# Patient Record
Sex: FEMALE | Race: WHITE | Employment: OTHER | URBAN - METROPOLITAN AREA
[De-identification: names, ages, dates, MRNs, and addresses within clinical notes are randomized per-mention and may not be internally consistent; named-entity substitution may affect disease eponyms.]

---

## 2017-02-13 ENCOUNTER — HOSPITAL ENCOUNTER (OUTPATIENT)
Dept: CT IMAGING | Age: 72
Discharge: HOME | End: 2017-02-13
Attending: INTERNAL MEDICINE | Admitting: INTERNAL MEDICINE

## 2017-02-13 DIAGNOSIS — R91.1 SOLITARY PULMONARY NODULE: ICD-10-CM

## 2017-02-13 DIAGNOSIS — N28.9 DISORDER OF KIDNEY: ICD-10-CM

## 2017-02-13 DIAGNOSIS — N28.9 DISORDER OF KIDNEY AND URETER: ICD-10-CM

## 2017-02-13 DIAGNOSIS — R91.1 PULMONARY NODULE: ICD-10-CM

## 2017-02-15 ENCOUNTER — OFFICE VISIT (OUTPATIENT)
Dept: PULMONOLOGY | Age: 72
End: 2017-02-15

## 2017-02-15 ENCOUNTER — TELEPHONE (OUTPATIENT)
Dept: PULMONOLOGY | Age: 72
End: 2017-02-15

## 2017-02-15 VITALS
WEIGHT: 148 LBS | BODY MASS INDEX: 23.78 KG/M2 | OXYGEN SATURATION: 95 % | DIASTOLIC BLOOD PRESSURE: 82 MMHG | HEART RATE: 69 BPM | SYSTOLIC BLOOD PRESSURE: 118 MMHG | RESPIRATION RATE: 20 BRPM | HEIGHT: 66 IN | TEMPERATURE: 98.3 F

## 2017-02-15 DIAGNOSIS — J30.9 ALLERGIC RHINITIS, UNSPECIFIED ALLERGIC RHINITIS TRIGGER, UNSPECIFIED RHINITIS SEASONALITY: ICD-10-CM

## 2017-02-15 DIAGNOSIS — Z01.812 PRE-PROCEDURE LAB EXAM: ICD-10-CM

## 2017-02-15 DIAGNOSIS — R91.8 PULMONARY NODULES: Primary | ICD-10-CM

## 2017-02-15 DIAGNOSIS — J45.901 ASTHMA WITH ACUTE EXACERBATION, UNSPECIFIED ASTHMA SEVERITY: ICD-10-CM

## 2017-02-15 DIAGNOSIS — R55 SYNCOPE, UNSPECIFIED SYNCOPE TYPE: ICD-10-CM

## 2017-02-15 PROCEDURE — 99214 OFFICE O/P EST MOD 30 MIN: CPT | Performed by: INTERNAL MEDICINE

## 2017-02-15 RX ORDER — PREDNISONE 10 MG/1
TABLET ORAL
Qty: 30 TABLET | Refills: 0 | Status: SHIPPED | OUTPATIENT
Start: 2017-02-15 | End: 2017-02-25

## 2017-02-15 RX ORDER — DOXYCYCLINE HYCLATE 100 MG
100 TABLET ORAL 2 TIMES DAILY
Qty: 10 TABLET | Refills: 0 | Status: SHIPPED | OUTPATIENT
Start: 2017-02-15 | End: 2017-02-20

## 2017-02-24 ENCOUNTER — TELEPHONE (OUTPATIENT)
Dept: PULMONOLOGY | Age: 72
End: 2017-02-24

## 2017-02-27 ENCOUNTER — HOSPITAL ENCOUNTER (OUTPATIENT)
Dept: SURGERY | Age: 72
Discharge: HOME | End: 2017-02-27
Attending: INTERNAL MEDICINE | Admitting: INTERNAL MEDICINE

## 2017-02-27 VITALS
BODY MASS INDEX: 24.49 KG/M2 | HEART RATE: 81 BPM | OXYGEN SATURATION: 94 % | TEMPERATURE: 97 F | HEIGHT: 65 IN | DIASTOLIC BLOOD PRESSURE: 66 MMHG | WEIGHT: 147 LBS | RESPIRATION RATE: 16 BRPM | SYSTOLIC BLOOD PRESSURE: 114 MMHG

## 2017-02-27 DIAGNOSIS — R91.1 LUNG NODULE: ICD-10-CM

## 2017-02-27 DIAGNOSIS — R59.0 HILAR ADENOPATHY: ICD-10-CM

## 2017-02-27 LAB
INR BLD: 0.97 (ref 0.85–1.15)
PLATELET # BLD: 252 K/UL (ref 135–450)
PROTHROMBIN TIME: 11 SEC (ref 9.6–13)

## 2017-02-27 PROCEDURE — 31624 DX BRONCHOSCOPE/LAVAGE: CPT | Performed by: INTERNAL MEDICINE

## 2017-02-27 PROCEDURE — 31628 BRONCHOSCOPY/LUNG BX EACH: CPT | Performed by: INTERNAL MEDICINE

## 2017-02-27 PROCEDURE — 31623 DX BRONCHOSCOPE/BRUSH: CPT | Performed by: INTERNAL MEDICINE

## 2017-02-27 PROCEDURE — 31627 NAVIGATIONAL BRONCHOSCOPY: CPT | Performed by: INTERNAL MEDICINE

## 2017-02-27 PROCEDURE — 31652 BRONCH EBUS SAMPLNG 1/2 NODE: CPT | Performed by: INTERNAL MEDICINE

## 2017-02-27 RX ORDER — HYDROMORPHONE HCL 110MG/55ML
0.25 PATIENT CONTROLLED ANALGESIA SYRINGE INTRAVENOUS EVERY 5 MIN PRN
Status: DISCONTINUED | OUTPATIENT
Start: 2017-02-27 | End: 2017-02-28 | Stop reason: HOSPADM

## 2017-02-27 RX ORDER — MEPERIDINE HYDROCHLORIDE 25 MG/ML
12.5 INJECTION INTRAMUSCULAR; INTRAVENOUS; SUBCUTANEOUS EVERY 5 MIN PRN
Status: DISCONTINUED | OUTPATIENT
Start: 2017-02-27 | End: 2017-02-28 | Stop reason: HOSPADM

## 2017-02-27 RX ORDER — LABETALOL HYDROCHLORIDE 5 MG/ML
5 INJECTION, SOLUTION INTRAVENOUS EVERY 10 MIN PRN
Status: DISCONTINUED | OUTPATIENT
Start: 2017-02-27 | End: 2017-02-28 | Stop reason: HOSPADM

## 2017-02-27 RX ORDER — SODIUM CHLORIDE, SODIUM LACTATE, POTASSIUM CHLORIDE, CALCIUM CHLORIDE 600; 310; 30; 20 MG/100ML; MG/100ML; MG/100ML; MG/100ML
INJECTION, SOLUTION INTRAVENOUS CONTINUOUS
Status: DISCONTINUED | OUTPATIENT
Start: 2017-02-27 | End: 2017-02-28 | Stop reason: HOSPADM

## 2017-02-27 RX ORDER — OXYCODONE HYDROCHLORIDE AND ACETAMINOPHEN 5; 325 MG/1; MG/1
2 TABLET ORAL PRN
Status: ACTIVE | OUTPATIENT
Start: 2017-02-27 | End: 2017-02-27

## 2017-02-27 RX ORDER — ONDANSETRON 2 MG/ML
4 INJECTION INTRAMUSCULAR; INTRAVENOUS EVERY 10 MIN PRN
Status: DISCONTINUED | OUTPATIENT
Start: 2017-02-27 | End: 2017-02-28 | Stop reason: HOSPADM

## 2017-02-27 RX ORDER — HYDROMORPHONE HCL 110MG/55ML
0.5 PATIENT CONTROLLED ANALGESIA SYRINGE INTRAVENOUS EVERY 5 MIN PRN
Status: DISCONTINUED | OUTPATIENT
Start: 2017-02-27 | End: 2017-02-28 | Stop reason: HOSPADM

## 2017-02-27 RX ORDER — HYDRALAZINE HYDROCHLORIDE 20 MG/ML
5 INJECTION INTRAMUSCULAR; INTRAVENOUS EVERY 10 MIN PRN
Status: DISCONTINUED | OUTPATIENT
Start: 2017-02-27 | End: 2017-02-28 | Stop reason: HOSPADM

## 2017-02-27 RX ORDER — OXYCODONE HYDROCHLORIDE AND ACETAMINOPHEN 5; 325 MG/1; MG/1
1 TABLET ORAL PRN
Status: ACTIVE | OUTPATIENT
Start: 2017-02-27 | End: 2017-02-27

## 2017-02-27 RX ADMIN — SODIUM CHLORIDE, SODIUM LACTATE, POTASSIUM CHLORIDE, CALCIUM CHLORIDE: 600; 310; 30; 20 INJECTION, SOLUTION INTRAVENOUS at 12:00

## 2017-02-27 ASSESSMENT — PAIN DESCRIPTION - DESCRIPTORS: DESCRIPTORS: ACHING

## 2017-02-27 ASSESSMENT — PAIN SCALES - GENERAL: PAINLEVEL_OUTOF10: 0

## 2017-02-27 ASSESSMENT — PAIN - FUNCTIONAL ASSESSMENT: PAIN_FUNCTIONAL_ASSESSMENT: 0-10

## 2017-03-01 LAB
CULTURE, RESPIRATORY: NORMAL
CULTURE, RESPIRATORY: NORMAL
GRAM STAIN RESULT: NORMAL
GRAM STAIN RESULT: NORMAL

## 2017-03-02 ENCOUNTER — OFFICE VISIT (OUTPATIENT)
Dept: PULMONOLOGY | Age: 72
End: 2017-03-02

## 2017-03-02 VITALS
HEIGHT: 66 IN | BODY MASS INDEX: 23.78 KG/M2 | HEART RATE: 68 BPM | OXYGEN SATURATION: 95 % | SYSTOLIC BLOOD PRESSURE: 109 MMHG | DIASTOLIC BLOOD PRESSURE: 73 MMHG | WEIGHT: 148 LBS | TEMPERATURE: 97.5 F | RESPIRATION RATE: 18 BRPM

## 2017-03-02 DIAGNOSIS — R91.1 PULMONARY NODULE: Primary | ICD-10-CM

## 2017-03-02 DIAGNOSIS — J30.9 ALLERGIC RHINITIS, UNSPECIFIED ALLERGIC RHINITIS TRIGGER, UNSPECIFIED RHINITIS SEASONALITY: ICD-10-CM

## 2017-03-02 PROCEDURE — 99214 OFFICE O/P EST MOD 30 MIN: CPT | Performed by: INTERNAL MEDICINE

## 2017-03-02 RX ORDER — ALBUTEROL SULFATE 90 UG/1
2 AEROSOL, METERED RESPIRATORY (INHALATION) EVERY 6 HOURS PRN
Qty: 3 INHALER | Refills: 1 | Status: SHIPPED | OUTPATIENT
Start: 2017-03-02 | End: 2017-11-16 | Stop reason: SDUPTHER

## 2017-03-02 RX ORDER — AZELASTINE 1 MG/ML
2 SPRAY, METERED NASAL 2 TIMES DAILY
Qty: 3 BOTTLE | Refills: 1 | Status: SHIPPED | OUTPATIENT
Start: 2017-03-02 | End: 2018-05-03 | Stop reason: ALTCHOICE

## 2017-03-02 RX ORDER — FLUTICASONE PROPIONATE 50 MCG
SPRAY, SUSPENSION (ML) NASAL
Qty: 3 BOTTLE | Refills: 1 | Status: SHIPPED | OUTPATIENT
Start: 2017-03-02 | End: 2017-11-16 | Stop reason: SDUPTHER

## 2017-03-02 RX ORDER — MONTELUKAST SODIUM 10 MG/1
10 TABLET ORAL NIGHTLY
Qty: 90 TABLET | Refills: 1 | Status: SHIPPED | OUTPATIENT
Start: 2017-03-02 | End: 2018-05-03 | Stop reason: SDUPTHER

## 2017-04-03 LAB
FUNGUS (MYCOLOGY) CULTURE: NORMAL
FUNGUS (MYCOLOGY) CULTURE: NORMAL
FUNGUS STAIN: NORMAL
FUNGUS STAIN: NORMAL

## 2017-04-18 LAB
AFB CULTURE (MYCOBACTERIA): NORMAL
AFB CULTURE (MYCOBACTERIA): NORMAL
AFB SMEAR: NORMAL
AFB SMEAR: NORMAL

## 2017-05-15 ENCOUNTER — HOSPITAL ENCOUNTER (OUTPATIENT)
Dept: CT IMAGING | Age: 72
Discharge: HOME | End: 2017-05-15
Attending: INTERNAL MEDICINE | Admitting: INTERNAL MEDICINE

## 2017-05-15 DIAGNOSIS — R91.1 PULMONARY NODULE: ICD-10-CM

## 2017-05-15 DIAGNOSIS — R91.1 SOLITARY PULMONARY NODULE: ICD-10-CM

## 2017-06-05 ENCOUNTER — OFFICE VISIT (OUTPATIENT)
Dept: PULMONOLOGY | Age: 72
End: 2017-06-05

## 2017-06-05 VITALS
BODY MASS INDEX: 24.16 KG/M2 | RESPIRATION RATE: 16 BRPM | SYSTOLIC BLOOD PRESSURE: 100 MMHG | TEMPERATURE: 97.9 F | HEART RATE: 72 BPM | WEIGHT: 145 LBS | DIASTOLIC BLOOD PRESSURE: 66 MMHG | HEIGHT: 65 IN | OXYGEN SATURATION: 98 %

## 2017-06-05 DIAGNOSIS — R91.1 PULMONARY NODULE: Primary | ICD-10-CM

## 2017-06-05 DIAGNOSIS — Z23 NEED FOR VACCINATION WITH 13-POLYVALENT PNEUMOCOCCAL CONJUGATE VACCINE: ICD-10-CM

## 2017-06-05 DIAGNOSIS — J45.31 MILD PERSISTENT ASTHMA WITH ACUTE EXACERBATION: ICD-10-CM

## 2017-06-05 DIAGNOSIS — Z87.891 FORMER SMOKER: ICD-10-CM

## 2017-06-05 DIAGNOSIS — J30.9 ALLERGIC RHINITIS, UNSPECIFIED ALLERGIC RHINITIS TRIGGER, UNSPECIFIED RHINITIS SEASONALITY: ICD-10-CM

## 2017-06-05 PROCEDURE — G0009 ADMIN PNEUMOCOCCAL VACCINE: HCPCS | Performed by: INTERNAL MEDICINE

## 2017-06-05 PROCEDURE — 90670 PCV13 VACCINE IM: CPT | Performed by: INTERNAL MEDICINE

## 2017-06-05 PROCEDURE — 99214 OFFICE O/P EST MOD 30 MIN: CPT | Performed by: INTERNAL MEDICINE

## 2017-06-05 RX ORDER — PREDNISONE 10 MG/1
TABLET ORAL
Qty: 30 TABLET | Refills: 0 | Status: SHIPPED | OUTPATIENT
Start: 2017-06-05 | End: 2017-06-15

## 2017-07-14 ENCOUNTER — TELEPHONE (OUTPATIENT)
Dept: PULMONOLOGY | Age: 72
End: 2017-07-14

## 2017-08-21 ENCOUNTER — HOSPITAL ENCOUNTER (OUTPATIENT)
Dept: GENERAL RADIOLOGY | Age: 72
Discharge: HOME | End: 2017-08-21
Attending: INTERNAL MEDICINE | Admitting: INTERNAL MEDICINE

## 2017-08-21 DIAGNOSIS — M81.0 AGE-RELATED OSTEOPOROSIS WITHOUT CURRENT PATHOLOGICAL FRACTURE: ICD-10-CM

## 2017-08-21 DIAGNOSIS — M81.0 OSTEOPOROSIS, UNSPECIFIED OSTEOPOROSIS TYPE, UNSPECIFIED PATHOLOGICAL FRACTURE PRESENCE: ICD-10-CM

## 2017-11-15 ENCOUNTER — TELEPHONE (OUTPATIENT)
Dept: PULMONOLOGY | Age: 72
End: 2017-11-15

## 2017-11-15 ENCOUNTER — OFFICE VISIT (OUTPATIENT)
Dept: PULMONOLOGY | Age: 72
End: 2017-11-15

## 2017-11-15 ENCOUNTER — HOSPITAL ENCOUNTER (OUTPATIENT)
Dept: CT IMAGING | Age: 72
Discharge: HOME | End: 2017-11-15
Attending: INTERNAL MEDICINE | Admitting: INTERNAL MEDICINE

## 2017-11-15 VITALS
DIASTOLIC BLOOD PRESSURE: 76 MMHG | TEMPERATURE: 97.2 F | RESPIRATION RATE: 22 BRPM | WEIGHT: 153.4 LBS | BODY MASS INDEX: 25.56 KG/M2 | HEIGHT: 65 IN | OXYGEN SATURATION: 96 % | HEART RATE: 58 BPM | SYSTOLIC BLOOD PRESSURE: 116 MMHG

## 2017-11-15 DIAGNOSIS — R91.1 LUNG NODULE: Primary | ICD-10-CM

## 2017-11-15 DIAGNOSIS — R91.1 SOLITARY PULMONARY NODULE: ICD-10-CM

## 2017-11-15 DIAGNOSIS — J45.909 PERSISTENT ASTHMA WITHOUT COMPLICATION, UNSPECIFIED ASTHMA SEVERITY: ICD-10-CM

## 2017-11-15 DIAGNOSIS — R91.1 PULMONARY NODULE: ICD-10-CM

## 2017-11-15 PROCEDURE — 1123F ACP DISCUSS/DSCN MKR DOCD: CPT | Performed by: INTERNAL MEDICINE

## 2017-11-15 PROCEDURE — G8484 FLU IMMUNIZE NO ADMIN: HCPCS | Performed by: INTERNAL MEDICINE

## 2017-11-15 PROCEDURE — 3017F COLORECTAL CA SCREEN DOC REV: CPT | Performed by: INTERNAL MEDICINE

## 2017-11-15 PROCEDURE — G8400 PT W/DXA NO RESULTS DOC: HCPCS | Performed by: INTERNAL MEDICINE

## 2017-11-15 PROCEDURE — 4040F PNEUMOC VAC/ADMIN/RCVD: CPT | Performed by: INTERNAL MEDICINE

## 2017-11-15 PROCEDURE — 99214 OFFICE O/P EST MOD 30 MIN: CPT | Performed by: INTERNAL MEDICINE

## 2017-11-15 PROCEDURE — G8427 DOCREV CUR MEDS BY ELIG CLIN: HCPCS | Performed by: INTERNAL MEDICINE

## 2017-11-15 PROCEDURE — 1090F PRES/ABSN URINE INCON ASSESS: CPT | Performed by: INTERNAL MEDICINE

## 2017-11-15 PROCEDURE — G8419 CALC BMI OUT NRM PARAM NOF/U: HCPCS | Performed by: INTERNAL MEDICINE

## 2017-11-15 PROCEDURE — 4004F PT TOBACCO SCREEN RCVD TLK: CPT | Performed by: INTERNAL MEDICINE

## 2017-11-15 PROCEDURE — 3014F SCREEN MAMMO DOC REV: CPT | Performed by: INTERNAL MEDICINE

## 2017-11-15 RX ORDER — CITALOPRAM 20 MG/1
20 TABLET ORAL
COMMUNITY
Start: 2017-01-17

## 2017-11-15 NOTE — COMMUNICATION BODY
Assessment:      · Growing RUL nodule now 8 mm on CT chest 11/15/2017. 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 CT chest 8/12/16 showed stability with calcification. ? Growth on CT chest 2/13/2017 with hilar adenopathy. Negative EBUS TBNA of the LN 2/27/2017. Stable on repeat CT chest 11/15/2017. Overall fever granulomatous disease  · Persistent Asthma. Unable to afford Xolair  · Allergic rhinitis  · 54 pack years smoking-quit 5/2017   · Pulmonary nodules stable between 5/2007 and 4/2009. Likely granulomas. No further evaluation needed  · History of [address] [place]:      · Patient is not interested in chemotherapy or surgery if she is to have malignancy. She might consider radiation therapy. We'll refer to Dr. Yovana Dimas SBRT evaluation   · Decrease Advair 250/50 BID   · Albuterol 2 puffs Q4-6 hrs PRN  · Singulair, Flonase and Astelin   · Patient is up to date with Pneumococcal vaccine.    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

## 2017-11-15 NOTE — LETTER
[facility] Pulmonary, Critical Care, and Sleep  [address], [address] [place] [address]  Phone: [phone]  Fax: [phone]     November 15, 2017     Patient: Odessa Fitzpatrick   MR Number: [id number]   YOB: 1945   Date of Visit: 11/15/2017     Dear Dr. Justino Braun:    Thank you for the request for consultation for [place] to me for the evaluation of pulmonary nodule. Below are the relevant portions of my assessment and plan of care. Assessment:      · Growing RUL nodule now 8 mm on CT chest 11/15/2017. 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 CT chest 8/12/16 showed stability with calcification. ? Growth on CT chest 2/13/2017 with hilar adenopathy. Negative EBUS TBNA of the LN 2/27/2017. Stable on repeat CT chest 11/15/2017. Overall fever granulomatous disease  · Persistent Asthma. Unable to afford Xolair  · Allergic rhinitis  · 54 pack years smoking-quit 5/2017   · Pulmonary nodules stable between 5/2007 and 4/2009. Likely granulomas. No further evaluation needed  · History of [address] [place]:      · Patient is not interested in chemotherapy or surgery if she is to have malignancy. She might consider radiation therapy. We'll refer to Dr. Cipriano Beltran SBRT evaluation   · Decrease Advair 250/50 BID   · Albuterol 2 puffs Q4-6 hrs PRN  · Singulair, Flonase and Astelin   · Patient is up to date with Pneumococcal vaccine. 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 I look forward to following Kuldeep Vang along with you.     Sincerely,      Braulio Reeder MD

## 2017-11-15 NOTE — PROGRESS NOTES
[facility]    Outpatient Follow Up Note      CHIEF COMPLAINT: Follow up CT       HPI:   CT chest reviewed by me and noted below. Results were dicussed with patient and multiple good questions were answered. Breathing is fine. Clear sputum. Patient is compliant with inhaled bronchodilators. Does not like the Advair 500/50 and wants to go back to 250/50. Not needing to use her rescue inhaler, albuterol. 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)     Hyperlipidemia     Lock jaw     Pneumonia     Post-nasal drip     Pulmonary nodule     bi lateral    Rash     itchy, bumps    Reflex sympathetic dystrophy     RSD (reflex sympathetic dystrophy)     TMJ (dislocation of temporomandibular joint)        Past Surgical History:        Procedure Laterality Date    APPENDECTOMY      BRONCHOSCOPY  2008    COLONOSCOPY  11/15/2012    1 snared polyp    HYSTERECTOMY      TONSILLECTOMY         Allergies:  is allergic to latex; iodine; penicillins; sulfa antibiotics; tetanus toxoids; clindamycin/lincomycin; influenza vaccines; neurontin [gabapentin]; singulair [montelukast]; albuterol; codeine; and spiriva handihaler [tiotropium bromide monohydrate]. Social History:    TOBACCO:   reports that she has been smoking Cigarettes. She has a 12.75 pack-year smoking history. She has never used smokeless tobacco.  ETOH:   reports that she does not drink alcohol.       Family History:       Problem Relation Age of Onset    Diabetes Brother     Cancer Mother     Colon Cancer Mother     Asthma Neg Hx     Emphysema Neg Hx     Heart Failure Neg Hx     Hypertension Neg Hx        Current Medications:    Current Outpatient Prescriptions:     citalopram (CELEXA) 20 MG tablet, Take 20 mg by mouth, Disp: , Rfl:     fluticasone-salmeterol (ADVAIR) 500-50 MCG/DOSE diskus inhaler, Inhale 1 puff into the lungs every 12 hours, Disp: 2 Inhaler, Rfl: 0    albuterol sulfate HFA (VENTOLIN HFA) 108 (90 BASE) MCG/ACT inhaler, Inhale 2 puffs into the lungs every 6 hours as needed for Wheezing or Shortness of Breath, Disp: 3 Inhaler, Rfl: 1    montelukast (SINGULAIR) 10 MG tablet, Take 1 tablet by mouth nightly, Disp: 90 tablet, Rfl: 1    fluticasone (FLONASE) 50 MCG/ACT nasal spray, 1 SPRAY IN EACH NOSTRIL DAILY, Disp: 3 Bottle, Rfl: 1    azelastine (ASTELIN) 0.1 % nasal spray, 2 sprays by Nasal route 2 times daily Use in each nostril as directed, Disp: 3 Bottle, Rfl: 1    escitalopram (LEXAPRO) 10 MG tablet, Take 10 mg by mouth daily, Disp: , Rfl:     betamethasone dipropionate (DIPROLENE) 0.05 % cream, Apply topically as needed Apply topically 2 times daily. , Disp: , Rfl:     clobetasol (TEMOVATE) 0.05 % ointment, Apply topically 2 times daily Apply topically 2 times daily. , Disp: , Rfl:     pravastatin (PRAVACHOL) 40 MG tablet, Take 1 tablet by mouth daily. , Disp: , Rfl:     guaiFENesin (MUCINEX) 600 MG SR tablet, Take 1,200 mg by mouth 2 times daily as needed , Disp: , Rfl:     DiphenhydrAMINE HCl (BENADRYL PO), Take 1 tablet by mouth daily , Disp: , Rfl:       Objective:     /76 (Site: Left Arm, Position: Sitting, Cuff Size: Medium Adult)   Pulse 58   Temp 97.2 °F (36.2 °C) (Oral)   Resp 22   Ht 5' 5\" (1.651 m)   Wt 153 lb 6.4 oz (69.6 kg)   SpO2 96% Comment: on room air  BMI 25.53 kg/m²  on RA  Gen: No distress. Eyes: PERRL. No sclera icterus. No conjunctival injection. ENT: No discharge. Pharynx clear. Neck: Trachea midline. No obvious mass. Resp: No accessory muscle use. No crackles. No wheezes. No rhonchi. No dullness on percussion. CV: Regular rate. Regular rhythm. No murmur or rub. No edema. GI: Non-tender. Non-distended. No hernia. Skin: Warm and dry.  No nodule on exposed extremities. Lymph: No cervical LAD. No supraclavicular LAD. M/S: No cyanosis. No joint deformity. No clubbing. Neuro: Awake. Alert. Moves all four extremities. Psych: Oriented x 3. No anxiety. DATA reviewed by me:   PFTs 02/17/2016 FEV1 1.78L(78%) TLC 5.09L(98%)   DLCO 15.97 (70%) 6MW 1120 F LO2 91%   PFTs 07/10/2012 FEV1 1.91L(90%) TLC 5.04L(101%) DLCO 16.30 (90%)  PFTs 03/17/2011 FEV1 1.77L           TLC 4.77L             DLCO 16.72  PFTs 04/15/2008 FEV1 1.82L           TLC 4.87L             DLCO 19.24      CT chest 11/15/2017  images reviewed by me and showed:   Mediastinum: No mediastinal adenopathy. Thoracic aorta normal in caliber. No pericardial effusion. Lungs/pleura: No significant change in size in appearance of 2 adjacent nodules in the left upper lobe. Interval increase in size of a nodule in the lateral right upper lobe, now measuring 8 mm in greatest axial dimension, was 5.8 mm. This nodule has irregular margins. Additional small nodules scattered throughout both lungs are not significantly changed. There is scarring in the lung bases. Pleural spaces are clear   pper Abdomen: Stable hyperdense left renal lesion Soft Tissues/Bones: No suspicious bone lesion         Bronch 2/27/2017:  Hilar LAD: No phenotypic evidence of non-Hodgkin lymphoma. A. Lymph Node, 12L, Transbronchial Fine Needle Aspirate: No malignant cells identified. B. Lung, Left Lower Lobe Nodule Brushings and Brush Tip: No malignant cells identified. A. Lung, Bronchial Washing: No malignant cells identified. B. Lung, Bronchoalveolar Lavage to Left Lower Lobe: No malignant cells identified. Assessment:      · Growing RUL nodule now 8 mm on CT chest 11/15/2017. 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 CT chest 8/12/16 showed stability with calcification. ? Growth on CT chest 2/13/2017 with hilar adenopathy.  Negative EBUS TBNA of the LN 2/27/2017. Stable on repeat CT chest 11/15/2017. Overall fever granulomatous disease  · Persistent Asthma. Unable to afford Xolair  · Allergic rhinitis  · 54 pack years smoking-quit 5/2017   · Pulmonary nodules stable between 5/2007 and 4/2009. Likely granulomas. No further evaluation needed  · History of [address] [place]:      · Patient is not interested in chemotherapy or surgery if she is to have malignancy. She might consider radiation therapy. We'll refer to Dr. Gisel Barragan SBRT evaluation   · Decrease Advair 250/50 BID   · Albuterol 2 puffs Q4-6 hrs PRN  · Singulair, Flonase and Astelin   · Patient is up to date with Pneumococcal vaccine.    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

## 2017-11-16 RX ORDER — FLUTICASONE PROPIONATE 50 MCG
SPRAY, SUSPENSION (ML) NASAL
Qty: 3 BOTTLE | Refills: 1 | Status: SHIPPED | OUTPATIENT
Start: 2017-11-16 | End: 2018-05-03 | Stop reason: SDUPTHER

## 2017-11-16 RX ORDER — ALBUTEROL SULFATE 90 UG/1
2 AEROSOL, METERED RESPIRATORY (INHALATION) EVERY 6 HOURS PRN
Qty: 3 INHALER | Refills: 1 | Status: SHIPPED | OUTPATIENT
Start: 2017-11-16 | End: 2018-05-03 | Stop reason: SDUPTHER

## 2017-11-16 NOTE — TELEPHONE ENCOUNTER
Pt called asking for 90 day supply to be sent to pharmacy. Pt wants to be notified when scripts have been sent.
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
## 2017-11-30 PROBLEM — S22.20XA CLOSED FRACTURE OF STERNUM: Status: ACTIVE | Noted: 2017-11-30

## 2017-11-30 PROBLEM — S32.019A CLOSED FRACTURE OF FIRST LUMBAR VERTEBRA (HCC): Status: ACTIVE | Noted: 2017-11-30

## 2017-11-30 PROBLEM — S22.089A CLOSED FRACTURE OF TWELFTH THORACIC VERTEBRA (HCC): Status: ACTIVE | Noted: 2017-11-30

## 2018-04-23 ENCOUNTER — TELEPHONE (OUTPATIENT)
Dept: PULMONOLOGY | Age: 73
End: 2018-04-23

## 2018-05-03 ENCOUNTER — OFFICE VISIT (OUTPATIENT)
Dept: PULMONOLOGY | Age: 73
End: 2018-05-03

## 2018-05-03 VITALS
DIASTOLIC BLOOD PRESSURE: 74 MMHG | OXYGEN SATURATION: 94 % | WEIGHT: 148.8 LBS | HEIGHT: 65 IN | HEART RATE: 70 BPM | BODY MASS INDEX: 24.79 KG/M2 | TEMPERATURE: 97.4 F | RESPIRATION RATE: 22 BRPM | SYSTOLIC BLOOD PRESSURE: 126 MMHG

## 2018-05-03 DIAGNOSIS — R91.8 PULMONARY NODULES: Primary | ICD-10-CM

## 2018-05-03 DIAGNOSIS — J45.909 PERSISTENT ASTHMA, UNSPECIFIED ASTHMA SEVERITY, UNSPECIFIED WHETHER COMPLICATED: ICD-10-CM

## 2018-05-03 DIAGNOSIS — J30.9 ALLERGIC RHINITIS, UNSPECIFIED CHRONICITY, UNSPECIFIED SEASONALITY, UNSPECIFIED TRIGGER: ICD-10-CM

## 2018-05-03 DIAGNOSIS — Z87.891 FORMER SMOKER: ICD-10-CM

## 2018-05-03 PROCEDURE — 99214 OFFICE O/P EST MOD 30 MIN: CPT | Performed by: INTERNAL MEDICINE

## 2018-05-03 RX ORDER — ALBUTEROL SULFATE 90 UG/1
2 AEROSOL, METERED RESPIRATORY (INHALATION) EVERY 6 HOURS PRN
Qty: 3 INHALER | Refills: 1 | Status: SHIPPED | OUTPATIENT
Start: 2018-05-03 | End: 2018-11-12 | Stop reason: SDUPTHER

## 2018-05-03 RX ORDER — MONTELUKAST SODIUM 10 MG/1
10 TABLET ORAL NIGHTLY
Qty: 90 TABLET | Refills: 1 | Status: SHIPPED | OUTPATIENT
Start: 2018-05-03 | End: 2018-11-12 | Stop reason: SDUPTHER

## 2018-05-03 RX ORDER — FLUTICASONE PROPIONATE 50 MCG
SPRAY, SUSPENSION (ML) NASAL
Qty: 3 BOTTLE | Refills: 1 | Status: SHIPPED | OUTPATIENT
Start: 2018-05-03 | End: 2018-11-12 | Stop reason: SDUPTHER

## 2018-06-11 ENCOUNTER — HOSPITAL ENCOUNTER (OUTPATIENT)
Dept: CT IMAGING | Age: 73
Discharge: HOME | End: 2018-06-11
Attending: RADIOLOGY | Admitting: RADIOLOGY

## 2018-06-11 DIAGNOSIS — R91.1 SOLITARY PULMONARY NODULE: ICD-10-CM

## 2018-06-11 DIAGNOSIS — R91.1 LUNG NODULE: ICD-10-CM

## 2018-11-12 ENCOUNTER — OFFICE VISIT (OUTPATIENT)
Dept: PULMONOLOGY | Age: 73
End: 2018-11-12
Payer: COMMERCIAL

## 2018-11-12 VITALS
BODY MASS INDEX: 27.16 KG/M2 | HEART RATE: 62 BPM | SYSTOLIC BLOOD PRESSURE: 148 MMHG | HEIGHT: 65 IN | WEIGHT: 163 LBS | TEMPERATURE: 97.9 F | DIASTOLIC BLOOD PRESSURE: 80 MMHG | OXYGEN SATURATION: 97 % | RESPIRATION RATE: 18 BRPM

## 2018-11-12 DIAGNOSIS — R91.8 PULMONARY NODULES: Primary | ICD-10-CM

## 2018-11-12 DIAGNOSIS — J45.998 PERSISTENT ASTHMA WITH UNDETERMINED SEVERITY: ICD-10-CM

## 2018-11-12 DIAGNOSIS — Z87.891 PERSONAL HISTORY OF TOBACCO USE: ICD-10-CM

## 2018-11-12 DIAGNOSIS — J30.9 ALLERGIC RHINITIS, UNSPECIFIED SEASONALITY, UNSPECIFIED TRIGGER: ICD-10-CM

## 2018-11-12 PROCEDURE — 99214 OFFICE O/P EST MOD 30 MIN: CPT | Performed by: INTERNAL MEDICINE

## 2018-11-12 RX ORDER — TIZANIDINE 4 MG/1
4 TABLET ORAL EVERY 6 HOURS PRN
COMMUNITY
End: 2020-08-12

## 2018-11-12 RX ORDER — NAPROXEN 500 MG/1
500 TABLET ORAL 2 TIMES DAILY WITH MEALS
COMMUNITY
End: 2020-02-25 | Stop reason: ALTCHOICE

## 2018-11-12 RX ORDER — ALBUTEROL SULFATE 90 UG/1
2 AEROSOL, METERED RESPIRATORY (INHALATION) EVERY 6 HOURS PRN
Qty: 3 INHALER | Refills: 1 | Status: SHIPPED | OUTPATIENT
Start: 2018-11-12 | End: 2019-03-25 | Stop reason: SDUPTHER

## 2018-11-12 RX ORDER — FEXOFENADINE HCL 180 MG/1
180 TABLET ORAL DAILY
COMMUNITY

## 2018-11-12 RX ORDER — MONTELUKAST SODIUM 10 MG/1
10 TABLET ORAL NIGHTLY
Qty: 90 TABLET | Refills: 1 | Status: SHIPPED | OUTPATIENT
Start: 2018-11-12 | End: 2019-03-25 | Stop reason: SDUPTHER

## 2018-11-12 RX ORDER — GABAPENTIN 300 MG/1
CAPSULE ORAL
COMMUNITY
Start: 2018-10-31

## 2018-11-12 RX ORDER — FLUTICASONE PROPIONATE 50 MCG
SPRAY, SUSPENSION (ML) NASAL
Qty: 3 BOTTLE | Refills: 1 | Status: SHIPPED | OUTPATIENT
Start: 2018-11-12 | End: 2019-03-25 | Stop reason: SDUPTHER

## 2018-11-12 NOTE — PROGRESS NOTES
[facility]    Outpatient Follow Up Note      CHIEF COMPLAINT: Follow up asthma       HPI:   Evaluated by Dr. Casiano Sic and elected to follow up radiaographically   Uses Advair BID   Not needing to use the rescue inhaler- Albuterol   Some cough with occasional sputum - clear   No smoking         Past Medical History:   Diagnosis Date    Arthritis     Asthma     Bronchitis chronic     Colon polyp     [facility]     [facility] lung [facility]     Guillain-[place] ([facility]     Hyperlipidemia     Lock jaw     Pneumonia     Post-nasal drip     Pulmonary nodule     bi lateral    Rash     itchy, bumps    Reflex sympathetic dystrophy     RSD (reflex sympathetic dystrophy)     TMJ (dislocation of temporomandibular joint)        Past Surgical History:        Procedure Laterality Date    APPENDECTOMY      BRONCHOSCOPY  2008    COLONOSCOPY  11/15/2012    1 snared polyp    HYSTERECTOMY      TONSILLECTOMY         Allergies:  is allergic to latex; iodine; penicillins; sulfa antibiotics; tetanus toxoids; clindamycin/lincomycin; influenza vaccines; neurontin [gabapentin]; singulair [montelukast]; albuterol; codeine; and spiriva handihaler [tiotropium bromide monohydrate]. Social History:    TOBACCO:   reports that she quit smoking about a year ago. Her smoking use included Cigarettes. She has a 12.75 pack-year smoking history. She has never used smokeless tobacco.  ETOH:   reports that she drinks alcohol.       Family History:   Family History   Problem Relation Age of Onset    Diabetes Brother     Cancer Mother     Colon Cancer Mother     Asthma Neg Hx     Emphysema Neg Hx     Heart Failure Neg Hx     Hypertension Neg Hx        Current Medications:    Current Outpatient Prescriptions:     Cholecalciferol (VITAMIN D3 PO), Take by mouth, Disp: , Rfl:     tiZANidine (ZANAFLEX) 4 MG tablet, Take 4 mg by mouth every 6 hours as needed, Disp: , reviewed by me:   PFTs 02/17/2016 FEV1 1.78L(78%) TLC 5.09L(98%)   DLCO 15.97 (70%) 6MW 1120 F LO2 91%   PFTs 07/10/2012 FEV1 1.91L(90%) TLC 5.04L(101%) DLCO 16.30 (90%)  PFTs 03/17/2011 FEV1 1.77L           TLC 4.77L             DLCO 16.72  PFTs 04/15/2008 FEV1 1.82L           TLC 4.87L             DLCO 19.24          CT chest 6/11/18 imaging reviewed by me and showed  Unchanged left lower lobe right lower lobe pulmonary nodules since every 2018  No adenopathy        Bronch 2/27/2018: Hilar LAD: No phenotypic evidence of non-Hodgkin lymphoma. A. Lymph Node, 12L, Transbronchial Fine Needle Aspirate: No malignant cells identified. B. Lung, Left Lower Lobe Nodule Brushings and Brush Tip: No malignant cells identified. A. Lung, Bronchial Washing: No malignant cells identified. B. Lung, Bronchoalveolar Lavage to Left Lower Lobe: No malignant cells identified. Assessment:      · RUL nodule now 8 mm on CT chest 11/15/2017. Smaller on repeat CT chest 6/11/2018   · Lobulated LLL pulmonary nodule on CT chest 2/3/2016 (SUV of 1.48). CT chest 8/12/16 showed stability with calcification. ? Growth on CT chest 2/13/2017 with hilar adenopathy. Negative EBUS TBNA of the LN 2/27/2017. Stable on repeat CT chest 11/15/2017. Overall fever granulomatous disease  · Moderate persistent Asthma. Unable to afford Xolair  · Allergic rhinitis  · 54 pack years smoking-quit 11/2017   · Pulmonary nodules stable between 5/2007 and 4/2009. Likely granulomas. No further evaluation needed  · History of Guillain Phi       Plan:      Advair and Albuterol 2 puffs Q4-6 hrs PRN  Singulair, Flonase and Astelin   Patient is up to date with Pneumococcal vaccine.    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

## 2019-02-15 ENCOUNTER — HOSPITAL ENCOUNTER (OUTPATIENT)
Dept: CT IMAGING | Age: 74
Discharge: HOME | End: 2019-02-15
Payer: MEDICARE

## 2019-02-15 DIAGNOSIS — R91.8 LUNG MASS: ICD-10-CM

## 2019-02-15 DIAGNOSIS — R91.1 PULMONARY NODULE: ICD-10-CM

## 2019-02-15 PROCEDURE — 71250 CT THORAX DX C-: CPT

## 2019-03-25 ENCOUNTER — OFFICE VISIT (OUTPATIENT)
Dept: PULMONOLOGY | Age: 74
End: 2019-03-25
Payer: MEDICARE

## 2019-03-25 VITALS
OXYGEN SATURATION: 96 % | HEART RATE: 70 BPM | DIASTOLIC BLOOD PRESSURE: 78 MMHG | TEMPERATURE: 98 F | SYSTOLIC BLOOD PRESSURE: 120 MMHG | WEIGHT: 170.4 LBS | BODY MASS INDEX: 28.39 KG/M2 | HEIGHT: 65 IN | RESPIRATION RATE: 24 BRPM

## 2019-03-25 DIAGNOSIS — Z87.891 PERSONAL HISTORY OF TOBACCO USE: ICD-10-CM

## 2019-03-25 DIAGNOSIS — J45.41 MODERATE PERSISTENT ASTHMA WITH ACUTE EXACERBATION: Primary | ICD-10-CM

## 2019-03-25 DIAGNOSIS — R91.8 PULMONARY NODULES: ICD-10-CM

## 2019-03-25 DIAGNOSIS — J30.9 ALLERGIC RHINITIS, UNSPECIFIED SEASONALITY, UNSPECIFIED TRIGGER: ICD-10-CM

## 2019-03-25 PROCEDURE — 99214 OFFICE O/P EST MOD 30 MIN: CPT | Performed by: INTERNAL MEDICINE

## 2019-03-25 RX ORDER — ALBUTEROL SULFATE 90 UG/1
2 AEROSOL, METERED RESPIRATORY (INHALATION) EVERY 6 HOURS PRN
Qty: 3 INHALER | Refills: 1 | Status: SHIPPED | OUTPATIENT
Start: 2019-03-25 | End: 2020-02-03 | Stop reason: SDUPTHER

## 2019-03-25 RX ORDER — AZELASTINE 1 MG/ML
2 SPRAY, METERED NASAL 2 TIMES DAILY
Qty: 3 BOTTLE | Refills: 1 | Status: SHIPPED | OUTPATIENT
Start: 2019-03-25 | End: 2020-02-03 | Stop reason: SDUPTHER

## 2019-03-25 RX ORDER — FLUTICASONE PROPIONATE 50 MCG
SPRAY, SUSPENSION (ML) NASAL
Qty: 3 BOTTLE | Refills: 1 | Status: SHIPPED | OUTPATIENT
Start: 2019-03-25 | End: 2020-02-03 | Stop reason: SDUPTHER

## 2019-03-25 RX ORDER — IPRATROPIUM BROMIDE AND ALBUTEROL SULFATE 2.5; .5 MG/3ML; MG/3ML
1 SOLUTION RESPIRATORY (INHALATION) EVERY 6 HOURS PRN
Qty: 120 VIAL | Refills: 6 | Status: SHIPPED | OUTPATIENT
Start: 2019-03-25 | End: 2020-08-12 | Stop reason: SDUPTHER

## 2019-03-25 RX ORDER — MONTELUKAST SODIUM 10 MG/1
10 TABLET ORAL NIGHTLY
Qty: 90 TABLET | Refills: 1 | Status: SHIPPED | OUTPATIENT
Start: 2019-03-25 | End: 2019-11-18 | Stop reason: SDUPTHER

## 2019-03-25 RX ORDER — PREDNISONE 10 MG/1
TABLET ORAL
Qty: 30 TABLET | Refills: 0 | Status: SHIPPED | OUTPATIENT
Start: 2019-03-25 | End: 2019-04-04

## 2019-03-25 RX ORDER — LISINOPRIL 10 MG/1
10 TABLET ORAL DAILY
COMMUNITY

## 2019-05-01 ENCOUNTER — TELEPHONE (OUTPATIENT)
Dept: PULMONOLOGY | Age: 74
End: 2019-05-01

## 2019-05-01 NOTE — TELEPHONE ENCOUNTER
Patient called cancelled appt for 5/21/19 6 mo f/u Dr Leslie Friedman erasmo 7/17/19 @ 10:30am.      3//25/19      Assessment:   · Moderate persistent Asthma with AE. Unable to afford Xolair  · Allergic rhinitis  · 54 pack years smoking-quit 11/2017   · RUL nodule now 8 mm on CT chest 11/15/2017. Smaller on repeat CT chest 6/11/2018. No suspicious nodules on repeat CT 2/15/19. · Lobulated LLL pulmonary nodule on CT chest 2/3/2016 (SUV of 1.48). CT chest 8/12/16 showed stability with calcification. ? Growth on CT chest 2/13/2017 with hilar adenopathy. Negative EBUS TBNA of the LN 2/27/2017. Stable on repeat CT chest 11/15/2017. Overall fever granulomatous disease  · Pulmonary nodules stable between 5/2007 and 4/2009. Likely granulomas. No further evaluation needed  · History of Guillain Phi                 Plan:   · Prednisone taper   · Advair and Albuterol 2 puffs Q4-6 hrs PRN  · Trial of DuoNeb QID PRN   · Singulair, Flonase and Astelin   · Patient is up to date with Pneumococcal vaccine.    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

## 2019-07-15 ENCOUNTER — TELEPHONE (OUTPATIENT)
Dept: PULMONOLOGY | Age: 74
End: 2019-07-15

## 2019-07-15 NOTE — TELEPHONE ENCOUNTER
Patient cancelled appointment on 7/17/19 with  for 6 month follow-up. Reason: did not give one    Patient did not reschedule appointment. Patient declined to reschedule      Last OV   Assessment:3/25/19   · Moderate persistent Asthma with AE. Unable to afford Xolair  · Allergic rhinitis  · 54 pack years smoking-quit 11/2017   · RUL nodule now 8 mm on CT chest 11/15/2017. Smaller on repeat CT chest 6/11/2018. No suspicious nodules on repeat CT 2/15/19. · Lobulated LLL pulmonary nodule on CT chest 2/3/2016 (SUV of 1.48). CT chest 8/12/16 showed stability with calcification. ? Growth on CT chest 2/13/2017 with hilar adenopathy. Negative EBUS TBNA of the LN 2/27/2017. Stable on repeat CT chest 11/15/2017. Overall fever granulomatous disease  · Pulmonary nodules stable between 5/2007 and 4/2009. Likely granulomas. No further evaluation needed  · History of Guillain Phi                 Plan:   · Prednisone taper   · Advair and Albuterol 2 puffs Q4-6 hrs PRN  · Trial of DuoNeb QID PRN   · Singulair, Flonase and Astelin   · Patient is up to date with Pneumococcal vaccine.    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

## 2019-10-12 ENCOUNTER — HOSPITAL ENCOUNTER (EMERGENCY)
Age: 74
Discharge: HOME | End: 2019-10-12
Attending: EMERGENCY MEDICINE
Payer: MEDICARE

## 2019-10-12 VITALS
RESPIRATION RATE: 21 BRPM | TEMPERATURE: 98 F | WEIGHT: 170 LBS | HEART RATE: 60 BPM | OXYGEN SATURATION: 100 % | DIASTOLIC BLOOD PRESSURE: 82 MMHG | BODY MASS INDEX: 28.32 KG/M2 | SYSTOLIC BLOOD PRESSURE: 138 MMHG | HEIGHT: 65 IN

## 2019-10-12 DIAGNOSIS — R53.1 GENERALIZED WEAKNESS: ICD-10-CM

## 2019-10-12 DIAGNOSIS — R53.83 FATIGUE, UNSPECIFIED TYPE: ICD-10-CM

## 2019-10-12 DIAGNOSIS — I95.1 ORTHOSTATIC HYPOTENSION: Primary | ICD-10-CM

## 2019-10-12 LAB
A/G RATIO: 1.5 (ref 1.1–2.2)
ALBUMIN SERPL-MCNC: 4.3 G/DL (ref 3.4–5)
ALP BLD-CCNC: 100 U/L (ref 40–129)
ALT SERPL-CCNC: 14 U/L (ref 10–40)
ANION GAP SERPL CALCULATED.3IONS-SCNC: 13 MMOL/L (ref 3–16)
AST SERPL-CCNC: 20 U/L (ref 15–37)
BASOPHILS ABSOLUTE: 0.1 K/UL (ref 0–0.2)
BASOPHILS RELATIVE PERCENT: 0.8 %
BILIRUB SERPL-MCNC: 0.4 MG/DL (ref 0–1)
BILIRUBIN URINE: NEGATIVE
BLOOD, URINE: NEGATIVE
BUN BLDV-MCNC: 6 MG/DL (ref 7–20)
CALCIUM SERPL-MCNC: 9 MG/DL (ref 8.3–10.6)
CASTS: ABNORMAL /LPF
CHLORIDE BLD-SCNC: 98 MMOL/L (ref 99–110)
CLARITY: CLEAR
CO2: 23 MMOL/L (ref 21–32)
COLOR: YELLOW
CREAT SERPL-MCNC: 0.6 MG/DL (ref 0.6–1.2)
EKG ATRIAL RATE: 60 BPM
EKG DIAGNOSIS: NORMAL
EKG P AXIS: 75 DEGREES
EKG P-R INTERVAL: 170 MS
EKG Q-T INTERVAL: 444 MS
EKG QRS DURATION: 78 MS
EKG QTC CALCULATION (BAZETT): 444 MS
EKG R AXIS: -17 DEGREES
EKG T AXIS: 27 DEGREES
EKG VENTRICULAR RATE: 60 BPM
EOSINOPHILS ABSOLUTE: 0.2 K/UL (ref 0–0.6)
EOSINOPHILS RELATIVE PERCENT: 3.7 %
EPITHELIAL CELLS, UA: ABNORMAL /HPF
GFR AFRICAN AMERICAN: >60
GFR NON-AFRICAN AMERICAN: >60
GLOBULIN: 2.8 G/DL
GLUCOSE BLD-MCNC: 124 MG/DL (ref 70–99)
GLUCOSE URINE: NEGATIVE MG/DL
HCT VFR BLD CALC: 39.8 % (ref 36–48)
HEMOGLOBIN: 13.4 G/DL (ref 12–16)
KETONES, URINE: ABNORMAL MG/DL
LEUKOCYTE ESTERASE, URINE: NEGATIVE
LYMPHOCYTES ABSOLUTE: 1.4 K/UL (ref 1–5.1)
LYMPHOCYTES RELATIVE PERCENT: 23.7 %
MCH RBC QN AUTO: 30.8 PG (ref 26–34)
MCHC RBC AUTO-ENTMCNC: 33.8 G/DL (ref 31–36)
MCV RBC AUTO: 91.2 FL (ref 80–100)
MICROSCOPIC EXAMINATION: YES
MONOCYTES ABSOLUTE: 0.6 K/UL (ref 0–1.3)
MONOCYTES RELATIVE PERCENT: 10.4 %
MUCUS: ABNORMAL /LPF
NEUTROPHILS ABSOLUTE: 3.7 K/UL (ref 1.7–7.7)
NEUTROPHILS RELATIVE PERCENT: 61.4 %
NITRITE, URINE: NEGATIVE
PDW BLD-RTO: 14.9 % (ref 12.4–15.4)
PH UA: 7 (ref 5–8)
PLATELET # BLD: 189 K/UL (ref 135–450)
PMV BLD AUTO: 10.4 FL (ref 5–10.5)
POTASSIUM SERPL-SCNC: 3.9 MMOL/L (ref 3.5–5.1)
PRO-BNP: 148 PG/ML (ref 0–449)
PROTEIN UA: 30 MG/DL
RBC # BLD: 4.37 M/UL (ref 4–5.2)
RBC UA: ABNORMAL /HPF (ref 0–2)
SODIUM BLD-SCNC: 134 MMOL/L (ref 136–145)
SPECIFIC GRAVITY UA: 1.02 (ref 1–1.03)
TOTAL PROTEIN: 7.1 G/DL (ref 6.4–8.2)
TROPONIN: <0.01 NG/ML
URINE REFLEX TO CULTURE: ABNORMAL
URINE TYPE: ABNORMAL
UROBILINOGEN, URINE: 2 E.U./DL
WBC # BLD: 6 K/UL (ref 4–11)
WBC UA: ABNORMAL /HPF (ref 0–5)

## 2019-10-12 PROCEDURE — 51701 INSERT BLADDER CATHETER: CPT

## 2019-10-12 PROCEDURE — 80053 COMPREHEN METABOLIC PANEL: CPT

## 2019-10-12 PROCEDURE — 96360 HYDRATION IV INFUSION INIT: CPT

## 2019-10-12 PROCEDURE — 93010 ELECTROCARDIOGRAM REPORT: CPT | Performed by: INTERNAL MEDICINE

## 2019-10-12 PROCEDURE — 96361 HYDRATE IV INFUSION ADD-ON: CPT

## 2019-10-12 PROCEDURE — 83880 ASSAY OF NATRIURETIC PEPTIDE: CPT

## 2019-10-12 PROCEDURE — 93005 ELECTROCARDIOGRAM TRACING: CPT | Performed by: EMERGENCY MEDICINE

## 2019-10-12 PROCEDURE — 81001 URINALYSIS AUTO W/SCOPE: CPT

## 2019-10-12 PROCEDURE — 2580000003 HC RX 258: Performed by: NURSE PRACTITIONER

## 2019-10-12 PROCEDURE — 99285 EMERGENCY DEPT VISIT HI MDM: CPT

## 2019-10-12 PROCEDURE — 85025 COMPLETE CBC W/AUTO DIFF WBC: CPT

## 2019-10-12 PROCEDURE — 84484 ASSAY OF TROPONIN QUANT: CPT

## 2019-10-12 RX ORDER — 0.9 % SODIUM CHLORIDE 0.9 %
1000 INTRAVENOUS SOLUTION INTRAVENOUS ONCE
Status: COMPLETED | OUTPATIENT
Start: 2019-10-12 | End: 2019-10-12

## 2019-10-12 RX ADMIN — SODIUM CHLORIDE 1000 ML: 9 INJECTION, SOLUTION INTRAVENOUS at 10:42

## 2019-10-12 ASSESSMENT — ENCOUNTER SYMPTOMS
SHORTNESS OF BREATH: 1
ABDOMINAL PAIN: 0

## 2019-10-12 ASSESSMENT — PAIN DESCRIPTION - PAIN TYPE: TYPE: CHRONIC PAIN

## 2019-10-12 ASSESSMENT — PAIN SCALES - GENERAL: PAINLEVEL_OUTOF10: 6

## 2019-10-12 ASSESSMENT — PAIN DESCRIPTION - LOCATION: LOCATION: BACK

## 2019-11-18 DIAGNOSIS — J45.41 MODERATE PERSISTENT ASTHMA WITH ACUTE EXACERBATION: ICD-10-CM

## 2019-11-18 RX ORDER — MONTELUKAST SODIUM 10 MG/1
TABLET ORAL
Qty: 90 TABLET | Refills: 1 | Status: SHIPPED | OUTPATIENT
Start: 2019-11-18 | End: 2020-02-03 | Stop reason: SDUPTHER

## 2020-01-18 ENCOUNTER — APPOINTMENT (OUTPATIENT)
Dept: CT IMAGING | Age: 75
End: 2020-01-18
Payer: MEDICARE

## 2020-01-18 ENCOUNTER — HOSPITAL ENCOUNTER (EMERGENCY)
Age: 75
Discharge: HOME | End: 2020-01-19
Attending: EMERGENCY MEDICINE
Payer: MEDICARE

## 2020-01-18 VITALS
RESPIRATION RATE: 18 BRPM | TEMPERATURE: 97 F | HEART RATE: 72 BPM | WEIGHT: 170 LBS | DIASTOLIC BLOOD PRESSURE: 73 MMHG | HEIGHT: 65 IN | SYSTOLIC BLOOD PRESSURE: 120 MMHG | OXYGEN SATURATION: 97 % | BODY MASS INDEX: 28.32 KG/M2

## 2020-01-18 LAB
A/G RATIO: 1.4 (ref 1.1–2.2)
ALBUMIN SERPL-MCNC: 4.5 G/DL (ref 3.4–5)
ALP BLD-CCNC: 106 U/L (ref 40–129)
ALT SERPL-CCNC: 13 U/L (ref 10–40)
ANION GAP SERPL CALCULATED.3IONS-SCNC: 17 MMOL/L (ref 3–16)
AST SERPL-CCNC: 24 U/L (ref 15–37)
BASOPHILS ABSOLUTE: 0.1 K/UL (ref 0–0.2)
BASOPHILS RELATIVE PERCENT: 0.9 %
BILIRUB SERPL-MCNC: 0.3 MG/DL (ref 0–1)
BUN BLDV-MCNC: 15 MG/DL (ref 7–20)
CALCIUM SERPL-MCNC: 10.3 MG/DL (ref 8.3–10.6)
CHLORIDE BLD-SCNC: 97 MMOL/L (ref 99–110)
CO2: 21 MMOL/L (ref 21–32)
CREAT SERPL-MCNC: 0.9 MG/DL (ref 0.6–1.2)
EOSINOPHILS ABSOLUTE: 0.3 K/UL (ref 0–0.6)
EOSINOPHILS RELATIVE PERCENT: 3.1 %
GFR AFRICAN AMERICAN: >60
GFR NON-AFRICAN AMERICAN: >60
GLOBULIN: 3.2 G/DL
GLUCOSE BLD-MCNC: 131 MG/DL (ref 70–99)
HCT VFR BLD CALC: 47.4 % (ref 36–48)
HEMOGLOBIN: 15.5 G/DL (ref 12–16)
LIPASE: 37 U/L (ref 13–60)
LYMPHOCYTES ABSOLUTE: 2 K/UL (ref 1–5.1)
LYMPHOCYTES RELATIVE PERCENT: 21.5 %
MCH RBC QN AUTO: 31 PG (ref 26–34)
MCHC RBC AUTO-ENTMCNC: 32.7 G/DL (ref 31–36)
MCV RBC AUTO: 94.7 FL (ref 80–100)
MONOCYTES ABSOLUTE: 0.8 K/UL (ref 0–1.3)
MONOCYTES RELATIVE PERCENT: 8.5 %
NEUTROPHILS ABSOLUTE: 6.1 K/UL (ref 1.7–7.7)
NEUTROPHILS RELATIVE PERCENT: 66 %
PDW BLD-RTO: 13.7 % (ref 12.4–15.4)
PLATELET # BLD: 236 K/UL (ref 135–450)
PMV BLD AUTO: 11.3 FL (ref 5–10.5)
POTASSIUM REFLEX MAGNESIUM: 4.2 MMOL/L (ref 3.5–5.1)
RAPID INFLUENZA  B AGN: NEGATIVE
RAPID INFLUENZA A AGN: NEGATIVE
RBC # BLD: 5.01 M/UL (ref 4–5.2)
SODIUM BLD-SCNC: 135 MMOL/L (ref 136–145)
TOTAL PROTEIN: 7.7 G/DL (ref 6.4–8.2)
TROPONIN: <0.01 NG/ML
WBC # BLD: 9.3 K/UL (ref 4–11)

## 2020-01-18 PROCEDURE — 99284 EMERGENCY DEPT VISIT MOD MDM: CPT

## 2020-01-18 PROCEDURE — 70450 CT HEAD/BRAIN W/O DYE: CPT

## 2020-01-18 PROCEDURE — 80053 COMPREHEN METABOLIC PANEL: CPT

## 2020-01-18 PROCEDURE — 2580000003 HC RX 258: Performed by: EMERGENCY MEDICINE

## 2020-01-18 PROCEDURE — 87804 INFLUENZA ASSAY W/OPTIC: CPT

## 2020-01-18 PROCEDURE — 83690 ASSAY OF LIPASE: CPT

## 2020-01-18 PROCEDURE — 84484 ASSAY OF TROPONIN QUANT: CPT

## 2020-01-18 PROCEDURE — 85025 COMPLETE CBC W/AUTO DIFF WBC: CPT

## 2020-01-18 PROCEDURE — 93005 ELECTROCARDIOGRAM TRACING: CPT | Performed by: EMERGENCY MEDICINE

## 2020-01-18 RX ORDER — 0.9 % SODIUM CHLORIDE 0.9 %
1000 INTRAVENOUS SOLUTION INTRAVENOUS ONCE
Status: COMPLETED | OUTPATIENT
Start: 2020-01-18 | End: 2020-01-19

## 2020-01-18 RX ADMIN — SODIUM CHLORIDE 1000 ML: 9 INJECTION, SOLUTION INTRAVENOUS at 22:42

## 2020-01-18 ASSESSMENT — PAIN DESCRIPTION - PAIN TYPE: TYPE: CHRONIC PAIN

## 2020-01-18 ASSESSMENT — PAIN SCALES - GENERAL: PAINLEVEL_OUTOF10: 10

## 2020-01-18 ASSESSMENT — PAIN DESCRIPTION - LOCATION: LOCATION: BACK

## 2020-01-18 ASSESSMENT — PAIN DESCRIPTION - DESCRIPTORS: DESCRIPTORS: CONSTANT

## 2020-01-19 LAB
AMORPHOUS: ABNORMAL /HPF
BACTERIA: ABNORMAL /HPF
BILIRUBIN URINE: NEGATIVE
BLOOD, URINE: NEGATIVE
CASTS 2: ABNORMAL /LPF
CASTS: ABNORMAL /LPF
CLARITY: CLEAR
COLOR: YELLOW
EKG ATRIAL RATE: 73 BPM
EKG DIAGNOSIS: NORMAL
EKG P AXIS: 62 DEGREES
EKG P-R INTERVAL: 158 MS
EKG Q-T INTERVAL: 398 MS
EKG QRS DURATION: 68 MS
EKG QTC CALCULATION (BAZETT): 438 MS
EKG R AXIS: -12 DEGREES
EKG T AXIS: 23 DEGREES
EKG VENTRICULAR RATE: 73 BPM
EPITHELIAL CELLS, UA: ABNORMAL /HPF
GLUCOSE URINE: NEGATIVE MG/DL
KETONES, URINE: NEGATIVE MG/DL
LEUKOCYTE ESTERASE, URINE: ABNORMAL
MICROSCOPIC EXAMINATION: YES
MUCUS: ABNORMAL /LPF
NITRITE, URINE: NEGATIVE
PH UA: 5.5 (ref 5–8)
PROTEIN UA: ABNORMAL MG/DL
RBC UA: ABNORMAL /HPF (ref 0–2)
RENAL EPITHELIAL, UA: ABNORMAL /HPF
SPECIFIC GRAVITY UA: >=1.03 (ref 1–1.03)
URINE REFLEX TO CULTURE: YES
URINE TYPE: ABNORMAL
UROBILINOGEN, URINE: 0.2 E.U./DL
WBC UA: ABNORMAL /HPF (ref 0–5)

## 2020-01-19 PROCEDURE — 87086 URINE CULTURE/COLONY COUNT: CPT

## 2020-01-19 PROCEDURE — 93010 ELECTROCARDIOGRAM REPORT: CPT | Performed by: INTERNAL MEDICINE

## 2020-01-19 PROCEDURE — 81001 URINALYSIS AUTO W/SCOPE: CPT

## 2020-01-19 NOTE — ED PROVIDER NOTES
atraumatic. Eyes:  conjunctiva/corneas clear, EOM's intact. Sclera anicteric. ENT: Mucous membranes moist.   Neck: Supple, symmetrical, trachea midline, no adenopathy. No jugular venous distention. Lungs:   Clear to auscultation bilaterally, respirationsunlabored. No rales, rhonchi or wheezes. Chest Wall:  No tenderness. Heart:  Regular rate and rhythm, S1 and S2 normal, no murmur, rub or gallop. Abdomen:   Soft, non-tender, bowel sounds active,   no masses, no organomegaly. Extremities: No edema, cords or calf tenderness. Full range of motion. Pulses: 2+ and symmetric   Skin: Turgor is normal, no rashes or lesions. Neurologic: Alert and oriented X 3. No focal findings.   Motor grossly normal.  Speech clear, no drift, CN III-XII grossly intact,        DIAGNOSTIC RESULTS   LABS:    Labs Reviewed   CBC WITH AUTO DIFFERENTIAL - Abnormal; Notable for the following components:       Result Value    MPV 11.3 (*)     All other components within normal limits    Narrative:     Performed at:  [place] [address],  [place], [facility]   Phone [phone]   COMPREHENSIVE METABOLIC PANEL W/ REFLEX TO MG FOR LOW K - Abnormal; Notable for the following components:    Sodium 135 (*)     Chloride 97 (*)     Anion Gap 17 (*)     Glucose 131 (*)     All other components within normal limits    Narrative:     Performed at:  [place] [address],  [place], [facility]   Phone [phone]   RAPID INFLUENZA A/B ANTIGENS    Narrative:     Performed at:  [place] [address],  [place], [facility]   Phone [phone]   LIPASE    Narrative:     Performed at:  [place] [address],  [place], [facility]   Phone [phone]   TROPONIN    Narrative:     Performed at:  [facility] [place] [address],  [place], [place] [address]   Phone [phone]   URINE RT REFLEX TO CULTURE       All other labs were within normal range or not returned as of this dictation. EKG: All EKG's are interpreted by the Emergency Department Physician who eithersigns or Co-signs this chart in the absence of a cardiologist.    The Ekg interpreted by me in the absence of a cardiologist shows. Normal Sinus rhythm   Rate of  73   Axis is   Normal  QTc is  within an acceptable range  Intervals and Durations are unremarkable. Nonspecific ST-T wave changes appreciated. No evidence of acute ischemia. RADIOLOGY:   Non-plain film images such as CT, Ultrasound and MRI are read by the radiologist. Plain radiographic images are visualized by myself. *    Interpretation per the Radiologist below, if available at the time of this note:    CT Head WO Contrast   Preliminary Result   1. No acute intracranial abnormality. 2. Cerebral parenchymal volume loss with severe chronic microvascular white   matter ischemic disease. 3. Stable right mastoid effusion. PROCEDURES   Unless otherwise noted below, none     Procedures    *    CRITICAL CARE TIME   N/A      EMERGENCY DEPARTMENT COURSE and DIFFERENTIALDIAGNOSIS/MDM:   Vitals:    Vitals:    01/18/20 2111   BP: 120/73   Pulse: 72   Resp: 18   Temp: 97 °F (36.1 °C)   TempSrc: Temporal   SpO2: 97%   Weight: 170 lb (77.1 kg)   Height: 5' 5\" (1.651 m)       Patient was given thefollowing medications:  Medications   0.9 % sodium chloride bolus (1,000 mLs Intravenous New Bag 1/18/20 3312)           The patient tolerated their visit well. The patient and / or the familywere informed of the results of any tests, a time was given to answer questions. FINAL IMPRESSION      1.  Gastroenteritis          DISPOSITION/PLAN   DISPOSITION Decision To Discharge 01/19/2020 12:55:03 AM  Patient ambulated without difficulty refused an abdominal CT vital signs are stable plan is for discharge    PATIENT REFERRED TO:  Bernadette Girard MD  [address]  [phone]    In 2 days        DISCHARGE MEDICATIONS:  New Prescriptions    No medications on file       DISCONTINUED MEDICATIONS:  Discontinued Medications    No medications on file              (Please note that portions of this note were completed with a voice recognition program.  Efforts were made to edit the dictations but occasionally words are mis-transcribed.)    Keri Kaiser MD (electronically signed)      Keri Kaiser MD  01/19/20 7538

## 2020-01-19 NOTE — ED NOTES
Report from Emilee Leroy, [address] [place] [address].       Eyal Hedrick RN  01/18/20 9805

## 2020-01-20 LAB — URINE CULTURE, ROUTINE: NORMAL

## 2020-02-03 ENCOUNTER — OFFICE VISIT (OUTPATIENT)
Dept: PULMONOLOGY | Age: 75
End: 2020-02-03
Payer: MEDICARE

## 2020-02-03 VITALS
HEIGHT: 65 IN | BODY MASS INDEX: 26.82 KG/M2 | WEIGHT: 161 LBS | TEMPERATURE: 98.1 F | HEART RATE: 72 BPM | OXYGEN SATURATION: 96 % | RESPIRATION RATE: 16 BRPM | DIASTOLIC BLOOD PRESSURE: 70 MMHG | SYSTOLIC BLOOD PRESSURE: 116 MMHG

## 2020-02-03 PROCEDURE — 99214 OFFICE O/P EST MOD 30 MIN: CPT | Performed by: INTERNAL MEDICINE

## 2020-02-03 RX ORDER — MONTELUKAST SODIUM 10 MG/1
TABLET ORAL
Qty: 90 TABLET | Refills: 1 | Status: SHIPPED | OUTPATIENT
Start: 2020-02-03 | End: 2020-08-12

## 2020-02-03 RX ORDER — PREDNISONE 10 MG/1
TABLET ORAL
Qty: 30 TABLET | Refills: 0 | Status: SHIPPED | OUTPATIENT
Start: 2020-02-03 | End: 2020-02-13

## 2020-02-03 RX ORDER — AZELASTINE 1 MG/ML
2 SPRAY, METERED NASAL 2 TIMES DAILY
Qty: 3 BOTTLE | Refills: 1 | Status: SHIPPED | OUTPATIENT
Start: 2020-02-03

## 2020-02-03 RX ORDER — HYDROXYZINE HYDROCHLORIDE 25 MG/1
TABLET, FILM COATED ORAL
COMMUNITY
Start: 2020-01-21

## 2020-02-03 RX ORDER — ALBUTEROL SULFATE 90 UG/1
2 AEROSOL, METERED RESPIRATORY (INHALATION) EVERY 6 HOURS PRN
Qty: 3 INHALER | Refills: 1 | Status: SHIPPED | OUTPATIENT
Start: 2020-02-03 | End: 2020-08-12 | Stop reason: SDUPTHER

## 2020-02-03 RX ORDER — DOXYCYCLINE HYCLATE 100 MG
100 TABLET ORAL 2 TIMES DAILY
Qty: 10 TABLET | Refills: 0 | Status: SHIPPED | OUTPATIENT
Start: 2020-02-03 | End: 2020-02-08

## 2020-02-03 RX ORDER — FUROSEMIDE 40 MG/1
TABLET ORAL
COMMUNITY
Start: 2019-12-27 | End: 2020-08-12

## 2020-02-03 RX ORDER — FLUTICASONE PROPIONATE 50 MCG
SPRAY, SUSPENSION (ML) NASAL
Qty: 3 BOTTLE | Refills: 1 | Status: SHIPPED | OUTPATIENT
Start: 2020-02-03

## 2020-02-03 RX ORDER — POTASSIUM CHLORIDE 20 MEQ/1
40 TABLET, EXTENDED RELEASE ORAL
COMMUNITY
Start: 2019-06-25 | End: 2020-08-12

## 2020-02-03 NOTE — PROGRESS NOTES
[facility]    Outpatient Follow Up Note      CHIEF COMPLAINT:  Follow up asthma       HPI:   Doing fine   Some cough with no sputum  No hemoptysis   Uses Albuterol 2 times/day   Uses neb once a week     Past Medical History:   Diagnosis Date    Arthritis     Asthma     Bronchitis chronic     Colon polyp     COPD (chronic obstructive pulmonary disease) (HCC)     Emphysema of lung (HCC)     Guillain-[place] (HCC)     Hyperlipidemia     Lock jaw     Pneumonia     Post-nasal drip     Pulmonary nodule     bi lateral    Rash     itchy, bumps    Reflex sympathetic dystrophy     RSD (reflex sympathetic dystrophy)     TMJ (dislocation of temporomandibular joint)        Past Surgical History:        Procedure Laterality Date    APPENDECTOMY      BACK SURGERY      BRONCHOSCOPY  2008    COLONOSCOPY  11/15/2012    1 snared polyp    HYSTERECTOMY      TONSILLECTOMY         Allergies:  is allergic to latex; iodine; penicillins; sulfa antibiotics; tetanus toxoids; clindamycin/lincomycin; influenza vaccines; neurontin [gabapentin]; singulair [montelukast]; albuterol; codeine; and spiriva handihaler [tiotropium bromide monohydrate]. Social History:    TOBACCO:   reports that she quit smoking about 2 years ago. Her smoking use included cigarettes. She has a 12.75 pack-year smoking history. She has never used smokeless tobacco.  ETOH:   reports current alcohol use.       Family History:       Problem Relation Age of Onset    Diabetes Brother     Cancer Mother     Colon Cancer Mother     Asthma Neg Hx     Emphysema Neg Hx     Heart Failure Neg Hx     Hypertension Neg Hx        Current Medications:    Current Outpatient Medications:     hydrOXYzine (ATARAX) 25 MG tablet, , Disp: , Rfl:     furosemide (LASIX) 40 MG tablet, , Disp: , Rfl:     potassium chloride (KLOR-CON M) 20 MEQ extended release tablet, Take 40 mEq by mouth, Disp: , Rfl:     montelukast (SINGULAIR) 10 MG times daily as needed , Disp: , Rfl:       Objective:     /70 (Site: Right Upper Arm, Position: Sitting)   Pulse 72   Temp 98.1 °F (36.7 °C) (Oral)   Resp 16   Ht 5' 5\" (1.651 m)   Wt 161 lb (73 kg)   SpO2 96%   BMI 26.79 kg/m²  on RA  Gen: No distress. Eyes: PERRL. No sclera icterus. No conjunctival injection. ENT: No discharge. Pharynx clear. Neck: Trachea midline. No obvious mass. Resp: No accessory muscle use. No crackles. No wheezes. No rhonchi. No dullness on percussion. CV: Regular rate. Regular rhythm. No murmur or rub. No edema. GI: Non-tender. Non-distended. No hernia. Skin: Warm and dry. No nodule on exposed extremities. Lymph: No cervical LAD. No supraclavicular LAD. M/S: No cyanosis. No joint deformity. No clubbing. Neuro: Awake. Alert. Moves all four extremities. Psych: Oriented x 3. No anxiety. DATA reviewed by me:   PFTs 02/17/2016 FEV1 1.78L(78%) TLC 5.09L(98%)   DLCO 15.97 (70%) 6MW 1120 F LO2 91%   PFTs 07/10/2012 FEV1 1.91L(90%) TLC 5.04L(101%) DLCO 16.30 (90%)  PFTs 03/17/2011 FEV1 1.77L           TLC 4.77L             DLCO 16.72  PFTs 04/15/2008 FEV1 1.82L           TLC 4.87L             DLCO 19.24        CT chest 2/15/19 imaging reviewed by me and showed  No suspicious nodules  Regression of left lower lobe, right upper lobe nodules  Slight growth in the small right lower lobe nodule          Bronch 2/27/2018: Hilar LAD: No phenotypic evidence of non-Hodgkin lymphoma. A. Lymph Node, 12L, Transbronchial Fine Needle Aspirate: No malignant cells identified. B. Lung, Left Lower Lobe Nodule Brushings and Brush Tip: No malignant cells identified. A. Lung, Bronchial Washing: No malignant cells identified. B. Lung, Bronchoalveolar Lavage to Left Lower Lobe: No malignant cells identified. Assessment:      · Moderate persistent Asthma.  Unable to afford Xolair  · Allergic rhinitis  · 54 pack years smoking-quit 11/2017   · RUL nodule now 8 mm on CT chest 11/15/2017. Smaller on repeat CT chest 6/11/2018. No suspicious nodules on repeat CT 2/15/19. · Lobulated LLL pulmonary nodule on CT chest 2/3/2016 (SUV of 1.48). CT chest 8/12/16 showed stability with calcification. ? Growth on CT chest 2/13/2017 with hilar adenopathy. Negative EBUS TBNA of the LN 2/27/2017. Stable on repeat CT chest 11/15/2017. Overall fever granulomatous disease  · Pulmonary nodules stable between 5/2007 and 4/2009. Likely granulomas. No further evaluation needed  · History of Guillain Phi       Plan:      Continue Advair and DuoNeb QID PRN  Albuterol 2 puffs Q4-6 hrs PRN  Singulair, Flonase and Astelin   Prednisone taper and Doxycycline 100 mg BID x 5 days for asthma AE self management if needed. Patient is up to date with Pneumococcal vaccine.    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

## 2020-02-25 ENCOUNTER — APPOINTMENT (OUTPATIENT)
Dept: GENERAL RADIOLOGY | Age: 75
End: 2020-02-25
Payer: MEDICARE

## 2020-02-25 ENCOUNTER — HOSPITAL ENCOUNTER (EMERGENCY)
Age: 75
Discharge: HOME | End: 2020-02-25
Payer: MEDICARE

## 2020-02-25 VITALS
HEART RATE: 58 BPM | OXYGEN SATURATION: 96 % | SYSTOLIC BLOOD PRESSURE: 120 MMHG | BODY MASS INDEX: 28.32 KG/M2 | DIASTOLIC BLOOD PRESSURE: 74 MMHG | WEIGHT: 170 LBS | RESPIRATION RATE: 16 BRPM | HEIGHT: 65 IN | TEMPERATURE: 97.4 F

## 2020-02-25 PROCEDURE — 99283 EMERGENCY DEPT VISIT LOW MDM: CPT

## 2020-02-25 PROCEDURE — 71046 X-RAY EXAM CHEST 2 VIEWS: CPT

## 2020-02-25 RX ORDER — HYDROCODONE BITARTRATE AND ACETAMINOPHEN 5; 325 MG/1; MG/1
1 TABLET ORAL EVERY 6 HOURS PRN
Qty: 12 TABLET | Refills: 0 | Status: SHIPPED | OUTPATIENT
Start: 2020-02-25 | End: 2020-02-28

## 2020-02-25 ASSESSMENT — PAIN DESCRIPTION - LOCATION: LOCATION: RIB CAGE;BACK

## 2020-02-25 ASSESSMENT — PAIN DESCRIPTION - FREQUENCY: FREQUENCY: CONTINUOUS

## 2020-02-25 ASSESSMENT — PAIN DESCRIPTION - PAIN TYPE: TYPE: ACUTE PAIN

## 2020-02-25 ASSESSMENT — PAIN DESCRIPTION - ONSET: ONSET: ON-GOING

## 2020-02-25 ASSESSMENT — PAIN SCALES - GENERAL: PAINLEVEL_OUTOF10: 6

## 2020-02-25 ASSESSMENT — PAIN DESCRIPTION - DESCRIPTORS: DESCRIPTORS: ACHING

## 2020-02-25 ASSESSMENT — PAIN DESCRIPTION - PROGRESSION: CLINICAL_PROGRESSION: NOT CHANGED

## 2020-02-25 ASSESSMENT — PAIN DESCRIPTION - ORIENTATION: ORIENTATION: LEFT

## 2020-02-25 NOTE — ED NOTES
Pt has Incentive Spirometer at home to use. Supa Garcia NP made aware.      Sherley Erickson LPN  72/29/49 [id number]

## 2020-02-27 NOTE — ED PROVIDER NOTES
 TMJ (dislocation of temporomandibular joint)          SURGICAL HISTORY:      Past Surgical History:   Procedure Laterality Date    APPENDECTOMY      BACK SURGERY      BRONCHOSCOPY  2008    COLONOSCOPY  11/15/2012    1 snared polyp    HYSTERECTOMY      TONSILLECTOMY           CURRENT MEDICATIONS:       Discharge Medication List as of 2/25/2020 12:32 PM      CONTINUE these medications which have NOT CHANGED    Details   hydrOXYzine (ATARAX) 25 MG tablet Historical Med      furosemide (LASIX) 40 MG tablet Historical Med      potassium chloride (KLOR-CON M) 20 MEQ extended release tablet Take 40 mEq by mouthHistorical Med      fluticasone-salmeterol (ADVAIR) 250-50 MCG/DOSE AEPB INHALE 1 PUFF INTO THE LUNGS EVERY 12 HOURS, Disp-3 Inhaler, R-1Normal      albuterol sulfate HFA (VENTOLIN HFA) 108 (90 Base) MCG/ACT inhaler Inhale 2 puffs into the lungs every 6 hours as needed for Wheezing or Shortness of Breath, Disp-3 Inhaler, R-1ID# [id number]      montelukast (SINGULAIR) 10 MG tablet TAKE ONE TABLET BY MOUTH EVERY NIGHT AT BEDTIME, Disp-90 tablet, R-1Normal      azelastine (ASTELIN) 0.1 % nasal spray 2 sprays by Nasal route 2 times daily 2 Spray in each nostril, Disp-3 Bottle, R-1Normal      fluticasone (FLONASE) 50 MCG/ACT nasal spray 1 SPRAY IN EACH NOSTRIL DAILY, Disp-3 Bottle, R-1Normal      Probiotic Product (PROBIOTIC-10 PO) Take 1 capsule by mouth dailyHistorical Med      Multiple Vitamins-Minerals (CENTRUM ADULTS PO) Take by mouthHistorical Med      lisinopril (PRINIVIL;ZESTRIL) 10 MG tablet Take 10 mg by mouth dailyHistorical Med      ipratropium-albuterol (DUONEB) 0.5-2.5 (3) MG/3ML SOLN nebulizer solution Inhale 3 mLs into the lungs every 6 hours as needed for Shortness of Breath DX Asthma J45.50, Disp-120 vial, R-6Normal      Cholecalciferol (VITAMIN D3 PO) Take by mouthHistorical Med      tiZANidine (ZANAFLEX) 4 MG tablet Take 4 mg by mouth every 6 hours as neededHistorical Med      gabapentin Relationship status: None    Intimate partner violence:     Fear of current or ex partner: None     Emotionally abused: None     Physically abused: None     Forced sexual activity: None   Other Topics Concern    None   Social History Narrative    None       SCREENINGS:             PHYSICAL EXAM:       ED Triage Vitals [02/25/20 1124]   BP Temp Temp Source Pulse Resp SpO2 Height Weight   106/69 97.4 °F (36.3 °C) Oral 70 20 94 % 5' 5\" (1.651 m) 170 lb (77.1 kg)       Physical Exam    CONSTITUTIONAL: Awake and alert. Cooperative. Well-developed. Well-nourished. Non-toxic. Vitals:    02/25/20 1124 02/25/20 1308   BP: 106/69 120/74   Pulse: 70 58   Resp: 20 16   Temp: 97.4 °F (36.3 °C)    TempSrc: Oral    SpO2: 94% 96%   Weight: 170 lb (77.1 kg)    Height: 5' 5\" (1.651 m)      HENT: Normocephalic. Atraumatic. External ears normal, without discharge. TMs clear bilaterally. Nonasal discharge. Oropharynx clear, no erythema. Mucous membranes moist.  EYES: Conjunctiva non-injected, nolid abnormalities noted. No scleral icterus. PERRL. EOM's grossly intact. Anterior chambers clear. NECK: Supple. Normal ROM. No meningismus. No thyroid tenderness or swelling noted. CARDIOVASCULAR: RRR. No Murmer. No carotid bruits. PULMONARY/CHEST WALL: Effort normal. No tachypnea. Lungs clear to ausculation. Tenderness on palpation to left anterior, lateral, and posterior rib cage with no crepitus. No ecchymosis. ABDOMEN: Normal BS. Soft. Nondistended. No tenderness to palpation. No guarding. No hernias noted. No splenomegaly. Back: Spine is midline. No ecchymosis. No crepituson palpation. No obvious subluxation of vertebral column. No saddle anesthesia or evidence of cauda equina. No focal spine tenderness. /ANORECTAL: Not assessed  MUSKULOSKELETAL: Normal ROM. No acute deformities. No edema. No tenderness to palpate. SKIN: Warm and dry. NEUROLOGICAL:  GCS 15. CN II-XII grossly intact.  Strength is 5/5 in all extremities and questions were answered tothe best of my ability and to the satisfaction of the patient and/or patient family. DDX  Considered acute rib fx, ACS, pneumothorax  FINAL IMPRESSION:      1. Contusion of rib on left side, initial encounter          DISPOSITION/PLAN:   DISPOSITION Decision To Discharge      PATIENT REFERRED TO:  MD Eugenia Everett [address]  [phone]    Call   For follow up      DISCHARGE MEDICATIONS:  Discharge Medication List as of 2/25/2020 12:32 PM      START taking these medications    Details   HYDROcodone-acetaminophen (NORCO) 5-325 MG per tablet Take 1 tablet by mouth every 6 hours as needed for Pain for up to 3 days. , Disp-12 tablet, R-0Print                        (Please note thatportions of this note were completed with a voice recognition program.  Efforts were made to edit the dictations, but occasionally words are mis-transcribed.)    HOWIE Villalobos CNP-C (electronicallysigned)        HOWIE Villalobos CNP  02/26/20 0977

## 2020-05-14 RX ORDER — MONTELUKAST SODIUM 10 MG/1
TABLET ORAL
Qty: 90 TABLET | Refills: 1 | OUTPATIENT
Start: 2020-05-14

## 2020-08-11 ENCOUNTER — TELEPHONE (OUTPATIENT)
Dept: PULMONOLOGY | Age: 75
End: 2020-08-11

## 2020-08-11 NOTE — TELEPHONE ENCOUNTER
limited to the following:    Your Provider(s) may not able to provide medical treatment for your particular condition and you may be required to seek alternative healthcare or emergency care services.  The electronic systems or other security protocols or safeguards used in the Service could fail, causing a breach of privacy of your medical or other information.  Given regulatory requirements in certain jurisdictions, your Provider(s) diagnosis and/or treatment options, especially pertaining to certain prescriptions, may be limited. Acceptance   1. You understand that Services will be provided via Telehealth. This process involves the use of HIPAA compliant and secure, real-time audio-visual interfacing with a qualified and appropriately trained provider located at [facility]. 2. You understand that, under no circumstances, will this session be recorded. 3. You understand that the Provider(s) at [facility] and other clinical participants will be party to the information obtained during the Telehealth session in accordance with best medical practices. 4. You understand that the information obtained during the Telehealth session will be used to help determine the most appropriate treatment options. 5. You understand that You have the right to revoke this consent at any point in time. 6. You understand that Telehealth is voluntary, and that continued treatment is not dependent upon consent. 7. You understand that, in the event of non-consent to Telehealth services and/or technical difficulties, you will obtain services as typically provided in the absence of Telehealth technology. 8. You understand that this consent will be kept in Your medical record. 9. No potential benefits from the use of Telehealth or specific results can be guaranteed. Your condition may not be cured or improved and, in some cases, may get worse.    10. There are limitations in the provision of medical care and treatment via Telehealth and the Service and you may not be able to receive diagnosis and/or treatment through the Service for every condition for which you seek diagnosis and/or treatment. 11. There are potential risks to the use of Telehealth, including but not limited to the risks described in this Telehealth Consent. 12. Your Provider(s) have discussed the use of Telehealth and the Service with you, including the benefits and risks of such and you have provided oral consent to your Provider(s) for the use of Telehealth and the Service. 15. You understand that it is your duty to provide your Provider(s) truthful, accurate and complete information, including all relevant information regarding care that you may have received or may be receiving from other healthcare providers outside of the Service. 14. You understand that each of your Provider(s) may determine in his or sole discretion that your condition is not suitable for diagnosis and/or treatment using the Service, and that you may need to seek medical care and treatment a specialist or other healthcare provider, outside of the Service. 15. You understand that you are fully responsible for payment for all services provided by Provider(s) or through use of the Service and that you may not be able to use third-party insurance. 16. You represent that (a) you have read this Telehealth Consent carefully, (b) you understand the risks and benefits of the Service and the use of Telehealth in the medical care and treatment provided to you by Provider(s) using the Service, and (c) you have the legal capacity and authority to provide this consent for yourself and/or the minor for which you are consenting under applicable federal and state laws, including laws relating to the age of [de-identified] and/or parental/guardian consent.    17. You give your informed consent to the use of Telehealth by Provider(s) using the Service under

## 2020-08-12 ENCOUNTER — VIRTUAL VISIT (OUTPATIENT)
Dept: PULMONOLOGY | Age: 75
End: 2020-08-12
Payer: MEDICARE

## 2020-08-12 PROCEDURE — 99443 PR PHYS/QHP TELEPHONE EVALUATION 21-30 MIN: CPT | Performed by: INTERNAL MEDICINE

## 2020-08-12 RX ORDER — MONTELUKAST SODIUM 10 MG/1
TABLET ORAL
Qty: 30 TABLET | Refills: 6 | Status: SHIPPED | OUTPATIENT
Start: 2020-08-12 | End: 2021-02-16

## 2020-08-12 RX ORDER — PREDNISONE 10 MG/1
TABLET ORAL
Qty: 30 TABLET | Refills: 0 | Status: SHIPPED | OUTPATIENT
Start: 2020-08-12 | End: 2020-08-22

## 2020-08-12 RX ORDER — IPRATROPIUM BROMIDE AND ALBUTEROL SULFATE 2.5; .5 MG/3ML; MG/3ML
1 SOLUTION RESPIRATORY (INHALATION) EVERY 6 HOURS PRN
Qty: 120 VIAL | Refills: 6 | Status: SHIPPED | OUTPATIENT
Start: 2020-08-12 | End: 2021-03-11 | Stop reason: SDUPTHER

## 2020-08-12 RX ORDER — ALBUTEROL SULFATE 90 UG/1
2 AEROSOL, METERED RESPIRATORY (INHALATION) EVERY 6 HOURS PRN
Qty: 1 INHALER | Refills: 6 | Status: SHIPPED | OUTPATIENT
Start: 2020-08-12 | End: 2021-03-11 | Stop reason: SDUPTHER

## 2020-08-12 NOTE — PROGRESS NOTES
furosemide (LASIX) 40 MG tablet, , Disp: , Rfl:     potassium chloride (KLOR-CON M) 20 MEQ extended release tablet, Take 40 mEq by mouth, Disp: , Rfl:     fluticasone-salmeterol (ADVAIR) 250-50 MCG/DOSE AEPB, INHALE 1 PUFF INTO THE LUNGS EVERY 12 HOURS, Disp: 3 Inhaler, Rfl: 1    albuterol sulfate HFA (VENTOLIN HFA) 108 (90 Base) MCG/ACT inhaler, Inhale 2 puffs into the lungs every 6 hours as needed for Wheezing or Shortness of Breath, Disp: 3 Inhaler, Rfl: 1    montelukast (SINGULAIR) 10 MG tablet, TAKE ONE TABLET BY MOUTH EVERY NIGHT AT BEDTIME, Disp: 90 tablet, Rfl: 1    azelastine (ASTELIN) 0.1 % nasal spray, 2 sprays by Nasal route 2 times daily 2 Spray in each nostril, Disp: 3 Bottle, Rfl: 1    fluticasone (FLONASE) 50 MCG/ACT nasal spray, 1 SPRAY IN EACH NOSTRIL DAILY, Disp: 3 Bottle, Rfl: 1    Probiotic Product (PROBIOTIC-10 PO), Take 1 capsule by mouth daily, Disp: , Rfl:     Multiple Vitamins-Minerals (CENTRUM ADULTS PO), Take by mouth, Disp: , Rfl:     lisinopril (PRINIVIL;ZESTRIL) 10 MG tablet, Take 10 mg by mouth daily, Disp: , Rfl:     ipratropium-albuterol (DUONEB) 0.5-2.5 (3) MG/3ML SOLN nebulizer solution, Inhale 3 mLs into the lungs every 6 hours as needed for Shortness of Breath DX Asthma J45.50, Disp: 120 vial, Rfl: 6    Cholecalciferol (VITAMIN D3 PO), Take by mouth, Disp: , Rfl:     tiZANidine (ZANAFLEX) 4 MG tablet, Take 4 mg by mouth every 6 hours as needed, Disp: , Rfl:     gabapentin (NEURONTIN) 300 MG capsule, , Disp: , Rfl:     fexofenadine (ALLEGRA ALLERGY) 180 MG tablet, Take 180 mg by mouth daily, Disp: , Rfl:     citalopram (CELEXA) 20 MG tablet, Take 20 mg by mouth, Disp: , Rfl:     pravastatin (PRAVACHOL) 40 MG tablet, Take 1 tablet by mouth daily. , Disp: , Rfl:     DiphenhydrAMINE HCl (BENADRYL PO), Take 1 tablet by mouth daily , Disp: , Rfl:       Objective:     Telephone visit not able to obtain physical exam       DATA reviewed by me:   PFTs 02/17/2016 FEV1 kelley              Mohini Alexis is a 66 y.o. female evaluated via telephone on 8/12/2020. Consent:  She and/or health care decision maker is aware that that she may receive a bill for this telephone service, depending on her insurance coverage, and has provided verbal consent to proceed: Yes       Documentation:  I communicated with the patient and/or health care decision maker about: See above   Details of this discussion including any medical advice provided: See above       I Affirm this is a Patient Initiated Episode with an Established Patient who has not had a related appointment within my department in the past 7 days or scheduled within the next 24 hours.     Total Time: 21-30 minutes     Note: not billable if this call serves to triage the patient into an appointment for the relevant concern      eDrrick Silver

## 2020-09-02 ENCOUNTER — HOSPITAL ENCOUNTER (OUTPATIENT)
Dept: CT IMAGING | Age: 75
Discharge: HOME | End: 2020-09-02
Payer: MEDICARE

## 2020-09-02 PROCEDURE — 71250 CT THORAX DX C-: CPT

## 2020-09-08 ENCOUNTER — VIRTUAL VISIT (OUTPATIENT)
Dept: PULMONOLOGY | Age: 75
End: 2020-09-08
Payer: MEDICARE

## 2020-09-08 PROCEDURE — 99443 PR PHYS/QHP TELEPHONE EVALUATION 21-30 MIN: CPT | Performed by: INTERNAL MEDICINE

## 2020-09-08 RX ORDER — PREDNISONE 10 MG/1
10 TABLET ORAL DAILY
COMMUNITY
Start: 2020-09-04 | End: 2020-09-15

## 2020-09-08 NOTE — PROGRESS NOTES
[facility]    Outpatient Follow Up Note  TELEHEALTH EVALUATION: Service performed was Audio (During XGMRO-00 public health emergency) and not a face-to-face visit       CHIEF COMPLAINT:  Follow up CT chest      HPI:   CT chest reviewed by me and noted below. Results were dicussed with patient and multiple good questions were answered. Doing better   On prednisone- completing now   No cough or sputum  Little mucus  No hemoptysis    No smoking   Not needing to use the Neb       Past Medical History:   Diagnosis Date    Arthritis     Asthma     Bronchitis chronic     Colon polyp     COPD (chronic obstructive pulmonary disease) (HCC)     Emphysema of lung (HCC)     Guillain-[place] (HCC)     Hyperlipidemia     Lock jaw     Pneumonia     Post-nasal drip     Pulmonary nodule     bi lateral    Rash     itchy, bumps    Reflex sympathetic dystrophy     RSD (reflex sympathetic dystrophy)     TMJ (dislocation of temporomandibular joint)        Past Surgical History:        Procedure Laterality Date    APPENDECTOMY      BACK SURGERY      BRONCHOSCOPY  2008    COLONOSCOPY  11/15/2012    1 snared polyp    HYSTERECTOMY      TONSILLECTOMY         Allergies:  is allergic to latex; iodine; penicillins; sulfa antibiotics; tetanus toxoids; clindamycin/lincomycin; influenza vaccines; singulair [montelukast]; albuterol; codeine; and spiriva handihaler [tiotropium bromide monohydrate]. Social History:    TOBACCO:   reports that she quit smoking about 2 years ago. Her smoking use included cigarettes. She has a 12.75 pack-year smoking history. She has never used smokeless tobacco.  ETOH:   reports current alcohol use.       Family History:       Problem Relation Age of Onset    Diabetes Brother     Cancer Mother     Colon Cancer Mother     Asthma Neg Hx     Emphysema Neg Hx     Heart Failure Neg Hx     Hypertension Neg Hx        Current Medications:    Current Outpatient Medications:     fluticasone-salmeterol (ADVAIR) 250-50 MCG/DOSE AEPB, INHALE 1 PUFF INTO THE LUNGS EVERY 12 HOURS, Disp: 1 Inhaler, Rfl: 6    ipratropium-albuterol (DUONEB) 0.5-2.5 (3) MG/3ML SOLN nebulizer solution, Inhale 3 mLs into the lungs every 6 hours as needed for Shortness of Breath DX Asthma J45.50, Disp: 120 vial, Rfl: 6    albuterol sulfate HFA (VENTOLIN HFA) 108 (90 Base) MCG/ACT inhaler, Inhale 2 puffs into the lungs every 6 hours as needed for Wheezing or Shortness of Breath, Disp: 1 Inhaler, Rfl: 6    montelukast (SINGULAIR) 10 MG tablet, TAKE ONE TABLET BY MOUTH EVERY NIGHT AT BEDTIME, Disp: 30 tablet, Rfl: 6    hydrOXYzine (ATARAX) 25 MG tablet, , Disp: , Rfl:     azelastine (ASTELIN) 0.1 % nasal spray, 2 sprays by Nasal route 2 times daily 2 Spray in each nostril, Disp: 3 Bottle, Rfl: 1    fluticasone (FLONASE) 50 MCG/ACT nasal spray, 1 SPRAY IN EACH NOSTRIL DAILY, Disp: 3 Bottle, Rfl: 1    Probiotic Product (PROBIOTIC-10 PO), Take 1 capsule by mouth daily, Disp: , Rfl:     Multiple Vitamins-Minerals (CENTRUM ADULTS PO), Take by mouth, Disp: , Rfl:     lisinopril (PRINIVIL;ZESTRIL) 10 MG tablet, Take 10 mg by mouth daily, Disp: , Rfl:     Cholecalciferol (VITAMIN D3 PO), Take by mouth, Disp: , Rfl:     gabapentin (NEURONTIN) 300 MG capsule, , Disp: , Rfl:     fexofenadine (ALLEGRA ALLERGY) 180 MG tablet, Take 180 mg by mouth daily, Disp: , Rfl:     citalopram (CELEXA) 20 MG tablet, Take 20 mg by mouth, Disp: , Rfl:     pravastatin (PRAVACHOL) 40 MG tablet, Take 1 tablet by mouth daily. , Disp: , Rfl:     DiphenhydrAMINE HCl (BENADRYL PO), Take 1 tablet by mouth daily , Disp: , Rfl:       Objective:     Telephone visit not able to obtain physical exam       DATA reviewed by me:   PFTs 02/17/2016 FEV1 1.78L(78%) TLC 5.09L(98%)   DLCO 15.97 (70%) 6MW 1120 F LO2 91%   PFTs 07/10/2012 FEV1 1.91L(90%) TLC 5.04L(101%) DLCO 16.30 (90%)  PFTs 03/17/2011 FEV1 1.77L           TLC 4.77L that that she may receive a bill for this telephone service, depending on her insurance coverage, and has provided verbal consent to proceed: Yes       Documentation:  I communicated with the patient and/or health care decision maker about: See above   Details of this discussion including any medical advice provided: See above       I Affirm this is a Patient Initiated Episode with an Established Patient who has not had a related appointment within my department in the past 7 days or scheduled within the next 24 hours.     Total Time: 21-30 minutes     Note: not billable if this call serves to triage the patient into an appointment for the relevant concern      Verónica Sadler

## 2020-12-11 ENCOUNTER — APPOINTMENT (OUTPATIENT)
Dept: CT IMAGING | Age: 75
End: 2020-12-11
Payer: MEDICARE

## 2020-12-11 ENCOUNTER — APPOINTMENT (OUTPATIENT)
Dept: GENERAL RADIOLOGY | Age: 75
End: 2020-12-11
Payer: MEDICARE

## 2020-12-11 ENCOUNTER — HOSPITAL ENCOUNTER (EMERGENCY)
Age: 75
Discharge: HOME | End: 2020-12-11
Attending: EMERGENCY MEDICINE
Payer: MEDICARE

## 2020-12-11 VITALS
TEMPERATURE: 97.9 F | DIASTOLIC BLOOD PRESSURE: 93 MMHG | HEIGHT: 65 IN | BODY MASS INDEX: 26.82 KG/M2 | SYSTOLIC BLOOD PRESSURE: 147 MMHG | RESPIRATION RATE: 20 BRPM | HEART RATE: 57 BPM | OXYGEN SATURATION: 99 % | WEIGHT: 161 LBS

## 2020-12-11 PROCEDURE — 70450 CT HEAD/BRAIN W/O DYE: CPT

## 2020-12-11 PROCEDURE — 71045 X-RAY EXAM CHEST 1 VIEW: CPT

## 2020-12-11 PROCEDURE — 99285 EMERGENCY DEPT VISIT HI MDM: CPT

## 2020-12-11 PROCEDURE — 72125 CT NECK SPINE W/O DYE: CPT

## 2020-12-11 PROCEDURE — 6370000000 HC RX 637 (ALT 250 FOR IP): Performed by: EMERGENCY MEDICINE

## 2020-12-11 RX ORDER — ACETAMINOPHEN 325 MG/1
650 TABLET ORAL ONCE
Status: COMPLETED | OUTPATIENT
Start: 2020-12-11 | End: 2020-12-11

## 2020-12-11 RX ADMIN — ACETAMINOPHEN 650 MG: 325 TABLET ORAL at 14:27

## 2020-12-11 ASSESSMENT — ENCOUNTER SYMPTOMS
NAUSEA: 0
VOMITING: 0
SHORTNESS OF BREATH: 0
BACK PAIN: 1

## 2020-12-11 ASSESSMENT — PAIN DESCRIPTION - PAIN TYPE: TYPE: ACUTE PAIN

## 2020-12-11 ASSESSMENT — PAIN SCALES - GENERAL
PAINLEVEL_OUTOF10: 5
PAINLEVEL_OUTOF10: 5

## 2020-12-11 ASSESSMENT — PAIN DESCRIPTION - LOCATION: LOCATION: BACK

## 2020-12-11 NOTE — ED PROVIDER NOTES
[facility]  ED  EMERGENCYDEPARTMENT ENCOUNTER      Pt Name: Geraldine Guadalupe  MRN: [id number]  [place] 1945  Date of evaluation: 12/11/2020  Melita Morales MD    CHIEF COMPLAINT       Chief Complaint   Patient presents with   [facility] Fall     per squad report patien tfell in her living room (lost her balance) hx of COPD they report pt had some labored breathing upon their arrival. breathing txt adminstered in route and breathing is normal upon arrival to ED.  Arm Pain     pt reports she bent over \"like I shouldn't have\" and fell. she states she did hit her head and lost consciousness for \"a minute or two\" reports she woke up to her dog licking her face.  Loss of Consciousness         HISTORY OF PRESENT ILLNESS   (Location/Symptom, Timing/Onset,Context/Setting, Quality, Duration, Modifying Factors, Severity)  Note limiting factors. Geraldine Guadalupe is a 66 y.o. female who presents to the emergency department sp fall. Patient reports she was bending over and while trying to get up lost her balance and fell backwards and states she hit her right arm and head against the door and lost consciousness, was woken up by her dog licking her face. States she thinks she may have lost consciousness for a few minutes, and once she woke up, started yelling for help and somebody came by. Currently complains of right arm and head pain. Not on blood thinner. HPI    Nursing Notes were reviewed. REVIEW OF SYSTEMS    (2-9 systems for level 4, 10 or more for level 5)     Review of Systems   Constitutional: Negative for fever. Respiratory: Negative for shortness of breath. Cardiovascular: Negative for chest pain. Gastrointestinal: Negative for nausea and vomiting. Musculoskeletal: Positive for back pain (chronic). Negative for neck pain and neck stiffness. Neurological: Positive for headaches. Except as noted above the remainder of the review of systems was reviewedand negative. PAST MEDICAL HISTORY     Past Medical History:   Diagnosis Date    Arthritis     Asthma     Bronchitis chronic     Colon polyp     [facility]) [facility] [address] [facility]     Emphysema of lung ([facility] [address] [facility]     Guillain-[place] (HCC)     Hyperlipidemia     Lock jaw     Pneumonia     Post-nasal drip     Pulmonary nodule     bi lateral    Rash     itchy, bumps    Reflex sympathetic dystrophy     RSD (reflex sympathetic dystrophy)     TMJ (dislocation of temporomandibular joint)          SURGICAL HISTORY       Past Surgical History:   Procedure Laterality Date    APPENDECTOMY      BACK SURGERY      BRONCHOSCOPY  2008    COLONOSCOPY  11/15/2012    1 snared polyp    HYSTERECTOMY      TONSILLECTOMY           CURRENT MEDICATIONS       Previous Medications    ALBUTEROL SULFATE HFA (VENTOLIN HFA) 108 (90 BASE) MCG/ACT INHALER    Inhale 2 puffs into the lungs every 6 hours as needed for Wheezing or Shortness of Breath    AZELASTINE (ASTELIN) 0.1 % NASAL SPRAY    2 sprays by Nasal route 2 times daily 2 Spray in each nostril    CHOLECALCIFEROL (VITAMIN D3 PO)    Take by mouth    CITALOPRAM (CELEXA) 20 MG TABLET    Take 20 mg by mouth    DIPHENHYDRAMINE HCL (BENADRYL PO)    Take 1 tablet by mouth daily     FEXOFENADINE (ALLEGRA ALLERGY) 180 MG TABLET    Take 180 mg by mouth daily    FLUTICASONE (FLONASE) 50 MCG/ACT NASAL SPRAY    1 SPRAY IN EACH NOSTRIL DAILY    FLUTICASONE-SALMETEROL (ADVAIR) 250-50 MCG/DOSE AEPB    INHALE 1 PUFF INTO THE LUNGS EVERY 12 HOURS    GABAPENTIN (NEURONTIN) 300 MG CAPSULE        HYDROXYZINE (ATARAX) 25 MG TABLET        IPRATROPIUM-ALBUTEROL (DUONEB) 0.5-2.5 (3) MG/3ML SOLN NEBULIZER SOLUTION    Inhale 3 mLs into the lungs every 6 hours as needed for Shortness of Breath DX Asthma J45.50    LISINOPRIL (PRINIVIL;ZESTRIL) 10 MG TABLET    Take 10 mg by mouth daily    MONTELUKAST (SINGULAIR) 10 MG TABLET    TAKE ONE TABLET BY MOUTH EVERY NIGHT AT BEDTIME    MULTIPLE VITAMINS-MINERALS (CENTRUM ADULTS PO)    Take by mouth    PRAVASTATIN (PRAVACHOL) 40 MG TABLET    Take 1 tablet by mouth daily. PROBIOTIC PRODUCT (PROBIOTIC-10 PO)    Take 1 capsule by mouth daily       ALLERGIES     Latex; Iodine; Penicillins; Sulfa antibiotics; Tetanus toxoids; Clindamycin/lincomycin; Influenza vaccines; Singulair [montelukast]; Albuterol; Codeine; and Spiriva handihaler [tiotropium bromide monohydrate]    FAMILY HISTORY       Family History   Problem Relation Age of Onset    Diabetes Brother     Cancer Mother     Colon Cancer Mother     Asthma Neg Hx     Emphysema Neg Hx     Heart Failure Neg Hx     Hypertension Neg Hx           SOCIAL HISTORY       Social History     Socioeconomic History    Marital status:      Spouse name: None    Number of children: None    Years of education: None    Highest education level: None   Occupational History    None   Social Needs    Financial resource strain: None    Food insecurity     Worry: None     Inability: None    Transportation needs     Medical: None     Non-medical: None   Tobacco Use    Smoking status: Former Smoker     Packs/day: 0.25     Years: 51.00     Pack years: 12.75     Types: Cigarettes     Last attempt to quit: 11/29/2017     Years since quitting: 3.0    Smokeless tobacco: Never Used   Substance and Sexual Activity    Alcohol use:  Yes    Drug use: No    Sexual activity: None   Lifestyle    Physical activity     Days per week: None     Minutes per session: None    Stress: None   Relationships    Social connections     Talks on phone: None     Gets together: None     Attends Mandaeism service: None     Active member of club or organization: None     Attends meetings of clubs or organizations: None     Relationship status: None    Intimate partner violence     Fear of current or ex partner: None     Emotionally abused: None     Physically abused: None     Forced sexual activity: None   Other Topics Concern    GCS verbal subscore is 5. GCS motor subscore is 6. Psychiatric:         Behavior: Behavior is cooperative. DIAGNOSTIC RESULTS     EKG: All EKG's are interpreted by the Emergency Department Physicianwho either signs or Co-signs this chart in the absence of a cardiologist.      RADIOLOGY:   Non-plain film images such as CT, Ultrasound and MRI are read by the radiologist. Plain radiographic images are visualized and preliminarily interpreted by the emergency physician with the below findings:      Interpretation per the Radiologist below, if available at the time of this note:    XR CHEST PORTABLE   Final Result   Small amount of right-sided atelectasis, less likely pneumonia. No evidence   of acute process otherwise. CT HEAD WO CONTRAST   Preliminary Result   No evidence of acute intracranial hemorrhage. Extensive white matter disease, likely due to chronic small vessel ischemia. Partial right mastoid air cell opacification, similar to the prior. CT CERVICAL SPINE WO CONTRAST   Preliminary Result   Osteopenia. An acute fracture is not identified. Moderate to advanced multilevel degenerative change. ED BEDSIDE ULTRASOUND:   Performed by ED Physician - none    LABS:  Labs Reviewed - No data to display    All other labs were within normal range ornot returned as of this dictation. EMERGENCY DEPARTMENT COURSE and DIFFERENTIAL DIAGNOSIS/MDM:   Vitals:    Vitals:    12/11/20 1306 12/11/20 1427 12/11/20 1430   BP: (!) 144/90 (!) 147/93 (!) 147/93   Pulse: 57     Resp: 20     Temp: 97.9 °F (36.6 °C)     TempSrc: Oral     SpO2: 95%  99%   Weight: 161 lb (73 kg)     Height: 5' 5\" (1.651 m)           MDM    ED COURSE/MDM    -Racheal Loyola is a 66 y.o. female with a history of COPD who presents to ED status post mechanical fall. Upon evaluation patient is alert and oriented, complains of headache and right arm pain.   On examination there is a skin tear, no evidence of discomfort swelling or deformity to right shoulder elbow or wrist.  Denies any neck tenderness, thoracic tenderness. Reports lumbar pain though she states this is her chronic pain and is not any different or increased from her baseline.  -She is speaking in full sentences, not in acute respiratory distress, clear to auscultation bilaterally. -CTA shows no evidence of acute intracranial hemorrhage. Extensive white matter disease likely due to chronic small vessel ischemia. Partial right mastoid air cell opacification similar to the prior  -The cervical shows osteopenia. An acute fracture is not identified. Moderate to advanced multilevel level degenerative change  -C x-ray shows small amount of right-sided atelectasis less likely pneumonia. No evidence of acute process otherwise.  -Wound care was applied to the skin tear to the right arm and covered.    -Patient was ambulated around the room with nurse who states that patient did well did not need assistance. - imaging reviewed and results discussed with patient. Noacute pathology was noted and plan for discharge home with close follow up with PCP was discussed with patient. Strict ED return precautions given for new/worsending symptoms. Patient in agreement withplan, verbally confirm understanding and have no further questions/concerns. REASSESSMENT      Well appearing, non toxic, alert, oriented speaking in full sentences and hemodynamically stable upon discharge      CRITICAL CARE TIME   Total Critical Care time was 0 minutes, excluding separately reportableprocedures. There was a high probability of clinicallysignificant/life threatening deterioration in the patient's condition which required my urgent intervention. CONSULTS:  None    PROCEDURES:  Unless otherwise noted below, none     Procedures    FINAL IMPRESSION      1. Fall, initial encounter    2.  Closed head injury, initial encounter          DISPOSITION/PLAN   DISPOSITION PATIENT REFERREDTO:  No follow-up provider specified.     DISCHARGE MEDICATIONS:  New Prescriptions    No medications on file          (Please note that portions of this note were completed with a voice recognition program.  Efforts were made to edit the dictations but occasionally wordsare mis-transcribed.)    Darcy Sanchez MD (electronically signed)  Attending Emergency Physician            Darcy Sanchez MD  12/11/20 [id number]

## 2020-12-11 NOTE — ED NOTES
DISCHARGE INSTRUCTIONS REVIEWED WITH PT AND FAMILY. PT AND FAMILY VERBALIZED UNDERSTANDING.        Christopher Anaya RN  12/11/20 [id number]

## 2021-01-06 ENCOUNTER — TELEPHONE (OUTPATIENT)
Dept: PULMONOLOGY | Age: 76
End: 2021-01-06

## 2021-01-06 NOTE — TELEPHONE ENCOUNTER
Patient states she has no ride to get the testing done so she's not able to get any tests at this time.

## 2021-01-28 ENCOUNTER — TELEPHONE (OUTPATIENT)
Dept: PULMONOLOGY | Age: 76
End: 2021-01-28

## 2021-01-28 NOTE — TELEPHONE ENCOUNTER
Pt called asking if Dr. Adele Lentz would recommend pt getting covid vaccine, given pts severe reactions to vaccines in the past?  Pt states that she had guillan barre with flu vaccine and is concerned. VV 9/8/20:    Assessment:   · Moderate persistent Asthma. Unable to afford Xolair  · Allergic rhinitis  · 54 pack years smoking-quit 11/2017   · RUL nodule now 8 mm on CT chest 11/15/2017. Smaller on repeat CT chest 6/11/2018. Smaller on repeat CT 9/2/2020  · Lobulated LLL pulmonary nodule on CT chest 2/3/2016 (SUV of 1.48). CT chest 8/12/16 showed stability with calcification. ? Growth on CT chest 2/13/2017 with hilar adenopathy. Negative EBUS TBNA of the LN 2/27/2017. Smaller on repeat CT 9/2/2020  · Pulmonary nodules stable between 5/2007 and 4/2009. Likely granulomas. No further evaluation needed  · History of Guillain Phi                 Plan:   · Complete Prednisone taper   · Continue Advair and DuoNeb QID PRN  · Albuterol 2 puffs Q4-6 hrs PRN  · Refill on Singulair  · Patient is up to date with Pneumococcal vaccine.    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

## 2021-02-03 ENCOUNTER — TELEPHONE (OUTPATIENT)
Dept: PULMONOLOGY | Age: 76
End: 2021-02-03

## 2021-02-03 NOTE — TELEPHONE ENCOUNTER
Patient is requesting that something be sent to the [facility] [place] to help her with the thrush that she received from her inhalers. LOV: 9/8/20  Assessment:   · Moderate persistent Asthma. Unable to afford Xolair  · Allergic rhinitis  · 54 pack years smoking-quit 11/2017   · RUL nodule now 8 mm on CT chest 11/15/2017. Smaller on repeat CT chest 6/11/2018. Smaller on repeat CT 9/2/2020  · Lobulated LLL pulmonary nodule on CT chest 2/3/2016 (SUV of 1.48). CT chest 8/12/16 showed stability with calcification. ? Growth on CT chest 2/13/2017 with hilar adenopathy. Negative EBUS TBNA of the LN 2/27/2017. Smaller on repeat CT 9/2/2020  · Pulmonary nodules stable between 5/2007 and 4/2009. Likely granulomas. No further evaluation needed  · History of Guillain Phi                 Plan:   · Complete Prednisone taper   · Continue Advair and DuoNeb QID PRN  · Albuterol 2 puffs Q4-6 hrs PRN  · Refill on Singulair  · Patient is up to date with Pneumococcal vaccine.    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

## 2021-02-16 RX ORDER — MONTELUKAST SODIUM 10 MG/1
TABLET ORAL
Qty: 30 TABLET | Refills: 6 | Status: SHIPPED | OUTPATIENT
Start: 2021-02-16 | End: 2021-10-11

## 2021-03-11 ENCOUNTER — VIRTUAL VISIT (OUTPATIENT)
Dept: PULMONOLOGY | Age: 76
End: 2021-03-11
Payer: MEDICARE

## 2021-03-11 DIAGNOSIS — J45.41 MODERATE PERSISTENT ASTHMA WITH ACUTE EXACERBATION: ICD-10-CM

## 2021-03-11 DIAGNOSIS — J30.9 ALLERGIC RHINITIS, UNSPECIFIED SEASONALITY, UNSPECIFIED TRIGGER: Primary | ICD-10-CM

## 2021-03-11 PROCEDURE — 99442 PR PHYS/QHP TELEPHONE EVALUATION 11-20 MIN: CPT | Performed by: INTERNAL MEDICINE

## 2021-03-11 RX ORDER — IPRATROPIUM BROMIDE AND ALBUTEROL SULFATE 2.5; .5 MG/3ML; MG/3ML
1 SOLUTION RESPIRATORY (INHALATION) EVERY 6 HOURS PRN
Qty: 120 VIAL | Refills: 5 | Status: SHIPPED | OUTPATIENT
Start: 2021-03-11 | End: 2021-11-16 | Stop reason: SDUPTHER

## 2021-03-11 RX ORDER — ALBUTEROL SULFATE 90 UG/1
2 AEROSOL, METERED RESPIRATORY (INHALATION) EVERY 6 HOURS PRN
Qty: 1 INHALER | Refills: 6 | Status: SHIPPED | OUTPATIENT
Start: 2021-03-11 | End: 2021-11-16 | Stop reason: SDUPTHER

## 2021-03-11 NOTE — PROGRESS NOTES
[facility]    Outpatient Follow Up Note  TELEHEALTH EVALUATION: Service performed was Audio (During LBB-29 public health emergency) and not a face-to-face visit       CHIEF COMPLAINT:  Follow up asthma      HPI:   Doing much better  Some cough with clear sputum  No hemoptysis   No smoking   Not needing to use the Neb        Past Medical History:   Diagnosis Date    Arthritis     Asthma     Bronchitis chronic     Colon polyp     COPD (chronic obstructive pulmonary disease) (HCC)     Emphysema of lung ([address]     Guillain-[place] (HCC)     Hyperlipidemia     Lock jaw     Pneumonia     Post-nasal drip     Pulmonary nodule     bi lateral    Rash     itchy, bumps    Reflex sympathetic dystrophy     RSD (reflex sympathetic dystrophy)     TMJ (dislocation of temporomandibular joint)        Past Surgical History:        Procedure Laterality Date    APPENDECTOMY      BACK SURGERY      BRONCHOSCOPY  2008    COLONOSCOPY  11/15/2012    1 snared polyp    HYSTERECTOMY      TONSILLECTOMY         Allergies:  is allergic to latex; iodine; penicillins; sulfa antibiotics; tetanus toxoids; clindamycin/lincomycin; influenza vaccines; singulair [montelukast]; albuterol; codeine; and spiriva handihaler [tiotropium bromide monohydrate]. Social History:    TOBACCO:   reports that she quit smoking about 3 years ago. Her smoking use included cigarettes. She has a 12.75 pack-year smoking history. She has never used smokeless tobacco.  ETOH:   reports current alcohol use.       Family History:       Problem Relation Age of Onset    Diabetes Brother     Cancer Mother     Colon Cancer Mother     Asthma Neg Hx     Emphysema Neg Hx     Heart Failure Neg Hx     Hypertension Neg Hx        Current Medications:    Current Outpatient Medications:     montelukast (SINGULAIR) 10 MG tablet, TAKE ONE TABLET BY MOUTH EVERY NIGHT AT BEDTIME , Disp: 30 tablet, Rfl: 6    fluticasone-salmeterol (ADVAIR) 250-50 MCG/DOSE AEPB, INHALE 1 PUFF INTO THE LUNGS EVERY 12 HOURS, Disp: 1 Inhaler, Rfl: 6    ipratropium-albuterol (DUONEB) 0.5-2.5 (3) MG/3ML SOLN nebulizer solution, Inhale 3 mLs into the lungs every 6 hours as needed for Shortness of Breath DX Asthma J45.50, Disp: 120 vial, Rfl: 6    albuterol sulfate HFA (VENTOLIN HFA) 108 (90 Base) MCG/ACT inhaler, Inhale 2 puffs into the lungs every 6 hours as needed for Wheezing or Shortness of Breath, Disp: 1 Inhaler, Rfl: 6    hydrOXYzine (ATARAX) 25 MG tablet, , Disp: , Rfl:     fluticasone (FLONASE) 50 MCG/ACT nasal spray, 1 SPRAY IN EACH NOSTRIL DAILY, Disp: 3 Bottle, Rfl: 1    Probiotic Product (PROBIOTIC-10 PO), Take 1 capsule by mouth daily, Disp: , Rfl:     lisinopril (PRINIVIL;ZESTRIL) 10 MG tablet, Take 10 mg by mouth daily, Disp: , Rfl:     Cholecalciferol (VITAMIN D3 PO), Take by mouth, Disp: , Rfl:     gabapentin (NEURONTIN) 300 MG capsule, , Disp: , Rfl:     citalopram (CELEXA) 20 MG tablet, Take 20 mg by mouth, Disp: , Rfl:     pravastatin (PRAVACHOL) 40 MG tablet, Take 1 tablet by mouth daily. , Disp: , Rfl:     DiphenhydrAMINE HCl (BENADRYL PO), Take 1 tablet by mouth daily , Disp: , Rfl:     nystatin (MYCOSTATIN) 044273 UNIT/ML suspension, Take 5 mLs by mouth 4 times daily Retain in mouth as long as possible (Patient not taking: Reported on 3/11/2021), Disp: 240 mL, Rfl: 0    azelastine (ASTELIN) 0.1 % nasal spray, 2 sprays by Nasal route 2 times daily 2 Spray in each nostril (Patient not taking: Reported on 3/11/2021), Disp: 3 Bottle, Rfl: 1    Multiple Vitamins-Minerals (CENTRUM ADULTS PO), Take by mouth, Disp: , Rfl:     fexofenadine (ALLEGRA ALLERGY) 180 MG tablet, Take 180 mg by mouth daily, Disp: , Rfl:       Objective:     Telephone visit not able to obtain physical exam       DATA reviewed by me:   PFTs 02/17/2016 FEV1 1.78L(78%) TLC 5.09L(98%)   DLCO 15.97 (70%) 6MW 1120 F LO2 91%   PFTs 07/10/2012 FEV1 1.91L(90%) TLC 5.04L(101%) DLCO 16.30 (90%)  PFTs 03/17/2011 FEV1 1.77L           TLC 4.77L             DLCO 16.72  PFTs 04/15/2008 FEV1 1.82L           TLC 4.87L             DLCO 19.24        CT chest 2/15/19   No suspicious nodules  Regression of left lower lobe, right upper lobe nodules  Slight growth in the small right lower lobe nodule    CT chest 9/2/2020 imaging reviewed by me and showed  Resolution of pulmonary nodule  Other nodules have resolved or decreased in size  No new nodules or acute abnormalities  No evidence of axillary adenopathy on the right    Bronch 2/27/2018: Hilar LAD: No phenotypic evidence of non-Hodgkin lymphoma. A. Lymph Node, 12L, Transbronchial Fine Needle Aspirate: No malignant cells identified. B. Lung, Left Lower Lobe Nodule Brushings and Brush Tip: No malignant cells identified. A. Lung, Bronchial Washing: No malignant cells identified. B. Lung, Bronchoalveolar Lavage to Left Lower Lobe: No malignant cells identified. Assessment:      · Moderate persistent Asthma. Unable to afford Xolair  · Allergic rhinitis  · 54 pack years smoking-quit 11/2017   · RUL nodule now 8 mm on CT chest 11/15/2017. Smaller on repeat CT chest 6/11/2018. Smaller on repeat CT 9/2/2020  · Lobulated LLL pulmonary nodule on CT chest 2/3/2016 (SUV of 1.48). CT chest 8/12/16 showed stability with calcification. ? Growth on CT chest 2/13/2017 with hilar adenopathy. Negative EBUS TBNA of the LN 2/27/2017. Smaller on repeat CT 9/2/2020  · Pulmonary nodules stable between 5/2007 and 4/2009. Likely granulomas. No further evaluation needed  · Reaction to flu vaccine in the past - vomiting, diarrhea and  achness   · History of Guillain Phi not related vaccines per patient's report       Plan:      Continue Advair BID and DuoNeb QID PRN  Albuterol 2 puffs Q4-6 hrs PRN  Continue Singulair  Patient is up to date with Pneumococcal vaccine. Interested in Covid vaccine, but hesitant.  Risks, benefits, alternatives and side effects were discussed with patient. Happy to schedule if she decides to take. No influenza vaccine due to history of GBS  Advised to continue with smoking cessation  Follow up in 6 months              Vinnie Herrera is a 66 y.o. female evaluated via telephone on 3/11/2021. Consent:  She and/or health care decision maker is aware that that she may receive a bill for this telephone service, depending on her insurance coverage, and has provided verbal consent to proceed: Yes       Documentation:  I communicated with the patient and/or health care decision maker about: See above   Details of this discussion including any medical advice provided: See above       I Affirm this is a Patient Initiated Episode with an Established Patient who has not had a related appointment within my department in the past 7 days or scheduled within the next 24 hours.     Total Time: 11-20 minutes     Note: not billable if this call serves to triage the patient into an appointment for the relevant concern      German Nunez

## 2021-04-06 ENCOUNTER — IMMUNIZATION (OUTPATIENT)
Dept: PRIMARY CARE CLINIC | Age: 76
End: 2021-04-06
Payer: MEDICARE

## 2021-04-06 PROCEDURE — 0001A COVID-19, PFIZER VACCINE 30MCG/0.3ML DOSE: CPT | Performed by: FAMILY MEDICINE

## 2021-04-06 PROCEDURE — 91300 COVID-19, PFIZER VACCINE 30MCG/0.3ML DOSE: CPT | Performed by: FAMILY MEDICINE

## 2021-04-26 ENCOUNTER — OFFICE VISIT (OUTPATIENT)
Dept: PULMONOLOGY | Age: 76
End: 2021-04-26
Payer: MEDICARE

## 2021-04-26 ENCOUNTER — TELEPHONE (OUTPATIENT)
Dept: PULMONOLOGY | Age: 76
End: 2021-04-26

## 2021-04-26 VITALS
HEART RATE: 69 BPM | TEMPERATURE: 97.5 F | OXYGEN SATURATION: 96 % | RESPIRATION RATE: 16 BRPM | SYSTOLIC BLOOD PRESSURE: 130 MMHG | DIASTOLIC BLOOD PRESSURE: 91 MMHG

## 2021-04-26 DIAGNOSIS — T50.Z95A ADVERSE EFFECT OF VACCINE, INITIAL ENCOUNTER: ICD-10-CM

## 2021-04-26 DIAGNOSIS — J30.9 ALLERGIC RHINITIS, UNSPECIFIED SEASONALITY, UNSPECIFIED TRIGGER: ICD-10-CM

## 2021-04-26 DIAGNOSIS — J45.901 EXACERBATION OF ASTHMA, UNSPECIFIED ASTHMA SEVERITY, UNSPECIFIED WHETHER PERSISTENT: Primary | ICD-10-CM

## 2021-04-26 PROCEDURE — 99214 OFFICE O/P EST MOD 30 MIN: CPT | Performed by: INTERNAL MEDICINE

## 2021-04-26 RX ORDER — PREDNISONE 20 MG/1
20 TABLET ORAL DAILY
Qty: 5 TABLET | Refills: 0 | Status: SHIPPED | OUTPATIENT
Start: 2021-04-26 | End: 2021-05-03 | Stop reason: SDUPTHER

## 2021-04-26 NOTE — PROGRESS NOTES
[facility]    Outpatient Follow Up Note      CHIEF COMPLAINT: [id number] seen reaction      HPI:   Patient received her first dose of Covid vaccine 4/6/2021 with pain in her right breast/right armpit since then. Overall improving. Wondering if she is okay to take her second dose. 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)     Hyperlipidemia     Lock jaw     Pneumonia     Post-nasal drip     Pulmonary nodule     bi lateral    Rash     itchy, bumps    Reflex sympathetic dystrophy     RSD (reflex sympathetic dystrophy)     TMJ (dislocation of temporomandibular joint)        Past Surgical History:        Procedure Laterality Date    APPENDECTOMY      BACK SURGERY      BRONCHOSCOPY  2008    COLONOSCOPY  11/15/2012    1 snared polyp    HYSTERECTOMY      TONSILLECTOMY         Allergies:  is allergic to latex; iodine; penicillins; sulfa antibiotics; tetanus toxoids; clindamycin/lincomycin; influenza vaccines; singulair [montelukast]; albuterol; codeine; and spiriva handihaler [tiotropium bromide monohydrate]. Social History:    TOBACCO:   reports that she quit smoking about 3 years ago. Her smoking use included cigarettes. She has a 12.75 pack-year smoking history. She has never used smokeless tobacco.  ETOH:   reports current alcohol use.       Family History:       Problem Relation Age of Onset    Diabetes Brother     Cancer Mother     Colon Cancer Mother     Asthma Neg Hx     Emphysema Neg Hx     Heart Failure Neg Hx     Hypertension Neg Hx        Current Medications:    Current Outpatient Medications:     fluticasone-salmeterol (ADVAIR) 250-50 conjunctival injection. ENT: No discharge. Pharynx clear. Neck: Trachea midline. No obvious mass. Resp: No accessory muscle use. No crackles. No wheezes. No rhonchi. No dullness on percussion. Good air entry. CV: Regular rate. Regular rhythm. No murmur or rub. No edema. GI: Non-tender. Non-distended. No hernia. Skin: Warm and dry. No nodule on exposed extremities. Lymph: No cervical LAD. No supraclavicular LAD. M/S: No cyanosis. No joint deformity. No clubbing. Neuro: Awake. Alert. Moves all four extremities. Psych: Oriented x 3. No anxiety. DATA reviewed by me:   PFTs 02/17/2016 FEV1 1.78L(78%) TLC 5.09L(98%)   DLCO 15.97 (70%) 6MW 1120 F LO2 91%   PFTs 07/10/2012 FEV1 1.91L(90%) TLC 5.04L(101%) DLCO 16.30 (90%)  PFTs 03/17/2011 FEV1 1.77L           TLC 4.77L             DLCO 16.72  PFTs 04/15/2008 FEV1 1.82L           TLC 4.87L             DLCO 19.24        CT chest 2/15/19   No suspicious nodules  Regression of left lower lobe, right upper lobe nodules  Slight growth in the small right lower lobe nodule    CT chest 9/2/2020   Resolution of pulmonary nodule  Other nodules have resolved or decreased in size  No new nodules or acute abnormalities  No evidence of axillary adenopathy on the right    Bronch 2/27/2018: Hilar LAD: No phenotypic evidence of non-Hodgkin lymphoma. A. Lymph Node, 12L, Transbronchial Fine Needle Aspirate: No malignant cells identified. B. Lung, Left Lower Lobe Nodule Brushings and Brush Tip: No malignant cells identified. A. Lung, Bronchial Washing: No malignant cells identified. B. Lung, Bronchoalveolar Lavage to Left Lower Lobe: No malignant cells identified. Assessment:      · Covid vaccine with local reaction, pain at the site of injection- In general, local reactions were mild to moderate in severity and resolved after a mean duration of ~2.5 days (with some cases resolving after ~30 days).  The proportion of local reactions did not increase after the second dose   · Moderate persistent Asthma with AE. Unable to afford Xolair  · Allergic rhinitis   · 54 pack years smoking-quit 11/2017   · RUL nodule now 8 mm on CT chest 11/15/2017. Smaller on repeat CT chest 6/11/2018. Smaller on repeat CT 9/2/2020  · Lobulated LLL pulmonary nodule on CT chest 2/3/2016 (SUV of 1.48). CT chest 8/12/16 showed stability with calcification. ? Growth on CT chest 2/13/2017 with hilar adenopathy. Negative EBUS TBNA of the LN 2/27/2017. Smaller on repeat CT 9/2/2020  · Pulmonary nodules stable between 5/2007 and 4/2009. Likely granulomas. 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 Risks, benefits, alternatives were discussed with patient in detail. She feels comfortable with moving forward with the second dose. Prednisone 20 mg po daily for 5 days   Continue Advair BID and DuoNeb QID PRN  Albuterol 2 puffs Q4-6 hrs PRN  6MW and ONPO   Continue Singulair  Patient is up to date with Covid and Pneumococcal vaccine. 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 female evaluated via telephone on 4/26/2021.       Consent:  She and/or health care decision maker is aware that that she may receive a bill for this telephone service, depending on her insurance coverage, and has provided verbal consent to proceed: Yes       Documentation:  I communicated with the patient and/or health care decision maker about: See above   Details of this discussion including any medical advice provided: See above       I Affirm this is a Patient Initiated Episode with an Established Patient who has not had a related appointment within my department in the past 7 days or scheduled within the next 24 hours.     Total Time: 11-20 minutes     Note: not billable if this call serves to triage the patient into an appointment for the relevant concern      Thee Berry

## 2021-04-26 NOTE — TELEPHONE ENCOUNTER
Patient called stating that she received COVID vaccine 4/6/21. She has been having pain in her breast and armpits for the past 4 wks, but is better today. Patient denies any other symptoms. Patient is wanting to know if she should get second COVID vaccine tomorrow. Please advise. Assessment:3/11/21   · Moderate persistent Asthma. Unable to afford Xolair  · Allergic rhinitis  · 54 pack years smoking-quit 11/2017   · RUL nodule now 8 mm on CT chest 11/15/2017. Smaller on repeat CT chest 6/11/2018. Smaller on repeat CT 9/2/2020  · Lobulated LLL pulmonary nodule on CT chest 2/3/2016 (SUV of 1.48). CT chest 8/12/16 showed stability with calcification. ? Growth on CT chest 2/13/2017 with hilar adenopathy. Negative EBUS TBNA of the LN 2/27/2017. Smaller on repeat CT 9/2/2020  · Pulmonary nodules stable between 5/2007 and 4/2009. Likely granulomas. 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 · Interested in Covid vaccine, but hesitant. Risks, benefits, alternatives and side effects were discussed with patient. Happy to schedule if she decides to take.    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

## 2021-04-27 ENCOUNTER — IMMUNIZATION (OUTPATIENT)
Dept: PRIMARY CARE CLINIC | Age: 76
End: 2021-04-27
Payer: MEDICARE

## 2021-04-27 PROCEDURE — 91300 COVID-19, PFIZER VACCINE 30MCG/0.3ML DOSE: CPT | Performed by: FAMILY MEDICINE

## 2021-04-27 PROCEDURE — 0002A COVID-19, PFIZER VACCINE 30MCG/0.3ML DOSE: CPT | Performed by: FAMILY MEDICINE

## 2021-04-30 ENCOUNTER — TELEPHONE (OUTPATIENT)
Dept: PULMONOLOGY | Age: 76
End: 2021-04-30

## 2021-04-30 NOTE — TELEPHONE ENCOUNTER
Patient calling stating she lost the last 3 tablet of her prednisone that was prescribed on 4/26/21, states she only took for 2 days. She is asking if 3 tablets could be sent back in as they were helping with the rash. She was originally prescribed 20 mg daily for 5 days. Pt uses [facility]. LOV: 4/26/21    Assessment:   · Covid vaccine with local reaction, pain at the site of injection- In general, local reactions were mild to moderate in severity and resolved after a mean duration of ~2.5 days (with some cases resolving after ~30 days). The proportion of local reactions did not increase after the second dose   · Moderate persistent Asthma with AE. Unable to afford Xolair  · Allergic rhinitis   · 54 pack years smoking-quit 11/2017   · RUL nodule now 8 mm on CT chest 11/15/2017. Smaller on repeat CT chest 6/11/2018. Smaller on repeat CT 9/2/2020  · Lobulated LLL pulmonary nodule on CT chest 2/3/2016 (SUV of 1.48). CT chest 8/12/16 showed stability with calcification. ? Growth on CT chest 2/13/2017 with hilar adenopathy. Negative EBUS TBNA of the LN 2/27/2017. Smaller on repeat CT 9/2/2020  · Pulmonary nodules stable between 5/2007 and 4/2009. Likely granulomas. 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 Risks, benefits, alternatives were discussed with patient in detail. She feels comfortable with moving forward with the second dose. · Prednisone 20 mg po daily for 5 days   · Continue Advair BID and DuoNeb QID PRN  · Albuterol 2 puffs Q4-6 hrs PRN  · 6MW and ONPO   · Continue Singulair  · Patient is up to date with Covid and Pneumococcal vaccine.    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

## 2021-05-03 RX ORDER — PREDNISONE 20 MG/1
20 TABLET ORAL DAILY
Qty: 5 TABLET | Refills: 0 | Status: SHIPPED | OUTPATIENT
Start: 2021-05-03 | End: 2021-05-08

## 2021-06-14 ENCOUNTER — TELEPHONE (OUTPATIENT)
Dept: PULMONOLOGY | Age: 76
End: 2021-06-14

## 2021-06-14 NOTE — TELEPHONE ENCOUNTER
Pt called stating that pharmacy can still not fill Advair. Spoke with pharmacist whom states that pt insurance will not cover brand Advair. Please advise if alternative should be used? OV 4/26/21:    Assessment:   · Covid vaccine with local reaction, pain at the site of injection- In general, local reactions were mild to moderate in severity and resolved after a mean duration of ~2.5 days (with some cases resolving after ~30 days). The proportion of local reactions did not increase after the second dose   · Moderate persistent Asthma with AE. Unable to afford Xolair  · Allergic rhinitis   · 54 pack years smoking-quit 11/2017   · RUL nodule now 8 mm on CT chest 11/15/2017. Smaller on repeat CT chest 6/11/2018. Smaller on repeat CT 9/2/2020  · Lobulated LLL pulmonary nodule on CT chest 2/3/2016 (SUV of 1.48). CT chest 8/12/16 showed stability with calcification. ? Growth on CT chest 2/13/2017 with hilar adenopathy. Negative EBUS TBNA of the LN 2/27/2017. Smaller on repeat CT 9/2/2020  · Pulmonary nodules stable between 5/2007 and 4/2009. Likely granulomas. 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 Risks, benefits, alternatives were discussed with patient in detail. She feels comfortable with moving forward with the second dose. · Prednisone 20 mg po daily for 5 days   · Continue Advair BID and DuoNeb QID PRN  · Albuterol 2 puffs Q4-6 hrs PRN  · 6MW and ONPO   · Continue Singulair  · Patient is up to date with Covid and Pneumococcal vaccine.    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

## 2021-06-14 NOTE — TELEPHONE ENCOUNTER
Patient is checking with insurance will call back. States tried generic advair caused hoarseness, irritates throat.

## 2021-09-16 ENCOUNTER — TELEPHONE (OUTPATIENT)
Dept: PULMONOLOGY | Age: 76
End: 2021-09-16

## 2021-09-16 ENCOUNTER — VIRTUAL VISIT (OUTPATIENT)
Dept: PULMONOLOGY | Age: 76
End: 2021-09-16
Payer: MEDICARE

## 2021-09-16 DIAGNOSIS — J30.9 ALLERGIC RHINITIS, UNSPECIFIED SEASONALITY, UNSPECIFIED TRIGGER: ICD-10-CM

## 2021-09-16 DIAGNOSIS — R91.1 PULMONARY NODULE: ICD-10-CM

## 2021-09-16 DIAGNOSIS — J45.40 MODERATE PERSISTENT ASTHMA, UNSPECIFIED WHETHER COMPLICATED: Primary | ICD-10-CM

## 2021-09-16 DIAGNOSIS — Z87.891 HISTORY OF TOBACCO USE: ICD-10-CM

## 2021-09-16 PROCEDURE — 99443 PR PHYS/QHP TELEPHONE EVALUATION 21-30 MIN: CPT | Performed by: INTERNAL MEDICINE

## 2021-09-16 NOTE — PROGRESS NOTES
[facility]    Outpatient Follow Up Note  TELEHEALTH EVALUATION: Service performed was Audio (During [facility]- public health emergency) and not a face-to-face visit       CHIEF COMPLAINT: COPD       HPI:   Completed her second dose of Covid   Overall doing well   Breathing is fine   Some cough with white clear sputum   No hemoptysis   Not needing to use her Neb 3 times/week   No smoking   Not able to get her pulse ox        Past Medical History:   Diagnosis Date    Arthritis     Asthma     Bronchitis chronic     Colon polyp     [facility]     [facility] lung [facility]     Guillain-[place] ([facility]     Hyperlipidemia     Lock jaw     Pneumonia     Post-nasal drip     Pulmonary nodule     bi lateral    Rash     itchy, bumps    Reflex sympathetic dystrophy     RSD (reflex sympathetic dystrophy)     TMJ (dislocation of temporomandibular joint)        Past Surgical History:        Procedure Laterality Date    APPENDECTOMY      BACK SURGERY      BRONCHOSCOPY  2008    COLONOSCOPY  11/15/2012    1 snared polyp    HYSTERECTOMY      TONSILLECTOMY         Allergies:  is allergic to latex, iodine, penicillins, sulfa antibiotics, tetanus toxoids, clindamycin/lincomycin, influenza vaccines, singulair [montelukast], albuterol, codeine, and spiriva handihaler [tiotropium bromide monohydrate]. Social History:    TOBACCO:   reports that she quit smoking about 3 years ago. Her smoking use included cigarettes. She has a 12.75 pack-year smoking history. She has never used smokeless tobacco.  ETOH:   reports current alcohol use.       Family History:       Problem Relation Age of Onset    Diabetes Brother     Cancer Mother     Colon Cancer Mother     Asthma Neg Hx     Emphysema Neg Hx     Heart Failure Neg Hx     Hypertension Neg Hx        Current Medications:    Current Outpatient Medications:     fluticasone-salmeterol (ADVAIR) 250-50 MCG/DOSE AEPB, INHALE 1 PUFF INTO THE LUNGS EVERY 12 HOURS, Disp: 1 Inhaler, Rfl: 5    ipratropium-albuterol (DUONEB) 0.5-2.5 (3) MG/3ML SOLN nebulizer solution, Inhale 3 mLs into the lungs every 6 hours as needed for Shortness of Breath DX Asthma J45.50, Disp: 120 vial, Rfl: 5    albuterol sulfate HFA (VENTOLIN HFA) 108 (90 Base) MCG/ACT inhaler, Inhale 2 puffs into the lungs every 6 hours as needed for Wheezing or Shortness of Breath, Disp: 1 Inhaler, Rfl: 6    montelukast (SINGULAIR) 10 MG tablet, TAKE ONE TABLET BY MOUTH EVERY NIGHT AT BEDTIME , Disp: 30 tablet, Rfl: 6    fluticasone (FLONASE) 50 MCG/ACT nasal spray, 1 SPRAY IN EACH NOSTRIL DAILY, Disp: 3 Bottle, Rfl: 1    fexofenadine (ALLEGRA ALLERGY) 180 MG tablet, Take 180 mg by mouth daily, Disp: , Rfl:     nystatin (MYCOSTATIN) 373706 UNIT/ML suspension, Take 5 mLs by mouth 4 times daily Retain in mouth as long as possible, Disp: 240 mL, Rfl: 0    hydrOXYzine (ATARAX) 25 MG tablet, , Disp: , Rfl:     azelastine (ASTELIN) 0.1 % nasal spray, 2 sprays by Nasal route 2 times daily 2 Spray in each nostril, Disp: 3 Bottle, Rfl: 1    Probiotic Product (PROBIOTIC-10 PO), Take 1 capsule by mouth daily, Disp: , Rfl:     Multiple Vitamins-Minerals (CENTRUM ADULTS PO), Take by mouth, Disp: , Rfl:     lisinopril (PRINIVIL;ZESTRIL) 10 MG tablet, Take 10 mg by mouth daily, Disp: , Rfl:     Cholecalciferol (VITAMIN D3 PO), Take by mouth, Disp: , Rfl:     gabapentin (NEURONTIN) 300 MG capsule, , Disp: , Rfl:     citalopram (CELEXA) 20 MG tablet, Take 20 mg by mouth, Disp: , Rfl:     pravastatin (PRAVACHOL) 40 MG tablet, Take 1 tablet by mouth daily. , Disp: , Rfl:     DiphenhydrAMINE HCl (BENADRYL PO), Take 1 tablet by mouth daily , Disp: , Rfl:       Objective:   Telephone visit not able to obtain physical exam       DATA reviewed by me:   PFTs 02/17/2016 FEV1 1.78L(78%) TLC 5.09L(98%)   DLCO 15.97 (70%) 6MW 1120 F LO2 91%   PFTs 07/10/2012 FEV1 1.91L(90%) TLC 5.04L(101%) DLCO 16.30 (90%)  PFTs 03/17/2011 FEV1 1.77L           TLC 4.77L             DLCO 16.72  PFTs 04/15/2008 FEV1 1.82L           TLC 4.87L             DLCO 19.24        CT chest 2/15/19   No suspicious nodules  Regression of left lower lobe, right upper lobe nodules  Slight growth in the small right lower lobe nodule    CT chest 9/2/2020   Resolution of pulmonary nodule  Other nodules have resolved or decreased in size  No new nodules or acute abnormalities  No evidence of axillary adenopathy on the right    CXR 12/11/2020  images reviewed by me and showed:   R atelectasis   No acute process       Bronch 2/27/2018: Hilar LAD: No phenotypic evidence of non-Hodgkin lymphoma. A. Lymph Node, 12L, Transbronchial Fine Needle Aspirate: No malignant cells identified. B. Lung, Left Lower Lobe Nodule Brushings and Brush Tip: No malignant cells identified. A. Lung, Bronchial Washing: No malignant cells identified. B. Lung, Bronchoalveolar Lavage to Left Lower Lobe: No malignant cells identified. Assessment:      · Moderate persistent Asthma  · Allergic rhinitis   · 54 pack years smoking-quit 11/2017   · RUL nodule now 8 mm on CT chest 11/15/2017. Smaller on repeat CT chest 6/11/2018. Smaller on repeat CT 9/2/2020  · Lobulated LLL pulmonary nodule on CT chest 2/3/2016 (SUV of 1.48). CT chest 8/12/16 showed stability with calcification. ? Growth on CT chest 2/13/2017 with hilar adenopathy. Negative EBUS TBNA of the LN 2/27/2017. Smaller on repeat CT 9/2/2020  · Pulmonary nodules stable between 5/2007 and 4/2009. Likely granulomas.  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 Risks, benefits and alternatives including doing nothing were discussed with patient. Patient is up to date with Covid and Pneumococcal vaccine. 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 female evaluated via telephone on 9/16/2021. Consent:  She and/or health care decision maker is aware that that she may receive a bill for this telephone service, depending on her insurance coverage, and has provided verbal consent to proceed: Yes       Documentation:  I communicated with the patient and/or health care decision maker about: See above   Details of this discussion including any medical advice provided: See above       I Affirm this is a Patient Initiated Episode with an Established Patient who has not had a related appointment within my department in the past 7 days or scheduled within the next 24 hours.     Total Time: 21-30 minutes     Note: not billable if this call serves to triage the patient into an appointment for the relevant concern      Addison Branch MD

## 2021-09-16 NOTE — TELEPHONE ENCOUNTER
Called patient to go over AVS care. Patient declined to schedule ct; 6mw test, ONPO, and fua patient states she will CB at a later time to schedule     LOV: 9/16/21    Assessment:   · Moderate persistent Asthma  · Allergic rhinitis   · 54 pack years smoking-quit 11/2017   · RUL nodule now 8 mm on CT chest 11/15/2017. Smaller on repeat CT chest 6/11/2018. Smaller on repeat CT 9/2/2020  · Lobulated LLL pulmonary nodule on CT chest 2/3/2016 (SUV of 1.48). CT chest 8/12/16 showed stability with calcification. ? Growth on CT chest 2/13/2017 with hilar adenopathy. Negative EBUS TBNA of the LN 2/27/2017. Smaller on repeat CT 9/2/2020  · Pulmonary nodules stable between 5/2007 and 4/2009. Likely granulomas. 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 Risks, benefits and alternatives including doing nothing were discussed with patient. · Patient is up to date with Covid and Pneumococcal vaccine.    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

## 2021-09-16 NOTE — TELEPHONE ENCOUNTER
Within this Telehealth Consent, the terms you and yours refer to the person using the Telehealth Service (Service), or in the case of a use of the Service by or on behalf of a minor, you and yours refer to and include (i) the parent or legal guardian who provides consent to the use of the Service by such minor or uses the Service on behalf of such minor, and (ii) the minor for whom consent is being provided or on whose behalf the Service is being utilized. When using Service, you will be consulting with your health care providers via the use of Telehealth.   Telehealth involves the delivery of healthcare services using electronic communications, information technology or other means between a healthcare provider and a patient who are not in the same physical location. Telehealth may be used for diagnosis, treatment, follow-up and/or patient education, and may include, but is not limited to, one or more of the following:    Electronic transmission of medical records, photo images, personal health information or other data between a patient and a healthcare provider    Interactions between a patient and healthcare provider via audio, video and/or data communications    Use of output data from medical devices, sound and video files    Anticipated Benefits   The use of Telehealth by your Provider(s) through the Service may have the following possible benefits:    Making it easier and more efficient for you to access medical care and treatment for the conditions treated by such Provider(s) utilizing the Service    Allowing you to obtain medical care and treatment by Provider(s) at times that are convenient for you    Enabling you to interact with Provider(s) without the necessity of an in-office appointment     Possible Risks   While the use of Telehealth can provide potential benefits for you, there are also potential risks associated with the use of Telehealth.  These risks include, but may not be limited to the following:    Your Provider(s) may not able to provide medical treatment for your particular condition and you may be required to seek alternative healthcare or emergency care services.  The electronic systems or other security protocols or safeguards used in the Service could fail, causing a breach of privacy of your medical or other information.  Given regulatory requirements in certain jurisdictions, your Provider(s) diagnosis and/or treatment options, especially pertaining to certain prescriptions, may be limited. Acceptance   1. You understand that Services will be provided via Telehealth. This process involves the use of HIPAA compliant and secure, real-time audio-visual interfacing with a qualified and appropriately trained provider located at [facility]. 2. You understand that, under no circumstances, will this session be recorded. 3. You understand that the Provider(s) at [facility] and other clinical participants will be party to the information obtained during the Telehealth session in accordance with best medical practices. 4. You understand that the information obtained during the Telehealth session will be used to help determine the most appropriate treatment options. 5. You understand that You have the right to revoke this consent at any point in time. 6. You understand that Telehealth is voluntary, and that continued treatment is not dependent upon consent. 7. You understand that, in the event of non-consent to Telehealth services and/or technical difficulties, you will obtain services as typically provided in the absence of Telehealth technology. 8. You understand that this consent will be kept in Your medical record. 9. No potential benefits from the use of Telehealth or specific results can be guaranteed. Your condition may not be cured or improved and, in some cases, may get worse.    10. There are limitations in the provision of medical care and treatment via Telehealth and the Service and you may not be able to receive diagnosis and/or treatment through the Service for every condition for which you seek diagnosis and/or treatment. 11. There are potential risks to the use of Telehealth, including but not limited to the risks described in this Telehealth Consent. 12. Your Provider(s) have discussed the use of Telehealth and the Service with you, including the benefits and risks of such and you have provided oral consent to your Provider(s) for the use of Telehealth and the Service. 15. You understand that it is your duty to provide your Provider(s) truthful, accurate and complete information, including all relevant information regarding care that you may have received or may be receiving from other healthcare providers outside of the Service. 14. You understand that each of your Provider(s) may determine in his or sole discretion that your condition is not suitable for diagnosis and/or treatment using the Service, and that you may need to seek medical care and treatment a specialist or other healthcare provider, outside of the Service. 15. You understand that you are fully responsible for payment for all services provided by Provider(s) or through use of the Service and that you may not be able to use third-party insurance. 16. You represent that (a) you have read this Telehealth Consent carefully, (b) you understand the risks and benefits of the Service and the use of Telehealth in the medical care and treatment provided to you by Provider(s) using the Service, and (c) you have the legal capacity and authority to provide this consent for yourself and/or the minor for which you are consenting under applicable federal and state laws, including laws relating to the age of [de-identified] and/or parental/guardian consent.    17. You give your informed consent to the use of Telehealth by Provider(s) using the Service under the terms described in the Terms of Service and this Telehealth Consent. The patient was read the following statement and has consented to the visit as of 9/16/21. The patient has been scheduled for their first telehealth visit on 9/16/21 with .

## 2021-09-29 NOTE — TELEPHONE ENCOUNTER
Spoke with patient whom states she will reach out to DME to find what company to send ONPO to and see what the cost would be for her, which will depend on if she will complete.  Patient is scheduled for ct 11/11/21 will schedule fua once she CB

## 2021-09-29 NOTE — TELEPHONE ENCOUNTER
Pt LM on VM states insurance will not cover ONPO so patient will not be completing. Patient also declined 6mw test has she has a \"crushed back\". Patient is requesting office to CB towards December/January to schedule fua as she does not have a 2022 schedule.

## 2021-10-11 RX ORDER — MONTELUKAST SODIUM 10 MG/1
TABLET ORAL
Qty: 30 TABLET | Refills: 5 | Status: SHIPPED | OUTPATIENT
Start: 2021-10-11 | End: 2021-11-16

## 2021-11-07 ENCOUNTER — TELEPHONE (OUTPATIENT)
Dept: ADMINISTRATIVE | Age: 76
End: 2021-11-07

## 2021-11-07 DIAGNOSIS — R91.1 PULMONARY NODULE: Primary | ICD-10-CM

## 2021-11-11 ENCOUNTER — HOSPITAL ENCOUNTER (OUTPATIENT)
Dept: CT IMAGING | Age: 76
Discharge: HOME | End: 2021-11-11
Payer: MEDICARE

## 2021-11-11 DIAGNOSIS — R91.1 PULMONARY NODULE: ICD-10-CM

## 2021-11-11 PROCEDURE — 71250 CT THORAX DX C-: CPT

## 2021-11-16 ENCOUNTER — OFFICE VISIT (OUTPATIENT)
Dept: PULMONOLOGY | Age: 76
End: 2021-11-16
Payer: MEDICARE

## 2021-11-16 ENCOUNTER — TELEPHONE (OUTPATIENT)
Dept: PULMONOLOGY | Age: 76
End: 2021-11-16

## 2021-11-16 VITALS
HEIGHT: 65 IN | SYSTOLIC BLOOD PRESSURE: 120 MMHG | RESPIRATION RATE: 20 BRPM | HEART RATE: 61 BPM | BODY MASS INDEX: 27.49 KG/M2 | DIASTOLIC BLOOD PRESSURE: 62 MMHG | WEIGHT: 165 LBS | OXYGEN SATURATION: 94 %

## 2021-11-16 DIAGNOSIS — R91.1 PULMONARY NODULE: ICD-10-CM

## 2021-11-16 DIAGNOSIS — J30.9 ALLERGIC RHINITIS, UNSPECIFIED SEASONALITY, UNSPECIFIED TRIGGER: ICD-10-CM

## 2021-11-16 DIAGNOSIS — Z87.891 HISTORY OF TOBACCO USE: ICD-10-CM

## 2021-11-16 DIAGNOSIS — J45.40 MODERATE PERSISTENT ASTHMA, UNSPECIFIED WHETHER COMPLICATED: Primary | ICD-10-CM

## 2021-11-16 PROCEDURE — 99214 OFFICE O/P EST MOD 30 MIN: CPT | Performed by: INTERNAL MEDICINE

## 2021-11-16 RX ORDER — IPRATROPIUM BROMIDE AND ALBUTEROL SULFATE 2.5; .5 MG/3ML; MG/3ML
1 SOLUTION RESPIRATORY (INHALATION) EVERY 6 HOURS PRN
Qty: 360 ML | Refills: 3 | Status: SHIPPED | OUTPATIENT
Start: 2021-11-16

## 2021-11-16 RX ORDER — ALBUTEROL SULFATE 90 UG/1
2 AEROSOL, METERED RESPIRATORY (INHALATION) EVERY 6 HOURS PRN
Qty: 18 G | Refills: 5 | Status: SHIPPED | OUTPATIENT
Start: 2021-11-16

## 2021-11-16 NOTE — PROGRESS NOTES
[facility]    Outpatient Follow Up Note      CHIEF COMPLAINT: CT/COPD       HPI:   CT chest reviewed by me and noted below. Results were dicussed with patient and multiple good questions were answered. 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)     Hyperlipidemia     Lock jaw     Pneumonia     Post-nasal drip     Pulmonary nodule     bi lateral    Rash     itchy, bumps    Reflex sympathetic dystrophy     RSD (reflex sympathetic dystrophy)     TMJ (dislocation of temporomandibular joint)        Past Surgical History:        Procedure Laterality Date    APPENDECTOMY      BACK SURGERY      BRONCHOSCOPY  2008    COLONOSCOPY  11/15/2012    1 snared polyp    HYSTERECTOMY      TONSILLECTOMY         Allergies:  is allergic to latex, iodine, penicillins, sulfa antibiotics, tetanus toxoids, clindamycin/lincomycin, influenza vaccines, singulair [montelukast], albuterol, codeine, and spiriva handihaler [tiotropium bromide monohydrate]. Social History:    TOBACCO:   reports that she quit smoking about 3 years ago. Her smoking use included cigarettes. She has a 12.75 pack-year smoking history. She has never used smokeless tobacco.  ETOH:   reports current alcohol use.       Family History:       Problem Relation Age of Onset    Diabetes Brother     Cancer Mother     Colon Cancer Mother     Asthma Neg Hx     Emphysema Neg Hx     Heart Failure Neg Hx     Hypertension Neg Hx        Current Medications:    Current Outpatient Medications:     montelukast (SINGULAIR) 10 MG tablet, TAKE 1 TABLET BY MOUTH EACH NIGHT AT BEDTIME, Disp: 30 tablet, Rfl: 5    fluticasone-salmeterol (ADVAIR) 250-50 MCG/DOSE AEPB, INHALE 1 PUFF INTO THE LUNGS EVERY 12 HOURS, Disp: 1 Inhaler, Rfl: 5    ipratropium-albuterol (DUONEB) 0.5-2.5 (3) MG/3ML SOLN nebulizer solution, Inhale 3 mLs into the lungs every 6 hours as needed for Shortness of Breath DX Asthma J45.50, Disp: 120 vial, Rfl: 5    albuterol sulfate HFA (VENTOLIN HFA) 108 (90 Base) MCG/ACT inhaler, Inhale 2 puffs into the lungs every 6 hours as needed for Wheezing or Shortness of Breath, Disp: 1 Inhaler, Rfl: 6    nystatin (MYCOSTATIN) 036783 UNIT/ML suspension, Take 5 mLs by mouth 4 times daily Retain in mouth as long as possible, Disp: 240 mL, Rfl: 0    hydrOXYzine (ATARAX) 25 MG tablet, , Disp: , Rfl:     azelastine (ASTELIN) 0.1 % nasal spray, 2 sprays by Nasal route 2 times daily 2 Spray in each nostril, Disp: 3 Bottle, Rfl: 1    fluticasone (FLONASE) 50 MCG/ACT nasal spray, 1 SPRAY IN EACH NOSTRIL DAILY, Disp: 3 Bottle, Rfl: 1    Probiotic Product (PROBIOTIC-10 PO), Take 1 capsule by mouth daily, Disp: , Rfl:     Multiple Vitamins-Minerals (CENTRUM ADULTS PO), Take by mouth, Disp: , Rfl:     lisinopril (PRINIVIL;ZESTRIL) 10 MG tablet, Take 10 mg by mouth daily, Disp: , Rfl:     Cholecalciferol (VITAMIN D3 PO), Take by mouth, Disp: , Rfl:     gabapentin (NEURONTIN) 300 MG capsule, , Disp: , Rfl:     fexofenadine (ALLEGRA ALLERGY) 180 MG tablet, Take 180 mg by mouth daily, Disp: , Rfl:     citalopram (CELEXA) 20 MG tablet, Take 20 mg by mouth, Disp: , Rfl:     pravastatin (PRAVACHOL) 40 MG tablet, Take 1 tablet by mouth daily. , Disp: , Rfl:     DiphenhydrAMINE HCl (BENADRYL PO), Take 1 tablet by mouth daily , Disp: , Rfl:       Objective:   /62   Pulse 61   Resp 20   Ht 5' 5\" (1.651 m)   Wt 165 lb (74.8 kg)   SpO2 94% Comment: with RA  BMI 27.46 kg/m²   Gen: No distress. Eyes: PERRL. No sclera icterus. No conjunctival injection. ENT: No discharge. Pharynx clear. Neck: Trachea midline. No obvious mass. Resp: No accessory muscle use. No crackles. No wheezes. No rhonchi. No dullness on percussion. Good air entry. CV: Regular rate. Regular rhythm. No murmur or rub. No edema. GI: Non-tender. Non-distended. No hernia. Skin: Warm and dry. No nodule on exposed extremities. Lymph: No cervical LAD. No supraclavicular LAD. M/S: No cyanosis. No joint deformity. No clubbing. Neuro: Awake. Alert. Moves all four extremities. Psych: Oriented x 3. No anxiety. DATA reviewed by me:   PFTs 02/17/2016 FEV1 1.78L(78%) TLC 5.09L(98%)   DLCO 15.97 (70%) 6MW 1120 F LO2 91%   PFTs 07/10/2012 FEV1 1.91L(90%) TLC 5.04L(101%) DLCO 16.30 (90%)  PFTs 03/17/2011 FEV1 1.77L           TLC 4.77L             DLCO 16.72  PFTs 04/15/2008 FEV1 1.82L           TLC 4.87L             DLCO 19.24        CT chest 2/15/19   No suspicious nodules  Regression of left lower lobe, right upper lobe nodules  Slight growth in the small right lower lobe nodule    CT chest 9/2/2020   Resolution of pulmonary nodule  Other nodules have resolved or decreased in size  No new nodules or acute abnormalities  No evidence of axillary adenopathy on the right    CT chest 11/11/2021  images reviewed by me and showed:   1. No acute intrapulmonary findings. 2. Stable multifocal pleural and parenchymal scarring. 3. A majority of the patient's scattered solid and ground-glass nodules  throughout both lungs are unchanged from 09/02/2020, and likely reflect  benign granulomatous disease. 4. Increase in size of an approximate 8 mm nodule within the peripheral posterior right upper lobe. Bronch 2/27/2018: Hilar LAD: No phenotypic evidence of non-Hodgkin lymphoma. A. Lymph Node, 12L, Transbronchial Fine Needle Aspirate: No malignant cells identified. B. Lung, Left Lower Lobe Nodule Brushings and Brush Tip: No malignant cells identified. A. Lung, Bronchial Washing: No malignant cells identified.   B. Lung, Bronchoalveolar Lavage to Left Lower Lobe: No malignant cells identified. Assessment:      · Moderate persistent Asthma  · Enlarging RUL 8 mm nodule on CT 11/11/21. DDx cancer and granulomas   · Allergic rhinitis   · 54 pack years smoking-quit 11/2017   · RUL nodule 8 mm on CT chest 11/15/2017. Smaller on repeat CT chest 6/11/2018 and CT 9/2/2020. · Lobulated LLL pulmonary nodule on CT chest 2/3/2016 (SUV of 1.48). CT chest 8/12/16 showed stability with calcification. ? Growth on CT chest 2/13/2017 with hilar adenopathy. Negative EBUS TBNA of the LN 2/27/2017. Smaller on repeat CT 9/2/2020  · Pulmonary nodules stable between 5/2007 and 4/2009. Likely granulomas. 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 I recommend radiographic follow up CT chest 3-6 months. Patient feels comfortable with that and not interested in any invasive testing at this time. Patient is up to date with Covid and Pneumococcal vaccine.    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

## 2021-11-16 NOTE — TELEPHONE ENCOUNTER
Offered to schedule pt ct and fua during LOV, pt declined wants office to call Mid April 2022 to schedule.  Will reach out to patient closer to that time

## 2021-11-22 ENCOUNTER — APPOINTMENT (OUTPATIENT)
Dept: CT IMAGING | Age: 76
End: 2021-11-22
Payer: MEDICARE

## 2021-11-22 ENCOUNTER — APPOINTMENT (OUTPATIENT)
Dept: GENERAL RADIOLOGY | Age: 76
End: 2021-11-22
Payer: MEDICARE

## 2021-11-22 ENCOUNTER — HOSPITAL ENCOUNTER (EMERGENCY)
Age: 76
Discharge: HOME | End: 2021-11-22
Attending: EMERGENCY MEDICINE
Payer: MEDICARE

## 2021-11-22 VITALS
HEART RATE: 62 BPM | WEIGHT: 156.1 LBS | BODY MASS INDEX: 26.01 KG/M2 | RESPIRATION RATE: 16 BRPM | OXYGEN SATURATION: 97 % | HEIGHT: 65 IN | DIASTOLIC BLOOD PRESSURE: 68 MMHG | TEMPERATURE: 97.2 F | SYSTOLIC BLOOD PRESSURE: 100 MMHG

## 2021-11-22 DIAGNOSIS — R42 DIZZINESS: Primary | ICD-10-CM

## 2021-11-22 DIAGNOSIS — J18.9 PNEUMONIA DUE TO INFECTIOUS ORGANISM, UNSPECIFIED LATERALITY, UNSPECIFIED PART OF LUNG: ICD-10-CM

## 2021-11-22 LAB
A/G RATIO: 1.3 (ref 1.1–2.2)
ALBUMIN SERPL-MCNC: 4.3 G/DL (ref 3.4–5)
ALP BLD-CCNC: 131 U/L (ref 40–129)
ALT SERPL-CCNC: 11 U/L (ref 10–40)
ANION GAP SERPL CALCULATED.3IONS-SCNC: 13 MMOL/L (ref 3–16)
AST SERPL-CCNC: 16 U/L (ref 15–37)
BASOPHILS ABSOLUTE: 0.1 K/UL (ref 0–0.2)
BASOPHILS RELATIVE PERCENT: 1 %
BILIRUB SERPL-MCNC: 0.3 MG/DL (ref 0–1)
BUN BLDV-MCNC: 11 MG/DL (ref 7–20)
CALCIUM SERPL-MCNC: 9.7 MG/DL (ref 8.3–10.6)
CHLORIDE BLD-SCNC: 96 MMOL/L (ref 99–110)
CHP ED QC CHECK: YES
CO2: 28 MMOL/L (ref 21–32)
CREAT SERPL-MCNC: 0.9 MG/DL (ref 0.6–1.2)
EOSINOPHILS ABSOLUTE: 0.3 K/UL (ref 0–0.6)
EOSINOPHILS RELATIVE PERCENT: 3.7 %
GFR AFRICAN AMERICAN: >60
GFR NON-AFRICAN AMERICAN: >60
GLUCOSE BLD-MCNC: 112 MG/DL (ref 70–99)
GLUCOSE BLD-MCNC: 118 MG/DL
GLUCOSE BLD-MCNC: 118 MG/DL (ref 70–99)
GLUCOSE BLD-MCNC: 66 MG/DL (ref 70–99)
HCT VFR BLD CALC: 43.8 % (ref 36–48)
HEMOGLOBIN: 14.8 G/DL (ref 12–16)
LYMPHOCYTES ABSOLUTE: 2 K/UL (ref 1–5.1)
LYMPHOCYTES RELATIVE PERCENT: 25 %
MAGNESIUM: 1.8 MG/DL (ref 1.8–2.4)
MCH RBC QN AUTO: 30.5 PG (ref 26–34)
MCHC RBC AUTO-ENTMCNC: 33.7 G/DL (ref 31–36)
MCV RBC AUTO: 90.4 FL (ref 80–100)
MONOCYTES ABSOLUTE: 0.8 K/UL (ref 0–1.3)
MONOCYTES RELATIVE PERCENT: 10.3 %
NEUTROPHILS ABSOLUTE: 4.7 K/UL (ref 1.7–7.7)
NEUTROPHILS RELATIVE PERCENT: 60 %
PDW BLD-RTO: 14.2 % (ref 12.4–15.4)
PERFORMED ON: ABNORMAL
PERFORMED ON: ABNORMAL
PLATELET # BLD: 238 K/UL (ref 135–450)
PMV BLD AUTO: 11.1 FL (ref 5–10.5)
POTASSIUM REFLEX MAGNESIUM: 3 MMOL/L (ref 3.5–5.1)
PRO-BNP: 210 PG/ML (ref 0–449)
RBC # BLD: 4.85 M/UL (ref 4–5.2)
SODIUM BLD-SCNC: 137 MMOL/L (ref 136–145)
TOTAL PROTEIN: 7.6 G/DL (ref 6.4–8.2)
TROPONIN: <0.01 NG/ML
TROPONIN: <0.01 NG/ML
WBC # BLD: 7.8 K/UL (ref 4–11)

## 2021-11-22 PROCEDURE — 83880 ASSAY OF NATRIURETIC PEPTIDE: CPT

## 2021-11-22 PROCEDURE — 83735 ASSAY OF MAGNESIUM: CPT

## 2021-11-22 PROCEDURE — 70450 CT HEAD/BRAIN W/O DYE: CPT

## 2021-11-22 PROCEDURE — 2580000003 HC RX 258: Performed by: EMERGENCY MEDICINE

## 2021-11-22 PROCEDURE — 80053 COMPREHEN METABOLIC PANEL: CPT

## 2021-11-22 PROCEDURE — 93005 ELECTROCARDIOGRAM TRACING: CPT | Performed by: EMERGENCY MEDICINE

## 2021-11-22 PROCEDURE — 99284 EMERGENCY DEPT VISIT MOD MDM: CPT

## 2021-11-22 PROCEDURE — 84484 ASSAY OF TROPONIN QUANT: CPT

## 2021-11-22 PROCEDURE — 85025 COMPLETE CBC W/AUTO DIFF WBC: CPT

## 2021-11-22 PROCEDURE — 6370000000 HC RX 637 (ALT 250 FOR IP): Performed by: EMERGENCY MEDICINE

## 2021-11-22 PROCEDURE — 71045 X-RAY EXAM CHEST 1 VIEW: CPT

## 2021-11-22 RX ORDER — DOXYCYCLINE HYCLATE 100 MG
100 TABLET ORAL ONCE
Status: COMPLETED | OUTPATIENT
Start: 2021-11-22 | End: 2021-11-22

## 2021-11-22 RX ORDER — MECLIZINE HCL 12.5 MG/1
12.5 TABLET ORAL ONCE
Status: COMPLETED | OUTPATIENT
Start: 2021-11-22 | End: 2021-11-22

## 2021-11-22 RX ORDER — DOXYCYCLINE HYCLATE 100 MG
100 TABLET ORAL 2 TIMES DAILY
Qty: 14 TABLET | Refills: 0 | Status: SHIPPED | OUTPATIENT
Start: 2021-11-22 | End: 2021-11-29

## 2021-11-22 RX ORDER — SODIUM CHLORIDE, SODIUM LACTATE, POTASSIUM CHLORIDE, AND CALCIUM CHLORIDE .6; .31; .03; .02 G/100ML; G/100ML; G/100ML; G/100ML
1000 INJECTION, SOLUTION INTRAVENOUS ONCE
Status: COMPLETED | OUTPATIENT
Start: 2021-11-22 | End: 2021-11-22

## 2021-11-22 RX ORDER — POTASSIUM CHLORIDE 20 MEQ/1
40 TABLET, EXTENDED RELEASE ORAL ONCE
Status: COMPLETED | OUTPATIENT
Start: 2021-11-22 | End: 2021-11-22

## 2021-11-22 RX ADMIN — DOXYCYCLINE HYCLATE 100 MG: 100 TABLET, COATED ORAL at 22:28

## 2021-11-22 RX ADMIN — MECLIZINE 12.5 MG: 12.5 TABLET ORAL at 21:07

## 2021-11-22 RX ADMIN — SODIUM CHLORIDE, POTASSIUM CHLORIDE, SODIUM LACTATE AND CALCIUM CHLORIDE 1000 ML: 600; 310; 30; 20 INJECTION, SOLUTION INTRAVENOUS at 21:09

## 2021-11-22 RX ADMIN — POTASSIUM CHLORIDE 40 MEQ: 20 TABLET, EXTENDED RELEASE ORAL at 21:48

## 2021-11-23 LAB
EKG ATRIAL RATE: 55 BPM
EKG DIAGNOSIS: NORMAL
EKG P AXIS: 79 DEGREES
EKG P-R INTERVAL: 156 MS
EKG Q-T INTERVAL: 464 MS
EKG QRS DURATION: 78 MS
EKG QTC CALCULATION (BAZETT): 443 MS
EKG R AXIS: -20 DEGREES
EKG T AXIS: 4 DEGREES
EKG VENTRICULAR RATE: 55 BPM

## 2021-11-23 PROCEDURE — 93010 ELECTROCARDIOGRAM REPORT: CPT | Performed by: INTERNAL MEDICINE

## 2021-11-23 NOTE — PLAN OF CARE
STROKE TEAM PLAN OF CARE    Name:  Garett Duarte (12 y.o., female)  : 1945  MRN:  [id number]  Today's Date: 2021    Stroke team contacted at: 20:30  History obtained from: emergency physician    Garett Duarte is a 64 y.o. female who presented with dizziness. Briefly, pt was LKW at 19:30 when she noticed that she was acutely vertiginous. She also felt generalized weakness, though has no focal weakness. She felt off balance, like she was leaning to the right. Had a similar episode appx one month ago that self-resolved. In the ED, pt was initially noted to be listing to the right. However, on reeval, was able to ambulate independently and had no truncal ataxia. No focal weakness on exam.    CT HEAD WO CONTRAST   Final Result   No CT evidence of an acute infarct. Findings were discussed with Alejandro Gann at 8:48 pm on 2021. CTA: prohibitive 2/2 contrast dye allergy    Acute Ischemic Stroke Clinical Decision Making:    (1) Intravenous tPA:    The patient is not a candidate for iv TPA based on:  Symptoms nondisabling, alternative diagnoses considered. (2) Angiography / Thrombectomy:  The patient does not have evidence of a proximal large vessel occlusion on CTA, and therefore is not an EVT candidate. For any additional questions regarding acute stroke care, please feel free to contact the  Stroke Team at [phone].      MD Jhonny   Stroke Team   2021 10:52 PM

## 2021-11-23 NOTE — ED PROVIDER NOTES
CHIEF COMPLAINT  Fatigue (and dizziness started an hr ago. 90/67, normal vitals)      HISTORY OF PRESENT ILLNESS  Garett Duarte is a 64 y.o. female with a history of COPD, emphysema, hyperlipidemia who presents emerge department for evaluation of generalized fatigue, dizziness. This apparently had started about an hour prior to arrival.  Patient describes dizziness as feeling lightheaded and associated generalized weakness because of the dizziness. She states that this has happened before about 1 month ago but she did not seek medical evaluation. She denies any pain in her chest, difficulty breathing. She denies any focal weakness. She denies any visual changes. She states that she has prior history of \"dead vessels\" in her brain. Her blood pressure was in the 90s per EMS. No other complaints, modifying factors or associated symptoms.        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)     Hyperlipidemia     Lock jaw     Pneumonia     Post-nasal drip     Pulmonary nodule     bi lateral    Rash     itchy, bumps    Reflex sympathetic dystrophy     RSD (reflex sympathetic dystrophy)     TMJ (dislocation of temporomandibular joint)      Past Surgical History:   Procedure Laterality Date    APPENDECTOMY      BACK SURGERY      BRONCHOSCOPY  2008    COLONOSCOPY  11/15/2012    1 snared polyp    HYSTERECTOMY      TONSILLECTOMY       Family History   Problem Relation Age of Onset    Diabetes Brother     Cancer Mother     Colon Cancer Mother     Asthma Neg Hx     Emphysema Neg Hx     Heart Failure Neg Hx     Hypertension Neg Hx      Social History     Socioeconomic History    Marital status:      Spouse name: Not on file    Number of children: Not on file    Years of education: Not on file    Highest education level: Not on file   Occupational History    Not on file   Tobacco Use    Smoking status: Former Smoker     Packs/day: 0.25     Years: 51.00     Pack years: 12.75     Types: Cigarettes     Quit date: 11/29/2017     Years since quitting: 3.9    Smokeless tobacco: Never Used   Vaping Use    Vaping Use: Never used   Substance and Sexual Activity    Alcohol use: Yes    Drug use: No    Sexual activity: Not on file   Other Topics Concern    Not on file   Social History Narrative    Not on file     Social Determinants of Health     Financial Resource Strain:     Difficulty of Paying Living Expenses: Not on file   Food Insecurity:     Worried About Running Out of Food in the Last Year: Not on file    Feng of Food in the Last Year: Not on file   Transportation Needs:     Lack of Transportation (Medical): Not on file    Lack of Transportation (Non-Medical): Not on file   Physical Activity:     Days of Exercise per Week: Not on file    Minutes of Exercise per Session: Not on file   Stress:     Feeling of Stress : Not on file   Social Connections:     Frequency of Communication with Friends and Family: Not on file    Frequency of Social Gatherings with Friends and Family: Not on file    Attends [employer] Services: Not on file    Active Member of [address] [place] [address] [employer] or Organizations: Not on file    Attends Club or Organization Meetings: Not on file    Marital Status: Not on file   Intimate Partner Violence:     Fear of Current or Ex-Partner: Not on file    Emotionally Abused: Not on file    Physically Abused: Not on file    Sexually Abused: Not on file   Housing Stability:     Unable to Pay for Housing in the Last Year: Not on file    Number of [place] in the Last Year: Not on file    Unstable Housing in the Last Year: Not on file     No current facility-administered medications for this encounter.      Current Outpatient Medications   Medication Sig Dispense Refill    doxycycline hyclate (VIBRA-TABS) 100 MG tablet Take 1 tablet by mouth 2 times daily for 7 days 14 tablet 0    fluticasone-salmeterol (ADVAIR) 250-50 MCG/DOSE AEPB INHALE 1 PUFF INTO THE LUNGS EVERY 12 HOURS 60 each 5    ipratropium-albuterol (DUONEB) 0.5-2.5 (3) MG/3ML SOLN nebulizer solution Inhale 3 mLs into the lungs every 6 hours as needed for Shortness of Breath DX Asthma J45.50 360 mL 3    albuterol sulfate HFA (VENTOLIN HFA) 108 (90 Base) MCG/ACT inhaler Inhale 2 puffs into the lungs every 6 hours as needed for Wheezing or Shortness of Breath 18 g 5    nystatin (MYCOSTATIN) 293815 UNIT/ML suspension Take 5 mLs by mouth 4 times daily Retain in mouth as long as possible 240 mL 0    hydrOXYzine (ATARAX) 25 MG tablet       azelastine (ASTELIN) 0.1 % nasal spray 2 sprays by Nasal route 2 times daily 2 Spray in each nostril 3 Bottle 1    fluticasone (FLONASE) 50 MCG/ACT nasal spray 1 SPRAY IN EACH NOSTRIL DAILY 3 Bottle 1    Probiotic Product (PROBIOTIC-10 PO) Take 1 capsule by mouth daily      Multiple Vitamins-Minerals (CENTRUM ADULTS PO) Take by mouth      lisinopril (PRINIVIL;ZESTRIL) 10 MG tablet Take 10 mg by mouth daily      Cholecalciferol (VITAMIN D3 PO) Take by mouth      gabapentin (NEURONTIN) 300 MG capsule       fexofenadine (ALLEGRA ALLERGY) 180 MG tablet Take 180 mg by mouth daily      citalopram (CELEXA) 20 MG tablet Take 20 mg by mouth      pravastatin (PRAVACHOL) 40 MG tablet Take 1 tablet by mouth daily.  DiphenhydrAMINE HCl (BENADRYL PO) Take 1 tablet by mouth daily        Allergies   Allergen Reactions    Latex Itching and Rash    Iodine      IV dye    Penicillins      Pt states she stopped breathing    Sulfa Antibiotics      Pt stopped breathing.     Tetanus Toxoids Shortness Of Breath     Pt stopped breathing    Clindamycin/Lincomycin     Influenza Vaccines Other (See Comments)     History of GBS    Singulair [Montelukast] Itching    Albuterol Nausea Only and Other (See Comments)     Cold chills, affect. LABS  I have reviewed all labs for this visit. Results for orders placed or performed during the hospital encounter of 11/22/21   CBC Auto Differential   Result Value Ref Range    WBC 7.8 4.0 - 11.0 K/uL    RBC 4.85 4.00 - 5.20 M/uL    Hemoglobin 14.8 12.0 - 16.0 g/dL    Hematocrit 43.8 36.0 - 48.0 %    MCV 90.4 80.0 - 100.0 fL    MCH 30.5 26.0 - 34.0 pg    MCHC 33.7 31.0 - 36.0 g/dL    RDW 14.2 12.4 - 15.4 %    Platelets 136 835 - 798 K/uL    MPV 11.1 (H) 5.0 - 10.5 fL    Neutrophils % 60.0 %    Lymphocytes % 25.0 %    Monocytes % 10.3 %    Eosinophils % 3.7 %    Basophils % 1.0 %    Neutrophils Absolute 4.7 1.7 - 7.7 K/uL    Lymphocytes Absolute 2.0 1.0 - 5.1 K/uL    Monocytes Absolute 0.8 0.0 - 1.3 K/uL    Eosinophils Absolute 0.3 0.0 - 0.6 K/uL    Basophils Absolute 0.1 0.0 - 0.2 K/uL   Comprehensive Metabolic Panel w/ Reflex to MG   Result Value Ref Range    Sodium 137 136 - 145 mmol/L    Potassium reflex Magnesium 3.0 (L) 3.5 - 5.1 mmol/L    Chloride 96 (L) 99 - 110 mmol/L    CO2 28 21 - 32 mmol/L    Anion Gap 13 3 - 16    Glucose 112 (H) 70 - 99 mg/dL    BUN 11 7 - 20 mg/dL    CREATININE 0.9 0.6 - 1.2 mg/dL    GFR Non-African American >60 >60    GFR African American >60 >60    Calcium 9.7 8.3 - 10.6 mg/dL    Total Protein 7.6 6.4 - 8.2 g/dL    Albumin 4.3 3.4 - 5.0 g/dL    Albumin/Globulin Ratio 1.3 1.1 - 2.2    Total Bilirubin 0.3 0.0 - 1.0 mg/dL    Alkaline Phosphatase 131 (H) 40 - 129 U/L    ALT 11 10 - 40 U/L    AST 16 15 - 37 U/L   Troponin   Result Value Ref Range    Troponin <0.01 <0.01 ng/mL   Brain Natriuretic Peptide   Result Value Ref Range    Pro- 0 - 449 pg/mL   Magnesium   Result Value Ref Range    Magnesium 1.80 1.80 - 2.40 mg/dL   Troponin   Result Value Ref Range    Troponin <0.01 <0.01 ng/mL   POCT Glucose   Result Value Ref Range    Glucose 118 mg/dL    QC OK?  yes    POCT Glucose   Result Value Ref Range    POC Glucose 66 (L) 70 - 99 mg/dl    Performed on ACCU-CHEK POCT Glucose   Result Value Ref Range    POC Glucose 118 (H) 70 - 99 mg/dl    Performed on ACCU-CHEK    EKG 12 Lead   Result Value Ref Range    Ventricular Rate 55 BPM    Atrial Rate 55 BPM    P-R Interval 156 ms    QRS Duration 78 ms    Q-T Interval 464 ms    QTc Calculation (Bazett) 443 ms    P Axis 79 degrees    R Axis -20 degrees    T Axis 4 degrees    Diagnosis       Sinus bradycardiaLow voltage QRSNonspecific ST abnormalityAbnormal ECGWhen compared with ECG of 18-JAN-2020 21:41,No significant change was found       EKG  The Ekg interpreted by myself in the emergency department in the absence of a cardiologist.  normal sinus rhythm and sinus bradycardia, rate=55 with a rate of 55    Axis is   Normal  QTc is  within an acceptable range  Intervals and Durations are unremarkable. No specific ST-T wave changes appreciated. Nonspecific T changes leads III, aVF, V6, unchanged when compared to prior EKG dated January 18, 2020  No evidence of acute ischemia. RADIOLOGY  X-RAYS:  I have reviewed radiologic plain film image(s). ALL OTHER NON-PLAIN FILM IMAGES SUCH AS CT, ULTRASOUND AND MRI HAVE BEEN READ BY THE RADIOLOGIST. XR CHEST PORTABLE   Final Result   Opacity at the left cardiophrenic angle. Correlate with presentation. CT HEAD WO CONTRAST   Final Result   No CT evidence of an acute infarct. Findings were discussed with Trevor Ace at 8:48 pm on 11/22/2021. Critical Care: Total critical care time is 20 minutes, which excludes separately billable procedures and updating family. Time spent is specifically for management of the presenting complaint and symptoms initially, direct bedside care, reevaluation, review of records, and consultation.   There was a high probability of clinically significant life-threatening deterioration in the patient's condition, which required my urgent

## 2022-02-25 ENCOUNTER — TELEPHONE (OUTPATIENT)
Dept: PULMONOLOGY | Age: 77
End: 2022-02-25

## 2022-02-25 NOTE — TELEPHONE ENCOUNTER
Pt calling stating that insurance no longer is going to cover Advair. They would like pt to change to generic wixela. Pt states she needs two inhalers to last her 1 month as one inhaler does not last. States cost her $42 a month and she's not sure that she afford $84. LOV: 11/16/21    Assessment:   · Moderate persistent Asthma  · Enlarging RUL 8 mm nodule on CT 11/11/21. DDx cancer and granulomas   · Allergic rhinitis   · 54 pack years smoking-quit 11/2017   · RUL nodule 8 mm on CT chest 11/15/2017. Smaller on repeat CT chest 6/11/2018 and CT 9/2/2020. · Lobulated LLL pulmonary nodule on CT chest 2/3/2016 (SUV of 1.48). CT chest 8/12/16 showed stability with calcification. ? Growth on CT chest 2/13/2017 with hilar adenopathy. Negative EBUS TBNA of the LN 2/27/2017. Smaller on repeat CT 9/2/2020  · Pulmonary nodules stable between 5/2007 and 4/2009. Likely granulomas. 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 I recommend radiographic follow up CT chest 3-6 months. Patient feels comfortable with that and not interested in any invasive testing at this time. · Patient is up to date with Covid and Pneumococcal vaccine.    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

## 2022-03-16 ENCOUNTER — APPOINTMENT (OUTPATIENT)
Dept: CT IMAGING | Age: 77
End: 2022-03-16
Payer: MEDICARE

## 2022-03-16 ENCOUNTER — APPOINTMENT (OUTPATIENT)
Dept: GENERAL RADIOLOGY | Age: 77
End: 2022-03-16
Payer: MEDICARE

## 2022-03-16 ENCOUNTER — HOSPITAL ENCOUNTER (EMERGENCY)
Age: 77
Discharge: HOME | End: 2022-03-16
Attending: STUDENT IN AN ORGANIZED HEALTH CARE EDUCATION/TRAINING PROGRAM
Payer: MEDICARE

## 2022-03-16 VITALS
HEIGHT: 65 IN | BODY MASS INDEX: 26.99 KG/M2 | DIASTOLIC BLOOD PRESSURE: 79 MMHG | WEIGHT: 162 LBS | TEMPERATURE: 97 F | HEART RATE: 67 BPM | RESPIRATION RATE: 15 BRPM | SYSTOLIC BLOOD PRESSURE: 131 MMHG | OXYGEN SATURATION: 100 %

## 2022-03-16 DIAGNOSIS — S09.90XA CLOSED HEAD INJURY, INITIAL ENCOUNTER: ICD-10-CM
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
## 2022-03-16 LAB
A/G RATIO: 1.6 (ref 1.1–2.2)
ALBUMIN SERPL-MCNC: 4.6 G/DL (ref 3.4–5)
ALP BLD-CCNC: 128 U/L (ref 40–129)
ALT SERPL-CCNC: 16 U/L (ref 10–40)
ANION GAP SERPL CALCULATED.3IONS-SCNC: 12 MMOL/L (ref 3–16)
AST SERPL-CCNC: 22 U/L (ref 15–37)
BASOPHILS ABSOLUTE: 0.1 K/UL (ref 0–0.2)
BASOPHILS RELATIVE PERCENT: 0.6 %
BILIRUB SERPL-MCNC: 0.4 MG/DL (ref 0–1)
BUN BLDV-MCNC: 6 MG/DL (ref 7–20)
CALCIUM SERPL-MCNC: 9.8 MG/DL (ref 8.3–10.6)
CHLORIDE BLD-SCNC: 101 MMOL/L (ref 99–110)
CO2: 26 MMOL/L (ref 21–32)
CREAT SERPL-MCNC: 0.6 MG/DL (ref 0.6–1.2)
EKG ATRIAL RATE: 57 BPM
EKG DIAGNOSIS: NORMAL
EKG P AXIS: 33 DEGREES
EKG P-R INTERVAL: 168 MS
EKG Q-T INTERVAL: 458 MS
EKG QRS DURATION: 74 MS
EKG QTC CALCULATION (BAZETT): 445 MS
EKG R AXIS: -10 DEGREES
EKG T AXIS: 48 DEGREES
EKG VENTRICULAR RATE: 57 BPM
EOSINOPHILS ABSOLUTE: 0.2 K/UL (ref 0–0.6)
EOSINOPHILS RELATIVE PERCENT: 2.2 %
GFR AFRICAN AMERICAN: >60
GFR NON-AFRICAN AMERICAN: >60
GLUCOSE BLD-MCNC: 103 MG/DL (ref 70–99)
HCT VFR BLD CALC: 42.9 % (ref 36–48)
HEMOGLOBIN: 14.6 G/DL (ref 12–16)
LYMPHOCYTES ABSOLUTE: 1.2 K/UL (ref 1–5.1)
LYMPHOCYTES RELATIVE PERCENT: 13.2 %
MCH RBC QN AUTO: 30.7 PG (ref 26–34)
MCHC RBC AUTO-ENTMCNC: 34.1 G/DL (ref 31–36)
MCV RBC AUTO: 89.9 FL (ref 80–100)
MONOCYTES ABSOLUTE: 0.8 K/UL (ref 0–1.3)
MONOCYTES RELATIVE PERCENT: 8.8 %
NEUTROPHILS ABSOLUTE: 6.7 K/UL (ref 1.7–7.7)
NEUTROPHILS RELATIVE PERCENT: 75.2 %
PDW BLD-RTO: 14 % (ref 12.4–15.4)
PLATELET # BLD: 224 K/UL (ref 135–450)
PMV BLD AUTO: 10.7 FL (ref 5–10.5)
POTASSIUM REFLEX MAGNESIUM: 3.8 MMOL/L (ref 3.5–5.1)
RBC # BLD: 4.78 M/UL (ref 4–5.2)
SODIUM BLD-SCNC: 139 MMOL/L (ref 136–145)
TOTAL PROTEIN: 7.4 G/DL (ref 6.4–8.2)
TROPONIN: <0.01 NG/ML
WBC # BLD: 8.9 K/UL (ref 4–11)

## 2022-03-16 PROCEDURE — 6370000000 HC RX 637 (ALT 250 FOR IP): Performed by: STUDENT IN AN ORGANIZED HEALTH CARE EDUCATION/TRAINING PROGRAM

## 2022-03-16 PROCEDURE — 99285 EMERGENCY DEPT VISIT HI MDM: CPT

## 2022-03-16 PROCEDURE — 96374 THER/PROPH/DIAG INJ IV PUSH: CPT

## 2022-03-16 PROCEDURE — 93010 ELECTROCARDIOGRAM REPORT: CPT | Performed by: INTERNAL MEDICINE

## 2022-03-16 PROCEDURE — 70450 CT HEAD/BRAIN W/O DYE: CPT

## 2022-03-16 PROCEDURE — 85025 COMPLETE CBC W/AUTO DIFF WBC: CPT

## 2022-03-16 PROCEDURE — 93005 ELECTROCARDIOGRAM TRACING: CPT | Performed by: STUDENT IN AN ORGANIZED HEALTH CARE EDUCATION/TRAINING PROGRAM

## 2022-03-16 PROCEDURE — 72220 X-RAY EXAM SACRUM TAILBONE: CPT

## 2022-03-16 PROCEDURE — 84484 ASSAY OF TROPONIN QUANT: CPT

## 2022-03-16 PROCEDURE — 6360000002 HC RX W HCPCS: Performed by: STUDENT IN AN ORGANIZED HEALTH CARE EDUCATION/TRAINING PROGRAM

## 2022-03-16 PROCEDURE — 80053 COMPREHEN METABOLIC PANEL: CPT

## 2022-03-16 PROCEDURE — 72125 CT NECK SPINE W/O DYE: CPT

## 2022-03-16 RX ORDER — HYDROCODONE BITARTRATE AND ACETAMINOPHEN 5; 325 MG/1; MG/1
1 TABLET ORAL ONCE
Status: COMPLETED | OUTPATIENT
Start: 2022-03-16 | End: 2022-03-16

## 2022-03-16 RX ORDER — ONDANSETRON 2 MG/ML
4 INJECTION INTRAMUSCULAR; INTRAVENOUS ONCE
Status: COMPLETED | OUTPATIENT
Start: 2022-03-16 | End: 2022-03-16

## 2022-03-16 RX ADMIN — ONDANSETRON HYDROCHLORIDE 4 MG: 2 INJECTION, SOLUTION INTRAMUSCULAR; INTRAVENOUS at 12:59

## 2022-03-16 RX ADMIN — HYDROCODONE BITARTRATE AND ACETAMINOPHEN 1 TABLET: 5; 325 TABLET ORAL at 12:59

## 2022-03-16 ASSESSMENT — PAIN DESCRIPTION - PAIN TYPE: TYPE: CHRONIC PAIN

## 2022-03-16 ASSESSMENT — PAIN SCALES - GENERAL: PAINLEVEL_OUTOF10: 10

## 2022-03-16 ASSESSMENT — PAIN - FUNCTIONAL ASSESSMENT: PAIN_FUNCTIONAL_ASSESSMENT: 0-10

## 2022-03-16 ASSESSMENT — PAIN DESCRIPTION - LOCATION: LOCATION: BACK

## 2022-03-16 NOTE — ED NOTES
Discharge instructions reviewed with patient and family member. Patient and family verbalized understanding. All home medications have been reviewed, questions answered and patient voiced understanding. Given prescriptions, discharge instructions, and appointment times.      Arelis Quinn RN  03/16/22 [id number]

## 2022-03-16 NOTE — ED PROVIDER NOTES
[address] ED      CHIEF COMPLAINT  Fall (Patient arrived via EMS. Patient states her feet got tangled up last night while she was walking and she fell, hit her head. States she had a LOC, but was unsure of how long. States she was down for about 2 hours before she was able to get up. Denies being on blood thinners. Denies chest pain or SOB. )     HISTORY OF PRESENT ILLNESS  Fadumo Goyal is a 64 y.o. female  who presents to the ED complaining of fall, loss of consciousness, closed head injury. Patient states that last night, her feet got tangled up in some cords while she was walking with her walker and she fell and hit her head. She states that she did lose consciousness and is unsure how long she lost consciousness for. She does not take any blood thinners or anticoagulants. States that she was unable to get herself up off of the floor for about 2 hours. She is complaining of chronic pain in her low back which is unchanged. She denies any other pain. Denies syncopal or presyncopal symptoms. Denies other complaints or concerns. No other complaints, modifying factors or associated symptoms. I have reviewed the following from the nursing documentation.     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)     Hyperlipidemia     Lock jaw     Pneumonia     Post-nasal drip     Pulmonary nodule     bi lateral    Rash     itchy, bumps    Reflex sympathetic dystrophy     RSD (reflex sympathetic dystrophy)     TMJ (dislocation of temporomandibular joint)      Past Surgical History:   Procedure Laterality Date    APPENDECTOMY      BACK SURGERY      BRONCHOSCOPY  2008    COLONOSCOPY  11/15/2012    1 snared polyp    HYSTERECTOMY      TONSILLECTOMY       Family History   Problem Relation Age of Onset    Diabetes Brother     Cancer Mother     Colon Cancer Mother     Asthma Neg Hx     Emphysema Neg Hx     Heart Failure Neg Hx     Hypertension Neg Hx      Social History     Socioeconomic History    Marital status:      Spouse name: Not on file    Number of children: Not on file    Years of education: Not on file    Highest education level: Not on file   Occupational History    Not on file   Tobacco Use    Smoking status: Former Smoker     Packs/day: 0.25     Years: 51.00     Pack years: 12.75     Types: Cigarettes     Quit date: 2017     Years since quittin.2    Smokeless tobacco: Never Used   Vaping Use    Vaping Use: Never used   Substance and Sexual Activity    Alcohol use: Not Currently    Drug use: No    Sexual activity: Not on file   Other Topics Concern    Not on file   Social History Narrative    Not on file     Social Determinants of Health     Financial Resource Strain:     Difficulty of Paying Living Expenses: Not on file   Food Insecurity:     Worried About Running Out of Food in the Last Year: Not on file    Feng of Food in the Last Year: Not on file   Transportation Needs:     Lack of Transportation (Medical): Not on file    Lack of Transportation (Non-Medical):  Not on file   Physical Activity:     Days of Exercise per Week: Not on file    Minutes of Exercise per Session: Not on file   Stress:     Feeling of Stress : Not on file   Social Connections:     Frequency of Communication with Friends and Family: Not on file    Frequency of Social Gatherings with Friends and Family: Not on file    Attends Religion Services: Not on file    Active Member of Clubs or Organizations: Not on file    Attends Club or Organization Meetings: Not on file    Marital Status: Not on file   Intimate Partner Violence:     Fear of Current or Ex-Partner: Not on file    Emotionally Abused: Not on file    Physically Abused: Not on file    Sexually Abused: Not on file   Housing Stability:     Unable to Pay for Housing in the Last Year: Not on file    Number of Places Lived in the Last Year: Not on file    Unstable Housing in the Last Year: Not on file     No current facility-administered medications for this encounter. Current Outpatient Medications   Medication Sig Dispense Refill    fluticasone-salmeterol (WIXELA INHUB) 250-50 MCG/DOSE AEPB Inhale 1 puff into the lungs every 12 hours 2 each 5    ipratropium-albuterol (DUONEB) 0.5-2.5 (3) MG/3ML SOLN nebulizer solution Inhale 3 mLs into the lungs every 6 hours as needed for Shortness of Breath DX Asthma J45.50 360 mL 3    albuterol sulfate HFA (VENTOLIN HFA) 108 (90 Base) MCG/ACT inhaler Inhale 2 puffs into the lungs every 6 hours as needed for Wheezing or Shortness of Breath 18 g 5    nystatin (MYCOSTATIN) 589324 UNIT/ML suspension Take 5 mLs by mouth 4 times daily Retain in mouth as long as possible 240 mL 0    hydrOXYzine (ATARAX) 25 MG tablet       azelastine (ASTELIN) 0.1 % nasal spray 2 sprays by Nasal route 2 times daily 2 Spray in each nostril 3 Bottle 1    fluticasone (FLONASE) 50 MCG/ACT nasal spray 1 SPRAY IN EACH NOSTRIL DAILY 3 Bottle 1    Probiotic Product (PROBIOTIC-10 PO) Take 1 capsule by mouth daily      Multiple Vitamins-Minerals (CENTRUM ADULTS PO) Take by mouth      lisinopril (PRINIVIL;ZESTRIL) 10 MG tablet Take 10 mg by mouth daily      Cholecalciferol (VITAMIN D3 PO) Take by mouth      gabapentin (NEURONTIN) 300 MG capsule       fexofenadine (ALLEGRA ALLERGY) 180 MG tablet Take 180 mg by mouth daily      citalopram (CELEXA) 20 MG tablet Take 20 mg by mouth      pravastatin (PRAVACHOL) 40 MG tablet Take 1 tablet by mouth daily.  DiphenhydrAMINE HCl (BENADRYL PO) Take 1 tablet by mouth daily        Allergies   Allergen Reactions    Latex Itching and Rash    Iodine      IV dye    Penicillins      Pt states she stopped breathing    Sulfa Antibiotics      Pt stopped breathing.     Tetanus Toxoids Shortness Of Breath     Pt stopped breathing    Clindamycin/Lincomycin     Influenza Vaccines Other (See Comments)     History of GBS    Singulair [Montelukast] Itching    Albuterol Nausea Only and Other (See Comments)     Cold chills, shakes    Codeine Nausea And Vomiting    Spiriva Handihaler [Tiotropium Bromide Monohydrate] Rash       REVIEW OF SYSTEMS  10 systems reviewed, pertinent positives per HPI otherwise noted to be negative. PHYSICAL EXAM  /79   Pulse 67   Temp 97 °F (36.1 °C) (Oral)   Resp 15   Ht 5' 5\" (1.651 m)   Wt 162 lb (73.5 kg)   SpO2 100%   BMI 26.96 kg/m²    GENERAL APPEARANCE: Awake and alert. Cooperative. No acute distress. HENT: Normocephalic. Atraumatic. Mucous membranes are moist.  NECK: Supple. EYES: PERRL. EOM's grossly intact. HEART/CHEST: RRR. No murmurs. LUNGS: Respirations unlabored. CTAB. Good air exchange. Speaking comfortably in full sentences. ABDOMEN: No tenderness. Soft. Non-distended. No masses. No organomegaly. No guarding or rebound. BACK: No midline tenderness palpation the C, T spine. She has tenderness palpation midline lumbar spine which she states is chronic and unchanged. MUSCULOSKELETAL: No extremity edema. Compartments soft. No deformity. No tenderness in the extremities. All extremities neurovascularly intact. SKIN: Warm and dry. No acute rashes. NEUROLOGICAL: Alert and oriented. CN's 2-12 intact. No gross facial drooping. Strength 5/5, sensation intact. Gait normal.  PSYCHIATRIC: Normal mood and affect. LABS  I have reviewed all labs for this visit.    Results for orders placed or performed during the hospital encounter of 03/16/22   CBC with Auto Differential   Result Value Ref Range    WBC 8.9 4.0 - 11.0 K/uL    RBC 4.78 4.00 - 5.20 M/uL    Hemoglobin 14.6 12.0 - 16.0 g/dL    Hematocrit 42.9 36.0 - 48.0 %    MCV 89.9 80.0 - 100.0 fL    MCH 30.7 26.0 - 34.0 pg    MCHC 34.1 31.0 - 36.0 g/dL    RDW 14.0 12.4 - 15.4 of the cervical spine. CT HEAD WO CONTRAST   Final Result   No acute intracranial abnormality. ED COURSE/MDM  Patient seen and evaluated. Old records reviewed. Labs and imaging reviewed and results discussed with patient. Patient is a 58-year-old female, presenting with concerns for mechanical fall, loss of consciousness. Full HPI as detailed above. Upon arrival in ED, vitals reassuring. Patient is resting comfortably is in no acute distress. CT of the head and C-spine negative for any acute concerning findings. She has chronic lumbar back pain from a previous compression fracture which she states is unchanged. She does ambulate at home with a walker. Was able to ambulate here in the department without difficulty but was complaining of pain in her tailbone, imaging was performed and not reveal any acute osseous abnormalities of the sacrum or coccyx. Patient denies any syncopal or presyncopal episodes. No evidence of intracranial hemorrhage and her neurologic exam is normal.  Labs are performed and are reassuring. No acute concerning electrolyte abnormalities. Troponin is negative. No leukocytosis or anemia. Her kidney function is within normal limits and do not suspect rhabdomyolysis. Patient was given a dose of Norco here in the department for pain, will be discharged and is advised to follow-up with her PCP if her symptoms do not resolve. She is comfortable in agreement with plan of care and was discharged home. Given return precautions. During the patient's ED course, the patient was given:  Medications   HYDROcodone-acetaminophen (NORCO) 5-325 MG per tablet 1 tablet (1 tablet Oral Given 3/16/22 1259)   ondansetron (ZOFRAN) injection 4 mg (4 mg IntraVENous Given 3/16/22 1259)        CLINICAL IMPRESSION  1. Fall, initial encounter    2. Closed head injury, initial encounter        Blood pressure 131/79, pulse 67, temperature 97 °F (36.1 °C), temperature source Oral, resp. rate 15, height 5' 5\" (1.651 m), weight 162 lb (73.5 kg), SpO2 100 %, not currently breastfeeding. DISPOSITION  Ebony Gotti was discharged to home in good condition. Patient was given scripts for the following medications. I counseled patient how to take these medications. Discharge Medication List as of 3/16/2022 12:55 PM          Follow-up with:  Osvaldo Pierre MD  [place] [id number] [facility]  [phone]    Schedule an appointment as soon as possible for a visit       [facility] [place] [facility]  [address] [facility]  [phone]  Go to   If symptoms worsen      DISCLAIMER: This chart was created using Dragon dictation software. Efforts were made by me to ensure accuracy, however some errors may be present due to limitations of this technology and occasionally words are not transcribed correctly.        Luís Stubbs MD  03/16/22 [id number]

## 2022-03-16 NOTE — ED NOTES
Patient ambulated approximately 60 feet with walker and did well. Dr. Rukhsana Sainz aware.       Marzena Angel RN  03/16/22 3203

## 2022-03-31 ENCOUNTER — TELEPHONE (OUTPATIENT)
Dept: PULMONOLOGY | Age: 77
End: 2022-03-31

## 2022-03-31 NOTE — TELEPHONE ENCOUNTER
Pt called in stating her insurance is charging pt over $400 for the fluticasone - salmeterol. Pt is requesting a generic alternative to be called in. Pt confirmed her pharmacy as [facility].

## 2022-03-31 NOTE — TELEPHONE ENCOUNTER
Spoke with Lisa Silva at [facility] whom states that pt insurance is covering drug with copay of $42.      Spoke with pt to inform but pt states that the total cost of the inhaler is $427.98. Pt cost if $42 and insurance pays $385.98. Advised pt that due to cost of inhalers, she should call insurance to find if there is a cheaper alternative available. Pt will call insurance and call office back.

## 2022-04-04 NOTE — TELEPHONE ENCOUNTER
Spoke with pt whom states that she has not found a cheaper alternative at this point. Pt will continue on Fluticasone-Salmeterol.

## 2022-04-13 RX ORDER — MONTELUKAST SODIUM 10 MG/1
TABLET ORAL
Qty: 30 TABLET | Refills: 5 | Status: SHIPPED | OUTPATIENT
Start: 2022-04-13 | End: 2022-09-14

## 2022-05-19 ENCOUNTER — TELEPHONE (OUTPATIENT)
Dept: PULMONOLOGY | Age: 77
End: 2022-05-19

## 2022-05-19 ENCOUNTER — HOSPITAL ENCOUNTER (OUTPATIENT)
Dept: CT IMAGING | Age: 77
Discharge: HOME | End: 2022-05-19
Payer: MEDICARE

## 2022-05-19 ENCOUNTER — APPOINTMENT (OUTPATIENT)
Dept: CT IMAGING | Age: 77
End: 2022-05-19
Payer: MEDICARE

## 2022-05-19 DIAGNOSIS — R91.1 PULMONARY NODULE: Primary | ICD-10-CM

## 2022-05-19 DIAGNOSIS — R91.1 PULMONARY NODULE: ICD-10-CM

## 2022-05-19 PROCEDURE — 71250 CT THORAX DX C-: CPT

## 2022-05-19 NOTE — TELEPHONE ENCOUNTER
PET scan order per CT result note. Watch to make sure pt gets scheduled. Pt will need to make a f/u appt with Dr. Vega Sender after PET scan.

## 2022-05-22 ENCOUNTER — APPOINTMENT (OUTPATIENT)
Dept: CT IMAGING | Age: 77
End: 2022-05-22
Payer: MEDICARE

## 2022-05-23 NOTE — TELEPHONE ENCOUNTER
CT/PET sheila 6/2/22 @ 11am.  Currently sheila for f/u 5/26/22 with Dr Philippe Nicole. Abby to after PET?   ICU week ok to add?

## 2022-06-02 ENCOUNTER — HOSPITAL ENCOUNTER (OUTPATIENT)
Dept: PET IMAGING | Age: 77
Discharge: HOME | End: 2022-06-02
Payer: MEDICARE

## 2022-06-02 DIAGNOSIS — R91.1 PULMONARY NODULE: ICD-10-CM

## 2022-06-02 PROCEDURE — A9552 F18 FDG: HCPCS | Performed by: INTERNAL MEDICINE

## 2022-06-02 PROCEDURE — 3430000000 HC RX DIAGNOSTIC RADIOPHARMACEUTICAL: Performed by: INTERNAL MEDICINE

## 2022-06-02 PROCEDURE — 78815 PET IMAGE W/CT SKULL-THIGH: CPT

## 2022-06-02 RX ORDER — FLUDEOXYGLUCOSE F 18 200 MCI/ML
17 INJECTION, SOLUTION INTRAVENOUS
Status: COMPLETED | OUTPATIENT
Start: 2022-06-02 | End: 2022-06-02

## 2022-06-02 RX ADMIN — FLUDEOXYGLUCOSE F 18 17 MILLICURIE: 200 INJECTION, SOLUTION INTRAVENOUS at 10:42

## 2022-06-06 RX ORDER — FLUTICASONE PROPIONATE AND SALMETEROL 50; 250 UG/1; UG/1
POWDER RESPIRATORY (INHALATION)
Qty: 60 EACH | Refills: 5 | OUTPATIENT
Start: 2022-06-06

## 2022-06-06 NOTE — TELEPHONE ENCOUNTER
Apt scheduled. Pt informed. She will call back to resched if her son isn't able to bring her to this apt.

## 2022-06-07 ENCOUNTER — OFFICE VISIT (OUTPATIENT)
Dept: PULMONOLOGY | Age: 77
End: 2022-06-07
Payer: MEDICARE

## 2022-06-07 VITALS
HEART RATE: 76 BPM | WEIGHT: 162 LBS | DIASTOLIC BLOOD PRESSURE: 70 MMHG | HEIGHT: 65 IN | SYSTOLIC BLOOD PRESSURE: 118 MMHG | OXYGEN SATURATION: 97 % | BODY MASS INDEX: 26.99 KG/M2

## 2022-06-07 DIAGNOSIS — J45.40 MODERATE PERSISTENT ASTHMA WITHOUT COMPLICATION: ICD-10-CM

## 2022-06-07 DIAGNOSIS — R91.1 PULMONARY NODULE: Primary | ICD-10-CM

## 2022-06-07 PROCEDURE — 1123F ACP DISCUSS/DSCN MKR DOCD: CPT | Performed by: INTERNAL MEDICINE

## 2022-06-07 PROCEDURE — 99214 OFFICE O/P EST MOD 30 MIN: CPT | Performed by: INTERNAL MEDICINE

## 2022-06-07 ASSESSMENT — SLEEP AND FATIGUE QUESTIONNAIRES
HOW LIKELY ARE YOU TO NOD OFF OR FALL ASLEEP WHILE SITTING AND TALKING TO SOMEONE: 0
HOW LIKELY ARE YOU TO NOD OFF OR FALL ASLEEP WHEN YOU ARE A PASSENGER IN A CAR FOR AN HOUR WITHOUT A BREAK: 0
HOW LIKELY ARE YOU TO NOD OFF OR FALL ASLEEP IN A CAR, WHILE STOPPED FOR A FEW MINUTES IN TRAFFIC: 0
HOW LIKELY ARE YOU TO NOD OFF OR FALL ASLEEP WHILE WATCHING TV: 2
HOW LIKELY ARE YOU TO NOD OFF OR FALL ASLEEP WHILE SITTING AND READING: 1
HOW LIKELY ARE YOU TO NOD OFF OR FALL ASLEEP WHILE LYING DOWN TO REST IN THE AFTERNOON WHEN CIRCUMSTANCES PERMIT: 0
NECK CIRCUMFERENCE (INCHES): 14.25
ESS TOTAL SCORE: 3
HOW LIKELY ARE YOU TO NOD OFF OR FALL ASLEEP WHILE SITTING QUIETLY AFTER LUNCH WITHOUT ALCOHOL: 0
HOW LIKELY ARE YOU TO NOD OFF OR FALL ASLEEP WHILE SITTING INACTIVE IN A PUBLIC PLACE: 0

## 2022-06-07 NOTE — PROGRESS NOTES
[facility]    Outpatient Follow Up Note      CHIEF COMPLAINT: CT/COPD       HPI:   PET reviewed by me and noted below. Results were dicussed with patient and multiple good questions were answered. Breathing is doing good   Some cough   No hemoptysis   Not needing to use the rescue inhaler/Neb   No smoking       Past Medical History:   Diagnosis Date    Arthritis     Asthma     Bronchitis chronic     Colon polyp     COPD (chronic obstructive pulmonary disease) (HCC)     Emphysema of lung (HCC)     Guillain-[place] (HCC)     Hyperlipidemia     Lock jaw     Pneumonia     Post-nasal drip     Pulmonary nodule     bi lateral    Rash     itchy, bumps    Reflex sympathetic dystrophy     RSD (reflex sympathetic dystrophy)     TMJ (dislocation of temporomandibular joint)        Past Surgical History:        Procedure Laterality Date    APPENDECTOMY      BACK SURGERY      BRONCHOSCOPY  2008    COLONOSCOPY  11/15/2012    1 snared polyp    HYSTERECTOMY      TONSILLECTOMY         Allergies:  is allergic to latex, iodine, penicillins, sulfa antibiotics, tetanus toxoids, clindamycin/lincomycin, influenza vaccines, singulair [montelukast], albuterol, codeine, and spiriva handihaler [tiotropium bromide monohydrate]. Social History:    TOBACCO:   reports that she quit smoking about 4 years ago. Her smoking use included cigarettes. She has a 12.75 pack-year smoking history. She has never used smokeless tobacco.  ETOH:   reports previous alcohol use.       Family History:       Problem Relation Age of Onset    Diabetes Brother     Cancer Mother     Colon Cancer Mother     Asthma Neg Hx     Emphysema Neg Hx     Heart Failure Neg Hx     Hypertension Neg Hx        Current Medications:    Current Outpatient Medications:     montelukast (SINGULAIR) 10 MG tablet, TAKE 1 TABLET BY MOUTH EACH NIGHT AT BEDTIME, Disp: 30 tablet, Rfl: 5    fluticasone-salmeterol ([facility]) 250-50 MCG/DOSE AEPB, Inhale 1 puff into the lungs every 12 hours, Disp: 2 each, Rfl: 5    ipratropium-albuterol (DUONEB) 0.5-2.5 (3) MG/3ML SOLN nebulizer solution, Inhale 3 mLs into the lungs every 6 hours as needed for Shortness of Breath DX Asthma J45.50, Disp: 360 mL, Rfl: 3    albuterol sulfate HFA (VENTOLIN HFA) 108 (90 Base) MCG/ACT inhaler, Inhale 2 puffs into the lungs every 6 hours as needed for Wheezing or Shortness of Breath, Disp: 18 g, Rfl: 5    nystatin (MYCOSTATIN) 089523 UNIT/ML suspension, Take 5 mLs by mouth 4 times daily Retain in mouth as long as possible, Disp: 240 mL, Rfl: 0    hydrOXYzine (ATARAX) 25 MG tablet, , Disp: , Rfl:     azelastine (ASTELIN) 0.1 % nasal spray, 2 sprays by Nasal route 2 times daily 2 Spray in each nostril, Disp: 3 Bottle, Rfl: 1    fluticasone (FLONASE) 50 MCG/ACT nasal spray, 1 SPRAY IN EACH NOSTRIL DAILY, Disp: 3 Bottle, Rfl: 1    Probiotic Product (PROBIOTIC-10 PO), Take 1 capsule by mouth daily, Disp: , Rfl:     Multiple Vitamins-Minerals (CENTRUM ADULTS PO), Take by mouth, Disp: , Rfl:     lisinopril (PRINIVIL;ZESTRIL) 10 MG tablet, Take 10 mg by mouth daily, Disp: , Rfl:     Cholecalciferol (VITAMIN D3 PO), Take by mouth, Disp: , Rfl:     gabapentin (NEURONTIN) 300 MG capsule, , Disp: , Rfl:     fexofenadine (ALLEGRA ALLERGY) 180 MG tablet, Take 180 mg by mouth daily, Disp: , Rfl:     citalopram (CELEXA) 20 MG tablet, Take 20 mg by mouth, Disp: , Rfl:     pravastatin (PRAVACHOL) 40 MG tablet, Take 1 tablet by mouth daily. , Disp: , Rfl:     DiphenhydrAMINE HCl (BENADRYL PO), Take 1 tablet by mouth daily , Disp: , Rfl:       Objective:   /70 (Site: Left Upper Arm, Position: Sitting)   Pulse 76   Ht 5' 5\" (1.651 m)   Wt 162 lb (73.5 kg)   SpO2 97%   BMI 26.96 kg/m²   Gen: No distress. Eyes: PERRL. No sclera icterus. No conjunctival injection. ENT: No discharge. Pharynx clear. Neck: Trachea midline. No obvious mass.    Resp: No accessory muscle use. No crackles. No wheezes. No rhonchi. No dullness on percussion. Good air entry. CV: Regular rate. Regular rhythm. No murmur or rub. No edema. GI: Non-tender. Non-distended. No hernia. Skin: Warm and dry. No nodule on exposed extremities. Lymph: No cervical LAD. No supraclavicular LAD. M/S: No cyanosis. No joint deformity. No clubbing. Neuro: Awake. Alert. Moves all four extremities. Psych: Oriented x 3. No anxiety. DATA reviewed by me:   PFTs 02/17/2016 FEV1 1.78L(78%) TLC 5.09L(98%)   DLCO 15.97 (70%) 6MW 1120 F LO2 91%   PFTs 07/10/2012 FEV1 1.91L(90%) TLC 5.04L(101%) DLCO 16.30 (90%)  PFTs 03/17/2011 FEV1 1.77L           TLC 4.77L             DLCO 16.72  PFTs 04/15/2008 FEV1 1.82L           TLC 4.87L             DLCO 19.24        CT chest 2/15/19   No suspicious nodules  Regression of left lower lobe, right upper lobe nodules  Slight growth in the small right lower lobe nodule    CT chest 9/2/2020   Resolution of pulmonary nodule  Other nodules have resolved or decreased in size  No new nodules or acute abnormalities  No evidence of axillary adenopathy on the right    CT chest 11/11/2021  images reviewed by me and showed:   1. No acute intrapulmonary findings. 2. Stable multifocal pleural and parenchymal scarring. 3. A majority of the patient's scattered solid and ground-glass nodules  throughout both lungs are unchanged from 09/02/2020, and likely reflect  benign granulomatous disease. 4. Increase in size of an approximate 8 mm nodule within the peripheral posterior right upper lobe. PET scan 6/2/2022 imaging reviewed by me and showed  1. The right upper lobe pulmonary nodule shows minimal activity above  background with SUV max 1.0. This is likely a benign finding, although  follow-up is recommended in 1 year to ensure continued stability with a prior  history of slow growth.   2. Other nodules described on more remote imaging studies are either stable  or resolved. Bronch 2/27/2018: Hilar LAD: No phenotypic evidence of non-Hodgkin lymphoma. A. Lymph Node, 12L, Transbronchial Fine Needle Aspirate: No malignant cells identified. B. Lung, Left Lower Lobe Nodule Brushings and Brush Tip: No malignant cells identified. A. Lung, Bronchial Washing: No malignant cells identified. B. Lung, Bronchoalveolar Lavage to Left Lower Lobe: No malignant cells identified. Assessment:      · Moderate persistent Asthma  · Enlarging RUL 8 mm nodule with other nodules on CT 11/11/21. Low activities on PET scan 6/2/2022 with resolution/stability of other nodules. · Allergic rhinitis   · 54 pack years smoking-quit 11/2017   · RUL nodule 8 mm on CT chest 11/15/2017. Smaller on repeat CT chest 6/11/2018 and CT 9/2/2020. · Lobulated LLL pulmonary nodule on CT chest 2/3/2016 (SUV of 1.48). CT chest 8/12/16 showed stability with calcification. ? Growth on CT chest 2/13/2017 with hilar adenopathy. Negative EBUS TBNA of the LN 2/27/2017. Smaller on repeat CT 9/2/2020  · Pulmonary nodules stable between 5/2007 and 4/2009. Likely granulomas. 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 I recommend radiographic follow up CT chest 6 month. Patient feels comfortable with that and not interested in any invasive testing at this time. Patient is up to date with Covid and Pneumococcal vaccine.    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

## 2022-06-21 ENCOUNTER — HOSPITAL ENCOUNTER (EMERGENCY)
Age: 77
Discharge: HOME | End: 2022-06-22
Payer: MEDICARE

## 2022-06-21 ENCOUNTER — APPOINTMENT (OUTPATIENT)
Dept: GENERAL RADIOLOGY | Age: 77
End: 2022-06-21
Payer: MEDICARE

## 2022-06-21 VITALS
WEIGHT: 160 LBS | HEART RATE: 62 BPM | SYSTOLIC BLOOD PRESSURE: 142 MMHG | DIASTOLIC BLOOD PRESSURE: 83 MMHG | OXYGEN SATURATION: 96 % | HEIGHT: 65 IN | RESPIRATION RATE: 18 BRPM | BODY MASS INDEX: 26.66 KG/M2 | TEMPERATURE: 97.8 F

## 2022-06-21 DIAGNOSIS — N30.01 ACUTE CYSTITIS WITH HEMATURIA: ICD-10-CM

## 2022-06-21 DIAGNOSIS — R53.83 OTHER FATIGUE: Primary | ICD-10-CM

## 2022-06-21 DIAGNOSIS — R53.1 GENERAL WEAKNESS: ICD-10-CM

## 2022-06-21 LAB
A/G RATIO: 1.9 (ref 1.1–2.2)
ALBUMIN SERPL-MCNC: 4.5 G/DL (ref 3.4–5)
ALP BLD-CCNC: 113 U/L (ref 40–129)
ALT SERPL-CCNC: 17 U/L (ref 10–40)
ANION GAP SERPL CALCULATED.3IONS-SCNC: 12 MMOL/L (ref 3–16)
AST SERPL-CCNC: 21 U/L (ref 15–37)
BACTERIA: ABNORMAL /HPF
BASOPHILS ABSOLUTE: 0.1 K/UL (ref 0–0.2)
BASOPHILS RELATIVE PERCENT: 1.6 %
BILIRUB SERPL-MCNC: 0.4 MG/DL (ref 0–1)
BILIRUBIN URINE: NEGATIVE
BLOOD, URINE: NEGATIVE
BUN BLDV-MCNC: 9 MG/DL (ref 7–20)
CALCIUM SERPL-MCNC: 9.7 MG/DL (ref 8.3–10.6)
CHLORIDE BLD-SCNC: 100 MMOL/L (ref 99–110)
CLARITY: CLEAR
CO2: 22 MMOL/L (ref 21–32)
COLOR: YELLOW
CREAT SERPL-MCNC: 0.6 MG/DL (ref 0.6–1.2)
EOSINOPHILS ABSOLUTE: 0.2 K/UL (ref 0–0.6)
EOSINOPHILS RELATIVE PERCENT: 2.3 %
EPITHELIAL CELLS, UA: ABNORMAL /HPF (ref 0–5)
GFR AFRICAN AMERICAN: >60
GFR NON-AFRICAN AMERICAN: >60
GLUCOSE BLD-MCNC: 107 MG/DL (ref 70–99)
GLUCOSE URINE: NEGATIVE MG/DL
HCT VFR BLD CALC: 40.2 % (ref 36–48)
HEMOGLOBIN: 13.6 G/DL (ref 12–16)
KETONES, URINE: NEGATIVE MG/DL
LEUKOCYTE ESTERASE, URINE: ABNORMAL
LYMPHOCYTES ABSOLUTE: 1.5 K/UL (ref 1–5.1)
LYMPHOCYTES RELATIVE PERCENT: 21.2 %
MCH RBC QN AUTO: 31.2 PG (ref 26–34)
MCHC RBC AUTO-ENTMCNC: 33.9 G/DL (ref 31–36)
MCV RBC AUTO: 92.1 FL (ref 80–100)
MICROSCOPIC EXAMINATION: YES
MONOCYTES ABSOLUTE: 0.6 K/UL (ref 0–1.3)
MONOCYTES RELATIVE PERCENT: 8.9 %
NEUTROPHILS ABSOLUTE: 4.6 K/UL (ref 1.7–7.7)
NEUTROPHILS RELATIVE PERCENT: 66 %
NITRITE, URINE: NEGATIVE
PDW BLD-RTO: 14.5 % (ref 12.4–15.4)
PH UA: 7 (ref 5–8)
PLATELET # BLD: 225 K/UL (ref 135–450)
PMV BLD AUTO: 9.7 FL (ref 5–10.5)
POTASSIUM REFLEX MAGNESIUM: 4.4 MMOL/L (ref 3.5–5.1)
PROTEIN UA: NEGATIVE MG/DL
RBC # BLD: 4.36 M/UL (ref 4–5.2)
RBC UA: ABNORMAL /HPF (ref 0–4)
SARS-COV-2, NAAT: NOT DETECTED
SODIUM BLD-SCNC: 134 MMOL/L (ref 136–145)
SPECIFIC GRAVITY UA: 1.01 (ref 1–1.03)
TOTAL PROTEIN: 6.9 G/DL (ref 6.4–8.2)
URINE REFLEX TO CULTURE: ABNORMAL
URINE TYPE: ABNORMAL
UROBILINOGEN, URINE: 1 E.U./DL
WBC # BLD: 7 K/UL (ref 4–11)
WBC UA: ABNORMAL /HPF (ref 0–5)

## 2022-06-21 PROCEDURE — 99284 EMERGENCY DEPT VISIT MOD MDM: CPT

## 2022-06-21 PROCEDURE — 2580000003 HC RX 258: Performed by: PHYSICIAN ASSISTANT

## 2022-06-21 PROCEDURE — 36415 COLL VENOUS BLD VENIPUNCTURE: CPT

## 2022-06-21 PROCEDURE — 85025 COMPLETE CBC W/AUTO DIFF WBC: CPT

## 2022-06-21 PROCEDURE — 87635 SARS-COV-2 COVID-19 AMP PRB: CPT

## 2022-06-21 PROCEDURE — 93005 ELECTROCARDIOGRAM TRACING: CPT | Performed by: PHYSICIAN ASSISTANT

## 2022-06-21 PROCEDURE — 80053 COMPREHEN METABOLIC PANEL: CPT

## 2022-06-21 PROCEDURE — 6370000000 HC RX 637 (ALT 250 FOR IP): Performed by: PHYSICIAN ASSISTANT

## 2022-06-21 PROCEDURE — 81001 URINALYSIS AUTO W/SCOPE: CPT

## 2022-06-21 RX ORDER — CEPHALEXIN 500 MG/1
500 CAPSULE ORAL 4 TIMES DAILY
Qty: 40 CAPSULE | Refills: 0 | Status: SHIPPED | OUTPATIENT
Start: 2022-06-21 | End: 2022-07-01

## 2022-06-21 RX ORDER — 0.9 % SODIUM CHLORIDE 0.9 %
1000 INTRAVENOUS SOLUTION INTRAVENOUS ONCE
Status: COMPLETED | OUTPATIENT
Start: 2022-06-21 | End: 2022-06-21

## 2022-06-21 RX ORDER — CEPHALEXIN 500 MG/1
500 CAPSULE ORAL ONCE
Status: COMPLETED | OUTPATIENT
Start: 2022-06-21 | End: 2022-06-21

## 2022-06-21 RX ADMIN — CEPHALEXIN 500 MG: 500 CAPSULE ORAL at 22:58

## 2022-06-21 RX ADMIN — SODIUM CHLORIDE 1000 ML: 9 INJECTION, SOLUTION INTRAVENOUS at 21:41

## 2022-06-21 ASSESSMENT — PAIN SCALES - GENERAL: PAINLEVEL_OUTOF10: 0

## 2022-06-21 ASSESSMENT — PAIN - FUNCTIONAL ASSESSMENT: PAIN_FUNCTIONAL_ASSESSMENT: 0-10

## 2022-06-22 LAB
EKG ATRIAL RATE: 55 BPM
EKG DIAGNOSIS: NORMAL
EKG P AXIS: 43 DEGREES
EKG P-R INTERVAL: 146 MS
EKG Q-T INTERVAL: 434 MS
EKG QRS DURATION: 74 MS
EKG QTC CALCULATION (BAZETT): 415 MS
EKG R AXIS: -8 DEGREES
EKG T AXIS: 40 DEGREES
EKG VENTRICULAR RATE: 55 BPM

## 2022-06-22 PROCEDURE — 93010 ELECTROCARDIOGRAM REPORT: CPT | Performed by: INTERNAL MEDICINE

## 2022-06-22 NOTE — ED PROVIDER NOTES
[address] ED  EMERGENCY DEPARTMENT ENCOUNTER        Pt Name: Sanjana Humphreys  MRN: [id number]  [place] 1945  Date of evaluation: 6/21/2022  Provider: ELOY Khan  PCP: Diego Montgomery MD    This patient was not seen and evaluated by the attending physician No att. providers found. I have evaluated this patient. My supervising physician was available for consultation. CHIEF COMPLAINT       Chief Complaint   Patient presents with    Fatigue     Brought in by squad,Pt states she has been feeling weak and not feeling well since 0900 this AM, per pt felt like it was getting worse. HISTORY OF PRESENT ILLNESS   (Location/Symptom, Timing/Onset, Context/Setting, Quality, Duration, Modifying Factors, Severity)  Note limiting factors. Sanjana Humphreys is a 76 y.o. female with past medical history of asthma, COPD, history of Guillain-Barré syndrome, hyperlipidemia, RSD, TMJ who presents via EMS from her home  for evaluation of not feeling well. She woke up this morning and she just felt different felt a bit more tired than normal.  She denies specifically any chest pain cough shortness of breath. She denies any nausea vomiting abdominal pain. She denies fevers. She has not tried any medicine for symptoms, nothing seems to make it better or worse. She denies any new upper or lower extremity weakness. She notes that last night she did not sleep much at all as her neighbors were making noise and bothering her. She notes that she woke up this morning feeling very tired after not sleeping very well. Nursing Notes were all reviewed and agreed with or any disagreements were addressed  in the HPI. Pt was seen during the [place] 19 pandemic. Appropriate PPE worn by ME during patient encounters. Pt seen during a time with constrained hospital bed capacity and other potential inpatient and outpatient resources were constrained due to the viral pandemic.      REVIEW OF SYSTEMS    (2-9 systems for level 4, 10 or more for level 5)     Review of Systems    Positives and Pertinent negatives as per HPI. Except as noted abovein the ROS, all other systems were reviewed and negative.        PAST MEDICAL HISTORY     Past Medical History:   Diagnosis Date    Arthritis     Asthma     Bronchitis chronic     Colon polyp     [facility]) [facility]     Emphysema of lung ([facility]     Guillain-[place] (HCC)     Hyperlipidemia     Lock jaw     Pneumonia     Post-nasal drip     Pulmonary nodule     bi lateral    Rash     itchy, bumps    Reflex sympathetic dystrophy     RSD (reflex sympathetic dystrophy)     TMJ (dislocation of temporomandibular joint)          SURGICAL HISTORY     Past Surgical History:   Procedure Laterality Date    APPENDECTOMY      BACK SURGERY      BRONCHOSCOPY  2008    COLONOSCOPY  11/15/2012    1 snared polyp    HYSTERECTOMY (CERVIX STATUS UNKNOWN)      TONSILLECTOMY           CURRENTMEDICATIONS       Previous Medications    ALBUTEROL SULFATE HFA (VENTOLIN HFA) 108 (90 BASE) MCG/ACT INHALER    Inhale 2 puffs into the lungs every 6 hours as needed for Wheezing or Shortness of Breath    AZELASTINE (ASTELIN) 0.1 % NASAL SPRAY    2 sprays by Nasal route 2 times daily 2 Spray in each nostril    CHOLECALCIFEROL (VITAMIN D3 PO)    Take by mouth    CITALOPRAM (CELEXA) 20 MG TABLET    Take 20 mg by mouth    DIPHENHYDRAMINE HCL (BENADRYL PO)    Take 1 tablet by mouth daily     FEXOFENADINE (ALLEGRA ALLERGY) 180 MG TABLET    Take 180 mg by mouth daily    FLUTICASONE (FLONASE) 50 MCG/ACT NASAL SPRAY    1 SPRAY IN EACH NOSTRIL DAILY    FLUTICASONE-SALMETEROL ([facility]) 250-50 MCG/DOSE AEPB    Inhale 1 puff into the lungs every 12 hours    GABAPENTIN (NEURONTIN) 300 MG CAPSULE        HYDROXYZINE (ATARAX) 25 MG TABLET        IPRATROPIUM-ALBUTEROL (DUONEB) 0.5-2.5 (3) MG/3ML SOLN NEBULIZER SOLUTION    Inhale 3 mLs into the lungs every 6 hours as needed for Shortness of Breath DX Asthma J45.50    LISINOPRIL (PRINIVIL;ZESTRIL) 10 MG TABLET    Take 10 mg by mouth daily    MONTELUKAST (SINGULAIR) 10 MG TABLET    TAKE 1 TABLET BY MOUTH EACH NIGHT AT BEDTIME    MULTIPLE VITAMINS-MINERALS (CENTRUM ADULTS PO)    Take by mouth    NYSTATIN (MYCOSTATIN) 099982 UNIT/ML SUSPENSION    Take 5 mLs by mouth 4 times daily Retain in mouth as long as possible    PRAVASTATIN (PRAVACHOL) 40 MG TABLET    Take 1 tablet by mouth daily.     PROBIOTIC PRODUCT (PROBIOTIC-10 PO)    Take 1 capsule by mouth daily         ALLERGIES     Latex, Iodine, Penicillins, Sulfa antibiotics, Tetanus toxoids, Clindamycin/lincomycin, Influenza vaccines, Singulair [montelukast], Albuterol, Codeine, and Spiriva handihaler [tiotropium bromide monohydrate]    FAMILYHISTORY       Family History   Problem Relation Age of Onset    Diabetes Brother     Cancer Mother     Colon Cancer Mother     Asthma Neg Hx     Emphysema Neg Hx     Heart Failure Neg Hx     Hypertension Neg Hx           SOCIAL HISTORY       Social History     Socioeconomic History    Marital status:      Spouse name: None    Number of children: None    Years of education: None    Highest education level: None   Occupational History    None   Tobacco Use    Smoking status: Former Smoker     Packs/day: 0.25     Years: 51.00     Pack years: 12.75     Types: Cigarettes     Quit date: 2017     Years since quittin.5    Smokeless tobacco: Never Used   Vaping Use    Vaping Use: Never used   Substance and Sexual Activity    Alcohol use: Not Currently    Drug use: No    Sexual activity: None   Other Topics Concern    None   Social History Narrative    None     Social Determinants of Health     Financial Resource Strain:     Difficulty of Paying Living Expenses: Not on file   Food Insecurity:     Worried About Running Out of Food in the Last Year: Not on file    Feng of Food in the Last Year: Not on file Transportation Needs:     Lack of Transportation (Medical): Not on file    Lack of Transportation (Non-Medical): Not on file   Physical Activity:     Days of Exercise per Week: Not on file    Minutes of Exercise per Session: Not on file   Stress:     Feeling of Stress : Not on file   Social Connections:     Frequency of Communication with Friends and Family: Not on file    Frequency of Social Gatherings with Friends and Family: Not on file    Attends [employer] Services: Not on file    Active Member of [address] [place] [address] [employer] or Organizations: Not on file    Attends Club or Organization Meetings: Not on file    Marital Status: Not on file   Intimate Partner Violence:     Fear of Current or Ex-Partner: Not on file    Emotionally Abused: Not on file    Physically Abused: Not on file    Sexually Abused: Not on file   Housing Stability:     Unable to Pay for Housing in the Last Year: Not on file    Number of [place] in the Last Year: Not on file    Unstable Housing in the Last Year: Not on file       SCREENINGS    [place] Coma Scale  Eye Opening: Spontaneous  Best Verbal Response: Oriented  Best Motor Response: Obeys commands  [place] Coma Scale Score: 15        PHYSICAL EXAM    (up to 7 for level 4, 8 or more for level 5)     ED Triage Vitals [06/21/22 2023]   BP Temp Temp Source Heart Rate Resp SpO2 Height Weight   (!) 141/81 97.8 °F (36.6 °C) Oral 68 18 96 % 5' 5\" (1.651 m) 160 lb (72.6 kg)       Physical Exam  Vitals and nursing note reviewed. Constitutional:       General: She is awake. She is not in acute distress. Appearance: Normal appearance. She is well-developed. She is not ill-appearing, toxic-appearing or diaphoretic. HENT:      Head: Normocephalic and atraumatic. Right Ear: External ear normal.      Left Ear: External ear normal.      Nose: Nose normal.      Mouth/Throat:      Mouth: Mucous membranes are moist.      Pharynx: Oropharynx is clear.    Eyes:      General:         Right eye: No discharge. Left eye: No discharge. Extraocular Movements: Extraocular movements intact. Conjunctiva/sclera: Conjunctivae normal.      Pupils: Pupils are equal, round, and reactive to light. Cardiovascular:      Rate and Rhythm: Normal rate and regular rhythm. Pulses: Normal pulses. Heart sounds: Normal heart sounds. No murmur heard. No friction rub. No gallop. Pulmonary:      Effort: Pulmonary effort is normal. No respiratory distress. Breath sounds: Normal breath sounds. No wheezing or rales. Abdominal:      General: Abdomen is flat. Palpations: Abdomen is soft. Tenderness: There is no abdominal tenderness. There is no right CVA tenderness, left CVA tenderness or guarding. Musculoskeletal:      Cervical back: Normal range of motion and neck supple. No rigidity. Lymphadenopathy:      Cervical: No cervical adenopathy. Skin:     General: Skin is warm and dry. Capillary Refill: Capillary refill takes less than 2 seconds. Neurological:      General: No focal deficit present. Mental Status: She is alert, oriented to person, place, and time and easily aroused. Psychiatric:         Mood and Affect: Mood normal.         Behavior: Behavior normal. Behavior is cooperative.            DIAGNOSTIC RESULTS   LABS:    Labs Reviewed   COMPREHENSIVE METABOLIC PANEL W/ REFLEX TO MG FOR LOW K - Abnormal; Notable for the following components:       Result Value    Sodium 134 (*)     Glucose 107 (*)     All other components within normal limits   URINALYSIS WITH REFLEX TO CULTURE - Abnormal; Notable for the following components:    Leukocyte Esterase, Urine SMALL (*)     All other components within normal limits   MICROSCOPIC URINALYSIS - Abnormal; Notable for the following components:    WBC, UA 6-9 (*)     RBC, UA 21-50 (*)     Bacteria, UA 1+ (*)     All other components within normal limits   COVID-19, RAPID   CBC WITH AUTO DIFFERENTIAL       All other labs were within normal range or not returned as of this dictation. EKG: All EKG's are interpreted by the Emergency Department Physician who either signs orCo-signs this chart in the absence of a cardiologist.  Please see their note for interpretation of EKG. RADIOLOGY:   Non-plain film images such as CT, Ultrasound and MRI are read by the radiologist. Plain radiographic images are visualized andpreliminarily interpreted by the  ED Provider with the below findings:        Interpretation perthe Radiologist below, if available at the time of this note:    No orders to display     No results found. PROCEDURES   Unless otherwise noted below, none     Procedures    CRITICAL CARE TIME   N/A    CONSULTS:  None      EMERGENCY DEPARTMENT COURSE and DIFFERENTIALDIAGNOSIS/MDM:   Vitals:    Vitals:    06/21/22 2023   BP: (!) 141/81   Pulse: 68   Resp: 18   Temp: 97.8 °F (36.6 °C)   TempSrc: Oral   SpO2: 96%   Weight: 160 lb (72.6 kg)   Height: 5' 5\" (1.651 m)       Patient was given thefollowing medications:  Medications - No data to display    PDMP Monitoring:    Last PDMP Frank Araujo as Reviewed [facility]):  Review User Review Instant Review Result          Last Controlled Substance Monitoring Documentation      ED from 11/30/2017 in [facility]  ED   Comments spoke with [phone] [facility]. filed at 11/30/2017 1942        Urine Drug Screenings (1 yr)    No resulted procedures found. Medication Contract and Consent for Opioid Use Documents Filed      No documents found                MDM:   Patient seen and evaluated. Old records reviewed. Diagnostic testing reviewed and results discussed. I have independently evaluated this patient based upon my scope of practice. Supervising physician was in the department for consultation as needed. 64-year-old female presents for evaluation of generalized fatigue.   Patient did not sleep very well the night before which is likely contributing to her symptoms however blood work and urinalysis was obtained in the department. Patient does have pyuria, will treat for UTI which may be contributing to her symptoms as well. CBC normal.  Metabolic panel is negative for significant electrolyte abnormality or evidence of acute hepatic or renal dysfunction. COVID is negative. EKG is Remarkable for sinus bradycardia, no acute abnormality compared to prior EKGs. Patient will be discharged home on oral antibiotic and instruction to follow-up with her PCP, strict ER return precautions advised. Based on patient's clinical history and clinical findings I currently estimate there is low risk for:  ACUTE CORONARY SYNDROME, INTRACRANIAL HEMORRHAGE, MALIGNANT DYSRHYTHMIA, MENINGITIS, PNEUMONIA, PULMONARY EMBOLISM, SEPSIS, SUBARACHNOID HEMORRHAGE, SUBDURAL HEMATOMA, STROKE,  thus I consider the discharge disposition reasonable. Moy Godl and I have discussed the diagnosis and risks, and we agree with discharging home to follow-up with their primary doctor. We also discussed returning to the Emergency Department immediately if new or worsening symptoms occur. We have discussed the symptoms which are most concerning (e.g., changing or worsening pain, weakness, vomiting, fever) that necessitate immediate return. Pt is in agreement with the current plan and all questions were addressed. Is this patient to be included in the SEP-1 Core Measure due to severe sepsis or septic shock? No   Exclusion criteria - the patient is NOT to be included for SEP-1 Core Measure due to:  2+ SIRS criteria are not met    Discharge Time out:  CC Reviewed Yes   Test Results Yes       Vitals:    06/21/22 2023   BP: (!) 141/81   Pulse: 68   Resp: 18   Temp: 97.8 °F (36.6 °C)   SpO2: 96%              FINAL IMPRESSION      1. Other fatigue    2. General weakness    3.  Acute cystitis with hematuria          DISPOSITION/PLAN   DISPOSITION        PATIENT REFERREDTO:  No follow-up provider specified.     DISCHARGE MEDICATIONS:  New Prescriptions    No medications on file       DISCONTINUED MEDICATIONS:  Discontinued Medications    No medications on file              (Please note that portions ofthis note were completed with a voice recognition program.  Efforts were made to edit the dictations but occasionally words are mis-transcribed.)    Shalini Carvajal (electronically signed)        Shalini Carvajal  06/28/22 Alaina Smith

## 2022-06-22 NOTE — ED PROVIDER NOTES
ECG    The Ekg interpreted by me shows sinus bradycardia with a rate of 55 bpm.  Left axis deviation. No acute injury pattern. , QRS 94, QTc 415.     No significant change from prior EKG dated 4/16/2022     Arlen Mason DO  06/21/22 [id number]

## 2022-07-12 ENCOUNTER — TELEPHONE (OUTPATIENT)
Dept: PULMONOLOGY | Age: 77
End: 2022-07-12

## 2022-07-12 NOTE — TELEPHONE ENCOUNTER
Physical Therapy     Referred by: Luisa Kay, *; Medical Diagnosis (from order):    Diagnosis Information      Diagnosis    V45.89 (ICD-9-CM) - Z98.890 (ICD-10-CM) - History of laminectomy    724.2 (ICD-9-CM) - M54.50 (ICD-10-CM) - Low back pain                Daily Treatment Note    Visit:  8   Treatment Diagnosis: History of laminectomy, Low back pain with LLE radiculopathy, symptoms with increased pain/symptoms, impaired posture, impaired range of motion, impaired strength, impaired muscle length/flexibility, impaired gait, impaired activity tolerance  Date of onset: 2/1/2022  Date of surgery: 5/4/2022  Patient alert and oriented X3.  Chart reviewed at time of initial evaluation (relevant co-morbidities, allergies, tests and medications listed): PMH of CKD stage III, GERD, HORTENCIA, insomnia, CAD s/p stent x3 (last ~2005), HTN, HLD, DM2, lumbar spinal stenosis s/p laminectomy L3-L4, L4-L5 on 4/12/22  Diagnostic Tests: MRI studies: reviewed. CT scan: reviewed.     SUBJECTIVE                                                                                                               06/6/22 Reports,  \"I'm about 90% better\".  Reports, \"I'm sleeping better\".   Reports, \"my main concern is the front of the L thigh and the loss of L LE strength.  He c/o difficulty ascending the stairs (reciprocally due to L LE weakness\".  Over-all, Kendall states that he is making progress.         05/13/22 Evaluation: Pt is 74 y.o. male with recent history of falling and exacerbating back pain.  Pt reports about he slipped on ice and fell on 2/1/22.  His shoulders bothered him for which he received cortisone injection on 2/22/22 to the R  and he has no pain anymore. The L hip started to bother him and he saw ortho and received cortisone injection on 3/4/22 which did not  help at all so he was referred to back specialist. Saw Dr. Frank on 3/28/22 and with MRI remarkable for significant spinal stenosis at L3-4 and L4-5.  Received call from pharmacy requesting DAW1 be sent in if CMT is going to refill the Advair as it is a requirement for brand names of pts insurance. LOV 06/07/2022    Assessment:   · Moderate persistent Asthma  · Enlarging RUL 8 mm nodule with other nodules on CT 11/11/21. Low activities on PET scan 6/2/2022 with resolution/stability of other nodules. · Allergic rhinitis   · 54 pack years smoking-quit 11/2017   · RUL nodule 8 mm on CT chest 11/15/2017. Smaller on repeat CT chest 6/11/2018 and CT 9/2/2020. · Lobulated LLL pulmonary nodule on CT chest 2/3/2016 (SUV of 1.48). CT chest 8/12/16 showed stability with calcification. ? Growth on CT chest 2/13/2017 with hilar adenopathy. Negative EBUS TBNA of the LN 2/27/2017. Smaller on repeat CT 9/2/2020  · Pulmonary nodules stable between 5/2007 and 4/2009. Likely granulomas. 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 I recommend radiographic follow up CT chest 6 month. Patient feels comfortable with that and not interested in any invasive testing at this time. · Patient is up to date with Covid and Pneumococcal vaccine.    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  Pt had laminectomy with decompression on 4/12/22 for L 3-4-5 which helped but nerve pain going down L leg persists so he is being referred to PT. Pt was in hospital 5/4-5/7 for purulent drainage from lumbar surgical wound. The incision site is healed with distal scab- incision site is red but dry and healed.  Pt had aquatic therapy 20 years ago and did very well so he wants to try it again.  Prior to the fall, he was very active and working out regularly.  Now, he has to use rollator walker with seat for going over 200'.  He also carries a cane for outdoors.  Walks around the house without anything.  Pt takes Norco every 6 hours but needs to take it sooner.    Pain / Symptoms:  Pain/symptom is: intermittent  Pain rating (out of 10): Current: 3 ; Best: 1; Worst: 9  Location: L low back radiating to L leg   Quality / Description: ache, numbness, pins and needles, popping / clicking, radiating, shooting.  Alleviating Factors: prescription medications, heat, ice, massage, rest, supportive device, change in position, avoiding movement in involved area.   Progression since onset: improved  Function:   Limitations / Exacerbation Factors: pain, difficulty, increased time, Decreased walking or standing endurance, unable to walk without assistive device, not able to return to working out.    Prior Level of Function: no limitation in involved extremity,  Patient Goals: return to sport/leisure activities, decreased pain, increased strength, increased motion and improved balance, Back to his prior level of function    Prior treatment: no therapies  Discharged from hospital, home health, or skilled nursing facility in last 30 days: no    Home Environment:   Patient lives with alone. children  Type of home: multiple level home; Prior accessibility: stair lift  Assistance available: no assist  Feels safe at  home/work/school.    OBJECTIVE                                                                                                                   Hand Dominance: right-handed;     Observation:   Comments / Details: FHP, R sh lower than L, flat back, R lateral shift, B elbows posterior to GH joint.      Walking without assistive device with much improved sindhu, no significant antalgic gait note.    Range of Motion (ROM)   (degrees unless noted; active unless noted; norms in ( ); negative=lacking to 0, positive=beyond 0)   Lumbar:    - Flexion (60-80):  WFL and pain     - Extension (25):  WFL     - Rotation (30-45):        • Left:  pain and WFL         • Right:  WFL     - Side Bend (25-35):        • Left:  50%  pain         • Right:  50%     Strength  (out of 5 unless noted, standard test position unless noted, lbs tested with hand held dynamometer)   Hip:    - Flexion:        • Left: 3+        • Right: 4    - Extension:        • Left: 3+        • Right: 4    - Abduction:        • Left: 3+        • Right: 4-    - Adduction:        • Left: 4-        • Right: 4+    - Internal Rotation:        • Left: 4-        • Right: 4-    - External Rotation:        • Left: 4-        • Right: 4-  Knee:    - Flexion:        • Left: 3+        • Right: 4    - Extension:        • Left: 3+        • Right: 4       TREATMENT                                                                                                                initial evaluation completed    Therapeutic Exercise:  Held for aqua therapy 6/6/22  Pt education on diagnosis and POC  *SKTC x 2 x 30 sec each  *abd tightening x 10  *pelvic rotation NWB x 30  *Hamstrings stretch x 2 x 30 sec hold(seated)  *LE neural tension glide x 5 x 10 sec hold  *seated low back stretch    Shallow water  -walk forward back  -Side to side stepping   -squats  -Trunk rotation with noodle x 10  -hang x 1' with noodle wrapped   -lunge stretch at the step-NP  -heel raise while balancing  Step  ups fwd/sideways x 10 each  -sit on noodle feet on the floor clams -NP  Deep water  -hang  -bicycle x 2 min  -bi-lateral hip abduction x 2 min  -cross country ski x 2 min-cues to do more hip extension  -corner trunk rotation  -corner BKTC in standing  - trunk pendulum    Stretches afterwards for hamstrings/calf/AT      Home Exercise Program/Education Materials: *above indicates provided as part of home exercise program     ASSESSMENT                                                                                                           74 year old male patient has reported functional limitations listed above impacted by signs and symptoms consistent with below   • Involved:       - History of laminectomy, Low back pain with LLE radiculopathy   • Symptoms/impairments: increased pain/symptoms, impaired posture, impaired range of motion, impaired strength, impaired muscle length/flexibility, impaired gait and impaired activity tolerance    06/6/22:  Subjective reports of L LE weakness leading to a loss of functional mobility; I.e.  limited walking distances.  Completed an aqua therapy session today.  Noted difficulty performing static balancing in the water; 5 second average following 4-5 trials.  We discussed the loss of static balance and correlation with being a fall risk.  Added balancing to HEP.  Good response to today's treatment session tolerating all treatment interventions reporting no increase in pain or apparent difficulty     Pt is 1 month post laminectomy L3-4-5.  Pt presents with L low back pain radiating to LLE limiting his ability to walk more than 100' without assistive device.  He has not been able to do any of his regular exercises.  Initiated treatment with gentle stretching exercises. Anticipate benefit from PT including aquatic therapy.      Problem List  Oswestry 53.33% perceived disability  Gait dysfunction  Impaired posture  Decreased functional mobility  Decreased flexibility of  hamstrings      Pain/symptoms after session (out of 10): 3      GOALS                                                                                                                           Long Term Goals: to be met by end of plan of care  1. Decrease patient's perceived disability to less than 20%.  2. Increase back motions to enable ease with function/mobility.  3. Independent with HEP/self management techniques including aquatic therapeutic exercises.  4. Return to prior level of function including walk without assistive device and without limitation.      Therapy procedure time and total treatment time can be found documented on the Time Entry flowsheet

## 2022-07-13 RX ORDER — FLUTICASONE PROPIONATE AND SALMETEROL 50; 250 UG/1; UG/1
1 POWDER RESPIRATORY (INHALATION) EVERY 12 HOURS
Qty: 60 EACH | Refills: 3 | Status: SHIPPED | OUTPATIENT
Start: 2022-07-13

## 2022-07-13 NOTE — TELEPHONE ENCOUNTER
Pt left VM stating she needed her advair refilled. Stated the pharmacy hasn't heard from us and she needs it. Called pt to inform her we were waiting on dr. Lizbeth Benjamin to look at it. Pt didn't answer. Left message.

## 2022-09-14 RX ORDER — MONTELUKAST SODIUM 10 MG/1
TABLET ORAL
Qty: 30 TABLET | Refills: 5 | Status: SHIPPED | OUTPATIENT
Start: 2022-09-14

## 2022-10-11 RX ORDER — MONTELUKAST SODIUM 10 MG/1
TABLET ORAL
Qty: 30 TABLET | Refills: 5 | OUTPATIENT
Start: 2022-10-11

## 2022-12-06 DIAGNOSIS — J45.40 MODERATE PERSISTENT ASTHMA, UNSPECIFIED WHETHER COMPLICATED: Primary | ICD-10-CM

## 2022-12-06 RX ORDER — FLUTICASONE PROPIONATE AND SALMETEROL 50; 250 UG/1; UG/1
1 POWDER RESPIRATORY (INHALATION) EVERY 12 HOURS
Qty: 60 EACH | Refills: 5 | Status: SHIPPED | OUTPATIENT
Start: 2022-12-06 | End: 2022-12-08 | Stop reason: SDUPTHER

## 2022-12-06 RX ORDER — FLUTICASONE PROPIONATE AND SALMETEROL 250; 50 UG/1; UG/1
1 POWDER RESPIRATORY (INHALATION) EVERY 12 HOURS
Qty: 60 EACH | Refills: 5 | OUTPATIENT
Start: 2022-12-06

## 2022-12-07 ENCOUNTER — TELEPHONE (OUTPATIENT)
Dept: PULMONOLOGY | Age: 77
End: 2022-12-07

## 2022-12-07 NOTE — TELEPHONE ENCOUNTER
Pt called stating that she is trying to get assistance through [address] [place] [address] for Advair and she needs to have a script, list of medications and list of medication conditions/dx's that she has, to submit with the application. Script pending. Pt would like to be called when script is signed and ready to . Shalonda Hobson will . OV 6/7/22:    Assessment:   Moderate persistent Asthma  Enlarging RUL 8 mm nodule with other nodules on CT 11/11/21. Low activities on PET scan 6/2/2022 with resolution/stability of other nodules. Allergic rhinitis   54 pack years smoking-quit 11/2017   RUL nodule 8 mm on CT chest 11/15/2017. Smaller on repeat CT chest 6/11/2018 and CT 9/2/2020. Lobulated LLL pulmonary nodule on CT chest 2/3/2016 (SUV of 1.48). CT chest 8/12/16 showed stability with calcification. ? Growth on CT chest 2/13/2017 with hilar adenopathy. Negative EBUS TBNA of the LN 2/27/2017. Smaller on repeat CT 9/2/2020  Pulmonary nodules stable between 5/2007 and 4/2009. Likely granulomas. 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 I recommend radiographic follow up CT chest 6 month. Patient feels comfortable with that and not interested in any invasive testing at this time. Patient is up to date with Covid and Pneumococcal vaccine.    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

## 2022-12-08 RX ORDER — FLUTICASONE PROPIONATE AND SALMETEROL 50; 250 UG/1; UG/1
1 POWDER RESPIRATORY (INHALATION) EVERY 12 HOURS
Qty: 180 EACH | Refills: 3 | Status: SHIPPED | OUTPATIENT
Start: 2022-12-08

## 2022-12-08 RX ORDER — FLUTICASONE PROPIONATE AND SALMETEROL 50; 250 UG/1; UG/1
1 POWDER RESPIRATORY (INHALATION) EVERY 12 HOURS
Qty: 180 EACH | Refills: 3 | Status: CANCELLED | OUTPATIENT
Start: 2022-12-08

## 2022-12-08 NOTE — TELEPHONE ENCOUNTER
Received call from pt who stated her son is unable to drop off enrollment forms. Pt would like to be contacted after Rx is faxed to [address] [place] [address] as she will send enrollment forms in. Confirmed fax number and pt ID.

## 2022-12-09 NOTE — TELEPHONE ENCOUNTER
Pt called back asking for office to all [facility] at [address] [place] [address] at [phone] to verify Rx. Advised pt that office will fax signed Rx. Pt states that her  already faxed the enrollment forms to [address] [place] [address], but they need the Rx.  Alisa Broussard has the Rx ready to be faxed once signed.

## 2022-12-14 NOTE — TELEPHONE ENCOUNTER
Received call from [facility] with [facility] pt assistance who had pt on the line and was requesting Advair rx. Rx is still pending physician signature. [facility] requested cover page be sent with rx, included pt id [id number] and pt .

## 2022-12-21 RX ORDER — FLUTICASONE PROPIONATE 50 MCG
SPRAY, SUSPENSION (ML) NASAL
Qty: 16 G | Refills: 5 | Status: SHIPPED | OUTPATIENT
Start: 2022-12-21

## 2023-01-09 ENCOUNTER — TELEPHONE (OUTPATIENT)
Dept: PULMONOLOGY | Age: 78
End: 2023-01-09

## 2023-01-09 NOTE — TELEPHONE ENCOUNTER
Patient cancelled appointment on 1/10/23 with Dr. Vega Sender for 6 month CT/COPD. Reason: pt can't afford copay for CT chest    Patient did not reschedule appointment. Pt is going to call scheduling to determine if there is a way around the cost.     Appointment rescheduled for . Last OV 6/7/23:    Assessment:   Moderate persistent Asthma  Enlarging RUL 8 mm nodule with other nodules on CT 11/11/21. Low activities on PET scan 6/2/2022 with resolution/stability of other nodules. Allergic rhinitis   54 pack years smoking-quit 11/2017   RUL nodule 8 mm on CT chest 11/15/2017. Smaller on repeat CT chest 6/11/2018 and CT 9/2/2020. Lobulated LLL pulmonary nodule on CT chest 2/3/2016 (SUV of 1.48). CT chest 8/12/16 showed stability with calcification. ? Growth on CT chest 2/13/2017 with hilar adenopathy. Negative EBUS TBNA of the LN 2/27/2017. Smaller on repeat CT 9/2/2020  Pulmonary nodules stable between 5/2007 and 4/2009. Likely granulomas. 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 I recommend radiographic follow up CT chest 6 month. Patient feels comfortable with that and not interested in any invasive testing at this time. Patient is up to date with Covid and Pneumococcal vaccine.    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

## 2023-01-27 ENCOUNTER — HOSPITAL ENCOUNTER (OUTPATIENT)
Dept: CT IMAGING | Age: 78
Discharge: HOME | End: 2023-01-27
Payer: MEDICARE

## 2023-01-27 DIAGNOSIS — R91.1 PULMONARY NODULE: ICD-10-CM

## 2023-01-27 PROCEDURE — 71250 CT THORAX DX C-: CPT

## 2023-02-07 ENCOUNTER — TELEPHONE (OUTPATIENT)
Dept: PULMONOLOGY | Age: 78
End: 2023-02-07

## 2023-02-07 ENCOUNTER — OFFICE VISIT (OUTPATIENT)
Dept: PULMONOLOGY | Age: 78
End: 2023-02-07
Payer: MEDICARE

## 2023-02-07 ENCOUNTER — TELEPHONE (OUTPATIENT)
Dept: INTERVENTIONAL RADIOLOGY/VASCULAR | Age: 78
End: 2023-02-07

## 2023-02-07 VITALS
SYSTOLIC BLOOD PRESSURE: 128 MMHG | WEIGHT: 150.2 LBS | BODY MASS INDEX: 25.02 KG/M2 | RESPIRATION RATE: 24 BRPM | DIASTOLIC BLOOD PRESSURE: 74 MMHG | HEART RATE: 56 BPM | OXYGEN SATURATION: 98 % | HEIGHT: 65 IN

## 2023-02-07 DIAGNOSIS — R91.1 RIGHT UPPER LOBE PULMONARY NODULE: Primary | ICD-10-CM

## 2023-02-07 DIAGNOSIS — J45.40 MODERATE PERSISTENT ASTHMA WITHOUT COMPLICATION: ICD-10-CM

## 2023-02-07 PROCEDURE — 99214 OFFICE O/P EST MOD 30 MIN: CPT | Performed by: INTERNAL MEDICINE

## 2023-02-07 PROCEDURE — 1123F ACP DISCUSS/DSCN MKR DOCD: CPT | Performed by: INTERNAL MEDICINE

## 2023-02-07 RX ORDER — ALENDRONATE SODIUM 70 MG/1
70 TABLET ORAL
COMMUNITY

## 2023-02-07 RX ORDER — ACETAMINOPHEN, ASPIRIN AND CAFFEINE 250; 250; 65 MG/1; MG/1; MG/1
2 TABLET, FILM COATED ORAL
COMMUNITY

## 2023-02-07 NOTE — TELEPHONE ENCOUNTER
[facility] called ct needle lung biopsy is scheduled Thursday 2/9/23 @ 1037

## 2023-02-07 NOTE — TELEPHONE ENCOUNTER
IR guided transthoracic needle biopsy of the enlarging RUL nodule. Patient prefers to have it done at [facility]. LM for IR dept [facility] to [facility].

## 2023-02-07 NOTE — TELEPHONE ENCOUNTER
[facility] Phone number used: [phone]  Procedure: lung biopsy  Approving Radiologist: Jesenia Francis    Pt informed of the following:  Date of procedure: 2/9/23  Arrival time of procedure: 0900  Time of procedure: 1030    On any blood thinners? No.     Blood pressure medication? No. If yes need to make sure take morning of procedure. Any issues with sedation in the past? no  Will receive versed and fentanyl for sedation will need a  day of procedure. H&P or office note within 30 days? yes - EPIC    After procedure will be here 2-4 hours after procedure for monitoring. Nothing to eat or drink at midnight.      Need COVID testing prior: No    Need SDS: Yes

## 2023-02-07 NOTE — PROGRESS NOTES
[facility]    Outpatient Follow Up Note      CHIEF COMPLAINT: COPD/CT chest      HPI:   CT chest reviewed by me and noted below. Results were dicussed with patient and multiple good questions were answered. 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)     Hyperlipidemia     Lock jaw     Pneumonia     Post-nasal drip     Pulmonary nodule     bi lateral    Rash     itchy, bumps    Reflex sympathetic dystrophy     RSD (reflex sympathetic dystrophy)     TMJ (dislocation of temporomandibular joint)        Past Surgical History:        Procedure Laterality Date    APPENDECTOMY      BACK SURGERY      BRONCHOSCOPY  2008    COLONOSCOPY  11/15/2012    1 snared polyp    HYSTERECTOMY (CERVIX STATUS UNKNOWN)      TONSILLECTOMY         Allergies:  is allergic to latex, iodine, penicillins, sulfa antibiotics, tetanus toxoids, clindamycin/lincomycin, influenza vaccines, singulair [montelukast], albuterol, codeine, and spiriva handihaler [tiotropium bromide monohydrate]. Social History:    TOBACCO:   reports that she quit smoking about 5 years ago. Her smoking use included cigarettes. She has a 12.75 pack-year smoking history. She has never used smokeless tobacco.  ETOH:   reports that she does not currently use alcohol.       Family History:       Problem Relation Age of Onset    Diabetes Brother     Cancer Mother     Colon Cancer Mother     Asthma Neg Hx     Emphysema Neg Hx     Heart Failure Neg Hx     Hypertension Neg Hx        Current Medications:    Current Outpatient Medications:     fluticasone (FLONASE) 50 MCG/ACT nasal spray, SQUIRT 1 SPRAY IN EACH NOSTRIL TWICE DAILY, Disp: 16 g, Rfl: 5    ADVAIR DISKUS 250-50 MCG/ACT AEPB diskus inhaler, Inhale 1 puff into the lungs in the morning and 1 puff in the evening., Disp: 180 each, Rfl: 3    montelukast (SINGULAIR) 10 MG tablet, TAKE 1 TABLET BY MOUTH EACH NIGHT AT BEDTIME, Disp: 30 tablet, Rfl: 5    ipratropium-albuterol (DUONEB) 0.5-2.5 (3) MG/3ML SOLN nebulizer solution, Inhale 3 mLs into the lungs every 6 hours as needed for Shortness of Breath DX Asthma J45.50, Disp: 360 mL, Rfl: 3    albuterol sulfate HFA (VENTOLIN HFA) 108 (90 Base) MCG/ACT inhaler, Inhale 2 puffs into the lungs every 6 hours as needed for Wheezing or Shortness of Breath, Disp: 18 g, Rfl: 5    nystatin (MYCOSTATIN) 654659 UNIT/ML suspension, Take 5 mLs by mouth 4 times daily Retain in mouth as long as possible, Disp: 240 mL, Rfl: 0    hydrOXYzine (ATARAX) 25 MG tablet, , Disp: , Rfl:     azelastine (ASTELIN) 0.1 % nasal spray, 2 sprays by Nasal route 2 times daily 2 Spray in each nostril, Disp: 3 Bottle, Rfl: 1    Probiotic Product (PROBIOTIC-10 PO), Take 1 capsule by mouth daily, Disp: , Rfl:     Multiple Vitamins-Minerals (CENTRUM ADULTS PO), Take by mouth, Disp: , Rfl:     lisinopril (PRINIVIL;ZESTRIL) 10 MG tablet, Take 10 mg by mouth daily, Disp: , Rfl:     Cholecalciferol (VITAMIN D3 PO), Take by mouth, Disp: , Rfl:     gabapentin (NEURONTIN) 300 MG capsule, , Disp: , Rfl:     fexofenadine (ALLEGRA ALLERGY) 180 MG tablet, Take 180 mg by mouth daily, Disp: , Rfl:     citalopram (CELEXA) 20 MG tablet, Take 20 mg by mouth, Disp: , Rfl:     pravastatin (PRAVACHOL) 40 MG tablet, Take 1 tablet by mouth daily. , Disp: , Rfl:     DiphenhydrAMINE HCl (BENADRYL PO), Take 1 tablet by mouth daily , Disp: , Rfl:       Objective:   /74   Pulse 56   Resp 24   Ht 5' 5\" (1.651 m)   Wt 150 lb 3.2 oz (68.1 kg)   SpO2 98% Comment: on RA  BMI 24.99 kg/m²   Gen: No distress. Eyes: PERRL. No sclera icterus. No conjunctival injection. ENT: No discharge. Pharynx clear. Neck: Trachea midline. No obvious mass.    Resp: No accessory muscle use. No crackles. No wheezes. No rhonchi. No dullness on percussion. Good air entry. CV: Regular rate. Regular rhythm. No murmur or rub. No edema. GI: Non-tender. Non-distended. No hernia. Skin: Warm and dry. No nodule on exposed extremities. Lymph: No cervical LAD. No supraclavicular LAD. M/S: No cyanosis. No joint deformity. No clubbing. Neuro: Awake. Alert. Moves all four extremities. Psych: Oriented x 3. No anxiety. DATA reviewed by me:   PFTs 02/17/2016 FEV1 1.78L(78%) TLC 5.09L(98%)   DLCO 15.97 (70%) 6MW 1120 F LO2 91%   PFTs 07/10/2012 FEV1 1.91L(90%) TLC 5.04L(101%) DLCO 16.30 (90%)  PFTs 03/17/2011 FEV1 1.77L           TLC 4.77L             DLCO 16.72  PFTs 04/15/2008 FEV1 1.82L           TLC 4.87L             DLCO 19.24        CT chest 2/15/19   No suspicious nodules  Regression of left lower lobe, right upper lobe nodules  Slight growth in the small right lower lobe nodule    CT chest 9/2/2020   Resolution of pulmonary nodule  Other nodules have resolved or decreased in size  No new nodules or acute abnormalities  No evidence of axillary adenopathy on the right    CT chest 11/11/2021  images reviewed by me and showed:   1. No acute intrapulmonary findings. 2. Stable multifocal pleural and parenchymal scarring. 3. A majority of the patient's scattered solid and ground-glass nodules  throughout both lungs are unchanged from 09/02/2020, and likely reflect  benign granulomatous disease. 4. Increase in size of an approximate 8 mm nodule within the peripheral posterior right upper lobe. PET scan 6/2/2022 imaging reviewed by me and showed  1. The right upper lobe pulmonary nodule shows minimal activity above  background with SUV max 1.0. This is likely a benign finding, although  follow-up is recommended in 1 year to ensure continued stability with a prior  history of slow growth.   2. Other nodules described on more remote imaging studies are either stable  or resolved. CT chest 1/27/2023 imaging reviewed by me and showed  Continued interval increase in size of right upper lobe pulmonary nodules,  the larger of which measures up to 16 x 10 mm. These remain suspicious for  malignancy given interval growth and could be metastatic or primary in  etiology. Recommend tissue sampling    Bronch 2/27/2018: Hilar LAD: No phenotypic evidence of non-Hodgkin lymphoma. A. Lymph Node, 12L, Transbronchial Fine Needle Aspirate: No malignant cells identified. B. Lung, Left Lower Lobe Nodule Brushings and Brush Tip: No malignant cells identified. A. Lung, Bronchial Washing: No malignant cells identified. B. Lung, Bronchoalveolar Lavage to Left Lower Lobe: No malignant cells identified. Assessment:      Moderate persistent Asthma  Enlarging RUL 8 mm nodule with other nodules on CT 11/11/21. Low activities on PET scan 6/2/2022 with resolution/stability of other nodules. Enlarging RUL 1.6 cm nodule on repeat CT 1/27/2023. DDx malignancy, granuloma, LCH, lymphoma,  Allergic rhinitis   54 pack years smoking-quit 11/2017   RUL nodule 8 mm on CT chest 11/15/2017. Smaller on repeat CT chest 6/11/2018 and CT 9/2/2020. Lobulated LLL pulmonary nodule on CT chest 2/3/2016 (SUV of 1.48). CT chest 8/12/16 showed stability with calcification. ? Growth on CT chest 2/13/2017 with hilar adenopathy. Negative EBUS TBNA of the LN 2/27/2017. Smaller on repeat CT 9/2/2020  Pulmonary nodules stable between 5/2007 and 4/2009. Likely granulomas. 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 Risks, benefits, alternatives were discussed with patient.   She is open for cancer treatment including surgery, chemotherapy and radiation. Continue Advair BID   Continue DuoNeb QID PRN/Albuterol 2 puffs Q4-6 hrs PRN  Continue Singulair  Patient is up to date with Covid and Pneumococcal vaccine. 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

## 2023-02-09 ENCOUNTER — HOSPITAL ENCOUNTER (EMERGENCY)
Age: 78
Discharge: HOME | End: 2023-02-09
Attending: EMERGENCY MEDICINE
Payer: MEDICARE

## 2023-02-09 ENCOUNTER — APPOINTMENT (OUTPATIENT)
Dept: GENERAL RADIOLOGY | Age: 78
End: 2023-02-09
Payer: MEDICARE

## 2023-02-09 ENCOUNTER — HOSPITAL ENCOUNTER (OUTPATIENT)
Dept: CT IMAGING | Age: 78
Discharge: HOME | End: 2023-02-09

## 2023-02-09 ENCOUNTER — TELEPHONE (OUTPATIENT)
Dept: CARDIOLOGY | Age: 78
End: 2023-02-09

## 2023-02-09 VITALS
DIASTOLIC BLOOD PRESSURE: 73 MMHG | SYSTOLIC BLOOD PRESSURE: 157 MMHG | RESPIRATION RATE: 16 BRPM | BODY MASS INDEX: 25.02 KG/M2 | WEIGHT: 150.2 LBS | HEART RATE: 62 BPM | TEMPERATURE: 97.7 F | OXYGEN SATURATION: 98 % | HEIGHT: 65 IN

## 2023-02-09 VITALS
SYSTOLIC BLOOD PRESSURE: 132 MMHG | TEMPERATURE: 96.4 F | RESPIRATION RATE: 16 BRPM | HEART RATE: 46 BPM | DIASTOLIC BLOOD PRESSURE: 65 MMHG | OXYGEN SATURATION: 100 %

## 2023-02-09 DIAGNOSIS — R00.1 BRADYCARDIA: Primary | ICD-10-CM

## 2023-02-09 DIAGNOSIS — R91.1 RIGHT UPPER LOBE PULMONARY NODULE: ICD-10-CM

## 2023-02-09 LAB
A/G RATIO: 1.7 (ref 1.1–2.2)
ALBUMIN SERPL-MCNC: 4.6 G/DL (ref 3.4–5)
ALP BLD-CCNC: 92 U/L (ref 40–129)
ALT SERPL-CCNC: 12 U/L (ref 10–40)
ANION GAP SERPL CALCULATED.3IONS-SCNC: 11 MMOL/L (ref 3–16)
AST SERPL-CCNC: 16 U/L (ref 15–37)
BASOPHILS ABSOLUTE: 0.1 K/UL (ref 0–0.2)
BASOPHILS RELATIVE PERCENT: 1.2 %
BILIRUB SERPL-MCNC: 0.7 MG/DL (ref 0–1)
BILIRUBIN URINE: NEGATIVE
BLOOD, URINE: NEGATIVE
BUN BLDV-MCNC: 6 MG/DL (ref 7–20)
CALCIUM SERPL-MCNC: 10 MG/DL (ref 8.3–10.6)
CHLORIDE BLD-SCNC: 102 MMOL/L (ref 99–110)
CLARITY: CLEAR
CO2: 25 MMOL/L (ref 21–32)
COLOR: YELLOW
CREAT SERPL-MCNC: <0.5 MG/DL (ref 0.6–1.2)
EKG ATRIAL RATE: 50 BPM
EKG DIAGNOSIS: NORMAL
EKG P AXIS: 34 DEGREES
EKG P-R INTERVAL: 160 MS
EKG Q-T INTERVAL: 470 MS
EKG QRS DURATION: 80 MS
EKG QTC CALCULATION (BAZETT): 428 MS
EKG R AXIS: -8 DEGREES
EKG T AXIS: 26 DEGREES
EKG VENTRICULAR RATE: 50 BPM
EOSINOPHILS ABSOLUTE: 0.3 K/UL (ref 0–0.6)
EOSINOPHILS RELATIVE PERCENT: 4.8 %
EPITHELIAL CELLS, UA: NORMAL /HPF (ref 0–5)
GFR SERPL CREATININE-BSD FRML MDRD: >60 ML/MIN/{1.73_M2}
GLUCOSE BLD-MCNC: 95 MG/DL (ref 70–99)
GLUCOSE URINE: NEGATIVE MG/DL
HCT VFR BLD CALC: 41.1 % (ref 36–48)
HEMOGLOBIN: 13.9 G/DL (ref 12–16)
KETONES, URINE: NEGATIVE MG/DL
LEUKOCYTE ESTERASE, URINE: ABNORMAL
LYMPHOCYTES ABSOLUTE: 1.6 K/UL (ref 1–5.1)
LYMPHOCYTES RELATIVE PERCENT: 30.5 %
MCH RBC QN AUTO: 32.3 PG (ref 26–34)
MCHC RBC AUTO-ENTMCNC: 33.7 G/DL (ref 31–36)
MCV RBC AUTO: 95.8 FL (ref 80–100)
MICROSCOPIC EXAMINATION: YES
MONOCYTES ABSOLUTE: 0.5 K/UL (ref 0–1.3)
MONOCYTES RELATIVE PERCENT: 9.8 %
NEUTROPHILS ABSOLUTE: 2.8 K/UL (ref 1.7–7.7)
NEUTROPHILS RELATIVE PERCENT: 53.7 %
NITRITE, URINE: NEGATIVE
PDW BLD-RTO: 13.4 % (ref 12.4–15.4)
PH UA: 7 (ref 5–8)
PLATELET # BLD: 203 K/UL (ref 135–450)
PMV BLD AUTO: 10.2 FL (ref 5–10.5)
POTASSIUM REFLEX MAGNESIUM: 4 MMOL/L (ref 3.5–5.1)
PROTEIN UA: NEGATIVE MG/DL
RBC # BLD: 4.29 M/UL (ref 4–5.2)
RBC UA: NORMAL /HPF (ref 0–4)
SODIUM BLD-SCNC: 138 MMOL/L (ref 136–145)
SPECIFIC GRAVITY UA: <=1.005 (ref 1–1.03)
TOTAL PROTEIN: 7.3 G/DL (ref 6.4–8.2)
TROPONIN: <0.01 NG/ML
TSH REFLEX FT4: 1.7 UIU/ML (ref 0.27–4.2)
URINE REFLEX TO CULTURE: ABNORMAL
URINE TYPE: ABNORMAL
UROBILINOGEN, URINE: 0.2 E.U./DL
WBC # BLD: 5.2 K/UL (ref 4–11)
WBC UA: NORMAL /HPF (ref 0–5)

## 2023-02-09 PROCEDURE — 93005 ELECTROCARDIOGRAM TRACING: CPT | Performed by: EMERGENCY MEDICINE

## 2023-02-09 PROCEDURE — 84484 ASSAY OF TROPONIN QUANT: CPT

## 2023-02-09 PROCEDURE — 2580000003 HC RX 258: Performed by: EMERGENCY MEDICINE

## 2023-02-09 PROCEDURE — 99285 EMERGENCY DEPT VISIT HI MDM: CPT

## 2023-02-09 PROCEDURE — 71045 X-RAY EXAM CHEST 1 VIEW: CPT

## 2023-02-09 PROCEDURE — 80053 COMPREHEN METABOLIC PANEL: CPT

## 2023-02-09 PROCEDURE — 85025 COMPLETE CBC W/AUTO DIFF WBC: CPT

## 2023-02-09 PROCEDURE — 81001 URINALYSIS AUTO W/SCOPE: CPT

## 2023-02-09 PROCEDURE — 84443 ASSAY THYROID STIM HORMONE: CPT

## 2023-02-09 RX ORDER — 0.9 % SODIUM CHLORIDE 0.9 %
1000 INTRAVENOUS SOLUTION INTRAVENOUS ONCE
Status: COMPLETED | OUTPATIENT
Start: 2023-02-09 | End: 2023-02-09

## 2023-02-09 RX ADMIN — SODIUM CHLORIDE 1000 ML: 9 INJECTION, SOLUTION INTRAVENOUS at 11:31

## 2023-02-09 ASSESSMENT — PAIN - FUNCTIONAL ASSESSMENT: PAIN_FUNCTIONAL_ASSESSMENT: NONE - DENIES PAIN

## 2023-02-09 NOTE — PROGRESS NOTES
On arrival pt HR 48 on monitor. Auscultated and palpated HR at 55.  Checo FERNÁNDEZ RN made aware and will call this RN back.

## 2023-02-09 NOTE — DISCHARGE INSTRUCTIONS
Your evaluation in the ER today was reassuring. Your heart rate was slightly low. You have been set up with a heart monitor to wear at home. You will follow-up with cardiology. If at any point you feel you are worsening or feel like you are going to pass out again or feel lightheaded you need to return to the ER immediately. FOLLOW-UP APPOINTMENTS    [facility] - Appointment on March 1st at 1:45pm with Amanda Brooke MD, electrophysiologist, [address] [facility],  [facility], [address] [place] [address] [address], [address] [facility], [address] [facility]. Office #: [phone]. If you are unable to make this appointment, please call to reschedule. Directions to [employer]  [facility] towards [place]. [address] [facility] exit. Right off exit. Cross over [employer]. Right on [address]. Left into hospital. Follow the signs to the emergency room ( turn left toward the Emergency room). Go right at the first stop sign. Just past the Emergency room at the second stop sign turn right and go up the ramp and park on the top level if possible. Go in the glass doors of the [facility] on the top level of the garage [address]. As soon as you get in the door turn left and our office is the one with the glass doors. [employer] MONITOR PATCH PATIENT INSTRUCTIONS    Remove Patch in 1 week from application. Recalibrate and apply next patch. Call  [employer] CUSTOMER SUPPORT: [phone] and they will assist if needed. o PIN: [id number]    o Keep the phone with you as much as possible. Be sure to charge the phone overnight and when the battery gets low. If the phone dies completely, be sure to restart the phone and enter the PIN number to confirm the patch is connected to your phone.    o If you are away from the phone for more than 8 hours, please stay within a 30 foot range of the phone for 3 hours to ensure your data fully uploads.     o If you go somewhere with no cellphone service, still keep the phone with you and charged. Your data will upload when you reach [employer] service. You can stay outside of cellphone range for up to 10 days. o This phone cannot make calls, take pictures, etc. It is solely for medical use and will only Bluetooth connect to your patch. · Patch Instructions:    o Wait to exercise or shower for 30 minutes after application. o Shower with the water stream to your back and avoid putting the patch directly under the water stream.    o Do not bathe, swim or fully submerge the patch.    o If the patch starts to lift off after showering or sweating, dry the patch with a towel and allow the adhesive to dry. It should re-adhere to your skin. If it continues to lift, use the adhesive overlay to re-adhere the patch. Video instructions for the adhesive overlay are on the phone under the Menu Tab in the right hand corner - [address]    o One patch lasts for up to 7 days. If you need to wear an additional patch or replace a patch, utilize the included application instructions or the video instructions - Menu - Help - [employer] Application    · End of Wear - Returning the Phone    o Jovita Bolden are responsible for returning the phone. You will be financially responsible for an unreturned phone. o Discard the used patch in the trash    o Return the phone,  and any unopened additional patches or adhesives in the pre-paid mailing pouch or drop off at the office. o Drop the  at [address] [place] [address] location or box or schedule a home  by calling    ups - [phone]. Keep the tracking number for your records.     CONTACT [employer] CUSTOMER SUPPORT:  [phone]

## 2023-02-09 NOTE — ED PROVIDER NOTES
[facility] Emergency Department      CHIEF COMPLAINT  Bradycardia (Patient was having an outpatient procedure at same day surgery. Per report pt was having a lung biopsy but was sent to the ED due to a low heart rate between 48-56. Tracie in same day stated pt has had multiple syncopical episodes but has never been worked up for them. PCP was called and patient sent to the ED for evaluation )      HISTORY OF PRESENT ILLNESS  Bashir Beltran is a 76 y.o. female with a history of COPD, hyperlipidemia with a known pulmonary nodule presents from interventional radiology where she was scheduled to have a lung nodule biopsy today. When she arrived there she told them that she felt weak and fatigued and they noted her heart rate was in the 40s and 50s. They sent her here for further evaluation of her bradycardia. She states a couple years ago she had 3 episodes where she passed out. Prior to that she was walking or exerting herself and felt lightheaded and passed out. She states she was never really evaluated for this. She has not had any episodes in almost 2 years. She states she felt a little weak and tired this morning but did not pass out did not feel lightheaded. No chest pain or shortness of breath. Currently right now she states she is just anxious to get home to her service dog. She does live alone but has a service dog at home. She has been eating and drinking normally with no vomiting or diarrhea. .   No other complaints, modifying factors or associated symptoms. History obtained from the patient. I have reviewed the following from the nursing documentation.     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)     Hyperlipidemia     Lock jaw     Pneumonia     Post-nasal drip     Pulmonary nodule     bi lateral    Rash itchy, bumps    Reflex sympathetic dystrophy     RSD (reflex sympathetic dystrophy)     TMJ (dislocation of temporomandibular joint)      Past Surgical History:   Procedure Laterality Date    APPENDECTOMY      BACK SURGERY      BRONCHOSCOPY  2008    COLONOSCOPY  11/15/2012    1 snared polyp    HYSTERECTOMY (CERVIX STATUS UNKNOWN)      TONSILLECTOMY       Family History   Problem Relation Age of Onset    Diabetes Brother     Cancer Mother     Colon Cancer Mother     Asthma Neg Hx     Emphysema Neg Hx     Heart Failure Neg Hx     Hypertension Neg Hx      Social History     Socioeconomic History    Marital status:      Spouse name: Not on file    Number of children: Not on file    Years of education: Not on file    Highest education level: Not on file   Occupational History    Not on file   Tobacco Use    Smoking status: Former     Packs/day: 0.25     Years: 51.00     Pack years: 12.75     Types: Cigarettes     Quit date: 2017     Years since quittin.2    Smokeless tobacco: Never   Vaping Use    Vaping Use: Never used   Substance and Sexual Activity    Alcohol use: Not Currently    Drug use: No    Sexual activity: Not on file   Other Topics Concern    Not on file   Social History Narrative    Not on file     Social Determinants of Health     Financial Resource Strain: Not on file   Food Insecurity: Not on file   Transportation Needs: Not on file   Physical Activity: Not on file   Stress: Not on file   Social Connections: Not on file   Intimate Partner Violence: Not on file   Housing Stability: Not on file     No current facility-administered medications for this encounter.      Current Outpatient Medications   Medication Sig Dispense Refill    aspirin-acetaminophen-caffeine (EXCEDRIN EXTRA STRENGTH) 250-250-65 MG per tablet Take 2 tablets by mouth nightly      alendronate (FOSAMAX) 70 MG tablet Take 70 mg by mouth every 7 days      fluticasone (FLONASE) 50 MCG/ACT nasal spray SQUIRT 1 SPRAY IN [facility] NOSTRIL TWICE DAILY 16 g 5    ADVAIR DISKUS 250-50 MCG/ACT AEPB diskus inhaler Inhale 1 puff into the lungs in the morning and 1 puff in the evening. 180 each 3    montelukast (SINGULAIR) 10 MG tablet TAKE 1 TABLET BY MOUTH EACH NIGHT AT BEDTIME 30 tablet 5    ipratropium-albuterol (DUONEB) 0.5-2.5 (3) MG/3ML SOLN nebulizer solution Inhale 3 mLs into the lungs every 6 hours as needed for Shortness of Breath DX Asthma J45.50 360 mL 3    albuterol sulfate HFA (VENTOLIN HFA) 108 (90 Base) MCG/ACT inhaler Inhale 2 puffs into the lungs every 6 hours as needed for Wheezing or Shortness of Breath 18 g 5    nystatin (MYCOSTATIN) 590724 UNIT/ML suspension Take 5 mLs by mouth 4 times daily Retain in mouth as long as possible 240 mL 0    hydrOXYzine (ATARAX) 25 MG tablet       azelastine (ASTELIN) 0.1 % nasal spray 2 sprays by Nasal route 2 times daily 2 Spray in each nostril 3 Bottle 1    Probiotic Product (PROBIOTIC-10 PO) Take 1 capsule by mouth daily      Multiple Vitamins-Minerals (CENTRUM ADULTS PO) Take by mouth      lisinopril (PRINIVIL;ZESTRIL) 10 MG tablet Take 10 mg by mouth daily      Cholecalciferol (VITAMIN D3 PO) Take by mouth      gabapentin (NEURONTIN) 300 MG capsule       fexofenadine (ALLEGRA) 180 MG tablet Take 180 mg by mouth daily (Patient not taking: Reported on 2/7/2023)      citalopram (CELEXA) 20 MG tablet Take 20 mg by mouth      pravastatin (PRAVACHOL) 40 MG tablet Take 1 tablet by mouth daily. DiphenhydrAMINE HCl (BENADRYL PO) Take 2 tablets by mouth nightly       Allergies   Allergen Reactions    Latex Itching and Rash    Iodine      IV dye    Penicillins      Pt states she stopped breathing    Sulfa Antibiotics      Pt stopped breathing.     Tetanus Toxoids Shortness Of Breath     Pt stopped breathing    Clindamycin/Lincomycin     Influenza Vaccines Other (See Comments)     History of GBS    Singulair [Montelukast] Itching    Albuterol Nausea Only and Other (See Comments)     Cold chills, shakes    Codeine Nausea And Vomiting    Spiriva Handihaler [Tiotropium Bromide Monohydrate] Rash       REVIEW OF SYSTEMS    Unless otherwise stated in this report, this patient's positive and negative responses for review of systems (constitutional, eyes, ENT, cardiovascular, respiratory, gastrointestinal, neurological, genitourinary, musculoskeletal, integument systems and systems related to the presenting problem) are either stated in the preceding paragraph, were not pertinent or were negative for the symptoms and/or complaints related to the medical problem. PHYSICAL EXAM  BP (!) 157/73   Pulse 62   Temp 97.7 °F (36.5 °C) (Oral)   Resp 16   Ht 5' 5\" (1.651 m)   Wt 150 lb 3.2 oz (68.1 kg)   SpO2 98%   BMI 24.99 kg/m²   GENERAL APPEARANCE: Awake and alert. Cooperative. No acute distress. HEAD: Normocephalic. Atraumatic. EYES: PERRL. EOM's grossly intact. ENT: Mucous membranes are moist.   NECK: Supple, trachea midline. HEART: Bradycardic, normal rhythm. LUNGS: Respirations unlabored. CTAB. Good air exchange. No wheezes, rales, or rhonchi. Speaking comfortably in full sentences. ABDOMEN:  Soft. Non-distended. Non-tender. No guarding or rebound. EXTREMITIES: No peripheral edema. MAEE. No acute deformities. SKIN: Warm, dry and intact. No acute rashes. NEUROLOGICAL: Alert and oriented X 3. CN II-XII grossly intact. Strength 5/5, sensation intact. PSYCHIATRIC: Normal mood and affect. LABS  I have reviewed all labs for this visit.    Results for orders placed or performed during the hospital encounter of 02/09/23   CBC with Auto Differential   Result Value Ref Range    WBC 5.2 4.0 - 11.0 K/uL    RBC 4.29 4.00 - 5.20 M/uL    Hemoglobin 13.9 12.0 - 16.0 g/dL    Hematocrit 41.1 36.0 - 48.0 %    MCV 95.8 80.0 - 100.0 fL    MCH 32.3 26.0 - 34.0 pg    MCHC 33.7 31.0 - 36.0 g/dL    RDW 13.4 12.4 - 15.4 %    Platelets 189 937 - 249 K/uL    MPV 10.2 5.0 - 10.5 fL    Neutrophils % 53.7 %    Lymphocytes % 30.5 %    Monocytes % 9.8 %    Eosinophils % 4.8 %    Basophils % 1.2 %    Neutrophils Absolute 2.8 1.7 - 7.7 K/uL    Lymphocytes Absolute 1.6 1.0 - 5.1 K/uL    Monocytes Absolute 0.5 0.0 - 1.3 K/uL    Eosinophils Absolute 0.3 0.0 - 0.6 K/uL    Basophils Absolute 0.1 0.0 - 0.2 K/uL   Comprehensive Metabolic Panel w/ Reflex to MG   Result Value Ref Range    Sodium 138 136 - 145 mmol/L    Potassium reflex Magnesium 4.0 3.5 - 5.1 mmol/L    Chloride 102 99 - 110 mmol/L    CO2 25 21 - 32 mmol/L    Anion Gap 11 3 - 16    Glucose 95 70 - 99 mg/dL    BUN 6 (L) 7 - 20 mg/dL    Creatinine <0.5 (L) 0.6 - 1.2 mg/dL    Est, Glom Filt Rate >60 >60    Calcium 10.0 8.3 - 10.6 mg/dL    Total Protein 7.3 6.4 - 8.2 g/dL    Albumin 4.6 3.4 - 5.0 g/dL    Albumin/Globulin Ratio 1.7 1.1 - 2.2    Total Bilirubin 0.7 0.0 - 1.0 mg/dL    Alkaline Phosphatase 92 40 - 129 U/L    ALT 12 10 - 40 U/L    AST 16 15 - 37 U/L   Troponin   Result Value Ref Range    Troponin <0.01 <0.01 ng/mL   Urinalysis with Reflex to Culture    Specimen: Urine, clean catch   Result Value Ref Range    Color, UA Yellow Straw/Yellow    Clarity, UA Clear Clear    Glucose, Ur Negative Negative mg/dL    Bilirubin Urine Negative Negative    Ketones, Urine Negative Negative mg/dL    Specific Gravity, UA <=1.005 1.005 - 1.030    Blood, Urine Negative Negative    pH, UA 7.0 5.0 - 8.0    Protein, UA Negative Negative mg/dL    Urobilinogen, Urine 0.2 <2.0 E.U./dL    Nitrite, Urine Negative Negative    Leukocyte Esterase, Urine MODERATE (A) Negative    Microscopic Examination YES     Urine Type NotGiven     Urine Reflex to Culture Not Indicated    Microscopic Urinalysis   Result Value Ref Range    WBC, UA 3-5 0 - 5 /HPF    RBC, UA 0-2 0 - 4 /HPF    Epithelial Cells, UA 0-1 0 - 5 /HPF   EKG 12 Lead   Result Value Ref Range    Ventricular Rate 50 BPM    Atrial Rate 50 BPM    P-R Interval 160 ms    QRS Duration 80 ms    Q-T Interval 470 ms    QTc Calculation (Bazett) 428 ms    P  Axis 34 degrees    R Axis -8 degrees    T Axis 26 degrees    Diagnosis       Sinus bradycardia with sinus arrhythmiaLow voltage QRSBorderline ECGWhen compared with ECG of 21-JUN-2022 21:08,No significant change was foundConfirmed by Walter Burgos MD, RAKESH ([id number]) on 2/9/2023 2:03:14 PM       EKG  The Ekg interpreted as interpreted by me:  sinus bradycardia, rate=50  with a rate of 50 with sinus arrhythmia  Axis is   Normal  QTc is  normal  Intervals and Durations are unremarkable. No specific ST-T wave changes appreciated. No evidence of acute ischemia. No significant change from prior EKG dated June 21, 2022    Cardiac Monitoring: The cardiac monitor revealed sinus bradycardia as interpreted by me. The cardiac monitor was ordered secondary to the patient's complaint of bradycardia and to monitor the patient for dysrhythmia. RADIOLOGY  X-RAYS: ALL IMAGES INCLUDING PLAIN FILMS, CT, ULTRASOUND AND MRI HAVE BEEN READ BY THE RADIOLOGIST. XR CHEST PORTABLE   Final Result   No significant finding in the chest.                  I have personally reviewed Xray and Ultrasound images and radiology confirms the interpretation:  Chest x-ray with no pneumonia or pneumothorax. Medications administered. (Dose and Route):  Normal saline 1 L IV fluid bolus. Sepsis:  Is this patient to be included in the SEP-1 Core Measure due to severe sepsis or septic shock? No   Exclusion criteria - the patient is NOT to be included for SEP-1 Core Measure due to: Infection is not suspected             ED COURSE/MDM:  Patient seen and evaluated. Here the patient is afebrile with normal vitals signs, other than sinus bradycardia. Old records reviewed: Prior records reviewed. Diagnoses affirmatively addressed, consideration of tests, treatments & admission, including shared decision making:    The patient is well-appearing. She has a nonfocal neurologic exam.  She states she is just slightly fatigued.   EKG shows sinus bradycardia with no ischemic changes. On chart review does look like she has been bradycardic in the past.  No evidence of heart block. Troponin is negative and lab work is reassuring. She would like to go home. She confirms that the syncopal event she had had were over 2 years ago. She has not had any recent syncopal events. I consulted cardiology and spoke with Dr. Nisha Owen who recommends cardiac event monitor and close outpatient follow-up. The patient is comfortable with this plan. I do not think she needs to be admitted. I do not suspect PE. She is not short of breath. She is not having chest pain and I do not suspect ACS. Consultation summary: Discussed the patient with Dr. Nisha Owen with cardiology. He recommends a cardiac event monitor and follow-up with him in the office. Social determinants of health: None      Labs and imaging reviewed and results discussed with patient. Patient was reassessed as noted above . Plan of care discussed with patient. Patient in agreement with plan. Strict return precautions have been given. Patient was given scripts for the following medications. I counseled patient how to take these medications. Discharge Medication List as of 2/9/2023  1:10 PM        None      CLINICAL IMPRESSION  1. Bradycardia        Blood pressure (!) 157/73, pulse 62, temperature 97.7 °F (36.5 °C), temperature source Oral, resp. rate 16, height 5' 5\" (1.651 m), weight 150 lb 3.2 oz (68.1 kg), SpO2 98 %, not currently breastfeeding. DISPOSITION  Bashir Beltran was discharged to home in stable condition. Tato Crowley MD am the primary clinician of record.     (Please note this note was completed with a voice recognition program.  Efforts were made to edit the dictations but occasionally words are mis-transcribed.)        Amarjit Phillip MD  02/09/23 1709

## 2023-02-09 NOTE — TELEPHONE ENCOUNTER
[facility] in [facility] radiology called stating that pt is here for her biopsy and heart rate is very low at 48BPM and pt indicated that she has been passing out at home. Dr. Malena Nicolas asked to speak with Dr. Ezio Nunes. Call was transferred to Dr. Ezio Nunes.

## 2023-02-09 NOTE — PROGRESS NOTES
Pt transported to ED by Isela Burleson, and report given to [facility], ED charge RN

## 2023-02-09 NOTE — PROGRESS NOTES
Pt arrived to [facility] for procedure. HR 48 per [facility] and states that patient has been passing out. Dr Tyson Ohara made aware and he spoke to Dr Alona Carrizales on the phone. Pt to be sent to the ER for evaluation will need to reschedule procedure.  [facility] made aware

## 2023-02-09 NOTE — TELEPHONE ENCOUNTER
Discussed with Dr. Darcy Rodriguez  and recommended that patient be sent to ED for further evaluation given active bradycardia and syncope

## 2023-02-09 NOTE — ED NOTES
RN reviewed AVS with pt who denies concerns or questions at this time. Cardiology tech attached pt to monitor, explained care taking instructions. Pt left with [facility].      Santo Hernández RN  02/09/23 1230

## 2023-02-09 NOTE — ED PROVIDER NOTES
[facility]  ED  [address]        Patient Name: Vish Quinn  MRN: [id number]  Chrissygfchristina 1945  Date of evaluation: 2/9/2023  Attending Provider: Alis West MD  Resident Provider: Ezequiel Zamorano MD, PhD   PCP: Tiffany Madison MD  Note Started: 9:19 AM EST 2/9/23    CHIEF COMPLAINT       Bradycardia (Patient was having an outpatient procedure at same day surgery. Per report pt was having a lung biopsy but was sent to the ED due to a low heart rate between 48-56. Tracie in same day stated pt has had multiple syncopical episodes but has never been worked up for them. PCP was called and patient sent to the ED for evaluation )      HISTORY OF PRESENT ILLNESS: 1 or more Elements     History from : Patient    Limitations to history : None    Vish Quinn is a 76 y.o. female who presents for bradycardia  - was at OP procedure for lung biopsy when she was found to be bradycardic  - arrived to ED with HR 47  - reports of multiple syncope in the past, was never dx and given IV fluid  - denies headache, cp, sob    Nursing Notes were all reviewed and agreed with or any disagreements were addressed in the HPI. REVIEW OF SYSTEMS :      Positives and Pertinent negatives as per HPI. SURGICAL HISTORY     Past Surgical History:   Procedure Laterality Date    APPENDECTOMY      BACK SURGERY      BRONCHOSCOPY  2008    COLONOSCOPY  11/15/2012    1 snared polyp    HYSTERECTOMY (CERVIX STATUS UNKNOWN)      TONSILLECTOMY         CURRENTMEDICATIONS       Previous Medications    ADVAIR DISKUS 250-50 MCG/ACT AEPB DISKUS INHALER    Inhale 1 puff into the lungs in the morning and 1 puff in the evening.     ALBUTEROL SULFATE HFA (VENTOLIN HFA) 108 (90 BASE) MCG/ACT INHALER    Inhale 2 puffs into the lungs every 6 hours as needed for Wheezing or Shortness of Breath    ALENDRONATE (FOSAMAX) 70 MG TABLET    Take 70 mg by mouth every 7 days    ASPIRIN-ACETAMINOPHEN-CAFFEINE (EXCEDRIN EXTRA STRENGTH) 250-250-65 MG PER TABLET    Take 2 tablets by mouth nightly    AZELASTINE (ASTELIN) 0.1 % NASAL SPRAY    2 sprays by Nasal route 2 times daily 2 Spray in each nostril    CHOLECALCIFEROL (VITAMIN D3 PO)    Take by mouth    CITALOPRAM (CELEXA) 20 MG TABLET    Take 20 mg by mouth    DIPHENHYDRAMINE HCL (BENADRYL PO)    Take 2 tablets by mouth nightly    FEXOFENADINE (ALLEGRA) 180 MG TABLET    Take 180 mg by mouth daily    FLUTICASONE (FLONASE) 50 MCG/ACT NASAL SPRAY    SQUIRT 1 SPRAY IN EACH NOSTRIL TWICE DAILY    GABAPENTIN (NEURONTIN) 300 MG CAPSULE        HYDROXYZINE (ATARAX) 25 MG TABLET        IPRATROPIUM-ALBUTEROL (DUONEB) 0.5-2.5 (3) MG/3ML SOLN NEBULIZER SOLUTION    Inhale 3 mLs into the lungs every 6 hours as needed for Shortness of Breath DX Asthma J45.50    LISINOPRIL (PRINIVIL;ZESTRIL) 10 MG TABLET    Take 10 mg by mouth daily    MONTELUKAST (SINGULAIR) 10 MG TABLET    TAKE 1 TABLET BY MOUTH EACH NIGHT AT BEDTIME    MULTIPLE VITAMINS-MINERALS (CENTRUM ADULTS PO)    Take by mouth    NYSTATIN (MYCOSTATIN) 096545 UNIT/ML SUSPENSION    Take 5 mLs by mouth 4 times daily Retain in mouth as long as possible    PRAVASTATIN (PRAVACHOL) 40 MG TABLET    Take 1 tablet by mouth daily.     PROBIOTIC PRODUCT (PROBIOTIC-10 PO)    Take 1 capsule by mouth daily       ALLERGIES     Latex, Iodine, Penicillins, Sulfa antibiotics, Tetanus toxoids, Clindamycin/lincomycin, Influenza vaccines, Singulair [montelukast], Albuterol, Codeine, and Spiriva handihaler [tiotropium bromide monohydrate]    FAMILYHISTORY       Family History   Problem Relation Age of Onset    Diabetes Brother     Cancer Mother     Colon Cancer Mother     Asthma Neg Hx     Emphysema Neg Hx     Heart Failure Neg Hx     Hypertension Neg Hx         SOCIAL HISTORY       Social History     Tobacco Use    Smoking status: Former     Packs/day: 0.25     Years: 51.00     Pack years: 12.75     Types: Cigarettes     Quit date: 11/29/2017     Years since quittin.2    Smokeless tobacco: Never   Vaping Use    Vaping Use: Never used   Substance Use Topics    Alcohol use: Not Currently    Drug use: No       SCREENINGS        [place] Coma Scale  Eye Opening: Spontaneous  Best Verbal Response: Oriented  Best Motor Response: Obeys commands  [place] Coma Scale Score: 15                CIWA Assessment  BP: (!) 157/73  Heart Rate: 62           PHYSICAL EXAM  1 or more Elements     ED Triage Vitals [23 0857]   BP Temp Temp Source Heart Rate Resp SpO2 Height Weight   (!) 141/66 98.7 °F (37.1 °C) Oral (!) 47 18 95 % 5' 5\" (1.651 m) 150 lb 3.2 oz (68.1 kg)       General: No acute distress. Alert and Oriented. Appears stated age. HEENT: No midline cervical spine tenderness. Full ROM of the neck. There is no significant cervical lympadenopathy. No difficulty tolerating oral secretions. Cardiac: Regular rate and rhythm. Radial pulses are intact bilaterally. Chest: No respiratory distress. Clear breath sounds bilaterally. No increased work of breathing. No use of accessory muscles for respiration. Abdomen: Soft, nontender, nondistended, non-peritonitic. Extremities:No significant lower extremity edema. Lower extremities are symmetric. Neuro: Moving all extremities. No focal deficits. Speech is clear. Skin:No rash, no erythema  Psych: Calm and cooperative. DIAGNOSTIC RESULTS   LABS:    Labs Reviewed   COMPREHENSIVE METABOLIC PANEL W/ REFLEX TO MG FOR LOW K - Abnormal; Notable for the following components:       Result Value    BUN 6 (*)     Creatinine <0.5 (*)     All other components within normal limits   URINALYSIS WITH REFLEX TO CULTURE - Abnormal; Notable for the following components:    Leukocyte Esterase, Urine MODERATE (*)     All other components within normal limits   CBC WITH AUTO DIFFERENTIAL   TROPONIN   MICROSCOPIC URINALYSIS   TSH WITH REFLEX TO FT4       When ordered only abnormal lab results are displayed.  All other labs were within normal range or not returned as of this dictation. EKG  The EKG, as interpreted by myself, in the emergency department in the absence of a cardiologist.  sinus bradycardia, rate=50  Axis is   Left axis deviation  QTc is  within an acceptable range  Intervals and Durations are unremarkable. No specific ST-T wave changes appreciated. No evidence of acute ischemia. No significant change from prior EKG dated 06/21/2022      RADIOLOGY:   Non-plain film images such as CT, Ultrasound and MRI are read by the radiologist. Plain radiographic images are visualized and preliminarily interpreted by the ED Provider with the below findings:    CXR: unremarkable    Interpretation per the Radiologist below, if available at the time of this note:    XR CHEST PORTABLE   Final Result   No significant finding in the chest.           No results found. Bedside Ultrasound, as interpreted by me, if performed:    No results found. PROCEDURES     Unless otherwise noted below, none     Procedures    CRITICAL CARE TIME     I personally spent a total of 60 minutes of critical care time in obtaining history, performing a physical exam, bedside monitoring of interventions, collecting and interpreting tests and discussion with consultants but excluding time spent performing procedures, treating other patients and teaching time. PAST MEDICAL HISTORY      has a past medical history of Arthritis, Asthma, Bronchitis chronic, Colon polyp, COPD (chronic obstructive pulmonary disease) ([address] Emphysema of lung ([address] Guillain-[place] ([address] Hyperlipidemia, Lock jaw, Pneumonia, Post-nasal drip, Pulmonary nodule, Rash, Reflex sympathetic dystrophy, RSD (reflex sympathetic dystrophy), and TMJ (dislocation of temporomandibular joint).      EMERGENCY DEPARTMENT COURSE and DIFFERENTIAL DIAGNOSIS/MDM:     Vitals:    Vitals:    02/09/23 1135 02/09/23 1205 02/09/23 1237 02/09/23 1320   BP: (!) 156/87 (!) 157/69 (!) 159/118 (!) 157/73   Pulse: 50 59 67 62   Resp: 16 17 27 16   Temp:    97.7 °F (36.5 °C)   TempSrc:    Oral   SpO2: 99% 98% 98% 98%   Weight:       Height:           Patient was treated with and given the following medications:  Medications   0.9 % sodium chloride bolus (0 mLs IntraVENous Stopped 2/9/23 1320)             Is this patient to be included in the SEP-1 Core Measure due to severe sepsis or septic shock? No Exclusion criteria - the patient is NOT to be included for SEP-1 Core Measure due to: 2+ SIRS criteria are not met    CC/HPI Summary, DDx, ED Course, and Reassessment:     Andrae Cates is a 69-year-old female presenting for bradycardia. The patient was scheduled to undergo lung biopsy today but found to be bradycardic therefore was sent to the ED for further evaluation. In ED, the patient reports of feeling chronic fatigue and prior history of multiple syncope, no recent fall/syncope, and notes that she felt very tired this morning. Denies heart palpitation or headache. The patient presented with HR in the high 40s. On chart review, the patient appears to be chronically bradycardic in the 40-60s in past several years. Exam was benign. CBC was normal, no anemia to suggest fatigue or prior syncope. CMP was normal and showed no electrolyte abnormalities. Trop was negative and ECG showed sinus barber. CXR was unremarkable. Consulted cardiology who recommended event monitor and discharge with OP cardiology follow-up. The patient was discharged to home in stable condition with cardiac event monitor and was advised to follow-up with cardiology.     CONSULTS: (Who and What was discussed)  Cardiology    Discussion with Other Professionals : Consultant cardiology    Social Determinants : None    Patient's care impacted by chronic condition(s): none    Records Reviewed : Inpatient Notes   and Outpatient Notes      I considered echocardiogram but did not after shared decision making with patient due to cardiology recommendation    Appropriate for outpatient management cardiology f/u    I am the Primary Clinician of Record. FINAL IMPRESSION      1. Bradycardia          DISPOSITION/PLAN     DISPOSITION Decision To Discharge 02/09/2023 11:22:11 AM      PATIENT REFERRED TO:  Cristiano Hair MD  [address]  [address] [place] [id number]  [phone]          [facility]  ED  [address] [facility] [address] [phone]    If symptoms worsen      DISCHARGE MEDICATIONS:  Patient was given scripts for the following medications. I counseled patient how to take these medications:  New Prescriptions    No medications on file       DISCONTINUED MEDICATIONS:  Discontinued Medications    No medications on file              (This chart was generated in part by using Dragon Dictation system and may contain errors related to that system including errors in grammar, punctuation, and spelling, as well as words and phrases that may be inappropriate.  If there are any questions or concerns please feel free to contact the dictating provider for clarification.)    Adelso Alfaro MD, PhD  Family Medicine, PGY-1       Adelso Alfaro MD PhD  Resident  02/09/23 5327       Adelso Alfaro MD PhD  Resident  02/09/23 5428

## 2023-02-09 NOTE — CONSULTS
1111 - called Drake at [facility] cardio  Re: Consult bradycardia  1120 - Dr Mike Ashton called back to speak with Dr Yunier Turcios

## 2023-02-13 NOTE — TELEPHONE ENCOUNTER
Nazanin with patient she states she is waiting on clearance from cardiologist before Ct biospy can be erasmo. Will f/u after her apptw with cardiologist on 3/1/23.

## 2023-03-01 ENCOUNTER — TELEPHONE (OUTPATIENT)
Dept: CARDIOLOGY CLINIC | Age: 78
End: 2023-03-01

## 2023-03-01 ENCOUNTER — OFFICE VISIT (OUTPATIENT)
Dept: CARDIOLOGY CLINIC | Age: 78
End: 2023-03-01
Payer: MEDICARE

## 2023-03-01 VITALS
HEIGHT: 65 IN | BODY MASS INDEX: 25.16 KG/M2 | SYSTOLIC BLOOD PRESSURE: 132 MMHG | WEIGHT: 151 LBS | HEART RATE: 67 BPM | OXYGEN SATURATION: 90 % | DIASTOLIC BLOOD PRESSURE: 76 MMHG

## 2023-03-01 DIAGNOSIS — Z87.898 HISTORY OF SYNCOPE: ICD-10-CM

## 2023-03-01 DIAGNOSIS — R00.1 SYMPTOMATIC BRADYCARDIA: Primary | ICD-10-CM

## 2023-03-01 DIAGNOSIS — R42 DIZZINESS: ICD-10-CM

## 2023-03-01 DIAGNOSIS — I47.29 NSVT (NONSUSTAINED VENTRICULAR TACHYCARDIA): ICD-10-CM

## 2023-03-01 PROCEDURE — 99205 OFFICE O/P NEW HI 60 MIN: CPT | Performed by: INTERNAL MEDICINE

## 2023-03-01 RX ORDER — OXYBUTYNIN CHLORIDE 5 MG/1
5 TABLET, EXTENDED RELEASE ORAL NIGHTLY
COMMUNITY
Start: 2023-02-27

## 2023-03-01 RX ORDER — GUAIFENESIN 600 MG/1
600 TABLET, EXTENDED RELEASE ORAL DAILY
COMMUNITY

## 2023-03-01 RX ORDER — ACETAMINOPHEN 500 MG
1000 TABLET ORAL NIGHTLY
COMMUNITY

## 2023-03-01 ASSESSMENT — ENCOUNTER SYMPTOMS
HEMATOCHEZIA: 0
BACK PAIN: 1
RIGHT EYE: 0
STRIDOR: 0
HEMATEMESIS: 0
SHORTNESS OF BREATH: 1
WHEEZING: 0
LEFT EYE: 0

## 2023-03-01 NOTE — PROGRESS NOTES
Assessment:     1. Symptomatic bradycardia: patient with issues of fatigue and dizziness, near-syncope and prior episodes of syncope (most recently one year ago or so). She has frequent resting bradycardia including HR in low 40's during a recent hospital visit for lung biopsy (necessitating postponement of the procedure). Many of her ECG's show sinus bradycardia. Event monitor showed brief runs of atrial and ventricular arrhythmias without anything sustained. Given above, and degree of symptoms, reasonable to consider dual chamber pacemaker for sinus node dysfunction. I had a discussion with the patient about pacemaker implantation. We discussed the risks, benefits and alternatives of this option. We discussed the possible associated risks including, but not limited to, the risks of infection, bleeding, vascular injury, injury to cardiac or surrounding structures including pneumothorax, lead or device malfunction or dislodgement, radiation exposure, injury to cardiac structures, stroke, and the small risk to life. The patient considered the proposed treatment options and decided to proceed with the device implantation. 2. Palpitations: had brief atrial runs and brief NSVT with ectopy on recent event monitor. Once pacemaker implanted, can start on pharmacologic therapy. 3. Hypertension: reasonable ovearall blood pressure control. Plan:     Discussed the risks and benefits of a dual chamber pacemaker (literature provided)   - the patient would like to proceed   Follow up with me after the procedure     Subjective:     Patient ID: Neel Main is a 76 y.o. female. Chief Complaint:  Chief Complaint   Patient presents with    New Patient    Palpitations     A few episodes in the past week  - woke her up       HPI    Patient is a pleasant 76 y.o. female who presents for evaluation of bradycardia and frequent PVC's.  The patient has a past medical history of asthma, chronic bronchitis, COPD, emphysema, guillain-barre, hyperlipidemia, lung nodule, bradycardia and frequent PVC's. On 02/09/2023 she was scheduled to undergo a biopsy of her lung nodule. Upon presentation to same day surgery she reported feeling fatigued and her heart rate was noted to be in the 40's and 50's. Patient wore a cardiac event monitor from 02/09/2023 to 02/16/2023 which demonstrated predominately SR with an average HR of 64 (). PAC burden 2.15%, PVC burden 1.26%, and possible NSVT. Office Visit ([address], 03/01/2023): She presents today to discuss bradycardia. She presents using a walker for ambulation. She reports that she feels fatigued and wears out easily. She states that she drove herself to an appointment with a neurologist for three spots on her brain. She reports that she typically has a syncopal episode after bending over. She reports she has not had a syncopal episode in over a year. She reports she has COPD and asthma. She gets shortness of breath on exertion. She states that she does have chest pain that often wakes her up at night along with palpitations. She reports that she does exercise and she takes her dog to use the restroom and walk every two hours. Patient denies current edema. She reports blood on the tissue when she blows her nose. She denies any recent issues with acute bleeding or bruising. She reports minimal caffeine usage. She denies alcohol consumption. She denies tobacco usage. Review of Systems  Review of Systems   Constitutional: Positive for malaise/fatigue. Negative for weight gain and weight loss. HENT:  Negative for nosebleeds and stridor. Eyes:  Negative for vision loss in left eye and vision loss in right eye. Cardiovascular:  Positive for chest pain, dyspnea on exertion, leg swelling, palpitations and syncope. Respiratory:  Positive for shortness of breath. Negative for wheezing. Hematologic/Lymphatic: Negative for bleeding problem.  Does not bruise/bleed easily. Skin:  Negative for itching and rash. Musculoskeletal:  Positive for back pain. Negative for joint pain and joint swelling. Gastrointestinal:  Negative for hematemesis and hematochezia. Genitourinary:  Negative for dysuria and hematuria. Neurological:  Positive for dizziness and weakness. Negative for light-headedness. Psychiatric/Behavioral:  Negative for altered mental status. The patient is not nervous/anxious.         Past Medical History:   Diagnosis Date    Arthritis     Asthma     Bronchitis chronic     Colon polyp     COPD (chronic obstructive pulmonary disease) ([facility])     Emphysema of lung (HCC)     Guillain-[place] (HCC)     Hyperlipidemia     Lock jaw     Pneumonia     Post-nasal drip     Pulmonary nodule     bi lateral    Rash     itchy, bumps    Reflex sympathetic dystrophy     RSD (reflex sympathetic dystrophy)     TMJ (dislocation of temporomandibular joint)          Social History     Socioeconomic History    Marital status:      Spouse name: Not on file    Number of children: Not on file    Years of education: Not on file    Highest education level: Not on file   Occupational History    Not on file   Tobacco Use    Smoking status: Former     Packs/day: 0.25     Years: 51.00     Pack years: 12.75     Types: Cigarettes     Quit date: 2017     Years since quittin.2    Smokeless tobacco: Never   Vaping Use    Vaping Use: Never used   Substance and Sexual Activity    Alcohol use: Not Currently    Drug use: No    Sexual activity: Not on file   Other Topics Concern    Not on file   Social History Narrative    Not on file     Social Determinants of Health     Financial Resource Strain: Not on file   Food Insecurity: Not on file   Transportation Needs: Not on file   Physical Activity: Not on file   Stress: Not on file   Social Connections: Not on file   Intimate Partner Violence: Not on file   Housing Stability: Not on file         Family History   Problem Relation Age of Onset    Diabetes Brother     Cancer Mother     Colon Cancer Mother     Asthma Neg Hx     Emphysema Neg Hx     Heart Failure Neg Hx     Hypertension Neg Hx          Objective:     /76   Pulse 67   Ht 5' 5\" (1.651 m)   Wt 151 lb (68.5 kg)   SpO2 90%   BMI 25.13 kg/m²     Physical Exam  Constitutional:       Appearance: Normal appearance. HENT:      Right Ear: External ear normal.      Left Ear: External ear normal.      Nose: Nose normal. No rhinorrhea. Eyes:      General: No scleral icterus. Conjunctiva/sclera: Conjunctivae normal.   Cardiovascular:      Rate and Rhythm: Normal rate and regular rhythm. Pulmonary:      Effort: Pulmonary effort is normal.      Breath sounds: Normal breath sounds. Abdominal:      General: There is no distension. Tenderness: There is no abdominal tenderness. Musculoskeletal:         General: No swelling or deformity. Cervical back: Normal range of motion and neck supple. Skin:     General: Skin is warm and dry. Neurological:      General: No focal deficit present. Mental Status: She is alert and oriented to person, place, and time. Psychiatric:         Mood and Affect: Mood normal.         Behavior: Behavior normal.         ECG Interpretation:  (Date: 02/09/2023)  Rhythm: Sinus Bradycardia  Rate: 50 BPM  PAC's / PVC's present: None  Conduction abnormalities: Normal AV conduction    Echocardiogram - [facility]  (Date: 05/02/2019)  Summary:   Overall left ventricular ejection fraction is estimated to be 55-60%. The left ventricular wall motion is normal.   There is trace mitral regurgitation.      Stress Test   N/A      Current Medications     Current Outpatient Medications   Medication Sig Dispense Refill    guaiFENesin (MUCINEX) 600 MG extended release tablet Take 600 mg by mouth daily      acetaminophen (TYLENOL) 500 MG tablet Take 1,000 mg by mouth at bedtime      oxybutynin (DITROPAN-XL) 5 MG extended release tablet Take 5 mg by mouth at bedtime      fluticasone (FLONASE) 50 MCG/ACT nasal spray SQUIRT 1 SPRAY IN EACH NOSTRIL TWICE DAILY 16 g 5    ADVAIR DISKUS 250-50 MCG/ACT AEPB diskus inhaler Inhale 1 puff into the lungs in the morning and 1 puff in the evening. 180 each 3    montelukast (SINGULAIR) 10 MG tablet TAKE 1 TABLET BY MOUTH EACH NIGHT AT BEDTIME 30 tablet 5    ipratropium-albuterol (DUONEB) 0.5-2.5 (3) MG/3ML SOLN nebulizer solution Inhale 3 mLs into the lungs every 6 hours as needed for Shortness of Breath DX Asthma J45.50 360 mL 3    albuterol sulfate HFA (VENTOLIN HFA) 108 (90 Base) MCG/ACT inhaler Inhale 2 puffs into the lungs every 6 hours as needed for Wheezing or Shortness of Breath 18 g 5    hydrOXYzine (ATARAX) 25 MG tablet       azelastine (ASTELIN) 0.1 % nasal spray 2 sprays by Nasal route 2 times daily 2 Spray in each nostril 3 Bottle 1    Multiple Vitamins-Minerals (CENTRUM ADULTS PO) Take by mouth      pravastatin (PRAVACHOL) 40 MG tablet Take 1 tablet by mouth daily.       DiphenhydrAMINE HCl (BENADRYL PO) Take 2 tablets by mouth nightly      aspirin-acetaminophen-caffeine (EXCEDRIN MIGRAINE) 250-250-65 MG per tablet Take 2 tablets by mouth nightly (Patient not taking: Reported on 3/1/2023)      alendronate (FOSAMAX) 70 MG tablet Take 70 mg by mouth every 7 days (Patient not taking: Reported on 3/1/2023)      nystatin (MYCOSTATIN) 438944 UNIT/ML suspension Take 5 mLs by mouth 4 times daily Retain in mouth as long as possible (Patient not taking: Reported on 3/1/2023) 240 mL 0    Probiotic Product (PROBIOTIC-10 PO) Take 1 capsule by mouth daily (Patient not taking: Reported on 3/1/2023)      lisinopril (PRINIVIL;ZESTRIL) 10 MG tablet Take 10 mg by mouth daily (Patient not taking: Reported on 3/1/2023)      Cholecalciferol (VITAMIN D3 PO) Take by mouth (Patient not taking: Reported on 3/1/2023)      gabapentin (NEURONTIN) 300 MG capsule  (Patient not taking: Reported on 3/1/2023)      fexofenadine (ALLEGRA) 180 MG tablet Take 180 mg by mouth daily (Patient not taking: No sig reported)      citalopram (CELEXA) 20 MG tablet Take 20 mg by mouth (Patient not taking: Reported on 3/1/2023)       No current facility-administered medications for this visit. Lab Review     Lab Results   Component Value Date/Time     02/09/2023 09:25 AM    K 4.0 02/09/2023 09:25 AM     02/09/2023 09:25 AM    CO2 25 02/09/2023 09:25 AM    BUN 6 02/09/2023 09:25 AM    CREATININE <0.5 02/09/2023 09:25 AM    GLUCOSE 95 02/09/2023 09:25 AM    CALCIUM 10.0 02/09/2023 09:25 AM        Lab Results   Component Value Date    WBC 5.2 02/09/2023    HGB 13.9 02/09/2023    HCT 41.1 02/09/2023    MCV 95.8 02/09/2023     02/09/2023       Lab Results   Component Value Date    TSHFT4 1.70 02/09/2023    TSH 1.79 11/23/2011       No results found for: BNP    I, Deana Torres RN, am scribing for and in the presence of Dr. Ricarda Keith.  03/01/23 2:18 PM   Deana Torres RN

## 2023-03-01 NOTE — PATIENT INSTRUCTIONS
Plan:     Discussed the risks and benefits of a dual chamber pacemaker (literature provided)   - the patient would like to proceed   Follow up with me after the procedure

## 2023-03-07 DIAGNOSIS — R00.1 BRADYCARDIA: ICD-10-CM

## 2023-03-08 ENCOUNTER — ANESTHESIA EVENT (OUTPATIENT)
Dept: CARDIAC CATH/INVASIVE PROCEDURES | Age: 78
DRG: 244 | End: 2023-03-08
Payer: MEDICARE

## 2023-03-10 DIAGNOSIS — R00.1 BRADYCARDIA: Primary | ICD-10-CM

## 2023-03-14 ENCOUNTER — HOSPITAL ENCOUNTER (INPATIENT)
Dept: CARDIAC CATH/INVASIVE PROCEDURES | Age: 78
LOS: 1 days | Discharge: HOME HEALTH | DRG: 243 | End: 2023-03-15
Attending: INTERNAL MEDICINE | Admitting: INTERNAL MEDICINE
Payer: MEDICARE

## 2023-03-14 ENCOUNTER — APPOINTMENT (OUTPATIENT)
Dept: GENERAL RADIOLOGY | Age: 78
DRG: 243 | End: 2023-03-14
Attending: INTERNAL MEDICINE
Payer: MEDICARE

## 2023-03-14 ENCOUNTER — ANESTHESIA (OUTPATIENT)
Dept: CARDIAC CATH/INVASIVE PROCEDURES | Age: 78
DRG: 244 | End: 2023-03-14
Payer: MEDICARE

## 2023-03-14 ENCOUNTER — NURSE ONLY (OUTPATIENT)
Dept: CARDIOLOGY CLINIC | Age: 78
End: 2023-03-14

## 2023-03-14 PROBLEM — I49.5 SINUS NODE DYSFUNCTION (HCC): Status: ACTIVE | Noted: 2023-03-14

## 2023-03-14 PROBLEM — Z95.0 STATUS POST PLACEMENT OF CARDIAC PACEMAKER: Status: ACTIVE | Noted: 2023-03-14

## 2023-03-14 LAB
ANION GAP SERPL CALCULATED.3IONS-SCNC: 10 MMOL/L (ref 3–16)
BUN SERPL-MCNC: 5 MG/DL (ref 7–20)
CALCIUM SERPL-MCNC: 9.6 MG/DL (ref 8.3–10.6)
CHLORIDE SERPL-SCNC: 103 MMOL/L (ref 99–110)
CO2 SERPL-SCNC: 27 MMOL/L (ref 21–32)
CREAT SERPL-MCNC: <0.5 MG/DL (ref 0.6–1.2)
DEPRECATED RDW RBC AUTO: 13.1 % (ref 12.4–15.4)
EKG ATRIAL RATE: 50 BPM
EKG ATRIAL RATE: 60 BPM
EKG DIAGNOSIS: NORMAL
EKG DIAGNOSIS: NORMAL
EKG P AXIS: 18 DEGREES
EKG P-R INTERVAL: 172 MS
EKG P-R INTERVAL: 190 MS
EKG Q-T INTERVAL: 462 MS
EKG Q-T INTERVAL: 472 MS
EKG QRS DURATION: 74 MS
EKG QRS DURATION: 78 MS
EKG QTC CALCULATION (BAZETT): 421 MS
EKG QTC CALCULATION (BAZETT): 472 MS
EKG R AXIS: -25 DEGREES
EKG R AXIS: -8 DEGREES
EKG T AXIS: 20 DEGREES
EKG T AXIS: 72 DEGREES
EKG VENTRICULAR RATE: 50 BPM
EKG VENTRICULAR RATE: 60 BPM
GFR SERPLBLD CREATININE-BSD FMLA CKD-EPI: >60 ML/MIN/{1.73_M2}
GLUCOSE SERPL-MCNC: 99 MG/DL (ref 70–99)
HCT VFR BLD AUTO: 40.5 % (ref 36–48)
HGB BLD-MCNC: 13.5 G/DL (ref 12–16)
MCH RBC QN AUTO: 32.1 PG (ref 26–34)
MCHC RBC AUTO-ENTMCNC: 33.4 G/DL (ref 31–36)
MCV RBC AUTO: 96.1 FL (ref 80–100)
PLATELET # BLD AUTO: 212 K/UL (ref 135–450)
PMV BLD AUTO: 10.2 FL (ref 5–10.5)
POTASSIUM SERPL-SCNC: 4.2 MMOL/L (ref 3.5–5.1)
RBC # BLD AUTO: 4.21 M/UL (ref 4–5.2)
SODIUM SERPL-SCNC: 140 MMOL/L (ref 136–145)
WBC # BLD AUTO: 6.6 K/UL (ref 4–11)

## 2023-03-14 PROCEDURE — 2709999900 HC NON-CHARGEABLE SUPPLY

## 2023-03-14 PROCEDURE — 80048 BASIC METABOLIC PNL TOTAL CA: CPT

## 2023-03-14 PROCEDURE — C1898 LEAD, PMKR, OTHER THAN TRANS: HCPCS

## 2023-03-14 PROCEDURE — 2500000003 HC RX 250 WO HCPCS

## 2023-03-14 PROCEDURE — 02HK3JZ INSERTION OF PACEMAKER LEAD INTO RIGHT VENTRICLE, PERCUTANEOUS APPROACH: ICD-10-PCS | Performed by: INTERNAL MEDICINE

## 2023-03-14 PROCEDURE — 2500000003 HC RX 250 WO HCPCS: Performed by: ANESTHESIOLOGY

## 2023-03-14 PROCEDURE — 3700000001 HC ADD 15 MINUTES (ANESTHESIA)

## 2023-03-14 PROCEDURE — 02H63JZ INSERTION OF PACEMAKER LEAD INTO RIGHT ATRIUM, PERCUTANEOUS APPROACH: ICD-10-PCS | Performed by: INTERNAL MEDICINE

## 2023-03-14 PROCEDURE — 2060000000 HC ICU INTERMEDIATE R&B

## 2023-03-14 PROCEDURE — 71045 X-RAY EXAM CHEST 1 VIEW: CPT

## 2023-03-14 PROCEDURE — 6360000002 HC RX W HCPCS

## 2023-03-14 PROCEDURE — C1785 PMKR, DUAL, RATE-RESP: HCPCS

## 2023-03-14 PROCEDURE — 2580000003 HC RX 258: Performed by: INTERNAL MEDICINE

## 2023-03-14 PROCEDURE — 93005 ELECTROCARDIOGRAM TRACING: CPT | Performed by: INTERNAL MEDICINE

## 2023-03-14 PROCEDURE — 0JH606Z INSERTION OF PACEMAKER, DUAL CHAMBER INTO CHEST SUBCUTANEOUS TISSUE AND FASCIA, OPEN APPROACH: ICD-10-PCS | Performed by: INTERNAL MEDICINE

## 2023-03-14 PROCEDURE — 3700000000 HC ANESTHESIA ATTENDED CARE

## 2023-03-14 PROCEDURE — 93010 ELECTROCARDIOGRAM REPORT: CPT | Performed by: INTERNAL MEDICINE

## 2023-03-14 PROCEDURE — 85027 COMPLETE CBC AUTOMATED: CPT

## 2023-03-14 PROCEDURE — 6370000000 HC RX 637 (ALT 250 FOR IP): Performed by: INTERNAL MEDICINE

## 2023-03-14 PROCEDURE — 33208 INSRT HEART PM ATRIAL & VENT: CPT

## 2023-03-14 PROCEDURE — 94640 AIRWAY INHALATION TREATMENT: CPT

## 2023-03-14 PROCEDURE — C1892 INTRO/SHEATH,FIXED,PEEL-AWAY: HCPCS

## 2023-03-14 PROCEDURE — 33208 INSRT HEART PM ATRIAL & VENT: CPT | Performed by: INTERNAL MEDICINE

## 2023-03-14 PROCEDURE — 2580000003 HC RX 258

## 2023-03-14 RX ORDER — SODIUM CHLORIDE 0.9 % (FLUSH) 0.9 %
5-40 SYRINGE (ML) INJECTION PRN
Status: DISCONTINUED | OUTPATIENT
Start: 2023-03-14 | End: 2023-03-14

## 2023-03-14 RX ORDER — SODIUM CHLORIDE, SODIUM LACTATE, POTASSIUM CHLORIDE, CALCIUM CHLORIDE 600; 310; 30; 20 MG/100ML; MG/100ML; MG/100ML; MG/100ML
INJECTION, SOLUTION INTRAVENOUS CONTINUOUS
Status: DISCONTINUED | OUTPATIENT
Start: 2023-03-14 | End: 2023-03-15 | Stop reason: HOSPADM

## 2023-03-14 RX ORDER — IPRATROPIUM BROMIDE AND ALBUTEROL SULFATE 2.5; .5 MG/3ML; MG/3ML
1 SOLUTION RESPIRATORY (INHALATION) ONCE
Status: COMPLETED | OUTPATIENT
Start: 2023-03-14 | End: 2023-03-14

## 2023-03-14 RX ORDER — SODIUM CHLORIDE 9 MG/ML
INJECTION, SOLUTION INTRAVENOUS PRN
Status: DISCONTINUED | OUTPATIENT
Start: 2023-03-14 | End: 2023-03-15 | Stop reason: HOSPADM

## 2023-03-14 RX ORDER — MONTELUKAST SODIUM 10 MG/1
10 TABLET ORAL NIGHTLY
Status: DISCONTINUED | OUTPATIENT
Start: 2023-03-14 | End: 2023-03-15 | Stop reason: HOSPADM

## 2023-03-14 RX ORDER — SODIUM CHLORIDE 0.9 % (FLUSH) 0.9 %
5-40 SYRINGE (ML) INJECTION EVERY 12 HOURS SCHEDULED
Status: DISCONTINUED | OUTPATIENT
Start: 2023-03-14 | End: 2023-03-14

## 2023-03-14 RX ORDER — ONDANSETRON 2 MG/ML
4 INJECTION INTRAMUSCULAR; INTRAVENOUS
Status: DISCONTINUED | OUTPATIENT
Start: 2023-03-14 | End: 2023-03-14

## 2023-03-14 RX ORDER — FAMOTIDINE 10 MG/ML
20 INJECTION, SOLUTION INTRAVENOUS ONCE
Status: COMPLETED | OUTPATIENT
Start: 2023-03-14 | End: 2023-03-14

## 2023-03-14 RX ORDER — SODIUM CHLORIDE 0.9 % (FLUSH) 0.9 %
5-40 SYRINGE (ML) INJECTION PRN
Status: DISCONTINUED | OUTPATIENT
Start: 2023-03-14 | End: 2023-03-15 | Stop reason: HOSPADM

## 2023-03-14 RX ORDER — SODIUM CHLORIDE 0.9 % (FLUSH) 0.9 %
5-40 SYRINGE (ML) INJECTION EVERY 12 HOURS SCHEDULED
Status: DISCONTINUED | OUTPATIENT
Start: 2023-03-14 | End: 2023-03-15 | Stop reason: HOSPADM

## 2023-03-14 RX ORDER — LIDOCAINE HYDROCHLORIDE 20 MG/ML
INJECTION, SOLUTION INFILTRATION; PERINEURAL PRN
Status: DISCONTINUED | OUTPATIENT
Start: 2023-03-14 | End: 2023-03-14 | Stop reason: SDUPTHER

## 2023-03-14 RX ORDER — AZELASTINE 1 MG/ML
2 SPRAY, METERED NASAL 2 TIMES DAILY
Status: DISCONTINUED | OUTPATIENT
Start: 2023-03-14 | End: 2023-03-15 | Stop reason: HOSPADM

## 2023-03-14 RX ORDER — SODIUM CHLORIDE 9 MG/ML
INJECTION, SOLUTION INTRAVENOUS PRN
Status: DISCONTINUED | OUTPATIENT
Start: 2023-03-14 | End: 2023-03-14

## 2023-03-14 RX ORDER — PHENYLEPHRINE HCL IN 0.9% NACL 1 MG/10 ML
SYRINGE (ML) INTRAVENOUS PRN
Status: DISCONTINUED | OUTPATIENT
Start: 2023-03-14 | End: 2023-03-14 | Stop reason: SDUPTHER

## 2023-03-14 RX ORDER — GUAIFENESIN 600 MG/1
600 TABLET, EXTENDED RELEASE ORAL DAILY
Status: DISCONTINUED | OUTPATIENT
Start: 2023-03-14 | End: 2023-03-15 | Stop reason: HOSPADM

## 2023-03-14 RX ORDER — MIDAZOLAM HYDROCHLORIDE 1 MG/ML
2 INJECTION INTRAMUSCULAR; INTRAVENOUS
Status: DISCONTINUED | OUTPATIENT
Start: 2023-03-14 | End: 2023-03-15 | Stop reason: HOSPADM

## 2023-03-14 RX ORDER — OXYBUTYNIN CHLORIDE 5 MG/1
5 TABLET, EXTENDED RELEASE ORAL NIGHTLY
Status: DISCONTINUED | OUTPATIENT
Start: 2023-03-14 | End: 2023-03-15 | Stop reason: HOSPADM

## 2023-03-14 RX ORDER — LIDOCAINE HYDROCHLORIDE 10 MG/ML
1 INJECTION, SOLUTION EPIDURAL; INFILTRATION; INTRACAUDAL; PERINEURAL
Status: DISCONTINUED | OUTPATIENT
Start: 2023-03-14 | End: 2023-03-15 | Stop reason: HOSPADM

## 2023-03-14 RX ORDER — ONDANSETRON 2 MG/ML
4 INJECTION INTRAMUSCULAR; INTRAVENOUS EVERY 6 HOURS PRN
Status: DISCONTINUED | OUTPATIENT
Start: 2023-03-14 | End: 2023-03-15 | Stop reason: HOSPADM

## 2023-03-14 RX ORDER — FLUTICASONE PROPIONATE 50 MCG
1 SPRAY, SUSPENSION (ML) NASAL DAILY
Status: DISCONTINUED | OUTPATIENT
Start: 2023-03-14 | End: 2023-03-15 | Stop reason: HOSPADM

## 2023-03-14 RX ORDER — PRAVASTATIN SODIUM 40 MG
40 TABLET ORAL DAILY
Status: DISCONTINUED | OUTPATIENT
Start: 2023-03-14 | End: 2023-03-15 | Stop reason: HOSPADM

## 2023-03-14 RX ORDER — PROPOFOL 10 MG/ML
INJECTION, EMULSION INTRAVENOUS PRN
Status: DISCONTINUED | OUTPATIENT
Start: 2023-03-14 | End: 2023-03-14 | Stop reason: SDUPTHER

## 2023-03-14 RX ORDER — ACETAMINOPHEN 500 MG
1000 TABLET ORAL NIGHTLY
Status: DISCONTINUED | OUTPATIENT
Start: 2023-03-14 | End: 2023-03-15 | Stop reason: HOSPADM

## 2023-03-14 RX ORDER — LORAZEPAM 0.5 MG/1
0.5 TABLET ORAL
Status: ACTIVE | OUTPATIENT
Start: 2023-03-14 | End: 2023-03-15

## 2023-03-14 RX ADMIN — Medication 100 MCG: at 15:32

## 2023-03-14 RX ADMIN — LIDOCAINE HYDROCHLORIDE 50 MG: 20 INJECTION, SOLUTION INFILTRATION; PERINEURAL at 14:33

## 2023-03-14 RX ADMIN — Medication 50 MCG: at 15:17

## 2023-03-14 RX ADMIN — Medication 100 MCG: at 15:39

## 2023-03-14 RX ADMIN — PROPOFOL 30 MG: 10 INJECTION, EMULSION INTRAVENOUS at 14:33

## 2023-03-14 RX ADMIN — PRAVASTATIN SODIUM 40 MG: 40 TABLET ORAL at 23:23

## 2023-03-14 RX ADMIN — PROPOFOL 100 MCG/KG/MIN: 10 INJECTION, EMULSION INTRAVENOUS at 14:34

## 2023-03-14 RX ADMIN — Medication 10 ML: at 23:25

## 2023-03-14 RX ADMIN — Medication 100 MCG: at 15:22

## 2023-03-14 RX ADMIN — IPRATROPIUM BROMIDE AND ALBUTEROL SULFATE 1 AMPULE: 2.5; .5 SOLUTION RESPIRATORY (INHALATION) at 11:31

## 2023-03-14 RX ADMIN — GUAIFENESIN 600 MG: 600 TABLET, EXTENDED RELEASE ORAL at 23:24

## 2023-03-14 RX ADMIN — ACETAMINOPHEN 1000 MG: 500 TABLET ORAL at 23:30

## 2023-03-14 RX ADMIN — MONTELUKAST 10 MG: 10 TABLET, FILM COATED ORAL at 23:23

## 2023-03-14 RX ADMIN — OXYBUTYNIN CHLORIDE 5 MG: 5 TABLET, EXTENDED RELEASE ORAL at 23:23

## 2023-03-14 RX ADMIN — DEXMEDETOMIDINE HYDROCHLORIDE 8 MCG: 100 INJECTION, SOLUTION INTRAVENOUS at 14:45

## 2023-03-14 RX ADMIN — Medication 100 MCG: at 15:27

## 2023-03-14 RX ADMIN — DEXMEDETOMIDINE HYDROCHLORIDE 12 MCG: 100 INJECTION, SOLUTION INTRAVENOUS at 14:40

## 2023-03-14 RX ADMIN — FAMOTIDINE 20 MG: 10 INJECTION, SOLUTION INTRAVENOUS at 23:24

## 2023-03-14 ASSESSMENT — PAIN DESCRIPTION - DESCRIPTORS: DESCRIPTORS: ACHING

## 2023-03-14 ASSESSMENT — PAIN DESCRIPTION - ORIENTATION: ORIENTATION: LEFT

## 2023-03-14 ASSESSMENT — PAIN DESCRIPTION - LOCATION: LOCATION: INCISION

## 2023-03-14 ASSESSMENT — PAIN SCALES - GENERAL: PAINLEVEL_OUTOF10: 3

## 2023-03-14 ASSESSMENT — PAIN - FUNCTIONAL ASSESSMENT: PAIN_FUNCTIONAL_ASSESSMENT: ACTIVITIES ARE NOT PREVENTED

## 2023-03-14 NOTE — ANESTHESIA POSTPROCEDURE EVALUATION
Department of Anesthesiology  Postprocedure Note    Patient: Oksana [place]  MRN: [id number]  YOB: 1945  Date of evaluation: 3/14/2023      Procedure Summary     Date: 03/14/23 Room / Location: [facility] Cardiac Cath Lab    Anesthesia Start: 0617 Anesthesia Stop: 1555    Procedure: PACEMAKER Diagnosis:       Sick sinus syndrome      Sinus node dysfunction ([address]      Status post placement of cardiac pacemaker      Sick sinus syndrome    Scheduled Providers:  Responsible Provider: Mery Addison MD    Anesthesia Type: MAC ASA Status: 4          Anesthesia Type: No value filed.     Jessy Phase I:      Jessy Phase II:      Vitals:    03/14/23 1032 03/14/23 1131   Pulse:  56   Resp:  16   SpO2:  95%   Weight: 153 lb 6.4 oz (69.6 kg)    Height: 5' 5\" (1.651 m)      Anesthesia Post Evaluation    Patient location during evaluation: bedside  Patient participation: complete - patient participated  Level of consciousness: awake and alert  Airway patency: patent  Nausea & Vomiting: no nausea  Complications: no  Cardiovascular status: hemodynamically stable  Respiratory status: acceptable  Hydration status: euvolemic

## 2023-03-14 NOTE — FLOWSHEET NOTE
03/14/23 1945   Vital Signs   Temp 97.6 °F (36.4 °C)   Temp Source Oral   Heart Rate 64   Heart Rate Source Monitor   Resp 16   BP (!) 150/67   MAP (Calculated) 95   BP Location Right upper arm   BP Method Automatic   Patient Position Semi fowlers   Level of Consciousness 0   MEWS Score 1   Pain Assessment   Pain Assessment 0-10   Pain Level 3   Patient's Stated Pain Goal 0 - No pain   Pain Location Incision   Pain Orientation Left   Pain Descriptors Aching   Functional Pain Assessment Activities are not prevented   Opioid-Induced Sedation   POSS Score 1   RASS   [place] Agitation Sedation Scale (RASS) 0   Oxygen Therapy   SpO2 96 %   Pulse Oximetry Type Intermittent   Pulse Oximeter Device Mode Intermittent   Pulse Oximeter Device Location Right;Finger   O2 Device None (Room air)   Skin Assessment Clean, dry, & intact   Height and Weight   Height 5' 5\" (1.651 m)   Weight 155 lb 6.8 oz (70.5 kg)   Weight Method Bedside scale   BSA (Calculated - sq m) 1.8 sq meters   BMI (Calculated) 25.9   Patient Observation   Observations admission   Patient A/O VSS no distress noted, all needs addressed

## 2023-03-14 NOTE — PROCEDURES
PROCEDURE PERFORMED:     1. Implantation of a dual-chamber pacemaker (PPM)    Event Time In   In [address]   In Preprocedure    Physician Available    Anesthesia Available    Out of Preprocedure    Back to Preprocedure    Preprocedure Complete    In Holding Area    Out of Holding Area    Anesthesia Start 1425   Perfusion Start    Setup Start    Setup Complete    In Room    Induction 1433   Anesthesia Ready    Procedure Start    Procedure Closing    Procedure Finish    Out of Room    In PACU    Perfusion Stop    Anesthesia Stop 8271   PACU Care Complete    Out of PACU    Return to PACU    Out of PACU (2nd Time)    In Phase II    Phase II Care Complete    Out of Phase II    Return to Phase II    Out of Phase II (2nd Time)    Returned to OR    End of Periop Care    Epidural to         Surgeon: Candance Hose, MD    Complications: None    Estimated blood loss: less than 20 ml    Anesthesia: MAC    ANTIBIOTIC GIVEN:  Vancomycin 1 g IV. History/Indication: 76 y.o. female  with history of symptomatic bradycardia presenting for dual chamber pacemaker implant. DETAILS OF PROCEDURE: The patient was brought to the electrophysiology laboratory in stable condition. The patient was in a fasting, nonsedated state. The risks, benefits and alternatives of the procedure were discussed with the patient. The risks including, but not limited to, the risks of infection, bleeding, vascular injury, injury to cardiac or surrounding structures including pneumothorax, lead or device malfunction or dislodgement, radiation exposure, injury to cardiac structures, stroke, myocardial infarction and minimal risk to life were all discussed. The patient considered his treatment options and decided to proceed with the pacemaker implantation. A venogram of the left upper extremity was performed to confirm patency of the left subclavian vein. The patient was prepped and draped in a sterile fashion.  An approximately 4-cm incision was made in the left pectoral area after administration of lidocaine/bupivicaine mixture. Using electrocautery and blunt dissection, a pocket was created. Central venous access into the left subclavian vein was obtained using the modified Seldinger technique. After central venous access was obtained, a sheath was placed in the left subclavian vein. A right ventricular lead was advanced into position in the apical septum under fluoroscopic guidance and using a series of curved stylets. The lead was actively fixated. After confirming appropriate function, the sheath was split and removed. The lead was secured to the underlying tissue. A new sheath was advanced over a second previously placed wire in the left subclavian vein. The atrial lead was advanced to the right atrial appendage and passively fixated under fluoroscopic guidance. After confirming appropriate function, the sheath was split and removed. The lead was secured to the underlying tissue. An anchoring suture was placed within the pocket to prevent excess pulse generator movement. The pocket was irrigated with a large volume irrigation solution. The leads were then connected to the new pulse generator and placed into the cleaned pocket. Final parameters were obtained and are detailed below. The pocket was closed in three successive layers using 2-0 and 4-0 Vicryl suture material. Steri-Strips and a pressure dressing were applied. The patient was transported to the holding area in stable condition. IMPLANTED MATERIALS:     1. PPM generator. Device name is [employer].  is [employer]. Model number 8 DR-T. Serial number [id number]. Implant date of 3/14/2023 in the left pectoral area. 2. Right atrial lead. Model number Solia JT 45 cm. Serial number [id number].  is [employer]. Implant date of 3/14/2023 in the right atrial appendage. 3. Right ventricular lead. Model number Solia S 53 cm. Serial number [id number].   is [employer]. Implant date of 3/14/2023 in the right ventricular septum. BASELINE PARAMETERS:     1. Right atrial lead: Sensing of the P wave was in the range of 3.1 millivolts. Pacing impedance was 721 Ohms. Capture threshold was at 0.9 volts at 0.4 milliseconds. 2. Right ventricular lead: Sensing of the R wave was in the range of 8.7 millivolts. Pacing impedance was 760 Ohms. Capture threshold was at 0.8 volts at 0.4 milliseconds. Programmed Parameters:  Mode: DDDR, rate 60 to 130      SUMMARY: Successful implantation of a dual-chamber PPM.    RECOMMENDATIONS:   1. Bed rest x 4 hours. 2. left arm in sling for 24 hours. 3. Dressing in place for 24 hours. 4. Pain control. 5. Vital signs per protocol. 6. Portable chest x-ray to rule out pneumothorax. 7. ECG upon arrival on floor and daily. 8. Monitor on telemetry. 9. Avoid use of subcutaneous heparin and LMWH to minimize the risk of pocket hematomas. 10. Follow-up in the electrophysiology clinic in 1 to 2 weeks for wound and device evaluation.

## 2023-03-14 NOTE — DISCHARGE INSTRUCTIONS
FOLLOW-UP APPOINTMENTS    [facility] OFFICE - Appointment on March 22 at 1:30PM for device check, Ricky Carrol. [facility],  [facility], [address] [facility] [address], [address] Office #: [phone]. If you are unable to make this appointment, please call to reschedule. [facility] OFFICE - Appointment on *** at *** with ***, Ricky 81. [facility],  [facility], [address] [facility] [address], [address] [facility], [address] [place] [address]. Office #: [phone]. If you are unable to make this appointment, please call to reschedule. Directions to [place] [address] towards [place]. [address] [facility] exit. Right off exit. Cross over [employer]. Right on [address] Left into hospital. Follow the signs to the emergency room ( turn left toward the Emergency room). Go right at the first stop sign. Just past the Emergency room at the second stop sign turn right and go up the ramp and park on the top level if possible. Go in the glass doors of the [facility] on the top level of the garage [address]. As soon as you get in the door turn left and our office is the one with the glass doors. Pacemaker/ICD Discharge Instructions   You have had a pacemaker/ defibrillator implanted (or inserted) into your body. This small electronic device regulates your hearts electrical system, which helps your heart beat at the right pace. You can probably do almost everything you did before you got your device. See your doctor regularly to help make sure that you stay healthy. 1. Keep the dressing dry and intact over the incision site until the day following the procedure. You may take a tub bath, but do not get the dressing wet. 2. On the third day after the procedure remove the outside dressing and leave the incision open to air. Steri-strips should remain on the incision until the follow-up visit with your physician. Continue to keep this area dry.  If you have the white fabric fabric bandage, you may leave this in place. 3. Take your pain medication as prescribed and directed by your physician. 4. Bruising and a small amount of swelling is to be expected. Please call [facility] at  [phone] [facility])  to report any unusual amount of drainage, odor, or fever over 100 degrees Farenheit. 5. Call during office hours (Monday through Friday 7:30-5:00) to scheduling office at [phone] [facility])  to arrange your follow-up visit. The appointment should occur 7-10 days following the procedure. Generally, you will be seen in the Pacemaker clinic during this visit. Activity:   * Avoid lifting objects heavier than 10 pounds for one month on the side of the device. * Do not raise the arm on the side the device was implanted over your head for one month. * No driving for one week until initial visit after your procedure. * Sling and swathe to affected arm for at least 24 hours. These rules help to heal the implant site and stabilize the heart lead wires. Wound Care:   * Do not shower for seven days. Do not submerge your incision in any water for seven days (i.e. Tub bath, swimming pool). * Keep the incision dry and clean. * Remove the clear plastic dressing no later than three days after your surgery. Do not remove the thin pieces of tape. They will be removed at the office visit. If they fall off naturally do not reapply. * Avoid tight clothes over the incision. Do not rub or twist the pacer/ICD. Carry your card:   * Carry your temporary pacer/ICD card with you until your permanent card arrives in four to six weeks. An ID card should always be with you. Other precautions:   * Notify your doctor, dentist, or any other healthcare provider that you have a pacemaker or ICD before you receive any treatment. * Carry an ID card that contains information about your pacemaker. You may need to show this card to security personnel if your pacemaker/ICD sets off a metal detector. * Keep your cellular phone away from your pacemaker/ICD. Dont carry the phone in your shirt pocket, even when its turned off. * Avoid strong magnets, such as those used in an MRI or in hand-held security wands. Unless your pacemaker is compatible with MRI   * Avoid strong electrical fields such as those made by radio transmitting towers, ham radios, and heavy-duty electrical equipment. * Avoid leaning over the open de souza of a running car. A running engine creates an electrical field. What's Ok:   * [employer] and other appliances that are in good repair. * Computers, hair dryers, power tools, radios, televisions, stereos, electric blankets, vacuum , heating pads, and cars are all okay to use. Call your doctor if you have:   * Increase swelling and/or tenderness in incision. * Redness of incision or drainage from incision. * If a suture works its way through the incision. * Fever, unexplained temperature greater than 100 degrees. * If you receive a shock or hear a tone from the device call the Ricky 81 numbers listed above to schedule an appointment in the device clinic. * Heart failure: Unusual swelling of lower legs/feet, chest discomfort, unusual weakness or fatigue.

## 2023-03-14 NOTE — H&P
I have reviewed seen, interviewed and examined the patient. There has been no change in the findings since our last office visit dated 3/1/2023.
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
## 2023-03-15 ENCOUNTER — PROCEDURE VISIT (OUTPATIENT)
Dept: CARDIOLOGY CLINIC | Age: 78
End: 2023-03-15
Payer: MEDICARE

## 2023-03-15 ENCOUNTER — APPOINTMENT (OUTPATIENT)
Dept: GENERAL RADIOLOGY | Age: 78
DRG: 243 | End: 2023-03-15
Attending: INTERNAL MEDICINE
Payer: MEDICARE

## 2023-03-15 VITALS
OXYGEN SATURATION: 96 % | SYSTOLIC BLOOD PRESSURE: 138 MMHG | DIASTOLIC BLOOD PRESSURE: 88 MMHG | HEART RATE: 69 BPM | WEIGHT: 155.42 LBS | HEIGHT: 65 IN | BODY MASS INDEX: 25.9 KG/M2 | RESPIRATION RATE: 18 BRPM | TEMPERATURE: 97.6 F

## 2023-03-15 DIAGNOSIS — Z95.0 PACEMAKER: ICD-10-CM

## 2023-03-15 DIAGNOSIS — Z95.0 PACEMAKER: Primary | ICD-10-CM

## 2023-03-15 DIAGNOSIS — I49.5 SINUS NODE DYSFUNCTION (HCC): Primary | ICD-10-CM

## 2023-03-15 PROCEDURE — 6360000002 HC RX W HCPCS: Performed by: INTERNAL MEDICINE

## 2023-03-15 PROCEDURE — 71046 X-RAY EXAM CHEST 2 VIEWS: CPT

## 2023-03-15 PROCEDURE — 2580000003 HC RX 258: Performed by: ANESTHESIOLOGY

## 2023-03-15 PROCEDURE — 6370000000 HC RX 637 (ALT 250 FOR IP): Performed by: INTERNAL MEDICINE

## 2023-03-15 PROCEDURE — 99239 HOSP IP/OBS DSCHRG MGMT >30: CPT

## 2023-03-15 PROCEDURE — 93280 PM DEVICE PROGR EVAL DUAL: CPT | Performed by: INTERNAL MEDICINE

## 2023-03-15 RX ORDER — KETOROLAC TROMETHAMINE 30 MG/ML
15 INJECTION, SOLUTION INTRAMUSCULAR; INTRAVENOUS EVERY 6 HOURS PRN
Status: DISCONTINUED | OUTPATIENT
Start: 2023-03-15 | End: 2023-03-15 | Stop reason: HOSPADM

## 2023-03-15 RX ADMIN — AZELASTINE 2 SPRAY: 1 SPRAY, METERED NASAL at 13:19

## 2023-03-15 RX ADMIN — Medication 10 ML: at 10:03

## 2023-03-15 RX ADMIN — FLUTICASONE PROPIONATE 1 SPRAY: 50 SPRAY, METERED NASAL at 10:03

## 2023-03-15 RX ADMIN — KETOROLAC TROMETHAMINE 15 MG: 30 INJECTION, SOLUTION INTRAMUSCULAR; INTRAVENOUS at 10:02

## 2023-03-15 ASSESSMENT — PAIN SCALES - GENERAL
PAINLEVEL_OUTOF10: 6
PAINLEVEL_OUTOF10: 5

## 2023-03-15 ASSESSMENT — PAIN DESCRIPTION - ORIENTATION
ORIENTATION: LEFT
ORIENTATION: LEFT

## 2023-03-15 ASSESSMENT — PAIN - FUNCTIONAL ASSESSMENT: PAIN_FUNCTIONAL_ASSESSMENT: PREVENTS OR INTERFERES SOME ACTIVE ACTIVITIES AND ADLS

## 2023-03-15 ASSESSMENT — PAIN DESCRIPTION - PAIN TYPE: TYPE: ACUTE PAIN

## 2023-03-15 ASSESSMENT — PAIN DESCRIPTION - LOCATION
LOCATION: INCISION
LOCATION: INCISION

## 2023-03-15 ASSESSMENT — PAIN DESCRIPTION - DESCRIPTORS: DESCRIPTORS: THROBBING

## 2023-03-15 NOTE — CARE COORDINATION
CASE MANAGEMENT DISCHARGE SUMMARY      Discharge to: Home with [facility]/ attempted to see for initial eval and d/c orders noted. From home in senior living one level and uses walker at baseline. Plans to return with [place] [id number] and family support.         New Durable Medical Equipment ordered/agency: None    Transportation:    Family/car:son here to transport    [facility]     Patient: yes     Family:  yes son at bedside     Facility/Agency, name:  Abhijeet Kamara RN liaison with [facility] has received all orders from Odalys Murrieta, name: Felipe Mares
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
## 2023-03-15 NOTE — PROGRESS NOTES
Discharge Note:      All discharge instructions given at this time as well as all patient belongings returned to patient. Pt denies any further questions regarding discharge at this time. Pt denies any further issues at this time. Pt wheeled out to front discharge doors at this time. Pt left premises without any issues in private vehicle at this time.

## 2023-03-15 NOTE — PROGRESS NOTES
Informed patient about the possibility of assisting patient to chair from bed by standing and pivoting. Patient states \" I am not doing that today I had a pacemaker put in and I can not do anything until I have no pain at all\" asked patient about pain and she said it is a 5/10 on a 0 - 10 scale. Asked patient about acceptable pain level patient states 0 \"I can not tolerate any pain at all\". Attempted to educate patient on the importance of ambulating and sitting up after a procedure. Patient again insisted that she is not getting out of bed today. Will attempt discussion again at a later time.

## 2023-03-15 NOTE — CARE COORDINATION
[facility]    Referral received from CM to follow for home care services. I will follow for needs, and speak with patient to verify demos.     [facility] unable to staff until Monday 3/20/22; reached out to patient to see if they would like a [address] or if therapy acceptable this date    3/16/23 930 AM spoke w patient agreeable to start of care 3/20/23; states she has dr appointment 3/22 for pacemaker check    [address] [facility], BSN CTN  [facility] [phone]

## 2023-03-15 NOTE — DISCHARGE SUMMARY
Patient ID:  Anjana Sadler  [id number]  87 y.o.  1945    Admit date: 3/14/2023    Discharge date and time: 3/15/2023    Admitting Physician: Tressa Madison MD     Discharge NP: Perry Escalera CNP    Admission Diagnoses: Sick sinus syndrome [I49.5]  Sinus node dysfunction ([address] [I49.5]  Status post placement of cardiac pacemaker [Z95.0]    Discharge Diagnoses: SAME    Admission Condition: fair    Discharged Condition: good    Hospital Course: Anjana Sadler was admitted on 3/14/2023 and had a dual chamber pacemaker implanted for symptomatic bradycardia. Device check was normal and CXR was normal from a device standpoint. Rhythm has been SR with intermittent AP. She is tired today. Her dizziness has improved. She has unchanged shortness of breath. She had a right sided chest pain this morning that has since resolved.      Consults: none    Assessment:   Symptomatic bradycardia/sinus node dysfunction: stable   -s/p dual chamber implant on 3/14/2023 (ETF.comronik)    -device check and CXR reviewed  PVC's:   -1.2% burden on recent cardiac monitor 2/2023  PAC's with brief atrial runs:    -noted on recent cardiac monitor 2/2023  NSVT vs aberrant conduction:   -noted on recent event monitor 2/2023  HTN: controlled   Asthma   Chronic bronchitis   COPD  Guillian-barre  HLD  Lung nodule     Plan:   No changes with current medications   Post procedure instructions reviewed   Device check on 3/22/2023  Follow up in office on 4/19/2023    Discharge Exam:  /88   Pulse 69   Temp 97.6 °F (36.4 °C) (Oral)   Resp 18   Ht 5' 5\" (1.651 m)   Wt 155 lb 6.8 oz (70.5 kg)   SpO2 96%   BMI 25.86 kg/m²     General Appearance:    Alert, cooperative, no distress, appears stated age   Head:    Normocephalic, without obvious abnormality, atraumatic   Eyes:    PERRL, conjunctiva/corneas clear      Ears:    deferred   Nose:   Nares normal, septum midline, mucosa normal, no drainage  or sinus tenderness   Throat:   Lips, mucosa, and tongue normal; teeth and gums normal   Neck:   Supple, symmetrical, trachea midline, no adenopathy;        thyroid:  No enlargement/tenderness/nodules; no carotid    bruit or JVD   Back:     Symmetric, no curvature, ROM normal, no CVA tenderness   Lungs:     Clear to auscultation bilaterally, respirations unlabored   Chest wall:    No tenderness or deformity; left upper chest dressing dry and intact, no hematoma, left shoulder soft   Heart:    Regular rate and rhythm, S1 and S2 normal, no murmur, rub   or gallop   Abdomen:     Soft, non-tender, bowel sounds active all four quadrants,     no masses, no organomegaly   Genitalia:    deferred   Rectal:    deferred    Extremities:   Extremities normal, atraumatic, no cyanosis or edema   Pulses:   2+ and symmetric all extremities   Skin:   Skin color, texture, turgor normal, no rashes or lesions   Lymph nodes:   Cervical, supraclavicular, and axillary nodes normal   Neurologic:   CNII-XII intact. Normal strength, sensation and reflexes       throughout     Disposition: home    Patient Instructions:      Medication List        CONTINUE taking these medications      acetaminophen 500 MG tablet  Commonly known as: TYLENOL     Advair Diskus 250-50 MCG/ACT Aepb diskus inhaler  Generic drug: fluticasone-salmeterol  Inhale 1 puff into the lungs in the morning and 1 puff in the evening.      albuterol sulfate  (90 Base) MCG/ACT inhaler  Commonly known as: Ventolin HFA  Inhale 2 puffs into the lungs every 6 hours as needed for Wheezing or Shortness of Breath     azelastine 0.1 % nasal spray  Commonly known as: ASTELIN  2 sprays by Nasal route 2 times daily 2 Spray in each nostril     BENADRYL PO     CENTRUM ADULTS PO     fluticasone 50 MCG/ACT nasal spray  Commonly known as: FLONASE  SQUIRT 1 SPRAY IN EACH NOSTRIL TWICE DAILY     guaiFENesin 600 MG extended release tablet  Commonly known as: MUCINEX     hydrOXYzine HCl 25 MG tablet  Commonly known as: ATARAX ipratropium-albuterol 0.5-2.5 (3) MG/3ML Soln nebulizer solution  Commonly known as: DUONEB  Inhale 3 mLs into the lungs every 6 hours as needed for Shortness of Breath DX Asthma J45.50     montelukast 10 MG tablet  Commonly known as: SINGULAIR  TAKE 1 TABLET BY MOUTH EACH NIGHT AT BEDTIME     oxybutynin 5 MG extended release tablet  Commonly known as: DITROPAN-XL     pravastatin 40 MG tablet  Commonly known as: PRAVACHOL            Post device instructions given  Activity: as tolerated  Diet: cardiac diet    HOWIE Carbajal-DUSTY  [address]  [phone]     Time spent on discharge of patient: >35 minutes, including plan of care, patient education, and care coordination.

## 2023-03-16 ENCOUNTER — NURSE ONLY (OUTPATIENT)
Dept: CARDIOLOGY CLINIC | Age: 78
End: 2023-03-16

## 2023-03-16 ENCOUNTER — TELEPHONE (OUTPATIENT)
Dept: CARDIOLOGY CLINIC | Age: 78
End: 2023-03-16

## 2023-03-16 DIAGNOSIS — Z95.0 PACEMAKER: ICD-10-CM

## 2023-03-16 NOTE — TELEPHONE ENCOUNTER
I spoke with the patient. She is upset because she has not received a temporary card that shows she has a device in case she needs to go into a government building or airport. [facility] contacted device rep to have them send her a temp card.  Patient aware and V/U.

## 2023-03-16 NOTE — PROGRESS NOTES
A inpatient rep check 1 day s/p device implant. P wave 3.3mV  R wave 9.0mV    AP 75%   1.0%    No arrhythmias recorded. See Paceart report under the Cardiology tab.

## 2023-03-16 NOTE — TELEPHONE ENCOUNTER
Called patient today states she is sheila for f/u with Pacemaker on 3/22/23. Would like a call after that appt.

## 2023-03-16 NOTE — TELEPHONE ENCOUNTER
Pt had a Pacemaker placed on 03/14, pt called with several questions and concerns. Please contact pt, stated release nurse was unhelpful & rude.      ([phone])

## 2023-03-16 NOTE — PROGRESS NOTES
BIOTRONIK ALERT -Findings  Long atrial episode detected  Atrial burden above limit  Number of AT per day above limit  There are more findings. NEW AF RECORDED 3/15/23->6 HRS. BKADCY3-2. KXA TO REVIEW    Long atrial episode detected (ongoing at end of mon. interv.)  1 episode(s) detected between Mar 15, 2023 7:05:38 AM and Mar 16, 2023  1:33:00 AM - The programmed episode duration limit was 6h 0min  New. Atrial burden above limit (> 0% of day)  Last value 45% of day measured on Mar 16, 2023 1:33:00 AM  New. HF monitor YELLOW Number of atrial arrhythmia episodes per day above limit (at least one)  Last value 2 episode(s) per day reported on Mar 16, 2023 1:33:00 AM  New. Recordings /  Episode  YELLOW Details for arrhythmia episode received (all types)  Details were received for a AT episode, which was detected on Mar 15, 2023  5:10:52 PM  New.   Administrative YELLOW First message received on Mar 16, 2023 2:33:14 AM  Please compare the status with the data of the last follow

## 2023-03-16 NOTE — Clinical Note
BIOTRONIK ALERT -Findings Long atrial episode detected Atrial burden above limit Number of AT per day above limit There are more findings. NEW AF RECORDED 3/15/23->6 HRS. BPYYAX0-5.   KXA TO REVIEW

## 2023-03-17 ENCOUNTER — NURSE ONLY (OUTPATIENT)
Dept: CARDIOLOGY CLINIC | Age: 78
End: 2023-03-17

## 2023-03-17 NOTE — Clinical Note
3/16/23 biotronik remote alert reviewed: Ruben Epperson MD  3/16/2023 11:52 AM EDT Back to Top  Reviewed PAF Do we have estimate of duration of these episodes?   On going AF since Mar 15, 2023 5:10:52. Biotronik alert received 3/17/23.  See PACEART report under Cardiology tab.

## 2023-03-17 NOTE — PROGRESS NOTES
3/16/23 biotronik remote alert reviewed:  Marquita Silva MD   3/16/2023 11:52 AM EDT Back to Top      Reviewed  PAF  Do we have estimate of duration of these episodes? On going AF since Mar 15, 2023 5:10:52. Biotronik alert received 3/17/23. See PACEART report under Cardiology tab.

## 2023-03-20 ENCOUNTER — TELEPHONE (OUTPATIENT)
Dept: PULMONOLOGY | Age: 78
End: 2023-03-20

## 2023-03-20 DIAGNOSIS — I48.91 ATRIAL FIBRILLATION, UNSPECIFIED TYPE (HCC): Primary | ICD-10-CM

## 2023-03-20 DIAGNOSIS — J45.40 MODERATE PERSISTENT ASTHMA WITHOUT COMPLICATION: Primary | ICD-10-CM

## 2023-03-20 DIAGNOSIS — Z95.0 S/P PLACEMENT OF CARDIAC PACEMAKER: ICD-10-CM

## 2023-03-20 DIAGNOSIS — R00.1 BRADYCARDIA: ICD-10-CM

## 2023-03-20 NOTE — TELEPHONE ENCOUNTER
Called and spoke with patient. Relayed KXA results. She is agreeable to starting therapy. Samples provided for patient Eliquis 5 mg BID. Medication pending, pharmacy verified. Patient requested information on home health reviewed chart sulema called and spoke with [address] [facility] provided Team E for patient and to call [phone] for any other assistance.

## 2023-03-20 NOTE — TELEPHONE ENCOUNTER
----- Message from Melina Crowell MD sent at 3/17/2023  4:53 PM EDT -----  Reviewed  Please inform patient that her pacemaker is showing the presence of atrial fibrillation. We recommend starting anticoagulation for stroke protection.    If agreeable, would start apixaban 5 mg BID

## 2023-03-20 NOTE — TELEPHONE ENCOUNTER
Order pended for home care. Patient wants to wait til her f/u on 3/22/23 with cardiologist to see how she is doing before she erasmo her biopsy.

## 2023-03-20 NOTE — TELEPHONE ENCOUNTER
Pt called stating she tried to get PCP office to get her setup with come care. Pt just had pacemaker put in and needs help with care at home. Pt states PCP office refused to write an order for home health care and stated they are not affiliated with [facility] or [facility] and would not write her an order. Please advise.

## 2023-03-21 NOTE — TELEPHONE ENCOUNTER
Spoke to pt to clarify questions. Pt had questions regarding the cost of device checks / office visits. Answered pt questions to the best of my ability with verbal help from device clinic. Pt has upcoming device check on 3/22/23. Informed pt we could also answer any questions at the time of this appointment.  V/U

## 2023-03-22 ENCOUNTER — NURSE ONLY (OUTPATIENT)
Dept: CARDIOLOGY CLINIC | Age: 78
End: 2023-03-22
Payer: MEDICARE

## 2023-03-22 DIAGNOSIS — I49.5 SINUS NODE DYSFUNCTION (HCC): Primary | ICD-10-CM

## 2023-03-22 DIAGNOSIS — Z95.0 PACEMAKER: ICD-10-CM

## 2023-03-22 PROCEDURE — 93280 PM DEVICE PROGR EVAL DUAL: CPT | Performed by: INTERNAL MEDICINE

## 2023-03-22 NOTE — TELEPHONE ENCOUNTER
Order faxed to [facility].  [phone] Phone  [phone] Fax.    F/u with patient end of the week to see if she is ready to shiela biopsy.

## 2023-03-22 NOTE — PATIENT INSTRUCTIONS
New Cardiac Device Implant (Pacemaker and/or Defibrillator) Post Op Instructions  Bathe with water indirectly hitting the incision site. Water and soap may run over the incision site. Do not scrub. Pat dry with a clean towel after bathing. Leave incision open to the air; do not apply any dressings, ointments, or bandages to the area. Do not apply lotion, perfume/cologne, or powders to the area until it is completely healed. Any scabbing or skin glue that is noted will fall off within 1-2 weeks after the post op appointment. If any oozing, bleeding, or pus drainage occurs, please call the office immediately at [phone]. Patient has movement restrictions in place until 4 weeks post op (to the day of implant) unless otherwise instructed by physician. Patient may not lift the device side arm above shoulder height. Do not far reach or stretch across body or behind body with effected side. Do not use this arm for pushing, pulling, or lifting body. Do not use cane on the effected side. Patient may not lift anything heavier than a gallon of milk with the associated arm. Appointments to expect going forward:  Post operatively the patient will have had a 1-week post op check, a 1 month follow up with NP/MD, and a 3 month follow up with NP/MD and the device clinic. Remote Monitoring Instructions    Within 2-3 weeks of your device being implanted, you will receive a call from the  of your device. Please answer this call as it is to set up remote monitoring for your device. Once you receive your in-home monitor, please follow the instructions provided to sync the home monitor to your implanted device. Once you have paired your home monitor to your implanted device, keep your monitor plugged in within 6 feet of where you sleep. Your monitor will routinely check in with your device during sleep hours and transmit any urgent events to the [address] [place] [address] Drive for review.      Please do not

## 2023-03-22 NOTE — PROGRESS NOTES
Patient presents to the device clinic today for a programming evaluation for her pacemaker. Patient has a history of SND. Takes Eliquis. Patient's device was implanted on 3/14 by Dr. Taisha Hicks. Since then, AT/AF burden measures 9%. P wave: 3.1 mV  R wave: 10.3 mV    AP 35%  1.0%    All sensing and pacing parameters are within normal range. No changes need to be made at this time. Incision is closed, clean, and dry with all dressings/steri strips removed. Site left open to the air. Incision well approximated. No s/s of infection. Patient education was provided about site care, device functionality, in home monitoring, and any other patient questions and/or concerns were addressed. Aftercare and remote monitoring literature was provided. Patient voices understanding. Patient will follow up in 3 months in office or remotely. Please see interrogation for more detail - Paceart report located under the Cardiology tab.

## 2023-03-24 ENCOUNTER — TELEPHONE (OUTPATIENT)
Dept: CARDIOLOGY CLINIC | Age: 78
End: 2023-03-24

## 2023-03-24 NOTE — TELEPHONE ENCOUNTER
CARDIAC CLEARANCE REQUEST    What type of procedure are you having:  Lung biopsy  Are you taking any blood thinners:  Eliquis-hold 2 days prior  Type on anesthesia:    When is your procedure scheduled for:  pending  What physician is performing your procedure:  Dr. Sabina Araujo is ordering it but a specialist will do  Phone Number:  [phone]  Fax number to send the letter:  [phone]

## 2023-03-24 NOTE — LETTER
[address], [address] [facility] [address] [address]  Phone: [phone]  Fax: [phone]    Thee Alarcon MD    Re:Isa Hernandez  1945 March 27, 2023    To whom it may concern,    Jennifer Nuñez has been doing well overall from a cardiac standpoint and recently had pacemaker implantation. She is acceptable risk for the planned lung procedure (from cardiac electrophysiology standpoint). If performing physician deems it necessary to hold anticoagulation, then can proceed with  holding anticoagulation for 48 hours and then restarting the medication as soon as possible afterwards if no bleeding issues noted.      Sincerely,        Thee Alarcon MD

## 2023-03-24 NOTE — TELEPHONE ENCOUNTER
Patient called back today regarding rescheduling CT lung bio. Per Dr Anastacio Garcia pt needs cardiac clearance. She is also on Eliquis would need to hold x 3 days. Called Dr Tolliver [facility] office lm to check on clearance.

## 2023-03-27 NOTE — TELEPHONE ENCOUNTER
Bhavna Allen MD  You 2 days ago     [facility]  If performing physician deems it necessary to hold anticoagulation, then can proceed with  holding anticoagulation for 48 hours and then restarting the medication as soon as possible afterwards if no bleeding issues noted. Patient has been doing well overall from a cardiac standpoint and recently had pacemaker implantation. She is acceptable risk for the planned lung procedure (from cardiac electrophysiology standpoint). Letter created and faxed.

## 2023-03-28 ENCOUNTER — PRE-PROCEDURE TELEPHONE (OUTPATIENT)
Dept: INTERVENTIONAL RADIOLOGY/VASCULAR | Age: 78
End: 2023-03-28

## 2023-03-28 RX ORDER — SODIUM CHLORIDE 9 MG/ML
INJECTION, SOLUTION INTRAVENOUS PRN
Status: CANCELLED | OUTPATIENT
Start: 2023-03-28

## 2023-03-28 RX ORDER — SODIUM CHLORIDE 0.9 % (FLUSH) 0.9 %
5-40 SYRINGE (ML) INJECTION EVERY 12 HOURS SCHEDULED
Status: CANCELLED | OUTPATIENT
Start: 2023-03-28

## 2023-03-28 RX ORDER — SODIUM CHLORIDE 0.9 % (FLUSH) 0.9 %
5-40 SYRINGE (ML) INJECTION PRN
Status: CANCELLED | OUTPATIENT
Start: 2023-03-28

## 2023-03-28 NOTE — PROGRESS NOTES
Called and spoke to Patient about upcoming procedure. Phone number used: [phone]  Procedure: lung nodule biopsy  Approving Radiologist: Prateek Pelaez     Pt informed of the following:  Date of procedure: 4/4/2023  Arrival time of procedure: 0700  Time of procedure: 0830  Check in at same day surgery blue awning entrance prior to procedure. On any blood thinners? Yes. If yes: Eliquis  Instructed to hold: 2 days prior. Blood pressure medication? Yes. If yes, take the morning of procedure. Any issues with sedation in the past? No  Will receive versed and fentanyl for conscious sedation and will need a  day of procedure. Post-procedure recovery will be here 2--3 hours. Nothing to eat or drink after midnight. Need COVID testing prior: No     Need SDS: Yes    Pre-procedure orders placed.   Lab ordered: CBC and PT/INR

## 2023-03-29 ENCOUNTER — TELEPHONE (OUTPATIENT)
Dept: CARDIOLOGY CLINIC | Age: 78
End: 2023-03-29

## 2023-03-29 NOTE — TELEPHONE ENCOUNTER
Pt calling into [facility]. Pt stated that she is allergic to the Eliquis that she is recently started. From the best of her knowledge. Pt would like to know what else she can do. Pt stated that she did however take this medication this morning. Please advise & thank you.

## 2023-03-29 NOTE — TELEPHONE ENCOUNTER
Satinder Gonsalez MD  You 18 minutes ago (2:09 PM)     Melanie Mcarthur  This does not appear to be an allergic reaction to Eliquis. Lets continue this medication. Spoke with the patient and relayed message.  She V/U.

## 2023-03-29 NOTE — TELEPHONE ENCOUNTER
Pt called [facility] and wanted to inform rn antoni that her pcp called her in a prescription for an antibiotic.

## 2023-03-29 NOTE — TELEPHONE ENCOUNTER
Spoke with the patient and she states that after she started taking Eliquis she has a UTI for the first time in her life. She states that she has not seen a physician to be formally diagnosed with a UTI. She reports that she has clear/yellowish drainage from her vagina and itching that she is self treating with over the counter cream. She states that she will call her primary care physician regarding these symptoms as I encouraged her to do so. She denies shortness of breath, rash, throat swelling, or other symptoms of anaphylaxis. I told her this does not present as an allergic reaction to Eliquis, but I would send it to the physician to see if they have any other advice. Lenny Josue is OOT.

## 2023-04-03 NOTE — TELEPHONE ENCOUNTER
Pt calling asking if she is able to take nitrofurantoin (macrobid) 100mg for 14 days as she has a UTI, she was not sure if this would cause any issues with bx that is scheduled for 4/4/23?

## 2023-04-04 ENCOUNTER — HOSPITAL ENCOUNTER (OUTPATIENT)
Dept: GENERAL RADIOLOGY | Age: 78
Discharge: HOME | End: 2023-04-04
Payer: MEDICARE

## 2023-04-04 ENCOUNTER — TELEPHONE (OUTPATIENT)
Dept: PULMONOLOGY | Age: 78
End: 2023-04-04

## 2023-04-04 ENCOUNTER — HOSPITAL ENCOUNTER (OUTPATIENT)
Dept: CT IMAGING | Age: 78
Discharge: HOME | End: 2023-04-04
Payer: MEDICARE

## 2023-04-04 ENCOUNTER — HOSPITAL ENCOUNTER (OUTPATIENT)
Age: 78
Discharge: HOME | End: 2023-04-04
Payer: MEDICARE

## 2023-04-04 VITALS
SYSTOLIC BLOOD PRESSURE: 174 MMHG | WEIGHT: 156 LBS | DIASTOLIC BLOOD PRESSURE: 77 MMHG | HEIGHT: 65 IN | RESPIRATION RATE: 16 BRPM | OXYGEN SATURATION: 95 % | BODY MASS INDEX: 25.99 KG/M2 | HEART RATE: 60 BPM

## 2023-04-04 DIAGNOSIS — Z98.890 STATUS POST BIOPSY: ICD-10-CM

## 2023-04-04 DIAGNOSIS — R91.1 PULMONARY NODULE: ICD-10-CM

## 2023-04-04 LAB
DEPRECATED RDW RBC AUTO: 13.1 % (ref 12.4–15.4)
HCT VFR BLD AUTO: 40.1 % (ref 36–48)
HGB BLD-MCNC: 13.1 G/DL (ref 12–16)
INR PPP: 0.99 (ref 0.84–1.16)
MCH RBC QN AUTO: 31.4 PG (ref 26–34)
MCHC RBC AUTO-ENTMCNC: 32.8 G/DL (ref 31–36)
MCV RBC AUTO: 95.8 FL (ref 80–100)
PLATELET # BLD AUTO: 198 K/UL (ref 135–450)
PMV BLD AUTO: 10.3 FL (ref 5–10.5)
PROTHROMBIN TIME: 13.1 SEC (ref 11.5–14.8)
RBC # BLD AUTO: 4.19 M/UL (ref 4–5.2)
WBC # BLD AUTO: 5.3 K/UL (ref 4–11)

## 2023-04-04 PROCEDURE — 6360000002 HC RX W HCPCS: Performed by: RADIOLOGY

## 2023-04-04 PROCEDURE — 71045 X-RAY EXAM CHEST 1 VIEW: CPT

## 2023-04-04 PROCEDURE — 88305 TISSUE EXAM BY PATHOLOGIST: CPT

## 2023-04-04 PROCEDURE — 32408 CORE NDL BX LNG/MED PERQ: CPT

## 2023-04-04 PROCEDURE — 85610 PROTHROMBIN TIME: CPT

## 2023-04-04 PROCEDURE — 36415 COLL VENOUS BLD VENIPUNCTURE: CPT

## 2023-04-04 PROCEDURE — 77012 CT SCAN FOR NEEDLE BIOPSY: CPT

## 2023-04-04 PROCEDURE — 85027 COMPLETE CBC AUTOMATED: CPT

## 2023-04-04 RX ORDER — CHOLECALCIFEROL (VITAMIN D3) 1250 MCG
CAPSULE ORAL DAILY
COMMUNITY

## 2023-04-04 RX ORDER — SODIUM CHLORIDE 0.9 % (FLUSH) 0.9 %
5-40 SYRINGE (ML) INJECTION PRN
Status: DISCONTINUED | OUTPATIENT
Start: 2023-04-04 | End: 2023-04-05 | Stop reason: HOSPADM

## 2023-04-04 RX ORDER — ASCORBIC ACID 500 MG
500 TABLET ORAL DAILY
COMMUNITY

## 2023-04-04 RX ORDER — SODIUM CHLORIDE 9 MG/ML
INJECTION, SOLUTION INTRAVENOUS PRN
Status: DISCONTINUED | OUTPATIENT
Start: 2023-04-04 | End: 2023-04-05 | Stop reason: HOSPADM

## 2023-04-04 RX ORDER — FENTANYL CITRATE 50 UG/ML
INJECTION, SOLUTION INTRAMUSCULAR; INTRAVENOUS PRN
Status: COMPLETED | OUTPATIENT
Start: 2023-04-04 | End: 2023-04-04

## 2023-04-04 RX ORDER — DIPHENHYDRAMINE HYDROCHLORIDE 50 MG/ML
INJECTION INTRAMUSCULAR; INTRAVENOUS PRN
Status: COMPLETED | OUTPATIENT
Start: 2023-04-04 | End: 2023-04-04

## 2023-04-04 RX ORDER — SODIUM CHLORIDE 0.9 % (FLUSH) 0.9 %
5-40 SYRINGE (ML) INJECTION EVERY 12 HOURS SCHEDULED
Status: DISCONTINUED | OUTPATIENT
Start: 2023-04-04 | End: 2023-04-05 | Stop reason: HOSPADM

## 2023-04-04 RX ADMIN — FENTANYL CITRATE 50 MCG: 50 INJECTION INTRAMUSCULAR; INTRAVENOUS at 09:10

## 2023-04-04 RX ADMIN — FENTANYL CITRATE 50 MCG: 50 INJECTION INTRAMUSCULAR; INTRAVENOUS at 09:29

## 2023-04-04 RX ADMIN — DIPHENHYDRAMINE HYDROCHLORIDE 25 MG: 50 INJECTION, SOLUTION INTRAMUSCULAR; INTRAVENOUS at 09:10

## 2023-04-04 ASSESSMENT — PAIN SCALES - GENERAL
PAINLEVEL_OUTOF10: 0

## 2023-04-04 ASSESSMENT — PAIN - FUNCTIONAL ASSESSMENT: PAIN_FUNCTIONAL_ASSESSMENT: NONE - DENIES PAIN

## 2023-04-04 NOTE — OR NURSING
Pt arrived for image guided lung nodule biopsy in the [facility]. Procedure explained including the risk and benefits of the procedure. All questions answered. Pt verbalizes understanding of the of procedure and states no more questions. Consent signed. Vital signs stable, labs, allergies, medications, and code status reviewed. No contraindications noted.      Vitals:    04/04/23 0930   BP: (!) 170/81   Pulse: 61   Resp: 16   SpO2: 98%    (vital signs in table format)    Jessy Score  2 - Able to move 4 extremities voluntarily on command  2 - BP+/- 20mmHg of normal  2 - Fully awake  2 - Able to maintain oxygen saturation >92% on room air  2 - Able to breathe deeply and cough freely    Allergies  Latex, Iodine, Penicillins, Sulfa antibiotics, Tetanus toxoids, Clindamycin/lincomycin, Influenza vaccines, Albuterol, Codeine, and Spiriva handihaler [tiotropium bromide monohydrate]    Labs  Lab Results   Component Value Date    INR 0.99 04/04/2023    PROTIME 13.1 04/04/2023       Lab Results   Component Value Date    CREATININE <0.5 (L) 03/14/2023    BUN 5 (L) 03/14/2023     03/14/2023    K 4.2 03/14/2023     03/14/2023    CO2 27 03/14/2023       Lab Results   Component Value Date    WBC 5.3 04/04/2023    HGB 13.1 04/04/2023    HCT 40.1 04/04/2023    MCV 95.8 04/04/2023     04/04/2023

## 2023-04-04 NOTE — TELEPHONE ENCOUNTER
Called and discussed with patient one time streaks of blood mixed with sputum, not unusual postbiopsy  Doing fine otherwise  Advised to continue to observe in ER if any worsening

## 2023-04-04 NOTE — TELEPHONE ENCOUNTER
Pt had a biopsy done today, she states she coughed up a small amount of bright red blood and is curious if this is normal and what should she do. Please advise.

## 2023-04-04 NOTE — OR NURSING
Image guided lung nodule biopsy biopsy completed by Dr. Jaz Toledo. Pt tolerated procedure without any signs or symptoms of distress. Vital signs stable. Pt transported back to IR holding in stable condition via stretcher. Total Biopsy: 2  Received: Versed: 0 mg       Fentanyl: 100 mcg  Bandage to rt thoracic area that is clean dry and intact. Patient to lay on rt side for 1 hour.      Vital Signs  Vitals:    04/04/23 0954   BP: (!) 146/75   Pulse: 62   Resp: 15   SpO2: 96%    (vital signs in table format)    Post Jessy  2 - Able to move 4 extremities voluntarily on command  2 - BP+/- 20mmHg of normal  2 - Fully awake  2 - Able to maintain oxygen saturation >92% on room air  2 - Able to breathe deeply and cough freely

## 2023-04-04 NOTE — OR NURSING
Unable to get access to target lesion due to position of lesion in comparison to rib at current positioning. Reposition to occur and proceed.

## 2023-04-04 NOTE — FLOWSHEET NOTE
Pt from unit with no complaints of CP or SOA and no distress noted. Pt aware ordering MD will be getting results in a few days.

## 2023-04-08 ENCOUNTER — HOSPITAL ENCOUNTER (EMERGENCY)
Age: 78
Discharge: LEFT BEFORE DISCHARGE | End: 2023-04-08
Payer: MEDICARE

## 2023-04-08 ENCOUNTER — APPOINTMENT (OUTPATIENT)
Dept: GENERAL RADIOLOGY | Age: 78
End: 2023-04-08
Payer: MEDICARE

## 2023-04-08 ENCOUNTER — APPOINTMENT (OUTPATIENT)
Dept: CT IMAGING | Age: 78
End: 2023-04-08
Payer: MEDICARE

## 2023-04-08 VITALS
WEIGHT: 156 LBS | DIASTOLIC BLOOD PRESSURE: 82 MMHG | RESPIRATION RATE: 22 BRPM | SYSTOLIC BLOOD PRESSURE: 159 MMHG | TEMPERATURE: 97.6 F | OXYGEN SATURATION: 92 % | BODY MASS INDEX: 25.99 KG/M2 | HEIGHT: 65 IN | HEART RATE: 79 BPM

## 2023-04-08 DIAGNOSIS — R19.7 DIARRHEA, UNSPECIFIED TYPE: ICD-10-CM

## 2023-04-08 DIAGNOSIS — R55 NEAR SYNCOPE: Primary | ICD-10-CM

## 2023-04-08 DIAGNOSIS — R07.9 CHEST PAIN, UNSPECIFIED TYPE: ICD-10-CM

## 2023-04-08 LAB
ALBUMIN SERPL-MCNC: 4.6 G/DL (ref 3.4–5)
ALBUMIN/GLOB SERPL: 2 {RATIO} (ref 1.1–2.2)
ALP SERPL-CCNC: 87 U/L (ref 40–129)
ALT SERPL-CCNC: 13 U/L (ref 10–40)
ANION GAP SERPL CALCULATED.3IONS-SCNC: 12 MMOL/L (ref 3–16)
AST SERPL-CCNC: 18 U/L (ref 15–37)
BASOPHILS # BLD: 0.1 K/UL (ref 0–0.2)
BASOPHILS NFR BLD: 2 %
BILIRUB SERPL-MCNC: 0.4 MG/DL (ref 0–1)
BUN SERPL-MCNC: 4 MG/DL (ref 7–20)
CALCIUM SERPL-MCNC: 9.6 MG/DL (ref 8.3–10.6)
CHLORIDE SERPL-SCNC: 101 MMOL/L (ref 99–110)
CO2 SERPL-SCNC: 25 MMOL/L (ref 21–32)
CREAT SERPL-MCNC: <0.5 MG/DL (ref 0.6–1.2)
DEPRECATED RDW RBC AUTO: 13.1 % (ref 12.4–15.4)
EOSINOPHIL # BLD: 0.7 K/UL (ref 0–0.6)
EOSINOPHIL NFR BLD: 10 %
FLUAV RNA RESP QL NAA+PROBE: NOT DETECTED
FLUBV RNA RESP QL NAA+PROBE: NOT DETECTED
GFR SERPLBLD CREATININE-BSD FMLA CKD-EPI: >60 ML/MIN/{1.73_M2}
GLUCOSE SERPL-MCNC: 96 MG/DL (ref 70–99)
HCT VFR BLD AUTO: 39.2 % (ref 36–48)
HGB BLD-MCNC: 13.2 G/DL (ref 12–16)
LACTATE BLDV-SCNC: 1.3 MMOL/L (ref 0.4–1.9)
LYMPHOCYTES # BLD: 1 K/UL (ref 1–5.1)
LYMPHOCYTES NFR BLD: 13.9 %
MCH RBC QN AUTO: 31.7 PG (ref 26–34)
MCHC RBC AUTO-ENTMCNC: 33.7 G/DL (ref 31–36)
MCV RBC AUTO: 94.2 FL (ref 80–100)
MONOCYTES # BLD: 0.6 K/UL (ref 0–1.3)
MONOCYTES NFR BLD: 7.9 %
NEUTROPHILS # BLD: 4.8 K/UL (ref 1.7–7.7)
NEUTROPHILS NFR BLD: 66.2 %
NT-PROBNP SERPL-MCNC: 184 PG/ML (ref 0–449)
PLATELET # BLD AUTO: 186 K/UL (ref 135–450)
PMV BLD AUTO: 11 FL (ref 5–10.5)
POTASSIUM SERPL-SCNC: 3.6 MMOL/L (ref 3.5–5.1)
PROT SERPL-MCNC: 6.9 G/DL (ref 6.4–8.2)
RBC # BLD AUTO: 4.16 M/UL (ref 4–5.2)
SARS-COV-2 RNA RESP QL NAA+PROBE: NOT DETECTED
SODIUM SERPL-SCNC: 138 MMOL/L (ref 136–145)
TROPONIN T SERPL-MCNC: <0.01 NG/ML
WBC # BLD AUTO: 7.2 K/UL (ref 4–11)

## 2023-04-08 PROCEDURE — 84484 ASSAY OF TROPONIN QUANT: CPT

## 2023-04-08 PROCEDURE — 83880 ASSAY OF NATRIURETIC PEPTIDE: CPT

## 2023-04-08 PROCEDURE — 93005 ELECTROCARDIOGRAM TRACING: CPT | Performed by: NURSE PRACTITIONER

## 2023-04-08 PROCEDURE — 71045 X-RAY EXAM CHEST 1 VIEW: CPT

## 2023-04-08 PROCEDURE — 83605 ASSAY OF LACTIC ACID: CPT

## 2023-04-08 PROCEDURE — 80053 COMPREHEN METABOLIC PANEL: CPT

## 2023-04-08 PROCEDURE — 36415 COLL VENOUS BLD VENIPUNCTURE: CPT

## 2023-04-08 PROCEDURE — 87636 SARSCOV2 & INF A&B AMP PRB: CPT

## 2023-04-08 PROCEDURE — 85025 COMPLETE CBC W/AUTO DIFF WBC: CPT

## 2023-04-08 RX ORDER — ASPIRIN 325 MG
325 TABLET ORAL ONCE
Status: DISCONTINUED | OUTPATIENT
Start: 2023-04-08 | End: 2023-04-08 | Stop reason: HOSPADM

## 2023-04-08 ASSESSMENT — LIFESTYLE VARIABLES
HOW OFTEN DO YOU HAVE A DRINK CONTAINING ALCOHOL: NEVER
HOW MANY STANDARD DRINKS CONTAINING ALCOHOL DO YOU HAVE ON A TYPICAL DAY: PATIENT DOES NOT DRINK

## 2023-04-08 ASSESSMENT — PAIN - FUNCTIONAL ASSESSMENT: PAIN_FUNCTIONAL_ASSESSMENT: NONE - DENIES PAIN

## 2023-04-09 LAB
EKG ATRIAL RATE: 62 BPM
EKG ATRIAL RATE: 69 BPM
EKG DIAGNOSIS: NORMAL
EKG DIAGNOSIS: NORMAL
EKG P-R INTERVAL: 200 MS
EKG P-R INTERVAL: 202 MS
EKG Q-T INTERVAL: 436 MS
EKG Q-T INTERVAL: 444 MS
EKG QRS DURATION: 76 MS
EKG QRS DURATION: 80 MS
EKG QTC CALCULATION (BAZETT): 442 MS
EKG QTC CALCULATION (BAZETT): 475 MS
EKG R AXIS: -10 DEGREES
EKG R AXIS: -3 DEGREES
EKG T AXIS: 35 DEGREES
EKG T AXIS: 55 DEGREES
EKG VENTRICULAR RATE: 62 BPM
EKG VENTRICULAR RATE: 69 BPM

## 2023-04-09 NOTE — ED NOTES
Pt left AMA. PT ambulated out of unit with no issue.  PT verbalized understanding of risks of leaving [address], RN  04/08/23 3008

## 2023-04-09 NOTE — ED PROVIDER NOTES
ECG    The Ekg interpreted by me shows regular narrow complex rhythm with a rate of 62 bpm.  Left axis deviation. No acute injury pattern. , QRS 76, QTc 442. Repeat EKG: Regular narrow complex rhythm with PACs and a rate of 69 bpm.  Left axis deviation. No acute injury pattern. , QRS 80, QTc 475.      Sweetie Rice,   04/08/23 0952

## 2023-04-11 ASSESSMENT — ENCOUNTER SYMPTOMS
BACK PAIN: 0
VOMITING: 0
DIARRHEA: 1
RHINORRHEA: 0
COUGH: 0
EYE PAIN: 0
SHORTNESS OF BREATH: 0
NAUSEA: 0
ABDOMINAL PAIN: 0
SORE THROAT: 0

## 2023-04-11 NOTE — ED PROVIDER NOTES
[address] ED  EMERGENCY DEPARTMENT ENCOUNTER        Pt Name: Mina Lowry  MRN: [id number]  [place] 1945  Date of evaluation: 4/8/2023  Provider: HOWIE Matson - CNP  PCP: Marianela Betancourt MD  Note Started: 12:37 PM EDT 4/11/23      BC. I have evaluated this patient. My supervising physician was available for consultation. CHIEF COMPLAINT       Chief Complaint   Patient presents with    Fatigue     Patient reports weakness x2 hours at home after episode of diarrhea. Recent history of lung biopsy and pacemaker placement. HISTORY OF PRESENT ILLNESS: 1 or more Elements     History From: Patient    Limitations to history : None    Social Determinants Significantly Affecting Health : None    Chief Complaint: General fatigue, chest pain, near syncope    Mina Lowry is a 76 y.o. female who presents to the emergency department with symptoms of general fatigue, chest pain, near syncope. Patient states that she today has been feeling weak. States that she had an episode of diarrhea and felt as though she is in a pass out. States that she called 911 because of her near syncopal event. Started to have pressure across her chest one-on-one arrived. States that since then has had symptoms of pain. Upon arrival to the emergency department she states that she is feeling much better. The near syncopal spell has resolved. Patient continues to have some mild symptoms of pain in chest.  No neck or back pain. No fever. Currently patient states he is asymptomatic. Nursing Notes were all reviewed and agreed with or any disagreements were addressed in the HPI. REVIEW OF SYSTEMS :      Review of Systems   Constitutional:  Negative for chills, diaphoresis and fever. HENT:  Negative for congestion, ear pain, rhinorrhea and sore throat. Eyes:  Negative for pain and visual disturbance. Respiratory:  Negative for cough and shortness of breath.     Cardiovascular:

## 2023-04-17 RX ORDER — MONTELUKAST SODIUM 10 MG/1
TABLET ORAL
Qty: 90 TABLET | Refills: 2 | Status: SHIPPED | OUTPATIENT
Start: 2023-04-17

## 2023-04-18 NOTE — PROGRESS NOTES
[address]   Electrophysiology Outpatient Note              Date:  April 19, 2023  Patient name: Emeka Forte  YOB: 1945    Primary Care physician: Stephan Grant MD    HISTORY OF PRESENT ILLNESS: The patient is a 76 y.o.  female with a history of symptomatic bradycardia, SND, PVC's/PAC's, HTN, asthma, chronic bronchitis, COPD, guillain-barre, HLD and lung nodule. In 2/2023 she presented for biopsy of lung nodule. HR was found to be in the 40's-50's. Cardiac monitor showed predominately SR with average HR of 64 bpm, PAC burden 2.15%, PVC burden 1.26%, and possible NSVT. On 3/14/2023 she had a dual chamber pacemaker implanted for symptomatic bradycardia. Follow up device interrogation showed AF and she was started on Eliquis. On 4/04/2023 she had a lung biopsy. On 4/11/2023 she called into the office complaining of SOB. Device interrogation showed no arrhythmias. Today she is being seen for sinus node dysfunction and PAF. On 4/11/2023 device interrogation showed 54% AP, 0% , no arrhythmias. She presents today with her son. She is using her walker. She reports she got a yeast infection since the hospital stay. She denies bleeding issues on Eliquis. She is working with physical therapy at home. She has right sided chest pain that comes and goes. She contributes this to back pain. She has constant shortness of breath. Her lung biopsy was negative. She gets occasional dizziness with the SOB. She denies palpitations. Her blood pressure is labile at home and this morning it was 121/79 mmHg. Half of the time she gets blood pressures in the 70's/30's. The days she sees this she does not feel well.      Past Medical History:   has a past medical history of Arthritis, Asthma, Bronchitis chronic, Colon polyp, COPD (chronic obstructive pulmonary disease) ([address] Emphysema of lung ([address] Guillain-[place] ([address] Hyperlipidemia, Lock jaw, Pneumonia, Post-nasal drip, Pulmonary nodule,

## 2023-04-19 ENCOUNTER — OFFICE VISIT (OUTPATIENT)
Dept: CARDIOLOGY CLINIC | Age: 78
End: 2023-04-19
Payer: MEDICARE

## 2023-04-19 VITALS
HEART RATE: 70 BPM | WEIGHT: 148.8 LBS | OXYGEN SATURATION: 96 % | SYSTOLIC BLOOD PRESSURE: 110 MMHG | BODY MASS INDEX: 24.76 KG/M2 | DIASTOLIC BLOOD PRESSURE: 60 MMHG

## 2023-04-19 DIAGNOSIS — R42 DIZZINESS: ICD-10-CM

## 2023-04-19 DIAGNOSIS — I49.5 SINUS NODE DYSFUNCTION (HCC): Primary | ICD-10-CM

## 2023-04-19 DIAGNOSIS — R06.02 SHORTNESS OF BREATH: ICD-10-CM

## 2023-04-19 DIAGNOSIS — Z95.0 PACEMAKER: ICD-10-CM

## 2023-04-19 DIAGNOSIS — R00.1 SYMPTOMATIC BRADYCARDIA: ICD-10-CM

## 2023-04-19 DIAGNOSIS — I48.0 PAF (PAROXYSMAL ATRIAL FIBRILLATION) (HCC): ICD-10-CM

## 2023-04-19 PROCEDURE — 99214 OFFICE O/P EST MOD 30 MIN: CPT

## 2023-04-19 PROCEDURE — 1123F ACP DISCUSS/DSCN MKR DOCD: CPT

## 2023-04-19 NOTE — PATIENT INSTRUCTIONS
Call [phone] to schedule echocardiogram (ultrasound of your heart) to look at your heart structure and function     Continue your current medications     Monitor your blood pressure at home and keep a log. Bring the log to your next office visit.  Bring your blood pressure cuff to your next visit     Remote device transmissions every three months      Follow up in 2-3 months

## 2023-04-24 ENCOUNTER — SCHEDULED TELEPHONE ENCOUNTER (OUTPATIENT)
Dept: PULMONOLOGY | Age: 78
End: 2023-04-24
Payer: MEDICARE

## 2023-04-24 DIAGNOSIS — J30.9 ALLERGIC RHINITIS, UNSPECIFIED SEASONALITY, UNSPECIFIED TRIGGER: ICD-10-CM

## 2023-04-24 DIAGNOSIS — R91.1 PULMONARY NODULE: Primary | ICD-10-CM

## 2023-04-24 DIAGNOSIS — J45.40 MODERATE PERSISTENT ASTHMA, UNSPECIFIED WHETHER COMPLICATED: ICD-10-CM

## 2023-04-24 PROCEDURE — 99442 PR PHYS/QHP TELEPHONE EVALUATION 11-20 MIN: CPT | Performed by: INTERNAL MEDICINE

## 2023-04-24 RX ORDER — GABAPENTIN 300 MG/1
1 CAPSULE ORAL 3 TIMES DAILY
Qty: 90 CAPSULE | Refills: 2 | COMMUNITY
Start: 2023-04-15 | End: 2023-07-14

## 2023-04-24 RX ORDER — ALBUTEROL SULFATE 90 UG/1
2 AEROSOL, METERED RESPIRATORY (INHALATION) EVERY 6 HOURS PRN
Qty: 18 G | Refills: 5 | Status: SHIPPED | OUTPATIENT
Start: 2023-04-24

## 2023-04-24 RX ORDER — ALBUTEROL SULFATE 2.5 MG/3ML
2.5 SOLUTION RESPIRATORY (INHALATION) EVERY 6 HOURS PRN
Qty: 180 EACH | Refills: 5 | Status: SHIPPED | OUTPATIENT
Start: 2023-04-24

## 2023-04-24 RX ORDER — CRANBERRY FRUIT EXTRACT 200 MG
CAPSULE ORAL
COMMUNITY

## 2023-04-24 RX ORDER — PREDNISONE 20 MG/1
20 TABLET ORAL DAILY
Qty: 5 TABLET | Refills: 0 | Status: SHIPPED | OUTPATIENT
Start: 2023-04-24 | End: 2023-04-29

## 2023-04-24 RX ORDER — FLUTICASONE FUROATE, UMECLIDINIUM BROMIDE AND VILANTEROL TRIFENATATE 200; 62.5; 25 UG/1; UG/1; UG/1
1 POWDER RESPIRATORY (INHALATION) DAILY
Qty: 1 EACH | Refills: 5 | Status: SHIPPED | OUTPATIENT
Start: 2023-04-24

## 2023-04-24 RX ORDER — AMLODIPINE BESYLATE 2.5 MG/1
2.5 TABLET ORAL DAILY
COMMUNITY
Start: 2023-03-27

## 2023-04-24 NOTE — PROGRESS NOTES
[facility]    Outpatient Follow Up Note  TELEHEALTH EVALUATION: Service performed was Audio (During [id number] public health emergency) and not a face-to-face visit     CHIEF COMPLAINT: COPD/Lung Bx       HPI:   TTNBx reviewed by me and noted below. Results were dicussed with patient and multiple good questions were answered. Feels breathing is worse   Cough with clear to occasional yellow   No hemoptysis   Uses Neb once a day   No smoking       Past Medical History:   Diagnosis Date    Arthritis     Asthma     Bronchitis chronic     Colon polyp     COPD (chronic obstructive pulmonary disease) (HCC)     Emphysema of lung (HCC)     Guillain-[place] (HCC)     Hyperlipidemia     Lock jaw     Pneumonia     Post-nasal drip     Pulmonary nodule     bi lateral    Rash     itchy, bumps    Reflex sympathetic dystrophy     RSD (reflex sympathetic dystrophy)     TMJ (dislocation of temporomandibular joint)        Past Surgical History:        Procedure Laterality Date    APPENDECTOMY      BACK SURGERY      BRONCHOSCOPY  2008    COLONOSCOPY  11/15/2012    1 snared polyp    CT NEEDLE BIOPSY LUNG PERCUTANEOUS  4/4/2023    CT NEEDLE BIOPSY LUNG PERCUTANEOUS 4/4/2023 [facility] CT SCAN    HYSTERECTOMY (CERVIX STATUS UNKNOWN)      TONSILLECTOMY         Allergies:  is allergic to latex, iodine, penicillins, sulfa antibiotics, tetanus toxoids, clindamycin/lincomycin, influenza vaccines, albuterol, codeine, and spiriva handihaler [tiotropium bromide monohydrate]. Social History:    TOBACCO:   reports that she quit smoking about 5 years ago. Her smoking use included cigarettes. She has a 12.75 pack-year smoking history. She has never used smokeless tobacco.  ETOH:   reports that she does not currently use alcohol.       Family History:       Problem Relation Age of Onset    Diabetes Brother     Cancer Mother     Colon Cancer Mother     Asthma Neg Hx     Emphysema Neg Hx     Heart Failure Neg Hx     Hypertension

## 2023-06-07 ENCOUNTER — HOSPITAL ENCOUNTER (OUTPATIENT)
Dept: CARDIOLOGY | Age: 78
Discharge: HOME | End: 2023-06-07
Payer: MEDICARE

## 2023-06-07 DIAGNOSIS — R06.02 SHORTNESS OF BREATH: ICD-10-CM

## 2023-06-07 DIAGNOSIS — I48.0 PAF (PAROXYSMAL ATRIAL FIBRILLATION) (HCC): ICD-10-CM

## 2023-06-07 DIAGNOSIS — R42 DIZZINESS: ICD-10-CM

## 2023-06-07 LAB
LV EF: 55 %
LVEF MODALITY: NORMAL

## 2023-06-07 PROCEDURE — 93306 TTE W/DOPPLER COMPLETE: CPT

## 2023-06-07 NOTE — RESULT ENCOUNTER NOTE
Heart pumping function normal at 55%. Normal pressures. Very mildly leaky valves. No further testing or workup required.

## 2023-06-08 ENCOUNTER — TELEPHONE (OUTPATIENT)
Dept: CARDIOLOGY CLINIC | Age: 78
End: 2023-06-08

## 2023-06-08 NOTE — TELEPHONE ENCOUNTER
Called and spoke with patient and relayed results-she gave V/U and had no further questions. Confirmed follow up appointment 6/29/2023.

## 2023-06-08 NOTE — TELEPHONE ENCOUNTER
----- Message from HOWIE Meneses CNP sent at 6/7/2023  5:44 PM EDT -----  Heart pumping function normal at 55%. Normal pressures. Very mildly leaky valves. No further testing or workup required.

## 2023-06-15 ENCOUNTER — APPOINTMENT (OUTPATIENT)
Dept: GENERAL RADIOLOGY | Age: 78
End: 2023-06-15
Payer: MEDICARE

## 2023-06-15 ENCOUNTER — TELEPHONE (OUTPATIENT)
Dept: PULMONOLOGY | Age: 78
End: 2023-06-15

## 2023-06-15 ENCOUNTER — TELEPHONE (OUTPATIENT)
Dept: CARDIOLOGY CLINIC | Age: 78
End: 2023-06-15

## 2023-06-15 ENCOUNTER — HOSPITAL ENCOUNTER (EMERGENCY)
Age: 78
Discharge: HOME | End: 2023-06-15
Payer: MEDICARE

## 2023-06-15 VITALS
OXYGEN SATURATION: 98 % | SYSTOLIC BLOOD PRESSURE: 136 MMHG | TEMPERATURE: 97.8 F | WEIGHT: 155 LBS | BODY MASS INDEX: 25.83 KG/M2 | HEART RATE: 78 BPM | RESPIRATION RATE: 18 BRPM | HEIGHT: 65 IN | DIASTOLIC BLOOD PRESSURE: 77 MMHG

## 2023-06-15 DIAGNOSIS — R06.02 SOB (SHORTNESS OF BREATH): Primary | ICD-10-CM

## 2023-06-15 DIAGNOSIS — J44.1 COPD EXACERBATION (HCC): Primary | ICD-10-CM

## 2023-06-15 LAB
ALBUMIN SERPL-MCNC: 4.3 G/DL (ref 3.4–5)
ALBUMIN/GLOB SERPL: 1.3 {RATIO} (ref 1.1–2.2)
ALP SERPL-CCNC: 105 U/L (ref 40–129)
ALT SERPL-CCNC: 14 U/L (ref 10–40)
ANION GAP SERPL CALCULATED.3IONS-SCNC: 15 MMOL/L (ref 3–16)
AST SERPL-CCNC: 18 U/L (ref 15–37)
BASOPHILS # BLD: 0.1 K/UL (ref 0–0.2)
BASOPHILS NFR BLD: 1.3 %
BILIRUB SERPL-MCNC: 0.5 MG/DL (ref 0–1)
BUN SERPL-MCNC: 11 MG/DL (ref 7–20)
CALCIUM SERPL-MCNC: 9.5 MG/DL (ref 8.3–10.6)
CHLORIDE SERPL-SCNC: 92 MMOL/L (ref 99–110)
CO2 SERPL-SCNC: 24 MMOL/L (ref 21–32)
CREAT SERPL-MCNC: <0.5 MG/DL (ref 0.6–1.2)
DEPRECATED RDW RBC AUTO: 13.2 % (ref 12.4–15.4)
EOSINOPHIL # BLD: 0.2 K/UL (ref 0–0.6)
EOSINOPHIL NFR BLD: 2.6 %
GFR SERPLBLD CREATININE-BSD FMLA CKD-EPI: >60 ML/MIN/{1.73_M2}
GLUCOSE SERPL-MCNC: 103 MG/DL (ref 70–99)
HCT VFR BLD AUTO: 43 % (ref 36–48)
HGB BLD-MCNC: 14.4 G/DL (ref 12–16)
LYMPHOCYTES # BLD: 2.1 K/UL (ref 1–5.1)
LYMPHOCYTES NFR BLD: 32.7 %
MCH RBC QN AUTO: 31.5 PG (ref 26–34)
MCHC RBC AUTO-ENTMCNC: 33.6 G/DL (ref 31–36)
MCV RBC AUTO: 93.7 FL (ref 80–100)
MONOCYTES # BLD: 0.7 K/UL (ref 0–1.3)
MONOCYTES NFR BLD: 11.5 %
NEUTROPHILS # BLD: 3.3 K/UL (ref 1.7–7.7)
NEUTROPHILS NFR BLD: 51.9 %
PLATELET # BLD AUTO: 232 K/UL (ref 135–450)
PMV BLD AUTO: 10.5 FL (ref 5–10.5)
POTASSIUM SERPL-SCNC: 3.6 MMOL/L (ref 3.5–5.1)
PROT SERPL-MCNC: 7.5 G/DL (ref 6.4–8.2)
RBC # BLD AUTO: 4.59 M/UL (ref 4–5.2)
SODIUM SERPL-SCNC: 131 MMOL/L (ref 136–145)
TROPONIN, HIGH SENSITIVITY: 11 NG/L (ref 0–14)
WBC # BLD AUTO: 6.3 K/UL (ref 4–11)

## 2023-06-15 PROCEDURE — 84484 ASSAY OF TROPONIN QUANT: CPT

## 2023-06-15 PROCEDURE — 93005 ELECTROCARDIOGRAM TRACING: CPT | Performed by: STUDENT IN AN ORGANIZED HEALTH CARE EDUCATION/TRAINING PROGRAM

## 2023-06-15 PROCEDURE — 80053 COMPREHEN METABOLIC PANEL: CPT

## 2023-06-15 PROCEDURE — 99285 EMERGENCY DEPT VISIT HI MDM: CPT

## 2023-06-15 PROCEDURE — 85025 COMPLETE CBC W/AUTO DIFF WBC: CPT

## 2023-06-15 PROCEDURE — 71046 X-RAY EXAM CHEST 2 VIEWS: CPT

## 2023-06-15 ASSESSMENT — PAIN - FUNCTIONAL ASSESSMENT: PAIN_FUNCTIONAL_ASSESSMENT: 0-10

## 2023-06-15 ASSESSMENT — LIFESTYLE VARIABLES: HOW OFTEN DO YOU HAVE A DRINK CONTAINING ALCOHOL: NEVER

## 2023-06-15 ASSESSMENT — PAIN SCALES - GENERAL: PAINLEVEL_OUTOF10: 4

## 2023-06-15 NOTE — ED NOTES
Ambulated pt with pulse oximeter. Initial VS were 62 bpm, and 95% SpO2 on RA.  Ambulated with walker approximately 100 feet, SpO2 remained 95-97%, HR was 75-77 bpm.     [facility]  06/15/23 1209

## 2023-06-15 NOTE — ED PROVIDER NOTES
Reviewed ECG completed for [place]. I did not see this patient and was not involved in their care. ECG  The Ekg interpreted by me shows  normal pacemaker rhythm with a rate of 60  Axis is   Normal  QTc is  within an acceptable range  Intervals and Durations are unremarkable.       ST Segments: nonspecific changes  No significant change from prior EKG dated 4/8/23

## 2023-06-15 NOTE — TELEPHONE ENCOUNTER
Pt called and stated that she has an upcoming ov with npam on 6/29. Pt wanted to know if kxa can be there at the appt or switch the appointment to kxa? Please advise.

## 2023-06-16 LAB
EKG ATRIAL RATE: 60 BPM
EKG DIAGNOSIS: NORMAL
EKG P AXIS: 73 DEGREES
EKG P-R INTERVAL: 160 MS
EKG Q-T INTERVAL: 448 MS
EKG QRS DURATION: 80 MS
EKG QTC CALCULATION (BAZETT): 448 MS
EKG R AXIS: -23 DEGREES
EKG T AXIS: 24 DEGREES
EKG VENTRICULAR RATE: 60 BPM

## 2023-06-16 PROCEDURE — 93010 ELECTROCARDIOGRAM REPORT: CPT | Performed by: INTERNAL MEDICINE

## 2023-06-16 NOTE — ED PROVIDER NOTES
[facility]  ED  EMERGENCY DEPARTMENT ENCOUNTER        Pt Name: Ady Rose  MRN: [id number]  [place] 1945  Date of evaluation: 6/15/2023  Provider: HOWIE Barfield - CNP  PCP: Eddy Osgood, MD        BC. I have evaluated this patient. CHIEF COMPLAINT       Chief Complaint   Patient presents with    Shortness of Breath     Pacer placed and biopsy this past April, SOB since and pain where they had biopsy       HISTORY OF PRESENT ILLNESS: 1 or more Elements     History From: the patient  Limitations to history : None    Ady Mention is a [de-identified] y.o. female who presents to the emergency department today with complaints of shortness of breath. Patient states that she has had pain in her lungs from her biopsy back in April, states that she has had shortness of breath since then as well as discomfort. Patient states that she received a breathing treatment prior to coming in and feels tremendously better, when I am in the room she is in no obvious distress. Nursing Notes were all reviewed and agreed with or any disagreements were addressed in the HPI. REVIEW OF SYSTEMS :      Review of Systems    Positives and Pertinent negatives as per HPI. SURGICAL HISTORY     Past Surgical History:   Procedure Laterality Date    APPENDECTOMY      BACK SURGERY      BRONCHOSCOPY  2008    COLONOSCOPY  11/15/2012    1 snared polyp    CT NEEDLE BIOPSY LUNG PERCUTANEOUS  4/4/2023    CT NEEDLE BIOPSY LUNG PERCUTANEOUS 4/4/2023 [address] SCAN    HYSTERECTOMY (CERVIX STATUS UNKNOWN)      TONSILLECTOMY         CURRENTMEDICATIONS       Discharge Medication List as of 6/15/2023 12:57 PM        CONTINUE these medications which have NOT CHANGED    Details   gabapentin (NEURONTIN) 300 MG capsule Take 1 capsule by mouth 3 times daily. , Disp-90 capsule, R-2Historical Med      amLODIPine (NORVASC) 2.5 MG tablet Take 1 tablet by mouth dailyHistorical Med      Cranberry 200 MG CAPS Take by

## 2023-06-19 DIAGNOSIS — J45.40 MODERATE PERSISTENT ASTHMA, UNSPECIFIED WHETHER COMPLICATED: ICD-10-CM

## 2023-06-19 RX ORDER — ALBUTEROL SULFATE 90 UG/1
2 AEROSOL, METERED RESPIRATORY (INHALATION) EVERY 6 HOURS PRN
Qty: 54 G | OUTPATIENT
Start: 2023-06-19

## 2023-06-19 NOTE — TELEPHONE ENCOUNTER
Attempted to contact pt  regarding message no VM phone just rang.  Once pt returns call back please informed KXA is OOT on 06/29 and next open appt is September

## 2023-06-27 ENCOUNTER — OFFICE VISIT (OUTPATIENT)
Dept: PULMONOLOGY | Age: 78
End: 2023-06-27
Payer: MEDICARE

## 2023-06-27 ENCOUNTER — HOSPITAL ENCOUNTER (OUTPATIENT)
Age: 78
Discharge: HOME | End: 2023-06-27
Payer: MEDICARE

## 2023-06-27 ENCOUNTER — HOSPITAL ENCOUNTER (OUTPATIENT)
Dept: GENERAL RADIOLOGY | Age: 78
Discharge: HOME | End: 2023-06-27
Payer: MEDICARE

## 2023-06-27 VITALS
WEIGHT: 151 LBS | HEIGHT: 65 IN | OXYGEN SATURATION: 99 % | HEART RATE: 118 BPM | SYSTOLIC BLOOD PRESSURE: 120 MMHG | BODY MASS INDEX: 25.16 KG/M2 | DIASTOLIC BLOOD PRESSURE: 70 MMHG

## 2023-06-27 DIAGNOSIS — J45.40 MODERATE PERSISTENT ASTHMA, UNSPECIFIED WHETHER COMPLICATED: Primary | ICD-10-CM

## 2023-06-27 DIAGNOSIS — R06.02 SOB (SHORTNESS OF BREATH): ICD-10-CM

## 2023-06-27 DIAGNOSIS — R06.09 DYSPNEA ON EXERTION: ICD-10-CM

## 2023-06-27 DIAGNOSIS — R91.1 PULMONARY NODULE: ICD-10-CM

## 2023-06-27 PROCEDURE — 99214 OFFICE O/P EST MOD 30 MIN: CPT | Performed by: INTERNAL MEDICINE

## 2023-06-27 PROCEDURE — 1123F ACP DISCUSS/DSCN MKR DOCD: CPT | Performed by: INTERNAL MEDICINE

## 2023-06-27 PROCEDURE — 71046 X-RAY EXAM CHEST 2 VIEWS: CPT

## 2023-06-27 RX ORDER — PREDNISONE 10 MG/1
TABLET ORAL
Qty: 30 TABLET | Refills: 0 | Status: SHIPPED | OUTPATIENT
Start: 2023-06-27 | End: 2023-07-09

## 2023-06-27 RX ORDER — DOXYCYCLINE HYCLATE 100 MG
100 TABLET ORAL 2 TIMES DAILY
Qty: 10 TABLET | Refills: 0 | Status: SHIPPED | OUTPATIENT
Start: 2023-06-27 | End: 2023-07-02

## 2023-06-27 RX ORDER — ALBUTEROL SULFATE 90 UG/1
2 AEROSOL, METERED RESPIRATORY (INHALATION) EVERY 6 HOURS PRN
Qty: 18 G | Refills: 5 | Status: SHIPPED | OUTPATIENT
Start: 2023-06-27

## 2023-06-29 ENCOUNTER — NURSE ONLY (OUTPATIENT)
Dept: CARDIOLOGY CLINIC | Age: 78
End: 2023-06-29
Payer: MEDICARE

## 2023-06-29 ENCOUNTER — OFFICE VISIT (OUTPATIENT)
Dept: CARDIOLOGY CLINIC | Age: 78
End: 2023-06-29

## 2023-06-29 VITALS
DIASTOLIC BLOOD PRESSURE: 68 MMHG | RESPIRATION RATE: 18 BRPM | WEIGHT: 147.13 LBS | HEART RATE: 61 BPM | BODY MASS INDEX: 24.51 KG/M2 | OXYGEN SATURATION: 94 % | HEIGHT: 65 IN | SYSTOLIC BLOOD PRESSURE: 124 MMHG

## 2023-06-29 DIAGNOSIS — I49.5 SINUS NODE DYSFUNCTION (HCC): ICD-10-CM

## 2023-06-29 DIAGNOSIS — I48.0 PAROXYSMAL ATRIAL FIBRILLATION (HCC): Primary | ICD-10-CM

## 2023-06-29 DIAGNOSIS — Z95.0 PACEMAKER: ICD-10-CM

## 2023-06-29 DIAGNOSIS — I49.5 SINUS NODE DYSFUNCTION (HCC): Primary | ICD-10-CM

## 2023-06-29 PROCEDURE — 93280 PM DEVICE PROGR EVAL DUAL: CPT | Performed by: INTERNAL MEDICINE

## 2023-06-29 RX ORDER — POTASSIUM CHLORIDE 20 MEQ/1
20 TABLET, EXTENDED RELEASE ORAL DAILY
COMMUNITY

## 2023-06-29 RX ORDER — FUROSEMIDE 40 MG/1
40 TABLET ORAL DAILY
COMMUNITY
End: 2023-06-29 | Stop reason: SINTOL

## 2023-06-29 RX ORDER — METOPROLOL SUCCINATE 25 MG/1
25 TABLET, EXTENDED RELEASE ORAL DAILY
Qty: 30 TABLET | Refills: 3 | Status: SHIPPED | OUTPATIENT
Start: 2023-06-29

## 2023-06-30 ENCOUNTER — TELEPHONE (OUTPATIENT)
Dept: PULMONOLOGY | Age: 78
End: 2023-06-30

## 2023-06-30 DIAGNOSIS — J45.40 MODERATE PERSISTENT ASTHMA, UNSPECIFIED WHETHER COMPLICATED: ICD-10-CM

## 2023-07-03 RX ORDER — ALBUTEROL SULFATE 2.5 MG/3ML
2.5 SOLUTION RESPIRATORY (INHALATION) EVERY 6 HOURS PRN
Qty: 180 EACH | Refills: 5 | Status: SHIPPED | OUTPATIENT
Start: 2023-07-03

## 2023-07-06 ENCOUNTER — TELEPHONE (OUTPATIENT)
Dept: CARDIOLOGY CLINIC | Age: 78
End: 2023-07-06

## 2023-07-11 RX ORDER — AZITHROMYCIN 250 MG/1
250 TABLET, FILM COATED ORAL SEE ADMIN INSTRUCTIONS
Qty: 6 TABLET | Refills: 0 | Status: ON HOLD | OUTPATIENT
Start: 2023-07-11 | End: 2023-07-15 | Stop reason: HOSPADM

## 2023-07-11 NOTE — TELEPHONE ENCOUNTER
Vanessa Ivey MD  You 5 days ago     KA  I spoke to patient by phone. I re-assured her that nothing serious is going on. Nothing further is needed at this time. Thanks    Noted.

## 2023-07-11 NOTE — TELEPHONE ENCOUNTER
Rx pended for Schofield Rape for self management. (To replace Doxy)    She is also asking if ok to do PFT since was dx with \"hold in her heart\"?

## 2023-07-13 ENCOUNTER — APPOINTMENT (OUTPATIENT)
Dept: GENERAL RADIOLOGY | Age: 78
DRG: 194 | End: 2023-07-13
Payer: MEDICARE

## 2023-07-13 ENCOUNTER — HOSPITAL ENCOUNTER (INPATIENT)
Age: 78
LOS: 2 days | Discharge: HOME | DRG: 194 | End: 2023-07-15
Attending: EMERGENCY MEDICINE | Admitting: INTERNAL MEDICINE
Payer: MEDICARE

## 2023-07-13 ENCOUNTER — NURSE ONLY (OUTPATIENT)
Dept: CARDIOLOGY CLINIC | Age: 78
End: 2023-07-13
Payer: MEDICARE

## 2023-07-13 ENCOUNTER — APPOINTMENT (OUTPATIENT)
Dept: CT IMAGING | Age: 78
DRG: 194 | End: 2023-07-13
Payer: MEDICARE

## 2023-07-13 DIAGNOSIS — R53.83 LETHARGIC: ICD-10-CM

## 2023-07-13 DIAGNOSIS — J18.9 PNEUMONIA OF RIGHT MIDDLE LOBE DUE TO INFECTIOUS ORGANISM: ICD-10-CM

## 2023-07-13 DIAGNOSIS — E87.20 LACTIC ACIDOSIS: ICD-10-CM

## 2023-07-13 DIAGNOSIS — Z95.0 PACEMAKER: Primary | ICD-10-CM

## 2023-07-13 DIAGNOSIS — R55 SYNCOPE AND COLLAPSE: Primary | ICD-10-CM

## 2023-07-13 DIAGNOSIS — R53.1 GENERALIZED WEAKNESS: ICD-10-CM

## 2023-07-13 DIAGNOSIS — I49.5 SINUS NODE DYSFUNCTION (HCC): ICD-10-CM

## 2023-07-13 PROBLEM — J15.9 PNEUMONIA, BACTERIAL: Status: ACTIVE | Noted: 2023-07-13

## 2023-07-13 LAB
ALBUMIN SERPL-MCNC: 4.6 G/DL (ref 3.4–5)
ALBUMIN/GLOB SERPL: 1.5 {RATIO} (ref 1.1–2.2)
ALP SERPL-CCNC: 106 U/L (ref 40–129)
ALT SERPL-CCNC: 14 U/L (ref 10–40)
AMPHETAMINES UR QL SCN>1000 NG/ML: ABNORMAL
ANION GAP SERPL CALCULATED.3IONS-SCNC: 15 MMOL/L (ref 3–16)
AST SERPL-CCNC: 16 U/L (ref 15–37)
BACTERIA URNS QL MICRO: ABNORMAL /HPF
BARBITURATES UR QL SCN>200 NG/ML: ABNORMAL
BASE EXCESS BLDV CALC-SCNC: 0.2 MMOL/L (ref -3–3)
BASOPHILS # BLD: 0.1 K/UL (ref 0–0.2)
BASOPHILS NFR BLD: 0.5 %
BENZODIAZ UR QL SCN>200 NG/ML: ABNORMAL
BILIRUB SERPL-MCNC: 0.6 MG/DL (ref 0–1)
BILIRUB UR QL STRIP.AUTO: NEGATIVE
BUN SERPL-MCNC: 10 MG/DL (ref 7–20)
CALCIUM SERPL-MCNC: 9.9 MG/DL (ref 8.3–10.6)
CANNABINOIDS UR QL SCN>50 NG/ML: ABNORMAL
CHLORIDE SERPL-SCNC: 95 MMOL/L (ref 99–110)
CLARITY UR: CLEAR
CO2 BLDV-SCNC: 27 MMOL/L
CO2 SERPL-SCNC: 23 MMOL/L (ref 21–32)
COCAINE UR QL SCN: ABNORMAL
COHGB MFR BLDV: 4 % (ref 0–1.5)
COLOR UR: ABNORMAL
CREAT SERPL-MCNC: 0.6 MG/DL (ref 0.6–1.2)
DEPRECATED RDW RBC AUTO: 13.7 % (ref 12.4–15.4)
DRUG SCREEN COMMENT UR-IMP: ABNORMAL
EOSINOPHIL # BLD: 0.2 K/UL (ref 0–0.6)
EOSINOPHIL NFR BLD: 1.5 %
EPI CELLS #/AREA URNS HPF: ABNORMAL /HPF (ref 0–5)
ETHANOLAMINE SERPL-MCNC: NORMAL MG/DL (ref 0–0.08)
FENTANYL SCREEN, URINE: ABNORMAL
GFR SERPLBLD CREATININE-BSD FMLA CKD-EPI: >60 ML/MIN/{1.73_M2}
GLUCOSE SERPL-MCNC: 91 MG/DL (ref 70–99)
GLUCOSE UR STRIP.AUTO-MCNC: NEGATIVE MG/DL
HCO3 BLDV-SCNC: 25.7 MMOL/L (ref 23–29)
HCT VFR BLD AUTO: 43.2 % (ref 36–48)
HGB BLD-MCNC: 14.3 G/DL (ref 12–16)
HGB UR QL STRIP.AUTO: NEGATIVE
KETONES UR STRIP.AUTO-MCNC: NEGATIVE MG/DL
LACTATE BLDV-SCNC: 1.5 MMOL/L (ref 0.4–2)
LACTATE BLDV-SCNC: 3.3 MMOL/L (ref 0.4–2)
LEUKOCYTE ESTERASE UR QL STRIP.AUTO: ABNORMAL
LYMPHOCYTES # BLD: 2.6 K/UL (ref 1–5.1)
LYMPHOCYTES NFR BLD: 25.8 %
MAGNESIUM SERPL-MCNC: 2.5 MG/DL (ref 1.8–2.4)
MCH RBC QN AUTO: 31.5 PG (ref 26–34)
MCHC RBC AUTO-ENTMCNC: 33.1 G/DL (ref 31–36)
MCV RBC AUTO: 95.4 FL (ref 80–100)
METHADONE UR QL SCN>300 NG/ML: ABNORMAL
METHGB MFR BLDV: 0.6 %
MONOCYTES # BLD: 1 K/UL (ref 0–1.3)
MONOCYTES NFR BLD: 9.6 %
NEUTROPHILS # BLD: 6.3 K/UL (ref 1.7–7.7)
NEUTROPHILS NFR BLD: 62.6 %
NITRITE UR QL STRIP.AUTO: NEGATIVE
O2 THERAPY: ABNORMAL
OPIATES UR QL SCN>300 NG/ML: ABNORMAL
OXYCODONE UR QL SCN: POSITIVE
PCO2 BLDV: 44.9 MMHG (ref 40–50)
PCP UR QL SCN>25 NG/ML: ABNORMAL
PH BLDV: 7.38 [PH] (ref 7.35–7.45)
PH UR STRIP.AUTO: 7 [PH] (ref 5–8)
PH UR STRIP: 7 [PH]
PLATELET # BLD AUTO: 273 K/UL (ref 135–450)
PMV BLD AUTO: 10.4 FL (ref 5–10.5)
PO2 BLDV: 164.7 MMHG (ref 25–40)
POTASSIUM SERPL-SCNC: 3.5 MMOL/L (ref 3.5–5.1)
PROCALCITONIN SERPL IA-MCNC: 0.03 NG/ML (ref 0–0.15)
PROT SERPL-MCNC: 7.7 G/DL (ref 6.4–8.2)
PROT UR STRIP.AUTO-MCNC: NEGATIVE MG/DL
RBC # BLD AUTO: 4.53 M/UL (ref 4–5.2)
RBC #/AREA URNS HPF: ABNORMAL /HPF (ref 0–4)
SAO2 % BLDV: 99 %
SODIUM SERPL-SCNC: 133 MMOL/L (ref 136–145)
SP GR UR STRIP.AUTO: <=1.005 (ref 1–1.03)
TROPONIN, HIGH SENSITIVITY: 11 NG/L (ref 0–14)
UA COMPLETE W REFLEX CULTURE PNL UR: ABNORMAL
UA DIPSTICK W REFLEX MICRO PNL UR: YES
URN SPEC COLLECT METH UR: ABNORMAL
UROBILINOGEN UR STRIP-ACNC: 0.2 E.U./DL
WBC # BLD AUTO: 10.1 K/UL (ref 4–11)
WBC #/AREA URNS HPF: ABNORMAL /HPF (ref 0–5)

## 2023-07-13 PROCEDURE — 83735 ASSAY OF MAGNESIUM: CPT

## 2023-07-13 PROCEDURE — 1200000000 HC SEMI PRIVATE

## 2023-07-13 PROCEDURE — 94640 AIRWAY INHALATION TREATMENT: CPT

## 2023-07-13 PROCEDURE — 84145 PROCALCITONIN (PCT): CPT

## 2023-07-13 PROCEDURE — 80053 COMPREHEN METABOLIC PANEL: CPT

## 2023-07-13 PROCEDURE — 85025 COMPLETE CBC W/AUTO DIFF WBC: CPT

## 2023-07-13 PROCEDURE — 70450 CT HEAD/BRAIN W/O DYE: CPT

## 2023-07-13 PROCEDURE — 72125 CT NECK SPINE W/O DYE: CPT

## 2023-07-13 PROCEDURE — 96360 HYDRATION IV INFUSION INIT: CPT

## 2023-07-13 PROCEDURE — 82077 ASSAY SPEC XCP UR&BREATH IA: CPT

## 2023-07-13 PROCEDURE — 6360000002 HC RX W HCPCS: Performed by: EMERGENCY MEDICINE

## 2023-07-13 PROCEDURE — 87641 MR-STAPH DNA AMP PROBE: CPT

## 2023-07-13 PROCEDURE — 87324 CLOSTRIDIUM AG IA: CPT

## 2023-07-13 PROCEDURE — 93280 PM DEVICE PROGR EVAL DUAL: CPT | Performed by: INTERNAL MEDICINE

## 2023-07-13 PROCEDURE — 87040 BLOOD CULTURE FOR BACTERIA: CPT

## 2023-07-13 PROCEDURE — 71250 CT THORAX DX C-: CPT

## 2023-07-13 PROCEDURE — 6370000000 HC RX 637 (ALT 250 FOR IP): Performed by: INTERNAL MEDICINE

## 2023-07-13 PROCEDURE — 97535 SELF CARE MNGMENT TRAINING: CPT

## 2023-07-13 PROCEDURE — 97165 OT EVAL LOW COMPLEX 30 MIN: CPT

## 2023-07-13 PROCEDURE — 6360000002 HC RX W HCPCS: Performed by: INTERNAL MEDICINE

## 2023-07-13 PROCEDURE — 99285 EMERGENCY DEPT VISIT HI MDM: CPT

## 2023-07-13 PROCEDURE — 84484 ASSAY OF TROPONIN QUANT: CPT

## 2023-07-13 PROCEDURE — 2580000003 HC RX 258: Performed by: EMERGENCY MEDICINE

## 2023-07-13 PROCEDURE — 80307 DRUG TEST PRSMV CHEM ANLYZR: CPT

## 2023-07-13 PROCEDURE — 71045 X-RAY EXAM CHEST 1 VIEW: CPT

## 2023-07-13 PROCEDURE — 83605 ASSAY OF LACTIC ACID: CPT

## 2023-07-13 PROCEDURE — 96374 THER/PROPH/DIAG INJ IV PUSH: CPT

## 2023-07-13 PROCEDURE — 82803 BLOOD GASES ANY COMBINATION: CPT

## 2023-07-13 PROCEDURE — 93005 ELECTROCARDIOGRAM TRACING: CPT | Performed by: EMERGENCY MEDICINE

## 2023-07-13 PROCEDURE — 81001 URINALYSIS AUTO W/SCOPE: CPT

## 2023-07-13 PROCEDURE — 87449 NOS EACH ORGANISM AG IA: CPT

## 2023-07-13 RX ORDER — ACETAMINOPHEN 650 MG/1
650 SUPPOSITORY RECTAL EVERY 6 HOURS PRN
Status: DISCONTINUED | OUTPATIENT
Start: 2023-07-13 | End: 2023-07-15 | Stop reason: HOSPADM

## 2023-07-13 RX ORDER — ALBUTEROL SULFATE 90 UG/1
2 AEROSOL, METERED RESPIRATORY (INHALATION) EVERY 6 HOURS PRN
Status: DISCONTINUED | OUTPATIENT
Start: 2023-07-13 | End: 2023-07-15 | Stop reason: HOSPADM

## 2023-07-13 RX ORDER — POLYETHYLENE GLYCOL 3350 17 G/17G
17 POWDER, FOR SOLUTION ORAL DAILY PRN
Status: DISCONTINUED | OUTPATIENT
Start: 2023-07-13 | End: 2023-07-15 | Stop reason: HOSPADM

## 2023-07-13 RX ORDER — MONTELUKAST SODIUM 10 MG/1
10 TABLET ORAL NIGHTLY
Status: DISCONTINUED | OUTPATIENT
Start: 2023-07-13 | End: 2023-07-15 | Stop reason: HOSPADM

## 2023-07-13 RX ORDER — SODIUM CHLORIDE 9 MG/ML
INJECTION, SOLUTION INTRAVENOUS PRN
Status: DISCONTINUED | OUTPATIENT
Start: 2023-07-13 | End: 2023-07-15 | Stop reason: HOSPADM

## 2023-07-13 RX ORDER — MAGNESIUM SULFATE 1 G/100ML
1000 INJECTION INTRAVENOUS PRN
Status: DISCONTINUED | OUTPATIENT
Start: 2023-07-13 | End: 2023-07-15 | Stop reason: HOSPADM

## 2023-07-13 RX ORDER — POTASSIUM CHLORIDE 7.45 MG/ML
10 INJECTION INTRAVENOUS PRN
Status: DISCONTINUED | OUTPATIENT
Start: 2023-07-13 | End: 2023-07-15 | Stop reason: HOSPADM

## 2023-07-13 RX ORDER — PRAVASTATIN SODIUM 40 MG
40 TABLET ORAL DAILY
Status: DISCONTINUED | OUTPATIENT
Start: 2023-07-13 | End: 2023-07-15 | Stop reason: HOSPADM

## 2023-07-13 RX ORDER — 0.9 % SODIUM CHLORIDE 0.9 %
500 INTRAVENOUS SOLUTION INTRAVENOUS ONCE
Status: COMPLETED | OUTPATIENT
Start: 2023-07-13 | End: 2023-07-13

## 2023-07-13 RX ORDER — METOPROLOL SUCCINATE 25 MG/1
25 TABLET, EXTENDED RELEASE ORAL DAILY
Status: DISCONTINUED | OUTPATIENT
Start: 2023-07-13 | End: 2023-07-15 | Stop reason: HOSPADM

## 2023-07-13 RX ORDER — LEVOFLOXACIN 5 MG/ML
750 INJECTION, SOLUTION INTRAVENOUS EVERY 24 HOURS
Status: DISCONTINUED | OUTPATIENT
Start: 2023-07-14 | End: 2023-07-14

## 2023-07-13 RX ORDER — OXYBUTYNIN CHLORIDE 5 MG/1
5 TABLET, EXTENDED RELEASE ORAL NIGHTLY
Status: DISCONTINUED | OUTPATIENT
Start: 2023-07-13 | End: 2023-07-15 | Stop reason: HOSPADM

## 2023-07-13 RX ORDER — ALBUTEROL SULFATE 2.5 MG/3ML
2.5 SOLUTION RESPIRATORY (INHALATION) EVERY 6 HOURS PRN
Status: DISCONTINUED | OUTPATIENT
Start: 2023-07-13 | End: 2023-07-15 | Stop reason: HOSPADM

## 2023-07-13 RX ORDER — ACETAMINOPHEN 325 MG/1
650 TABLET ORAL EVERY 6 HOURS PRN
Status: DISCONTINUED | OUTPATIENT
Start: 2023-07-13 | End: 2023-07-15 | Stop reason: HOSPADM

## 2023-07-13 RX ORDER — GUAIFENESIN 600 MG/1
600 TABLET, EXTENDED RELEASE ORAL DAILY
Status: DISCONTINUED | OUTPATIENT
Start: 2023-07-13 | End: 2023-07-15 | Stop reason: HOSPADM

## 2023-07-13 RX ORDER — ARFORMOTEROL TARTRATE 15 UG/2ML
15 SOLUTION RESPIRATORY (INHALATION)
Status: DISCONTINUED | OUTPATIENT
Start: 2023-07-13 | End: 2023-07-14

## 2023-07-13 RX ORDER — BUDESONIDE 0.5 MG/2ML
0.25 INHALANT ORAL
Status: DISCONTINUED | OUTPATIENT
Start: 2023-07-13 | End: 2023-07-14

## 2023-07-13 RX ORDER — ASCORBIC ACID 500 MG
500 TABLET ORAL DAILY
Status: DISCONTINUED | OUTPATIENT
Start: 2023-07-13 | End: 2023-07-15 | Stop reason: HOSPADM

## 2023-07-13 RX ORDER — SODIUM CHLORIDE 0.9 % (FLUSH) 0.9 %
5-40 SYRINGE (ML) INJECTION EVERY 12 HOURS SCHEDULED
Status: DISCONTINUED | OUTPATIENT
Start: 2023-07-13 | End: 2023-07-15 | Stop reason: HOSPADM

## 2023-07-13 RX ORDER — SODIUM CHLORIDE 0.9 % (FLUSH) 0.9 %
5-40 SYRINGE (ML) INJECTION PRN
Status: DISCONTINUED | OUTPATIENT
Start: 2023-07-13 | End: 2023-07-15 | Stop reason: HOSPADM

## 2023-07-13 RX ORDER — POTASSIUM CHLORIDE 20 MEQ/1
40 TABLET, EXTENDED RELEASE ORAL PRN
Status: DISCONTINUED | OUTPATIENT
Start: 2023-07-13 | End: 2023-07-15 | Stop reason: HOSPADM

## 2023-07-13 RX ORDER — FLUTICASONE PROPIONATE 50 MCG
1 SPRAY, SUSPENSION (ML) NASAL DAILY
Status: DISCONTINUED | OUTPATIENT
Start: 2023-07-13 | End: 2023-07-15 | Stop reason: HOSPADM

## 2023-07-13 RX ORDER — GABAPENTIN 300 MG/1
300 CAPSULE ORAL 3 TIMES DAILY PRN
Status: DISCONTINUED | OUTPATIENT
Start: 2023-07-13 | End: 2023-07-15 | Stop reason: HOSPADM

## 2023-07-13 RX ORDER — PROCHLORPERAZINE EDISYLATE 5 MG/ML
10 INJECTION INTRAMUSCULAR; INTRAVENOUS EVERY 6 HOURS PRN
Status: DISCONTINUED | OUTPATIENT
Start: 2023-07-13 | End: 2023-07-15 | Stop reason: HOSPADM

## 2023-07-13 RX ADMIN — IPRATROPIUM BROMIDE 0.5 MG: 0.5 SOLUTION RESPIRATORY (INHALATION) at 20:44

## 2023-07-13 RX ADMIN — OXYBUTYNIN CHLORIDE 5 MG: 5 TABLET, EXTENDED RELEASE ORAL at 20:18

## 2023-07-13 RX ADMIN — ACETAMINOPHEN 650 MG: 325 TABLET ORAL at 15:56

## 2023-07-13 RX ADMIN — FLUTICASONE PROPIONATE 1 SPRAY: 50 SPRAY, METERED NASAL at 15:57

## 2023-07-13 RX ADMIN — CEFTRIAXONE SODIUM 1000 MG: 1 INJECTION, POWDER, FOR SOLUTION INTRAMUSCULAR; INTRAVENOUS at 12:50

## 2023-07-13 RX ADMIN — SODIUM CHLORIDE 500 ML: 9 INJECTION, SOLUTION INTRAVENOUS at 10:56

## 2023-07-13 RX ADMIN — MONTELUKAST 10 MG: 10 TABLET, FILM COATED ORAL at 20:18

## 2023-07-13 RX ADMIN — PRAVASTATIN SODIUM 40 MG: 40 TABLET ORAL at 20:18

## 2023-07-13 RX ADMIN — OXYCODONE HYDROCHLORIDE AND ACETAMINOPHEN 500 MG: 500 TABLET ORAL at 15:57

## 2023-07-13 RX ADMIN — ARFORMOTEROL TARTRATE 15 MCG: 15 SOLUTION RESPIRATORY (INHALATION) at 20:44

## 2023-07-13 RX ADMIN — BUDESONIDE 250 MCG: 0.5 SUSPENSION RESPIRATORY (INHALATION) at 20:44

## 2023-07-13 RX ADMIN — AZITHROMYCIN MONOHYDRATE 500 MG: 500 INJECTION, POWDER, LYOPHILIZED, FOR SOLUTION INTRAVENOUS at 13:04

## 2023-07-13 RX ADMIN — GUAIFENESIN 600 MG: 600 TABLET ORAL at 20:18

## 2023-07-13 ASSESSMENT — PAIN SCALES - GENERAL: PAINLEVEL_OUTOF10: 6

## 2023-07-13 ASSESSMENT — PAIN DESCRIPTION - LOCATION: LOCATION: HEAD

## 2023-07-13 ASSESSMENT — PAIN DESCRIPTION - DESCRIPTORS: DESCRIPTORS: ACHING;POUNDING

## 2023-07-13 ASSESSMENT — ENCOUNTER SYMPTOMS
COUGH: 1
SORE THROAT: 0
VOMITING: 0
BACK PAIN: 1
ABDOMINAL PAIN: 0
SHORTNESS OF BREATH: 1
CHEST TIGHTNESS: 1
DIARRHEA: 0
CONSTIPATION: 0

## 2023-07-13 ASSESSMENT — PAIN DESCRIPTION - ORIENTATION: ORIENTATION: RIGHT;MID

## 2023-07-13 ASSESSMENT — PAIN - FUNCTIONAL ASSESSMENT: PAIN_FUNCTIONAL_ASSESSMENT: ACTIVITIES ARE NOT PREVENTED

## 2023-07-13 NOTE — PROGRESS NOTES
4 Eyes Skin Assessment     NAME:  Joselo Gerardo OF BIRTH:  1945  MEDICAL RECORD NUMBER:  [id number]    The patient is being assessed for  Admission    I agree that at least one RN has performed a thorough Head to Toe Skin Assessment on the patient. ALL assessment sites listed below have been assessed. Areas assessed by both nurses:    Head, Face, Ears, Shoulders, Back, Chest, Arms, Elbows, Hands, Sacrum. Buttock, Coccyx, Ischium, and Legs. Feet and Heels  PT has small areas covered by bandages that are being medically treated. Assessed under bandages, skin only slightly red. Does the Patient have a Wound?  No noted wound(s)       Jeison Prevention initiated by RN: No  Wound Care Orders initiated by RN: No    Pressure Injury (Stage 3,4, Unstageable, DTI, NWPT, and Complex wounds) if present, place Wound referral order by RN under : No    New Ostomies, if present place, Ostomy referral order under : No     Nurse 1 eSignature: Electronically signed by Adia Doherty RN on 7/13/23 at 4:23 PM EDT    **SHARE this note so that the co-signing nurse can place an eSignature**    Nurse 2 eSignature: {Esignature:[id number]}

## 2023-07-13 NOTE — CARE COORDINATION
Case Management Assessment  Initial Evaluation    Date/Time of Evaluation: 7/13/2023 2:48 PM  Assessment Completed by: RANDALL Minor    If patient is discharged prior to next notation, then this note serves as note for discharge by case management. Patient Name: Colt Tipton                   YOB: 1945  Diagnosis: Syncope and collapse [R55]  Lactic acidosis [E87.20]  Pneumonia, bacterial [J15.9]  Lethargic [R53.83]  Generalized weakness [R53.1]  Pneumonia of right middle lobe due to infectious organism [J18.9]                   Date / Time: 7/13/2023  9:34 AM    Patient Admission Status: Inpatient   Readmission Risk (Low < 19, Mod (19-27), High > 27): Readmission Risk Score: 10.7    Current PCP: Dariel Ibrahim MD  PCP verified by CM? Yes    Chart Reviewed: Yes      History Provided by: Patient  Patient Orientation: Alert and Oriented    Patient Cognition: Alert    Hospitalization in the last 30 days (Readmission):  No    If yes, Readmission Assessment in  Navigator will be completed. Advance Directives:      Code Status: Prior   Patient's Primary Decision Maker is: Legal Next of Kin    Primary Decision Maker (Active):  Nnamdijf Tang Child - [phone]    Primary Decision Maker: Lillie Sampson  Child - [phone]    Discharge Planning:    Patient lives with: Alone Type of Home: Apartment  Primary Care Giver: Self  Patient Support Systems include: (P) Children, Friends/Neighbors   Current Financial resources: None  Current community resources: ECF/Home Care  Current services prior to admission: NANCY/Passport, Durable Medical Equipment            Current DME: Walker            Type of Home Care services:  Nursing Services, Aide Services, OT, PT    ADLS  Prior functional level: Assistance with the following:, Shopping, Housework  Current functional level: Assistance with the following:, Housework, Shopping    PT AM-PAC:   /24  OT AM-PAC:   /24    Family can provide assistance at DC: information on Staying Connected. Discussed Lif[employer]. Patient agreeable. Call placed to [facility]  and message left. The Plan for Transition of Care is related to the following treatment goals of Syncope and collapse [R55]  Lactic acidosis [E87.20]  Pneumonia, bacterial [J15.9]  Lethargic [R53.83]  Generalized weakness [R53.1]  Pneumonia of right middle lobe due to infectious organism [D64.9]    IF APPLICABLE: The Patient and/or patient representative Katie Sandoval and her family were provided with a choice of provider and agrees with the discharge plan. Freedom of choice list with basic dialogue that supports the patient's individualized plan of care/goals and shares the quality data associated with the providers was provided to:     Patient Representative Name:       The Patient and/or Patient Representative Agree with the Discharge Plan?       RANDALL Corral, CARLS   Case Management Department  Ph: [phone] Fax: [phone]

## 2023-07-13 NOTE — PROGRESS NOTES
SLP Attempt Note    SLP acknowledged order, reviewed pt's chart and spoke with RN (Helga Marquez). RN reported that OT was just working with the pt, but wasn't sure if they had finished yet. SLP went to pt's room and pt was being seen by respiratory therapist. Will re-attempt evaluation as schedule allows. No charges filed. Thank you.     Alex Randall MA, CF-SLP

## 2023-07-13 NOTE — PLAN OF CARE
PLAN OF CARE    Received request for inpatient admission. Admitting provider Dr. Mirna Tate notified.     Malgorzata Freeman MD  7/13/2023  11:45 AM

## 2023-07-13 NOTE — H&P
Hospital Medicine History and Physical      Chief Complaint:  syncope      History and Present Illness: The patient is a pleasant 78 Y F with a h/o former smoking, COPD, Afib, and pacemaker. Years ago she \"passed out 5 times,\" always when she was up walking around, such as walking her dog. She was referred to a neurologist, had a negative MRI brain in 2017, and hadn't passed out since. She had been feeling well lately, seemingly in her usual state of health. She urinated, then stood up from the toilet. The next thing she knew she was on the bathroom floor. She realized she must have passed out. She had a bump on her head. She didn't think she was out for more than a few seconds, and didn't feel postictal.  She didn't have any chest pain or palpitations. She hasn't been feeling lightheaded lately. She has been eating and drinking like normal, denies losing weight. She came to the ED to get checked out. CXR suggested that she had a new RML pneumonia, not seen on CXR from 6/27. This was surprising because the patient denied any cough, dyspnea above baseline, or chills. No leukocytosis. I see that she had suspicious R lung nodules on a CT in 1/2023, and then had a negative lung nodule biopsy in 4/2023. Her pulmonologist is planning to perform another outpatient CT chest soon to re-evaluate. Even though her labs and vitals were fairly stable, the patient looked frail and week. She lives alone. Hospital medicine was called for admission. She was diagnosed with a COPD exacerbation by her pulmonologist on 6/27, but she didn't start taking the recommended prednisone and doxycycline until 7/12, so she only had one dose of these meds prior to presenting here. General appearance: No apparent distress, appears stated age. HEENT: Pupils equal, round. Conjunctivae/corneas clear. Hard of hearing. Neck: No jugular venous distention. Respiratory:  Normal respiratory effort.   Bilaterally

## 2023-07-13 NOTE — PLAN OF CARE
Problem: Discharge Planning  Goal: Discharge to home or other facility with appropriate resources  Outcome: Progressing  Flowsheets (Taken 7/13/2023 1612)  Discharge to home or other facility with appropriate resources: Identify barriers to discharge with patient and caregiver     Problem: Safety - Adult  Goal: Free from fall injury  Outcome: Progressing

## 2023-07-13 NOTE — CARE COORDINATION
[facility]  Patient is active with [facility], Will follow for discharge to home with orders to resume care.     Marsha Bell RN, BSN CTN  [facility] [phone]

## 2023-07-13 NOTE — PROGRESS NOTES
Occupational Therapy  Facility/Department: [place] [id number] - MED SURG/ORTHO  Occupational Therapy Initial Assessment and Teratment    Name: Jared Ovalles  : 1945  MRN: [id number]  Date of Service: 2023    Discharge Recommendations:  24 hour supervision or assist, Home with Home health OT  OT Equipment Recommendations  Equipment Needed: No    If pt is unable to be seen after this session, please let this note serve as discharge summary. Please see case management note for discharge disposition. Thank you. AM-PAC score  AM-PAC Inpatient Daily Activity Raw Score: 22 (23 160)  AM-PAC Inpatient ADL T-Scale Score : 47.1 (23 160)  ADL Inpatient CMS 0-100% Score: 25.8 (23)  ADL Inpatient CMS G-Code Modifier : CJ (23)      Patient Diagnosis(es): The primary encounter diagnosis was Syncope and collapse. Diagnoses of Pneumonia of right middle lobe due to infectious organism, Generalized weakness, Lethargic, and Lactic acidosis were also pertinent to this visit. Past Medical History:  has a past medical history of Arthritis, Asthma, Bronchitis chronic, Colon polyp, COPD (chronic obstructive pulmonary disease) ([address]), Emphysema of lung ([address]), Guillain-[place] ([address]), Hyperlipidemia, Lock jaw, Pneumonia, Post-nasal drip, Pulmonary nodule, Rash, Reflex sympathetic dystrophy, RSD (reflex sympathetic dystrophy), and TMJ (dislocation of temporomandibular joint). Past Surgical History:  has a past surgical history that includes Hysterectomy; Appendectomy; Tonsillectomy; bronchoscopy (); Colonoscopy (11/15/2012); back surgery; and CT NEEDLE BIOPSY LUNG PERCUTANEOUS (2023).      Assessment   Performance deficits / Impairments: Decreased safe awareness;Decreased strength;Decreased endurance;Decreased high-level IADLs  Prognosis: Good  Decision Making: Low Complexity  REQUIRES OT FOLLOW-UP: Yes  Activity Tolerance  Activity Tolerance: Patient Tolerated treatment well        Plan

## 2023-07-13 NOTE — ED NOTES
Pt very anxious, states she had a syncopal episode on the toilet this morning, fell forward and hit the top of her head. Hematoma seen on top of head. Takes eliquis for afib. Pt alert/oriented. Lives alone.      Diego Issa RN  07/13/23 9981

## 2023-07-14 LAB
ANION GAP SERPL CALCULATED.3IONS-SCNC: 10 MMOL/L (ref 3–16)
BACTERIA BLD CULT ORG #2: NORMAL
BACTERIA BLD CULT: NORMAL
BASOPHILS # BLD: 0.1 K/UL (ref 0–0.2)
BASOPHILS NFR BLD: 0.9 %
BUN SERPL-MCNC: 7 MG/DL (ref 7–20)
C DIFF TOX A+B STL QL IA: NORMAL
CALCIUM SERPL-MCNC: 9.2 MG/DL (ref 8.3–10.6)
CHLORIDE SERPL-SCNC: 105 MMOL/L (ref 99–110)
CO2 SERPL-SCNC: 21 MMOL/L (ref 21–32)
CREAT SERPL-MCNC: <0.5 MG/DL (ref 0.6–1.2)
DEPRECATED RDW RBC AUTO: 13.7 % (ref 12.4–15.4)
EKG ATRIAL RATE: 60 BPM
EKG DIAGNOSIS: NORMAL
EKG P AXIS: 111 DEGREES
EKG P-R INTERVAL: 150 MS
EKG Q-T INTERVAL: 428 MS
EKG QRS DURATION: 78 MS
EKG QTC CALCULATION (BAZETT): 428 MS
EKG R AXIS: -19 DEGREES
EKG T AXIS: 44 DEGREES
EKG VENTRICULAR RATE: 60 BPM
EOSINOPHIL # BLD: 0.3 K/UL (ref 0–0.6)
EOSINOPHIL NFR BLD: 4.4 %
GFR SERPLBLD CREATININE-BSD FMLA CKD-EPI: >60 ML/MIN/{1.73_M2}
GLUCOSE SERPL-MCNC: 95 MG/DL (ref 70–99)
HCT VFR BLD AUTO: 40.6 % (ref 36–48)
HGB BLD-MCNC: 13.4 G/DL (ref 12–16)
LACTATE BLDV-SCNC: 1.4 MMOL/L (ref 0.4–2)
LYMPHOCYTES # BLD: 2.3 K/UL (ref 1–5.1)
LYMPHOCYTES NFR BLD: 30.8 %
MCH RBC QN AUTO: 31.4 PG (ref 26–34)
MCHC RBC AUTO-ENTMCNC: 33 G/DL (ref 31–36)
MCV RBC AUTO: 95.1 FL (ref 80–100)
MONOCYTES # BLD: 0.7 K/UL (ref 0–1.3)
MONOCYTES NFR BLD: 9.8 %
MRSA DNA SPEC QL NAA+PROBE: NORMAL
NEUTROPHILS # BLD: 4.1 K/UL (ref 1.7–7.7)
NEUTROPHILS NFR BLD: 54.1 %
PLATELET # BLD AUTO: 235 K/UL (ref 135–450)
PMV BLD AUTO: 10.4 FL (ref 5–10.5)
POTASSIUM SERPL-SCNC: 3.7 MMOL/L (ref 3.5–5.1)
RBC # BLD AUTO: 4.27 M/UL (ref 4–5.2)
SODIUM SERPL-SCNC: 136 MMOL/L (ref 136–145)
WBC # BLD AUTO: 7.5 K/UL (ref 4–11)

## 2023-07-14 PROCEDURE — 94640 AIRWAY INHALATION TREATMENT: CPT

## 2023-07-14 PROCEDURE — 92610 EVALUATE SWALLOWING FUNCTION: CPT

## 2023-07-14 PROCEDURE — 83605 ASSAY OF LACTIC ACID: CPT

## 2023-07-14 PROCEDURE — 97116 GAIT TRAINING THERAPY: CPT

## 2023-07-14 PROCEDURE — 6360000002 HC RX W HCPCS: Performed by: INTERNAL MEDICINE

## 2023-07-14 PROCEDURE — 97530 THERAPEUTIC ACTIVITIES: CPT

## 2023-07-14 PROCEDURE — 36415 COLL VENOUS BLD VENIPUNCTURE: CPT

## 2023-07-14 PROCEDURE — 6370000000 HC RX 637 (ALT 250 FOR IP): Performed by: INTERNAL MEDICINE

## 2023-07-14 PROCEDURE — 85025 COMPLETE CBC W/AUTO DIFF WBC: CPT

## 2023-07-14 PROCEDURE — 80048 BASIC METABOLIC PNL TOTAL CA: CPT

## 2023-07-14 PROCEDURE — 93010 ELECTROCARDIOGRAM REPORT: CPT | Performed by: INTERNAL MEDICINE

## 2023-07-14 PROCEDURE — 97162 PT EVAL MOD COMPLEX 30 MIN: CPT

## 2023-07-14 PROCEDURE — 1200000000 HC SEMI PRIVATE

## 2023-07-14 RX ORDER — ARFORMOTEROL TARTRATE 15 UG/2ML
15 SOLUTION RESPIRATORY (INHALATION)
Status: DISCONTINUED | OUTPATIENT
Start: 2023-07-14 | End: 2023-07-15 | Stop reason: HOSPADM

## 2023-07-14 RX ORDER — DOXYCYCLINE HYCLATE 100 MG
100 TABLET ORAL 2 TIMES DAILY
Status: DISCONTINUED | OUTPATIENT
Start: 2023-07-15 | End: 2023-07-15 | Stop reason: HOSPADM

## 2023-07-14 RX ORDER — BUDESONIDE 0.5 MG/2ML
0.25 INHALANT ORAL
Status: DISCONTINUED | OUTPATIENT
Start: 2023-07-14 | End: 2023-07-15 | Stop reason: HOSPADM

## 2023-07-14 RX ADMIN — ACETAMINOPHEN 650 MG: 325 TABLET ORAL at 04:00

## 2023-07-14 RX ADMIN — ARFORMOTEROL TARTRATE 15 MCG: 15 SOLUTION RESPIRATORY (INHALATION) at 09:11

## 2023-07-14 RX ADMIN — MONTELUKAST 10 MG: 10 TABLET, FILM COATED ORAL at 20:33

## 2023-07-14 RX ADMIN — METOPROLOL SUCCINATE 25 MG: 25 TABLET, EXTENDED RELEASE ORAL at 20:33

## 2023-07-14 RX ADMIN — BUDESONIDE 250 MCG: 0.5 SUSPENSION RESPIRATORY (INHALATION) at 19:59

## 2023-07-14 RX ADMIN — GUAIFENESIN 600 MG: 600 TABLET ORAL at 20:33

## 2023-07-14 RX ADMIN — ARFORMOTEROL TARTRATE 15 MCG: 15 SOLUTION RESPIRATORY (INHALATION) at 19:59

## 2023-07-14 RX ADMIN — OXYBUTYNIN CHLORIDE 5 MG: 5 TABLET, EXTENDED RELEASE ORAL at 20:33

## 2023-07-14 RX ADMIN — OXYCODONE HYDROCHLORIDE AND ACETAMINOPHEN 500 MG: 500 TABLET ORAL at 09:26

## 2023-07-14 RX ADMIN — LEVOFLOXACIN 750 MG: 5 INJECTION, SOLUTION INTRAVENOUS at 09:34

## 2023-07-14 RX ADMIN — FLUTICASONE PROPIONATE 1 SPRAY: 50 SPRAY, METERED NASAL at 09:26

## 2023-07-14 RX ADMIN — IPRATROPIUM BROMIDE 0.5 MG: 0.5 SOLUTION RESPIRATORY (INHALATION) at 11:45

## 2023-07-14 RX ADMIN — ACETAMINOPHEN 650 MG: 325 TABLET ORAL at 20:33

## 2023-07-14 RX ADMIN — BUDESONIDE 250 MCG: 0.5 SUSPENSION RESPIRATORY (INHALATION) at 09:11

## 2023-07-14 RX ADMIN — PRAVASTATIN SODIUM 40 MG: 40 TABLET ORAL at 20:33

## 2023-07-14 RX ADMIN — IPRATROPIUM BROMIDE 0.5 MG: 0.5 SOLUTION RESPIRATORY (INHALATION) at 09:11

## 2023-07-14 ASSESSMENT — PAIN DESCRIPTION - DESCRIPTORS: DESCRIPTORS: ACHING;DISCOMFORT

## 2023-07-14 ASSESSMENT — PAIN SCALES - GENERAL
PAINLEVEL_OUTOF10: 0
PAINLEVEL_OUTOF10: 10
PAINLEVEL_OUTOF10: 6

## 2023-07-14 ASSESSMENT — PAIN DESCRIPTION - LOCATION: LOCATION: BACK

## 2023-07-14 ASSESSMENT — PAIN DESCRIPTION - ORIENTATION: ORIENTATION: MID

## 2023-07-14 NOTE — PROGRESS NOTES
Hospital Medicine Progress Note        Subjective:  No further spells. She has mild dyspnea. Anxious. General appearance: No apparent distress, appears stated age. HEENT: Pupils equal, round. Conjunctivae/corneas clear. Hard of hearing. Neck: No jugular venous distention. Respiratory:  Normal respiratory effort. Bilaterally without Wheezes/Rhonchi. Bibasilar crackles. Cardiovascular: Normal rate and regular rhythm with normal S1/S2 without murmurs, rubs or gallops. Abdomen: Soft, non-tender, non-distended with normal bowel sounds. Musculoskeletal: No cyanosis bilaterally. No edema. Without deformity. Skin: No jaundice. Scalp hematoma. Neurologic:  Neurovascularly intact without any focal sensory/motor deficits. Cranial nerves: II-XII intact, grossly non-focal.  Psychiatric: Alert and oriented, has insight. Anxious. She is a circuitous historian, often gives tangential, uninformative answers. Often she blatantly says the wrong word but doesn't realize it. She seems to acknowledge a bit of a chronic cognitive impairment and is vigilant for any indication that we think she is confused. \"People always seem to think I don't know what I'm talking about. \"      Assessment and Plan:       The patient is a pleasant 78 Y F with a h/o former smoking, COPD, Afib, and pacemaker. Years ago she \"passed out 5 times,\" always when she was up walking around, such as walking her dog. She was referred to a neurologist, had a negative MRI brain in 2017, and hadn't passed out since. She had been feeling well lately, seemingly in her usual state of health. She urinated, then stood up from the toilet. The next thing she knew she was on the bathroom floor. She realized she must have passed out. She had a bump on her head. She didn't think she was out for more than a few seconds, and didn't feel postictal.  She didn't have any chest pain or palpitations. She hasn't been feeling lightheaded lately.   She

## 2023-07-14 NOTE — PROGRESS NOTES
07/14/23 1600   RT Protocol   History Pulmonary Disease 0   Respiratory pattern 0   Breath sounds 0   Cough 0   Indications for Bronchodilator Therapy None   Bronchodilator Assessment Score 0

## 2023-07-14 NOTE — PLAN OF CARE
Bedside swallow evaluation completed. Fang Gerardo M.A.  [address] [place] [address]  Speech Language Pathologist

## 2023-07-14 NOTE — CARE COORDINATION
Spoke to patient and son. Discussed SNF vs. Home. Patient wants facility. List of facilities in network with insurance reviewed. Patient interested in [facility] [place]. Referral made. Spoke to PT/OT and discussed. They recommend home with home care. Discussed with patient and family. Patient now agreeable to returning home on d/c. Anticipate d/c in am.      Patient is not happy with her apartment. Patient provided with other senior housing options. Referral to [facility] for assistance with housing.   Electronically signed by RANDALL Ayala on 7/14/2023 at 1:47 PM
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
## 2023-07-14 NOTE — PROGRESS NOTES
Physical Therapy  Facility/Department: [facility] C5 - MED SURG/ORTHO  Physical Therapy Initial Assessment/Treatment/Discharge     Name: Obdulia Darby  : 1945  MRN: [id number]  Date of Service: 2023    Discharge Recommendations:  Home with assist PRN   PT Equipment Recommendations  Equipment Needed: No  Other: Has rollator      Patient Diagnosis(es): The primary encounter diagnosis was Syncope and collapse. Diagnoses of Pneumonia of right middle lobe due to infectious organism, Generalized weakness, Lethargic, and Lactic acidosis were also pertinent to this visit. Past Medical History:  has a past medical history of Arthritis, Asthma, Bronchitis chronic, Colon polyp, COPD (chronic obstructive pulmonary disease) ([address]), Emphysema of lung ([address]), Guillain-Arvonia ([address]), Hyperlipidemia, Lock jaw, Pneumonia, Post-nasal drip, Pulmonary nodule, Rash, Reflex sympathetic dystrophy, RSD (reflex sympathetic dystrophy), and TMJ (dislocation of temporomandibular joint). Past Surgical History:  has a past surgical history that includes Hysterectomy; Appendectomy; Tonsillectomy; bronchoscopy (); Colonoscopy (11/15/2012); back surgery; and CT NEEDLE BIOPSY LUNG PERCUTANEOUS (2023). Assessment   Assessment: Pt to [facility] with diagnosis of pneumonia. PTA pt lives alone in an apartment with no steps. Pt was able to ambulate and complete transfers with mod I with rollator. Pt currently presents near baseline function. Able to complete bed mobility, transfers, and ambulate with no AD with mod I to supervision. Pt feels like she is functional at her baseline. Pt visablly SOB with ambulation although SpO2 remains 95% or greater throughout session. Pt is safe to DC home with assist PRN when deemed medically appropriate. No further PT needs at this time. Decision Making: Low Complexity  Barriers to Learning: None  No Skilled PT:  At baseline function  Requires PT Follow-Up: No  Activity Tolerance  Activity Tolerance: Cognition   Orientation  Overall Orientation Status: Within Normal Limits  Orientation Level: Oriented X4;Oriented to time;Oriented to place;Oriented to situation;Oriented to person  Cognition  Overall Cognitive Status: WNL     Objective   Pulse: 81  Heart Rate Source: Monitor  BP: (!) 162/82  BP Location: Left lower arm  BP Method: Automatic  Patient Position: Semi fowlers  MAP (Calculated): 109  Respirations: 16  SpO2: 96 %  O2 Device: None (Room air)     Observation/Palpation  Posture: Good  Gross Assessment  AROM: Within functional limits  PROM: Within functional limits  Strength: Within functional limits     Bed Mobility Training  Bed Mobility Training: Yes  Overall Level of Assistance: Additional time;Modified independent  Supine to Sit: Modified independent (HOB elevated)  Sit to Supine: Modified independent (HOB elevated)  Balance  Sitting: Intact  Standing: Intact  Transfer Training  Transfer Training: Yes  Interventions: Verbal cues; Safety awareness training. Initial   Sit to Stand: Supervision  Stand to Sit: Supervision  Toilet Transfer: Supervision  Gait Training  Gait Training: Yes  Gait  Overall Level of Assistance: Contact-guard assistance;Supervision (CGA progressing to supervision)  Interventions: Verbal cues; Safety awareness training   Base of Support: Narrowed  Speed/Jayne: Pace decreased (< 100 feet/min)  Gait Abnormalities: Decreased step clearance (Gait at baseline per pt. Slightly unsteady, no overt LOB)  Distance (ft): 150 Feet  Assistive Device: Gait belt; Other (comment) (No AD, uses rail in hallway PRN, uses rollator at baseline)    OutComes Score  AM-PAC Score  AM-PAC Inpatient Mobility Raw Score : 23 (07/14/23 1214)  AM-PAC Inpatient T-Scale Score : 56.93 (07/14/23 1214)  Mobility Inpatient CMS 0-100% Score: 11.2 (07/14/23 1214)  Mobility Inpatient CMS G-Code Modifier : CI (07/14/23 1214)    Goals  Short Term Goals  Time Frame for Short Term Goals: 7/14/23  Short Term Goal 1: pt

## 2023-07-15 VITALS
BODY MASS INDEX: 24.91 KG/M2 | OXYGEN SATURATION: 95 % | WEIGHT: 155 LBS | HEIGHT: 66 IN | SYSTOLIC BLOOD PRESSURE: 129 MMHG | RESPIRATION RATE: 14 BRPM | HEART RATE: 68 BPM | TEMPERATURE: 98.1 F | DIASTOLIC BLOOD PRESSURE: 78 MMHG

## 2023-07-15 LAB — SARS-COV-2 RDRP RESP QL NAA+PROBE: NOT DETECTED

## 2023-07-15 PROCEDURE — 87635 SARS-COV-2 COVID-19 AMP PRB: CPT

## 2023-07-15 PROCEDURE — 6370000000 HC RX 637 (ALT 250 FOR IP): Performed by: INTERNAL MEDICINE

## 2023-07-15 RX ORDER — GABAPENTIN 300 MG/1
300 CAPSULE ORAL 3 TIMES DAILY PRN
Qty: 90 CAPSULE | Refills: 0
Start: 2023-07-15 | End: 2023-08-14

## 2023-07-15 RX ORDER — DOXYCYCLINE HYCLATE 100 MG
100 TABLET ORAL 2 TIMES DAILY
Qty: 6 TABLET | Refills: 0 | Status: SHIPPED | OUTPATIENT
Start: 2023-07-15 | End: 2023-07-18

## 2023-07-15 RX ADMIN — APIXABAN 5 MG: 5 TABLET, FILM COATED ORAL at 08:45

## 2023-07-15 RX ADMIN — FLUTICASONE PROPIONATE 1 SPRAY: 50 SPRAY, METERED NASAL at 07:59

## 2023-07-15 RX ADMIN — OXYCODONE HYDROCHLORIDE AND ACETAMINOPHEN 500 MG: 500 TABLET ORAL at 07:59

## 2023-07-15 RX ADMIN — DOXYCYCLINE HYCLATE 100 MG: 100 TABLET, COATED ORAL at 08:45

## 2023-07-15 RX ADMIN — ACETAMINOPHEN 650 MG: 325 TABLET ORAL at 02:42

## 2023-07-15 RX ADMIN — GABAPENTIN 300 MG: 300 CAPSULE ORAL at 07:58

## 2023-07-15 ASSESSMENT — PAIN DESCRIPTION - LOCATION: LOCATION: BACK;HEAD

## 2023-07-15 ASSESSMENT — PAIN SCALES - GENERAL: PAINLEVEL_OUTOF10: 8

## 2023-07-15 NOTE — DISCHARGE SUMMARY
Discharge Summary    Name:  Navin Whitaker /Age/Sex: 1945 (78 y.o. female)   Admit Date: 2023  Discharge Date: 7/15/23   MRN & CSN:  [id number] & [id number] Encounter Date and Time 7/15/23 9:06 AM EDT    Attending:  Jonn Michele MD Discharging Provider: Jonn Michele MD       Hospital Course: The patient is a pleasant 78 Y F with a h/o former smoking, COPD, Afib, and pacemaker. Years ago she \"passed out 5 times,\" always when she was up walking around, such as walking her dog. She was referred to a neurologist, had a negative MRI brain in , and hadn't passed out since. She had been feeling well lately, seemingly in her usual state of health. She urinated, then stood up from the toilet. The next thing she knew she was on the bathroom floor. She realized she must have passed out. She had a bump on her head. She didn't think she was out for more than a few seconds, and didn't feel postictal.  She didn't have any chest pain or palpitations. She hasn't been feeling lightheaded lately. She has been eating and drinking like normal, denies losing weight. She came to the ED to get checked out. CXR suggested that she had a new RML pneumonia, not seen on CXR from . This was surprising because the patient denied any cough, dyspnea above baseline, or chills. No leukocytosis, and procalcitonin and COVID were negative. She did have lung crackles and a lactic acidosis. Even though her labs and vitals were fairly stable, the patient looked frail and week. She lives alone. Hospital medicine was called for admission. She was diagnosed with a COPD exacerbation by her pulmonologist on , but she didn't start taking the recommended prednisone and doxycycline until , so she only had one dose of these meds prior to presenting here. Possible RML pneumonia. Ok to complete her planned course of doxycycline. Syncope.   Presumably either vasovagal (she had advanced multilevel facet arthropathy in the spine. No significant central canal narrowing is identified. SOFT TISSUES: There is no prevertebral soft tissue swelling. 1. No  acute cervical spine fracture. 2. Spinal degenerative changes as described. XR CHEST PORTABLE    Result Date: 7/13/2023  EXAMINATION: ONE XRAY VIEW OF THE CHEST 7/13/2023 10:03 am COMPARISON: 06/27/2023 HISTORY: ORDERING SYSTEM PROVIDED HISTORY: fall, syncope TECHNOLOGIST PROVIDED HISTORY: Reason for exam:->fall, syncope FINDINGS: There is biapical and bibasilar scarring. No change in the focal right mid lung airspace disease. The cardiac silhouette is stable status post dual lead pacemaker. There is no pneumothorax or pleural effusion. 1. Focal right mid lung airspace disease concerning for pneumonia. Recommend chest radiograph in 6 weeks to confirm resolution. CBC:   Recent Labs     07/13/23  1000 07/14/23  0541   WBC 10.1 7.5   HGB 14.3 13.4    235     BMP:    Recent Labs     07/13/23  1000 07/14/23  0541   * 136   K 3.5 3.7   CL 95* 105   CO2 23 21   BUN 10 7   CREATININE 0.6 <0.5*   GLUCOSE 91 95     Hepatic:   Recent Labs     07/13/23  1000   AST 16   ALT 14   BILITOT 0.6   ALKPHOS 106     Lipids: No results found for: CHOL, HDL, TRIG  Hemoglobin A1C: No results found for: LABA1C  TSH:   Lab Results   Component Value Date/Time    TSH 1.79 11/23/2011 11:33 AM     Troponin: No results found for: TROPONINT  Lactic Acid:   Recent Labs     07/13/23  1045 07/13/23  1234 07/14/23  0541   LACTA 3.3* 1.5 1.4     BNP: No results for input(s): PROBNP in the last 72 hours.   UA:  Lab Results   Component Value Date/Time    NITRU Negative 07/13/2023 09:52 PM    COLORU Straw 07/13/2023 09:52 PM    PHUR 7.0 07/13/2023 09:52 PM    PHUR 7.0 07/13/2023 09:52 PM    LABCAST 0-1 Fine 01/19/2020 12:15 AM    WBCUA 3-5 07/13/2023 09:52 PM    RBCUA 0-2 07/13/2023 09:52 PM    MUCUS 1+ 01/19/2020 12:15 AM    BACTERIA Rare 07/13/2023

## 2023-07-15 NOTE — PROGRESS NOTES
IVs removed. D/C instructions given. Pt verbalizes understanding and all questions answered. Medications picked up at outpatient pharmacy at time of being wheeled to main entrance.

## 2023-07-15 NOTE — PROGRESS NOTES
1432: received call from discharge pt with concerns of the prescribed medication potential side effects. Advised pt medication was received this morning, medication was not listed on allergy list, and if pt was experience side effects to contact primary care doctor or return to ED.     - Discussed with charge RN Jackie Maya of pts concern and agrees with my recommendations.

## 2023-07-15 NOTE — CARE COORDINATION
CASE MANAGEMENT DISCHARGE SUMMARY      Discharge to: Home with [address]     Transportation:    Family/car: yes       Confirmed discharge plan with:     Patient: yes      Family:  no per pt          RN, name: Niranjan Aguilera LPN    Pt friend Katie Sandoval will pick pt up    Note: Discharging nurse to complete ANDI, reconcile AVS, and place final copy with patient's discharge packet. RN to ensure that written prescriptions for  Level II medications are sent with patient to the facility as per protocol.

## 2023-07-17 LAB
BACTERIA BLD CULT ORG #2: NORMAL
BACTERIA BLD CULT: NORMAL

## 2023-07-18 NOTE — PROGRESS NOTES
Device programming evaluation for patient's DC PPM by company representative shows normal device function. EP physician will review. See interrogation under cardiology tab in the [address] field for more details. Attached you will find a pacemaker interrogation I performed in the ED at [facility] this afternoon. The patient had a syncopal episode and fell and hit her head at 0300 this morning. Patient had some afib episodes back in march and an episode of svt/avnrt last month. I changed mode from DDDR to DDD-CLS.

## 2023-07-24 NOTE — PROGRESS NOTES
Physician Progress Note      PATIENT:               Pranay Walden  CSN #:                  [id number]  :                       1945  ADMIT DATE:       2023 9:34 AM  DISCH DATE:        7/15/2023 11:11 AM  RESPONDING  PROVIDER #:        Era Narvaez MD          QUERY TEXT:    Patient admitted with \"syncope, right middle lobe pneumonia and generalized   weakness\" noted to have Paroxysmal atrial fibrillation and is maintained on   apixaban. If possible, please document in progress notes and discharge   summary if you are evaluating and/or treating any of the following: The medical record reflects the following:  Risk Factors: afib pacemaker  Clinical Indicators: hematoma on scalp- held Eliquis for PAF  Treatment: PAF. Resumed apixaban 7/15 (initially help for scalp hematoma). Thank-You, Sofia Bauman RN, BSN, CCDS  Options provided:  -- Secondary hypercoagulable state in a patient with atrial fibrillation  -- Other - I will add my own diagnosis  -- Disagree - Not applicable / Not valid  -- Disagree - Clinically unable to determine / Unknown  -- Refer to Clinical Documentation Reviewer    PROVIDER RESPONSE TEXT:    Provider disagreed with this query.   na    Query created by: Becky Choudhary on 2023 9:05 AM      Electronically signed by:  Era Narvaez MD 2023 3:36 PM

## 2023-07-27 ENCOUNTER — APPOINTMENT (OUTPATIENT)
Dept: CT IMAGING | Age: 78
End: 2023-07-27
Payer: MEDICARE

## 2023-07-27 ENCOUNTER — HOSPITAL ENCOUNTER (EMERGENCY)
Age: 78
Discharge: HOME | End: 2023-07-27
Attending: STUDENT IN AN ORGANIZED HEALTH CARE EDUCATION/TRAINING PROGRAM
Payer: MEDICARE

## 2023-07-27 VITALS
DIASTOLIC BLOOD PRESSURE: 76 MMHG | TEMPERATURE: 97.6 F | OXYGEN SATURATION: 96 % | HEART RATE: 69 BPM | BODY MASS INDEX: 23.76 KG/M2 | RESPIRATION RATE: 16 BRPM | SYSTOLIC BLOOD PRESSURE: 148 MMHG | WEIGHT: 145 LBS

## 2023-07-27 DIAGNOSIS — S09.90XA CLOSED HEAD INJURY, INITIAL ENCOUNTER: Primary | ICD-10-CM

## 2023-07-27 DIAGNOSIS — S00.03XA HEMATOMA OF SCALP, INITIAL ENCOUNTER: ICD-10-CM

## 2023-07-27 PROCEDURE — 99284 EMERGENCY DEPT VISIT MOD MDM: CPT

## 2023-07-27 PROCEDURE — 70450 CT HEAD/BRAIN W/O DYE: CPT

## 2023-07-27 ASSESSMENT — PAIN DESCRIPTION - LOCATION: LOCATION: HEAD

## 2023-07-27 ASSESSMENT — PAIN SCALES - GENERAL: PAINLEVEL_OUTOF10: 8

## 2023-07-27 ASSESSMENT — PAIN - FUNCTIONAL ASSESSMENT: PAIN_FUNCTIONAL_ASSESSMENT: 0-10

## 2023-07-27 NOTE — ED NOTES
Patient discharged with all belongings and discharge paperwork. Patient verbalized understanding of discharge instructions and follow up with primary care. Patient taken by wheelchair out of ED to [facility].         Rayna Mosley RN  07/27/23 0995

## 2023-07-28 ENCOUNTER — TELEPHONE (OUTPATIENT)
Dept: OTHER | Facility: CLINIC | Age: 78
End: 2023-07-28

## 2023-07-28 NOTE — TELEPHONE ENCOUNTER
Writer attempted to contact patient to set up ED follow up appointment, however, was unsuccessful. Patient's phone rang busy and immediately hung up. Unable to leave a voicemail for patient to call back if they need help with follow up.

## 2023-08-10 ENCOUNTER — SCHEDULED TELEPHONE ENCOUNTER (OUTPATIENT)
Dept: PULMONOLOGY | Age: 78
End: 2023-08-10
Payer: MEDICARE

## 2023-08-10 ENCOUNTER — TELEPHONE (OUTPATIENT)
Dept: PULMONOLOGY | Age: 78
End: 2023-08-10

## 2023-08-10 DIAGNOSIS — J45.40 MODERATE PERSISTENT ASTHMA, UNSPECIFIED WHETHER COMPLICATED: ICD-10-CM

## 2023-08-10 DIAGNOSIS — R91.1 PULMONARY NODULE: Primary | ICD-10-CM

## 2023-08-10 PROCEDURE — 99442 PR PHYS/QHP TELEPHONE EVALUATION 11-20 MIN: CPT | Performed by: INTERNAL MEDICINE

## 2023-08-10 NOTE — PROGRESS NOTES
Bronchoalveolar Lavage to Left Lower Lobe: No malignant cells identified. TTNBx 4/4/23   Right lung nodule, needle core biopsy:   -Fragments of benign lung parenchyma with mild chronic inflammation    CT chest 7/13/2023 imaging reviewed by me and showed  Compared to the CT from 01/27/2023, there is increase in size of suspicious  right upper lobe pulmonary nodules  No new focal lung consolidation is seen to suggest an acute pneumonia. Assessment:      Moderate persistent Asthma   Enlarging RUL 8 mm nodule with other nodules on CT 11/11/21. Low activities on PET scan 6/2/2022 with resolution/stability of other nodules. Enlarging RUL 1.6 cm nodule on repeat CT 1/27/2023. DDx malignancy, granuloma, LCH, lymphoma,. TTNBx 4/4/23 with chronic inflammation. Enlarging on repeat CT 7/13/2023 concerning for bronchogenic carcinoma. 54 pack years smoking-quit 11/2017   RUL nodule 8 mm on CT chest 11/15/2017. Smaller on repeat CT chest 6/11/2018 and CT 9/2/2020. Lobulated LLL pulmonary nodule on CT chest 2/3/2016 (SUV of 1.48). CT chest 8/12/16 showed stability with calcification. ? Growth on CT chest 2/13/2017 with hilar adenopathy. Negative EBUS TBNA of the LN 2/27/2017. Smaller on repeat CT 9/2/2020  Pulmonary nodules stable between 5/2007 and 4/2009. Likely granulomas. No further evaluation needed  Reaction to flu vaccine in the past - vomiting, diarrhea and  achness   History of Guillain Phi not related vaccines per patient's report   Covid vaccine with local reaction, pain at the site of first injectio injection, tolerated second dose fine   Unable to afford Xolair      Plan:      Options of repeat Bx, resection and SBRT were discussed with patient. Not interested in repeat Bx. Patient is not surgical candidate for resection and she is not interested in surgery. She is open for SBRT  and will refer to Dr. Mckinley Marks for evaluation.    Continue Trelegy and albuterol INH/Neb Q4-6 hrs PRN  Continue

## 2023-08-16 RX ORDER — ALBUTEROL SULFATE 90 UG/1
AEROSOL, METERED RESPIRATORY (INHALATION)
Qty: 8.5 G | Refills: 5 | Status: SHIPPED | OUTPATIENT
Start: 2023-08-16

## 2023-08-28 ENCOUNTER — TELEPHONE (OUTPATIENT)
Dept: PULMONOLOGY | Age: 78
End: 2023-08-28

## 2023-08-28 NOTE — TELEPHONE ENCOUNTER
8/15/2023    From the office of:   Chemo Hodgson MD   [facility] [address]  [address]  [address]  [facility] [address]  Phone: [phone]  Fax: [phone]    In regards to W Escobar Chappell, :  1969    Chief complaint:   Chief Complaint   Patient presents with   • Consultation     New pt//SEEN OVER 10 YEARS AGO by DR. Hodgson//calc score of 288 done at .        Vitals:  Visit Vitals  /83 (BP Location: RUE - Right upper extremity, Patient Position: Sitting, Cuff Size: Large Adult)   Pulse (!) 59   Ht 6' 1\" (1.854 m)   Wt 113.6 kg (250 lb 6.4 oz)   BMI 33.04 kg/m²       HISTORY OF PRESENT ILLNESS     Escobar Chappell is a 53M with a PMH of hyperlipidemia, elevated calcium score with normal ECG exercise stress test, extensive cardiac family history, GERD, sleep apnea on CPAP who presents to establish care with Dr. Hodgson. He is accompanied by his wife. [facility] due to extensive cardiac family hx despite being asymptomatic from a cardiac standpoint. Calcium score is 288. His family hx includes MI in great uncle at 47yo, grandmother at 47yo, mother, cousin x3. He then underwent ECG exercise stress testing which was found to be normal. He was also found to have elevated cholesterol levels and was recently started on rosuvastatin 5mg which was increased to 10mg. He and his wife are experiencing significant anxiety regarding his heart health and felt that [facility] and wanted to seek the expertise of a cardiologist. Additionally the patient saw Dr. Hodgson 13 years ago after an episode of vasovagal syncope at the gym, so wanted to reestablish care with Dr. Hodgson. Patient is asymptomatic from a cardiac standpoint and denies SOB, CP, MONTENEGRO, leg swelling, dizziness/light headedness, or any other complaints. He has been making an effort to improve his diet and increase  Patient wanted to let me know that she changed her [place] to [facility] [place] recently. He does not smoke.       Other significant problems:  Patient Active Problem List    Diagnosis Date Noted   • Hyperlipidemia 08/15/2023     Priority: Low     Most recent Chol 144, LDL 80 (spring 2023)     • Elevated coronary artery calcium score 08/15/2023     Priority: Low     Patient with elevated calcium score of 288 on 5/31/23.      • Sleep apnea      Priority: Low     Well managed on CPAP     • Ulnar neuropathy at elbow, bilateral 08/24/2017     Priority: Low   • Numbness and tingling in left arm 08/01/2017     Priority: Low   • Varicose veins of lower extremities with complications 01/22/2015     Priority: Low   • History of colonoscopy 06/13/2014     Priority: Low     Repeat 2019     • Esophageal reflux 05/23/2014     Priority: Low   • Family history of malignant neoplasm of gastrointestinal tract 05/23/2014     Priority: Low   • Special screening for malignant neoplasms, colon 05/23/2014     Priority: Low   • Fatigue 09/05/2013     Priority: Low   • Injury of ankle, right 09/05/2013     Priority: Low       PAST MEDICAL, FAMILY AND SOCIAL HISTORY     Medications:  Current Outpatient Medications   Medication Sig Dispense Refill   • rosuvastatin (CRESTOR) 10 MG tablet Take 10 mg by mouth daily.     • diclofenac (VOLTAREN) 1 % gel Apply 4g to affected area QID prn pain 5 Tube 3   • meloxicam (MOBIC) 15 MG tablet TAKE 1 TABLET DAILY 90 tablet 1   • rabeprazole (ACIPHEX) 20 MG tablet TAKE 1 TABLET DAILY 90 tablet 1   • levofloxacin (LEVAQUIN) 500 MG tablet Take 1 tablet by mouth daily. 10 tablet 0   • NEXIUM 40 MG capsule TAKE 1 CAPSULE DAILY 90 capsule 2   • HYDROcodone-acetaminophen (NORCO) 7.5-325 MG per tablet Take 1-2 tablets by mouth every 4 hours as needed for Pain. 50 tablet 0   • Misc Natural Products (GLUCOSAMINE CHOND COMPLEX/MSM) Tab      • pseudoephedrine (SUDAFED 12 HOUR) 120 MG 12 hr tablet Take 1 tablet by mouth every 12 hours as needed for Congestion.     • dextromethorphan  (DELSYM) 30 MG/5ML liquid Take 60 mg by mouth 2 times daily as needed.  89 mL PRN     No current facility-administered medications for this visit.       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     • Hay Fever & [Chlorpheniramine-Ppa Cr] Other (See Comments)       Past Medical  History/Surgeries:  Past Medical History:   Diagnosis Date   • Abdominal bloating 02/27/2017   • Acute sinusitis 05/06/2017   • Arthritis of midfoot 08/16/2017   • Bilateral hip pain 08/04/2017   • Chronic left-sided low back pain with left-sided sciatica    • Cluster headaches 07/08/2012   • Enlarged parotid gland 05/04/2014   • Environmental allergies    • Esophageal reflux disease 05/22/2014   • Generalized abdominal pain 04/17/2017   • Hiatal hernia    • High cholesterol    • History of staph infection     as a baby per patient report   • Injury of ankle, right 09/04/2013   • Lesion of left ulnar nerve 08/31/2017   • Numbness and tingling in left arm 07/10/2017   • Obstructive sleep apnea 11/20/2014    Well managed on CPAP   • Pharyngitis with viral syndrome 04/24/2016   • Shortness of breath on exertion    • Situational anxiety 09/2017   • Tinnitus 2017    and fluid right ear   • Ulnar neuropathy at elbow 08/24/2017    bilateral   • Varicose vein    • Varicose veins of lower extremity 01/22/2015    with complications, right   • Wears contact lenses    • Wears eyeglasses        Past Surgical History:   Procedure Laterality Date   • Colonoscopy  06/13/2014    [facility]   • Ir spine injection      8/2017   • Liposuction  2014   • Nasal septum surgery      age 15   • Ulnar nerve transposition Left 09/2017   • Vasectomy      2005 or 2006   • [place] tooth extraction         Family History:  Family History   Problem Relation Age of Onset   • Hyperlipidemia Mother    •  Cancer Father 57        colon CA   • Diabetes Brother    • Substance abuse Brother         drug   • Heart disease Maternal Uncle    • Myocardial Infarction Maternal Uncle    • Heart disease Maternal Grandmother    • Cancer Maternal Grandfather         GI ??   • Clotting Disorder Other         mom side   • Heart disease Other         mom side       Social History:  Social History     Tobacco Use   • Smoking status: Former     Current packs/day: 0.00     Average packs/day: 0.3 packs/day for 18.0 years (4.5 ttl pk-yrs)     Types: Cigarettes     Start date: 1986     Quit date: 2004     Years since quittin.5   • Smokeless tobacco: Never   • Tobacco comments:     smoking history is approx. per patient report, approx. 1 pack/week   Substance Use Topics   • Alcohol use: Yes     Alcohol/week: 2.5 standard drinks of alcohol     Types: 3 Standard drinks or equivalent per week     Comment: occasional, weekends, approx. 1-2 glasses (wine)/weekly       REVIEW OF SYSTEMS     Review of Systems   Constitutional: Negative for activity change.   Respiratory: Negative for chest tightness, shortness of breath and wheezing.    Cardiovascular: Negative for chest pain, palpitations and leg swelling.   Musculoskeletal: Positive for arthralgias.   Skin: Negative.    Neurological: Negative for dizziness, syncope and light-headedness.   Psychiatric/Behavioral: Negative.        PHYSICAL EXAM     Physical Exam  Constitutional:       Appearance: Normal appearance. He is obese.   HENT:      Head: Normocephalic and atraumatic.      Neck: Normal range of motion and neck supple.   Eyes:      Extraocular Movements: Extraocular movements intact.      Conjunctiva/sclera: Conjunctivae normal.   Neck:      Vascular: No carotid bruit.   Cardiovascular:      Rate and Rhythm: Normal rate and regular rhythm.      Pulses: Normal pulses.      Heart sounds: Normal heart sounds.   Pulmonary:      Effort: Pulmonary effort is normal.      Breath  sounds: Normal breath sounds.   Abdominal:      General: Abdomen is flat.      Palpations: Abdomen is soft.      Tenderness: There is no abdominal tenderness.   Musculoskeletal:      Right lower leg: No edema.      Left lower leg: No edema.   Skin:     General: Skin is warm and dry.   Neurological:      Mental Status: He is alert and oriented to person, place, and time.   Psychiatric:         Mood and Affect: Mood normal.         Behavior: Behavior normal.            IMAGING     Date: 5/31/23  Calcium Score: 288 (90th percentile for age and gender)    Date: 5/31/23  Coronary CTA  Predominantly calcified plaque within the mid LAD with moderate stenosis (50-69%). Additionally, the first diagonal branch has 50-69% stenosis from calcified plaque. Elsewhere there is calcified and non calcified plaque.     Date: 7/24/23  Exercise Stress Test, Amadeo Protocol  Non-stressed: ECG NSR, HR 70 bpm, /70  Stressed: ECG non-ischemic,  (92% of max), /94.   Stopped due to fatigue, no chest pain. Exercised 10.5 min, 12.3 mets. Duke teadmill score: +10. Exercise capacity: high for age group.       ASSESSMENT/PLAN     Hyperlipidemia  Patient currently taking 10mg rosuvastatin with mild side effects of joint tenderness, however difficult to assess role of the drug in these symptoms as patient has chronic joint tenderness (on meloxicam). Recommend 7-10 day drug holiday to determine if rosuvastatin is the cause of these symptoms. Patient agrees to call the clinic after drug holiday to report on symptoms, then we will do shared decision making regarding medical management of hyperlipidemia.     Elevated coronary artery calcium score  Patient with normal stress test (asymptomatic, no ischemic ECG changes, 10.5min exercise on Amadeo protocol) on 7/24/23. At this time, defer further testing as patient is asymptomatic and myocardium is sufficiently well perfused. Recommend 81mg daily aspirin and statin as discussed above.      Recommend follow up in 6 months.     Patient seen and discussed with Dr. Hodgson whose attestation will follow.     Mona Breen MD  Transitional PGY-1  Cardiology  8/19/2023 9:00 AM  I haveMorodney seen and examined the patient. I have discussed the diagnostic and treatment plan with Dr  and with the patient and I agree with the above assessment and plan.    SLY Hodgson MD

## 2023-09-01 NOTE — TELEPHONE ENCOUNTER
Pt called stating she didn't know about the appt that was scheduled 8/31, states [facility] can't do anything until after the PET scan is scheduled but needs order from our office.  Patient has recently had insurance change [employer] gold [id number]  [phone]

## 2023-09-01 NOTE — TELEPHONE ENCOUNTER
Nazanin with [facility] patient was seen on 8/31 Dr Dianne Devries ordered PET [facility] is to call patient to [facility] then she will see Olive for f/u on 9/14/23. Called patient and explained.

## 2023-09-06 ENCOUNTER — TELEPHONE (OUTPATIENT)
Dept: CARDIOLOGY CLINIC | Age: 78
End: 2023-09-06

## 2023-09-06 NOTE — TELEPHONE ENCOUNTER
Pt called left message [facility] voicemail stating they need schedule appt in October.     Pls advise thank you

## 2023-09-11 ENCOUNTER — TELEPHONE (OUTPATIENT)
Dept: PULMONOLOGY | Age: 78
End: 2023-09-11

## 2023-09-11 ENCOUNTER — TELEPHONE (OUTPATIENT)
Dept: CARDIOLOGY CLINIC | Age: 78
End: 2023-09-11

## 2023-09-11 DIAGNOSIS — I48.91 ATRIAL FIBRILLATION, UNSPECIFIED TYPE (HCC): ICD-10-CM

## 2023-09-11 NOTE — TELEPHONE ENCOUNTER
Pt called and stated that her Trelegy is too expensive and is requesting a cheaper alternative. Pt stated that she is currently out of the Trelegy.

## 2023-09-11 NOTE — TELEPHONE ENCOUNTER
DENITA for pt to return call for Eliquis options. If patient has not tried to submit a patient assistance application, we can start that and give samples in the meantime of that pending PA. If he has, or it is denied, the patient should call his insurance to see what the best/most affordable medication replacement would be under his insurance and then let us know. Trelegy is an inhaler that you will have to check with the ordering provider which looks to be Dr. Dl Juan.

## 2023-09-11 NOTE — TELEPHONE ENCOUNTER
Pt stated that she cannot afford the Eliquis or Trelegy. Pt also spoke with Dr. Sherman Larose office and they advised her to call the office. Pt would like to know what her alternatives are. Preferred pharmacy is [facility] [phone].

## 2023-09-11 NOTE — TELEPHONE ENCOUNTER
Pt returned call. She has not applied for patient assistance. She is out of [facility]. Pt would like to  samples and pt assistance ppw. Pt is concerned about not having the Trelegy. Advised pt again to call Dr. Faith Rodriguez office.  V/u.

## 2023-09-13 NOTE — TELEPHONE ENCOUNTER
Called patient pharmacy she is in donut hole since she changed insurance. Changing med will not make a difference.         Sample order pended #3

## 2023-09-14 RX ORDER — FLUTICASONE FUROATE, UMECLIDINIUM BROMIDE AND VILANTEROL TRIFENATATE 200; 62.5; 25 UG/1; UG/1; UG/1
1 POWDER RESPIRATORY (INHALATION) DAILY
Qty: 3 EACH | Refills: 0 | Status: ON HOLD | COMMUNITY
Start: 2023-09-14

## 2023-09-17 ENCOUNTER — APPOINTMENT (OUTPATIENT)
Dept: CT IMAGING | Age: 78
DRG: 313 | End: 2023-09-17
Payer: MEDICARE

## 2023-09-17 ENCOUNTER — HOSPITAL ENCOUNTER (INPATIENT)
Age: 78
LOS: 3 days | Discharge: HOME HEALTH | DRG: 313 | End: 2023-09-20
Attending: STUDENT IN AN ORGANIZED HEALTH CARE EDUCATION/TRAINING PROGRAM | Admitting: STUDENT IN AN ORGANIZED HEALTH CARE EDUCATION/TRAINING PROGRAM
Payer: MEDICARE

## 2023-09-17 ENCOUNTER — APPOINTMENT (OUTPATIENT)
Dept: GENERAL RADIOLOGY | Age: 78
DRG: 313 | End: 2023-09-17
Payer: MEDICARE

## 2023-09-17 DIAGNOSIS — R51.9 ACUTE NONINTRACTABLE HEADACHE, UNSPECIFIED HEADACHE TYPE: ICD-10-CM

## 2023-09-17 DIAGNOSIS — R07.9 CHEST PAIN, UNSPECIFIED TYPE: Primary | ICD-10-CM

## 2023-09-17 DIAGNOSIS — E87.6 HYPOKALEMIA: ICD-10-CM

## 2023-09-17 LAB
ALBUMIN SERPL-MCNC: 4.1 G/DL (ref 3.4–5)
ALBUMIN/GLOB SERPL: 1.4 {RATIO} (ref 1.1–2.2)
ALP SERPL-CCNC: 73 U/L (ref 40–129)
ALT SERPL-CCNC: 12 U/L (ref 10–40)
ANION GAP SERPL CALCULATED.3IONS-SCNC: 11 MMOL/L (ref 3–16)
ANION GAP SERPL CALCULATED.3IONS-SCNC: 15 MMOL/L (ref 3–16)
AST SERPL-CCNC: 17 U/L (ref 15–37)
BASOPHILS # BLD: 0.1 K/UL (ref 0–0.2)
BASOPHILS NFR BLD: 0.7 %
BILIRUB SERPL-MCNC: 0.5 MG/DL (ref 0–1)
BILIRUB UR QL STRIP.AUTO: NEGATIVE
BUN SERPL-MCNC: 16 MG/DL (ref 7–20)
BUN SERPL-MCNC: 8 MG/DL (ref 7–20)
CALCIUM SERPL-MCNC: 9.4 MG/DL (ref 8.3–10.6)
CALCIUM SERPL-MCNC: 9.9 MG/DL (ref 8.3–10.6)
CHLORIDE SERPL-SCNC: 89 MMOL/L (ref 99–110)
CHLORIDE SERPL-SCNC: 96 MMOL/L (ref 99–110)
CLARITY UR: CLEAR
CO2 SERPL-SCNC: 25 MMOL/L (ref 21–32)
CO2 SERPL-SCNC: 31 MMOL/L (ref 21–32)
COLOR UR: YELLOW
CREAT SERPL-MCNC: <0.5 MG/DL (ref 0.6–1.2)
CREAT SERPL-MCNC: <0.5 MG/DL (ref 0.6–1.2)
DEPRECATED RDW RBC AUTO: 13.3 % (ref 12.4–15.4)
EOSINOPHIL # BLD: 0.1 K/UL (ref 0–0.6)
EOSINOPHIL NFR BLD: 0.9 %
GFR SERPLBLD CREATININE-BSD FMLA CKD-EPI: >60 ML/MIN/{1.73_M2}
GFR SERPLBLD CREATININE-BSD FMLA CKD-EPI: >60 ML/MIN/{1.73_M2}
GLUCOSE SERPL-MCNC: 107 MG/DL (ref 70–99)
GLUCOSE SERPL-MCNC: 201 MG/DL (ref 70–99)
GLUCOSE UR STRIP.AUTO-MCNC: NEGATIVE MG/DL
HCT VFR BLD AUTO: 38.4 % (ref 36–48)
HGB BLD-MCNC: 13.3 G/DL (ref 12–16)
HGB UR QL STRIP.AUTO: NEGATIVE
KETONES UR STRIP.AUTO-MCNC: NEGATIVE MG/DL
LEUKOCYTE ESTERASE UR QL STRIP.AUTO: ABNORMAL
LYMPHOCYTES # BLD: 2.3 K/UL (ref 1–5.1)
LYMPHOCYTES NFR BLD: 29.1 %
MAGNESIUM SERPL-MCNC: 2 MG/DL (ref 1.8–2.4)
MCH RBC QN AUTO: 32.3 PG (ref 26–34)
MCHC RBC AUTO-ENTMCNC: 34.6 G/DL (ref 31–36)
MCV RBC AUTO: 93.5 FL (ref 80–100)
MONOCYTES # BLD: 0.8 K/UL (ref 0–1.3)
MONOCYTES NFR BLD: 10.4 %
NEUTROPHILS # BLD: 4.7 K/UL (ref 1.7–7.7)
NEUTROPHILS NFR BLD: 58.9 %
NITRITE UR QL STRIP.AUTO: NEGATIVE
PH UR STRIP.AUTO: 7.5 [PH] (ref 5–8)
PLATELET # BLD AUTO: 211 K/UL (ref 135–450)
PMV BLD AUTO: 11.6 FL (ref 5–10.5)
POTASSIUM SERPL-SCNC: 2.4 MMOL/L (ref 3.5–5.1)
POTASSIUM SERPL-SCNC: 2.7 MMOL/L (ref 3.5–5.1)
PROT SERPL-MCNC: 7 G/DL (ref 6.4–8.2)
PROT UR STRIP.AUTO-MCNC: NEGATIVE MG/DL
RBC # BLD AUTO: 4.11 M/UL (ref 4–5.2)
RBC #/AREA URNS HPF: NORMAL /HPF (ref 0–4)
SODIUM SERPL-SCNC: 131 MMOL/L (ref 136–145)
SODIUM SERPL-SCNC: 136 MMOL/L (ref 136–145)
SP GR UR STRIP.AUTO: <=1.005 (ref 1–1.03)
TROPONIN, HIGH SENSITIVITY: 13 NG/L (ref 0–14)
TROPONIN, HIGH SENSITIVITY: 13 NG/L (ref 0–14)
UA COMPLETE W REFLEX CULTURE PNL UR: ABNORMAL
UA DIPSTICK W REFLEX MICRO PNL UR: YES
URN SPEC COLLECT METH UR: ABNORMAL
UROBILINOGEN UR STRIP-ACNC: 0.2 E.U./DL
WBC # BLD AUTO: 8 K/UL (ref 4–11)
WBC #/AREA URNS HPF: NORMAL /HPF (ref 0–5)

## 2023-09-17 PROCEDURE — 2580000003 HC RX 258: Performed by: PHYSICIAN ASSISTANT

## 2023-09-17 PROCEDURE — 6360000002 HC RX W HCPCS: Performed by: PHYSICIAN ASSISTANT

## 2023-09-17 PROCEDURE — 85025 COMPLETE CBC W/AUTO DIFF WBC: CPT

## 2023-09-17 PROCEDURE — 2580000003 HC RX 258: Performed by: STUDENT IN AN ORGANIZED HEALTH CARE EDUCATION/TRAINING PROGRAM

## 2023-09-17 PROCEDURE — 94640 AIRWAY INHALATION TREATMENT: CPT

## 2023-09-17 PROCEDURE — 1200000000 HC SEMI PRIVATE

## 2023-09-17 PROCEDURE — 84484 ASSAY OF TROPONIN QUANT: CPT

## 2023-09-17 PROCEDURE — 96365 THER/PROPH/DIAG IV INF INIT: CPT

## 2023-09-17 PROCEDURE — 6360000002 HC RX W HCPCS: Performed by: STUDENT IN AN ORGANIZED HEALTH CARE EDUCATION/TRAINING PROGRAM

## 2023-09-17 PROCEDURE — 2500000003 HC RX 250 WO HCPCS: Performed by: STUDENT IN AN ORGANIZED HEALTH CARE EDUCATION/TRAINING PROGRAM

## 2023-09-17 PROCEDURE — 6370000000 HC RX 637 (ALT 250 FOR IP): Performed by: PHYSICIAN ASSISTANT

## 2023-09-17 PROCEDURE — 80053 COMPREHEN METABOLIC PANEL: CPT

## 2023-09-17 PROCEDURE — 93005 ELECTROCARDIOGRAM TRACING: CPT | Performed by: STUDENT IN AN ORGANIZED HEALTH CARE EDUCATION/TRAINING PROGRAM

## 2023-09-17 PROCEDURE — 71046 X-RAY EXAM CHEST 2 VIEWS: CPT

## 2023-09-17 PROCEDURE — 83735 ASSAY OF MAGNESIUM: CPT

## 2023-09-17 PROCEDURE — 36415 COLL VENOUS BLD VENIPUNCTURE: CPT

## 2023-09-17 PROCEDURE — 6370000000 HC RX 637 (ALT 250 FOR IP): Performed by: STUDENT IN AN ORGANIZED HEALTH CARE EDUCATION/TRAINING PROGRAM

## 2023-09-17 PROCEDURE — 70450 CT HEAD/BRAIN W/O DYE: CPT

## 2023-09-17 PROCEDURE — 71250 CT THORAX DX C-: CPT

## 2023-09-17 PROCEDURE — 81001 URINALYSIS AUTO W/SCOPE: CPT

## 2023-09-17 PROCEDURE — 99285 EMERGENCY DEPT VISIT HI MDM: CPT

## 2023-09-17 RX ORDER — POTASSIUM CHLORIDE 7.45 MG/ML
10 INJECTION INTRAVENOUS PRN
Status: DISCONTINUED | OUTPATIENT
Start: 2023-09-17 | End: 2023-09-20 | Stop reason: HOSPADM

## 2023-09-17 RX ORDER — POLYETHYLENE GLYCOL 3350 17 G/17G
17 POWDER, FOR SOLUTION ORAL DAILY PRN
Status: DISCONTINUED | OUTPATIENT
Start: 2023-09-17 | End: 2023-09-20 | Stop reason: HOSPADM

## 2023-09-17 RX ORDER — GABAPENTIN 300 MG/1
300 CAPSULE ORAL 3 TIMES DAILY PRN
Status: DISCONTINUED | OUTPATIENT
Start: 2023-09-17 | End: 2023-09-20 | Stop reason: HOSPADM

## 2023-09-17 RX ORDER — PRAVASTATIN SODIUM 40 MG
40 TABLET ORAL DAILY
Status: DISCONTINUED | OUTPATIENT
Start: 2023-09-17 | End: 2023-09-20 | Stop reason: HOSPADM

## 2023-09-17 RX ORDER — ALBUTEROL SULFATE 90 UG/1
2 AEROSOL, METERED RESPIRATORY (INHALATION) EVERY 6 HOURS PRN
Status: DISCONTINUED | OUTPATIENT
Start: 2023-09-17 | End: 2023-09-20 | Stop reason: HOSPADM

## 2023-09-17 RX ORDER — OXYBUTYNIN CHLORIDE 5 MG/1
5 TABLET, EXTENDED RELEASE ORAL NIGHTLY
Status: DISCONTINUED | OUTPATIENT
Start: 2023-09-17 | End: 2023-09-20 | Stop reason: HOSPADM

## 2023-09-17 RX ORDER — POTASSIUM CHLORIDE 7.45 MG/ML
10 INJECTION INTRAVENOUS
Status: COMPLETED | OUTPATIENT
Start: 2023-09-17 | End: 2023-09-17

## 2023-09-17 RX ORDER — MONTELUKAST SODIUM 10 MG/1
10 TABLET ORAL NIGHTLY
Status: DISCONTINUED | OUTPATIENT
Start: 2023-09-17 | End: 2023-09-20 | Stop reason: HOSPADM

## 2023-09-17 RX ORDER — DEXTROSE MONOHYDRATE, SODIUM CHLORIDE, AND POTASSIUM CHLORIDE 50; 1.49; 4.5 G/1000ML; G/1000ML; G/1000ML
INJECTION, SOLUTION INTRAVENOUS CONTINUOUS
Status: DISCONTINUED | OUTPATIENT
Start: 2023-09-17 | End: 2023-09-18

## 2023-09-17 RX ORDER — FLUTICASONE PROPIONATE 50 MCG
2 SPRAY, SUSPENSION (ML) NASAL DAILY
Status: DISCONTINUED | OUTPATIENT
Start: 2023-09-17 | End: 2023-09-20 | Stop reason: HOSPADM

## 2023-09-17 RX ORDER — ASCORBIC ACID 500 MG
500 TABLET ORAL DAILY
Status: DISCONTINUED | OUTPATIENT
Start: 2023-09-17 | End: 2023-09-20 | Stop reason: HOSPADM

## 2023-09-17 RX ORDER — POTASSIUM CHLORIDE 20 MEQ/1
20 TABLET, EXTENDED RELEASE ORAL DAILY
Status: DISCONTINUED | OUTPATIENT
Start: 2023-09-17 | End: 2023-09-20 | Stop reason: HOSPADM

## 2023-09-17 RX ORDER — ACETAMINOPHEN 325 MG/1
650 TABLET ORAL EVERY 6 HOURS PRN
Status: DISCONTINUED | OUTPATIENT
Start: 2023-09-17 | End: 2023-09-20 | Stop reason: HOSPADM

## 2023-09-17 RX ORDER — ONDANSETRON 2 MG/ML
4 INJECTION INTRAMUSCULAR; INTRAVENOUS EVERY 6 HOURS PRN
Status: DISCONTINUED | OUTPATIENT
Start: 2023-09-17 | End: 2023-09-20 | Stop reason: HOSPADM

## 2023-09-17 RX ORDER — ACETAMINOPHEN 650 MG/1
650 SUPPOSITORY RECTAL EVERY 6 HOURS PRN
Status: DISCONTINUED | OUTPATIENT
Start: 2023-09-17 | End: 2023-09-20 | Stop reason: HOSPADM

## 2023-09-17 RX ORDER — ASPIRIN 325 MG
325 TABLET ORAL ONCE
Status: CANCELLED | OUTPATIENT
Start: 2023-09-17 | End: 2023-09-17

## 2023-09-17 RX ORDER — POTASSIUM CHLORIDE 20 MEQ/1
40 TABLET, EXTENDED RELEASE ORAL PRN
Status: DISCONTINUED | OUTPATIENT
Start: 2023-09-17 | End: 2023-09-20 | Stop reason: HOSPADM

## 2023-09-17 RX ORDER — SODIUM CHLORIDE 0.9 % (FLUSH) 0.9 %
5-40 SYRINGE (ML) INJECTION EVERY 12 HOURS SCHEDULED
Status: DISCONTINUED | OUTPATIENT
Start: 2023-09-17 | End: 2023-09-20 | Stop reason: HOSPADM

## 2023-09-17 RX ORDER — ACETAMINOPHEN 500 MG
1000 TABLET ORAL ONCE
Status: COMPLETED | OUTPATIENT
Start: 2023-09-17 | End: 2023-09-17

## 2023-09-17 RX ORDER — SODIUM CHLORIDE 9 MG/ML
INJECTION, SOLUTION INTRAVENOUS PRN
Status: DISCONTINUED | OUTPATIENT
Start: 2023-09-17 | End: 2023-09-20 | Stop reason: HOSPADM

## 2023-09-17 RX ORDER — 0.9 % SODIUM CHLORIDE 0.9 %
1000 INTRAVENOUS SOLUTION INTRAVENOUS ONCE
Status: COMPLETED | OUTPATIENT
Start: 2023-09-17 | End: 2023-09-17

## 2023-09-17 RX ORDER — ONDANSETRON 4 MG/1
4 TABLET, ORALLY DISINTEGRATING ORAL EVERY 8 HOURS PRN
Status: DISCONTINUED | OUTPATIENT
Start: 2023-09-17 | End: 2023-09-20 | Stop reason: HOSPADM

## 2023-09-17 RX ORDER — METOPROLOL SUCCINATE 25 MG/1
25 TABLET, EXTENDED RELEASE ORAL DAILY
Status: DISCONTINUED | OUTPATIENT
Start: 2023-09-17 | End: 2023-09-20 | Stop reason: HOSPADM

## 2023-09-17 RX ORDER — ACETAMINOPHEN 500 MG
1000 TABLET ORAL NIGHTLY
Status: DISCONTINUED | OUTPATIENT
Start: 2023-09-17 | End: 2023-09-20 | Stop reason: HOSPADM

## 2023-09-17 RX ORDER — SODIUM CHLORIDE 0.9 % (FLUSH) 0.9 %
5-40 SYRINGE (ML) INJECTION PRN
Status: DISCONTINUED | OUTPATIENT
Start: 2023-09-17 | End: 2023-09-20 | Stop reason: HOSPADM

## 2023-09-17 RX ADMIN — MONTELUKAST 10 MG: 10 TABLET, FILM COATED ORAL at 22:30

## 2023-09-17 RX ADMIN — SODIUM CHLORIDE 1000 ML: 9 INJECTION, SOLUTION INTRAVENOUS at 12:42

## 2023-09-17 RX ADMIN — OXYBUTYNIN CHLORIDE 5 MG: 5 TABLET, EXTENDED RELEASE ORAL at 22:44

## 2023-09-17 RX ADMIN — POTASSIUM BICARBONATE 40 MEQ: 782 TABLET, EFFERVESCENT ORAL at 11:17

## 2023-09-17 RX ADMIN — APIXABAN 5 MG: 5 TABLET, FILM COATED ORAL at 22:30

## 2023-09-17 RX ADMIN — ACETAMINOPHEN 1000 MG: 500 TABLET ORAL at 22:30

## 2023-09-17 RX ADMIN — POTASSIUM CHLORIDE, DEXTROSE MONOHYDRATE AND SODIUM CHLORIDE: 150; 5; 450 INJECTION, SOLUTION INTRAVENOUS at 17:31

## 2023-09-17 RX ADMIN — POTASSIUM CHLORIDE 20 MEQ: 1500 TABLET, EXTENDED RELEASE ORAL at 17:32

## 2023-09-17 RX ADMIN — SODIUM CHLORIDE: 9 INJECTION, SOLUTION INTRAVENOUS at 23:12

## 2023-09-17 RX ADMIN — ACETAMINOPHEN 1000 MG: 500 TABLET ORAL at 10:53

## 2023-09-17 RX ADMIN — POTASSIUM CHLORIDE 10 MEQ: 7.46 INJECTION, SOLUTION INTRAVENOUS at 22:40

## 2023-09-17 RX ADMIN — POTASSIUM CHLORIDE 10 MEQ: 7.46 INJECTION, SOLUTION INTRAVENOUS at 12:41

## 2023-09-17 RX ADMIN — OXYCODONE HYDROCHLORIDE AND ACETAMINOPHEN 500 MG: 500 TABLET ORAL at 22:30

## 2023-09-17 RX ADMIN — POTASSIUM CHLORIDE 10 MEQ: 7.46 INJECTION, SOLUTION INTRAVENOUS at 11:17

## 2023-09-17 RX ADMIN — Medication 2 PUFF: at 21:27

## 2023-09-17 ASSESSMENT — HEART SCORE
ECG: 1
ECG: 1

## 2023-09-17 ASSESSMENT — PAIN DESCRIPTION - ORIENTATION: ORIENTATION: RIGHT

## 2023-09-17 ASSESSMENT — PAIN SCALES - GENERAL
PAINLEVEL_OUTOF10: 5
PAINLEVEL_OUTOF10: 5

## 2023-09-17 ASSESSMENT — PAIN DESCRIPTION - LOCATION: LOCATION: BREAST

## 2023-09-17 ASSESSMENT — PAIN - FUNCTIONAL ASSESSMENT: PAIN_FUNCTIONAL_ASSESSMENT: 0-10

## 2023-09-17 NOTE — ED NOTES
Ms. Bassam Zuniga is a 78 y.o. female who had concerns including Headache (Started yesterday getting worse), Chest Pain (Soreness to right breast), and Shortness of Breath (Chronic in nature. Received hhn per ems). Chief Complaint   Patient presents with    Headache     Started yesterday getting worse    Chest Pain     Soreness to right breast    Shortness of Breath     Chronic in nature. Received hhn per ems       She is being admitted for:    Hypokalemia    Her ED problem list included:    1. Chest pain, unspecified type    2. Acute nonintractable headache, unspecified headache type    3.  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)     Hyperlipidemia     Lock jaw     Pneumonia     Post-nasal drip     Pulmonary nodule     bi lateral    Rash     itchy, bumps    Reflex sympathetic dystrophy     RSD (reflex sympathetic dystrophy)     TMJ (dislocation of temporomandibular joint)        Past Surgical History:   Procedure Laterality Date    APPENDECTOMY      BACK SURGERY      BRONCHOSCOPY  2008    COLONOSCOPY  11/15/2012    1 snared polyp    CT NEEDLE BIOPSY LUNG PERCUTANEOUS  04/04/2023    CT NEEDLE BIOPSY LUNG PERCUTANEOUS 4/4/2023 [facility] [place] [place] SCAN    HYSTERECTOMY (CERVIX STATUS UNKNOWN)      PACEMAKER INSERTION      TONSILLECTOMY         Her recent abnormal labs were:    Labs Reviewed   CBC WITH AUTO DIFFERENTIAL - Abnormal; Notable for the following components:       Result Value    MPV 11.6 (*)     All other components within normal limits   COMPREHENSIVE METABOLIC PANEL W/ REFLEX TO MG FOR LOW K - Abnormal; Notable for the following components:    Sodium 131 (*)     Potassium reflex Magnesium 2.4 (*)     Chloride 89 (*)     Glucose 107 (*)     Creatinine <0.5 (*)     All other components within normal limits    Narrative:     Elvia Levin CT, [employer] 2021; [id number], Bosniak Classification of Cystic Renal Masses, Version 2019. CT HEAD WO CONTRAST    Result Date: 9/17/2023  EXAMINATION: CT OF THE HEAD WITHOUT CONTRAST  9/17/2023 11:29 am TECHNIQUE: CT of the head was performed without the administration of intravenous contrast. Automated exposure control, iterative reconstruction, and/or weight based adjustment of the mA/kV was utilized to reduce the radiation dose to as low as reasonably achievable. COMPARISON: 07/27/2023 CT HISTORY: ORDERING SYSTEM PROVIDED HISTORY: headache TECHNOLOGIST PROVIDED HISTORY: Reason for exam:->headache Has a \"code stroke\" or \"stroke alert\" been called? ->No Decision Support Exception - unselect if not a suspected or confirmed emergency medical condition->Emergency Medical Condition (MA) Reason for Exam: worsening ha since yesterday FINDINGS: BRAIN/VENTRICLES: The ventricles and sulci are prominent. Pattern is consistent with age-related atrophy. No extra-axial fluid collections, and no sign of recent intracranial hemorrhage. Decreased attenuation is noted within the periventricular white matter. Pattern is consistent with chronic small vessel ischemic change. No acute edema or mass effect. No mass lesions are detected. ORBITS: The visualized portion of the orbits demonstrate no acute abnormality. SINUSES: The visualized paranasal sinuses and mastoid air cells demonstrate no acute abnormality. SOFT TISSUES/SKULL:  No acute abnormality of the visualized skull or soft tissues. No acute intracranial abnormality. XR CHEST (2 VW)    Result Date: 9/17/2023  EXAMINATION: TWO XRAY VIEWS OF THE CHEST 9/17/2023 8:55 am COMPARISON: CT of the chest dated 07/13/2023, chest radiograph dated 07/13/2023. HISTORY: ORDERING SYSTEM PROVIDED HISTORY: chest pain TECHNOLOGIST PROVIDED HISTORY: Reason for exam:->chest pain Reason for Exam: Headache; Chest Pain; Shortness of Breath FINDINGS: Medical devices:  An intracardiac

## 2023-09-17 NOTE — PROGRESS NOTES
Admission assessment completed & charted. Pt A&O, calls out appropriately, reports feeling forgetful but is able to express needs. VSS on RA. Pt SBA w/ front wheel walker to BR. Bed alarm on for safety. Call light & bedside table within reach. Pt denies any further needs at this time.

## 2023-09-17 NOTE — PROGRESS NOTES
4 Eyes Skin Assessment     The patient is being assess for  Admission    I agree that 2 RN's have performed a thorough Head to Toe Skin Assessment on the patient. ALL assessment sites listed below have been assessed. Areas assessed by both nurses: Gonzalez Bonilla RN  []   Head, Face, and Ears   []   Shoulders, Back, and Chest  []   Arms, Elbows, and Hands   []   Coccyx, Sacrum, and Ischum  []   Legs, Feet, and Heels        Does the Patient have Skin Breakdown?   No         Jeison Prevention initiated:  NA   Wound Care Orders initiated:  NA      [facility] nurse consulted for Pressure Injury (Stage 3,4, Unstageable, DTI, NWPT, and Complex wounds):  NA      Nurse 1 eSignature: Electronically signed by Leisa Fernandez RN on 9/17/23 at 6:54 PM EDT    **SHARE this note so that the co-signing nurse is able to place an eSignature**    Nurse 2 eSignature: Electronically signed by Atiya Ceja RN on 9/17/23 at 6:55 PM EDT

## 2023-09-17 NOTE — ED NOTES
In CT, IVF to be hung to dilate K infusion d/t pt discomfort.      Latisha Heimlich, RN  09/17/23 [id number]

## 2023-09-17 NOTE — PROGRESS NOTES
09/17/23 1617   RT Protocol   History Pulmonary Disease 2   Respiratory pattern 0   Breath sounds 2   Cough 0   Bronchodilator Assessment Score 4

## 2023-09-18 PROBLEM — R07.9 CHEST PAIN: Status: ACTIVE | Noted: 2023-09-18

## 2023-09-18 PROBLEM — R51.9 ACUTE NONINTRACTABLE HEADACHE: Status: ACTIVE | Noted: 2023-09-18

## 2023-09-18 LAB
ANION GAP SERPL CALCULATED.3IONS-SCNC: 10 MMOL/L (ref 3–16)
ANION GAP SERPL CALCULATED.3IONS-SCNC: 12 MMOL/L (ref 3–16)
ANION GAP SERPL CALCULATED.3IONS-SCNC: 12 MMOL/L (ref 3–16)
ANION GAP SERPL CALCULATED.3IONS-SCNC: 13 MMOL/L (ref 3–16)
BASOPHILS # BLD: 0.1 K/UL (ref 0–0.2)
BASOPHILS NFR BLD: 0.7 %
BUN SERPL-MCNC: 5 MG/DL (ref 7–20)
BUN SERPL-MCNC: 5 MG/DL (ref 7–20)
BUN SERPL-MCNC: 6 MG/DL (ref 7–20)
BUN SERPL-MCNC: 9 MG/DL (ref 7–20)
CALCIUM SERPL-MCNC: 8.9 MG/DL (ref 8.3–10.6)
CALCIUM SERPL-MCNC: 9 MG/DL (ref 8.3–10.6)
CALCIUM SERPL-MCNC: 9.6 MG/DL (ref 8.3–10.6)
CALCIUM SERPL-MCNC: 9.7 MG/DL (ref 8.3–10.6)
CHLORIDE SERPL-SCNC: 101 MMOL/L (ref 99–110)
CHLORIDE SERPL-SCNC: 93 MMOL/L (ref 99–110)
CHLORIDE SERPL-SCNC: 98 MMOL/L (ref 99–110)
CHLORIDE SERPL-SCNC: 98 MMOL/L (ref 99–110)
CO2 SERPL-SCNC: 23 MMOL/L (ref 21–32)
CO2 SERPL-SCNC: 23 MMOL/L (ref 21–32)
CO2 SERPL-SCNC: 24 MMOL/L (ref 21–32)
CO2 SERPL-SCNC: 25 MMOL/L (ref 21–32)
CREAT SERPL-MCNC: <0.5 MG/DL (ref 0.6–1.2)
DEPRECATED RDW RBC AUTO: 13.7 % (ref 12.4–15.4)
EKG ATRIAL RATE: 72 BPM
EKG DIAGNOSIS: NORMAL
EKG P AXIS: 181 DEGREES
EKG P-R INTERVAL: 162 MS
EKG Q-T INTERVAL: 432 MS
EKG QRS DURATION: 92 MS
EKG QTC CALCULATION (BAZETT): 469 MS
EKG R AXIS: -18 DEGREES
EKG T AXIS: 17 DEGREES
EKG VENTRICULAR RATE: 71 BPM
EOSINOPHIL # BLD: 0.2 K/UL (ref 0–0.6)
EOSINOPHIL NFR BLD: 1.9 %
GFR SERPLBLD CREATININE-BSD FMLA CKD-EPI: >60 ML/MIN/{1.73_M2}
GLUCOSE SERPL-MCNC: 109 MG/DL (ref 70–99)
GLUCOSE SERPL-MCNC: 140 MG/DL (ref 70–99)
GLUCOSE SERPL-MCNC: 145 MG/DL (ref 70–99)
GLUCOSE SERPL-MCNC: 157 MG/DL (ref 70–99)
HCT VFR BLD AUTO: 37.5 % (ref 36–48)
HGB BLD-MCNC: 12.9 G/DL (ref 12–16)
LYMPHOCYTES # BLD: 2.1 K/UL (ref 1–5.1)
LYMPHOCYTES NFR BLD: 24.3 %
MCH RBC QN AUTO: 32.1 PG (ref 26–34)
MCHC RBC AUTO-ENTMCNC: 34.4 G/DL (ref 31–36)
MCV RBC AUTO: 93.2 FL (ref 80–100)
MONOCYTES # BLD: 1.1 K/UL (ref 0–1.3)
MONOCYTES NFR BLD: 12.5 %
NEUTROPHILS # BLD: 5.3 K/UL (ref 1.7–7.7)
NEUTROPHILS NFR BLD: 60.6 %
PLATELET # BLD AUTO: 197 K/UL (ref 135–450)
PMV BLD AUTO: 11.8 FL (ref 5–10.5)
POTASSIUM SERPL-SCNC: 3 MMOL/L (ref 3.5–5.1)
POTASSIUM SERPL-SCNC: 3.4 MMOL/L (ref 3.5–5.1)
POTASSIUM SERPL-SCNC: 3.5 MMOL/L (ref 3.5–5.1)
POTASSIUM SERPL-SCNC: 4.1 MMOL/L (ref 3.5–5.1)
RBC # BLD AUTO: 4.02 M/UL (ref 4–5.2)
SODIUM SERPL-SCNC: 130 MMOL/L (ref 136–145)
SODIUM SERPL-SCNC: 133 MMOL/L (ref 136–145)
SODIUM SERPL-SCNC: 134 MMOL/L (ref 136–145)
SODIUM SERPL-SCNC: 135 MMOL/L (ref 136–145)
WBC # BLD AUTO: 8.8 K/UL (ref 4–11)

## 2023-09-18 PROCEDURE — 97535 SELF CARE MNGMENT TRAINING: CPT

## 2023-09-18 PROCEDURE — 97116 GAIT TRAINING THERAPY: CPT

## 2023-09-18 PROCEDURE — 97530 THERAPEUTIC ACTIVITIES: CPT

## 2023-09-18 PROCEDURE — 85025 COMPLETE CBC W/AUTO DIFF WBC: CPT

## 2023-09-18 PROCEDURE — 94640 AIRWAY INHALATION TREATMENT: CPT

## 2023-09-18 PROCEDURE — 1200000000 HC SEMI PRIVATE

## 2023-09-18 PROCEDURE — 99222 1ST HOSP IP/OBS MODERATE 55: CPT | Performed by: PSYCHIATRY & NEUROLOGY

## 2023-09-18 PROCEDURE — 93010 ELECTROCARDIOGRAM REPORT: CPT | Performed by: INTERNAL MEDICINE

## 2023-09-18 PROCEDURE — 36415 COLL VENOUS BLD VENIPUNCTURE: CPT

## 2023-09-18 PROCEDURE — 97161 PT EVAL LOW COMPLEX 20 MIN: CPT

## 2023-09-18 PROCEDURE — 6370000000 HC RX 637 (ALT 250 FOR IP): Performed by: STUDENT IN AN ORGANIZED HEALTH CARE EDUCATION/TRAINING PROGRAM

## 2023-09-18 PROCEDURE — 97166 OT EVAL MOD COMPLEX 45 MIN: CPT

## 2023-09-18 PROCEDURE — 2580000003 HC RX 258: Performed by: STUDENT IN AN ORGANIZED HEALTH CARE EDUCATION/TRAINING PROGRAM

## 2023-09-18 PROCEDURE — 6360000002 HC RX W HCPCS: Performed by: STUDENT IN AN ORGANIZED HEALTH CARE EDUCATION/TRAINING PROGRAM

## 2023-09-18 PROCEDURE — 80048 BASIC METABOLIC PNL TOTAL CA: CPT

## 2023-09-18 RX ADMIN — PRAVASTATIN SODIUM 40 MG: 40 TABLET ORAL at 21:09

## 2023-09-18 RX ADMIN — POTASSIUM CHLORIDE 10 MEQ: 7.46 INJECTION, SOLUTION INTRAVENOUS at 06:21

## 2023-09-18 RX ADMIN — Medication 2 PUFF: at 09:00

## 2023-09-18 RX ADMIN — MONTELUKAST 10 MG: 10 TABLET, FILM COATED ORAL at 21:08

## 2023-09-18 RX ADMIN — POTASSIUM CHLORIDE 10 MEQ: 7.46 INJECTION, SOLUTION INTRAVENOUS at 01:45

## 2023-09-18 RX ADMIN — POTASSIUM CHLORIDE 10 MEQ: 7.46 INJECTION, SOLUTION INTRAVENOUS at 14:54

## 2023-09-18 RX ADMIN — APIXABAN 5 MG: 5 TABLET, FILM COATED ORAL at 21:08

## 2023-09-18 RX ADMIN — Medication 10 ML: at 21:09

## 2023-09-18 RX ADMIN — APIXABAN 5 MG: 5 TABLET, FILM COATED ORAL at 07:50

## 2023-09-18 RX ADMIN — METOPROLOL SUCCINATE 25 MG: 25 TABLET, EXTENDED RELEASE ORAL at 21:08

## 2023-09-18 RX ADMIN — OXYBUTYNIN CHLORIDE 5 MG: 5 TABLET, EXTENDED RELEASE ORAL at 21:08

## 2023-09-18 RX ADMIN — POTASSIUM CHLORIDE 20 MEQ: 1500 TABLET, EXTENDED RELEASE ORAL at 07:50

## 2023-09-18 RX ADMIN — OXYCODONE HYDROCHLORIDE AND ACETAMINOPHEN 500 MG: 500 TABLET ORAL at 21:08

## 2023-09-18 RX ADMIN — Medication 2 PUFF: at 20:44

## 2023-09-18 RX ADMIN — POTASSIUM CHLORIDE 10 MEQ: 7.46 INJECTION, SOLUTION INTRAVENOUS at 10:29

## 2023-09-18 RX ADMIN — POTASSIUM CHLORIDE 10 MEQ: 7.46 INJECTION, SOLUTION INTRAVENOUS at 12:45

## 2023-09-18 RX ADMIN — ACETAMINOPHEN 650 MG: 325 TABLET ORAL at 07:54

## 2023-09-18 RX ADMIN — ACETAMINOPHEN 1000 MG: 500 TABLET ORAL at 21:10

## 2023-09-18 ASSESSMENT — PAIN DESCRIPTION - ORIENTATION: ORIENTATION: RIGHT

## 2023-09-18 ASSESSMENT — PAIN - FUNCTIONAL ASSESSMENT: PAIN_FUNCTIONAL_ASSESSMENT: ACTIVITIES ARE NOT PREVENTED

## 2023-09-18 ASSESSMENT — PAIN DESCRIPTION - LOCATION
LOCATION: BREAST
LOCATION: HEAD

## 2023-09-18 ASSESSMENT — PAIN DESCRIPTION - DESCRIPTORS
DESCRIPTORS: ACHING
DESCRIPTORS: ACHING

## 2023-09-18 ASSESSMENT — PAIN SCALES - GENERAL
PAINLEVEL_OUTOF10: 7
PAINLEVEL_OUTOF10: 8

## 2023-09-18 NOTE — CONSULTS
attention span and concentration. Language: intact naming, repeating and fluency   Cranial Nerves:   II: Visual fields: Full. Pupils: equal, round, reactive to light, bilaterally  III,IV,VI: Extra Ocular Movements are intact. No nystagmus  V: Facial sensation is intact  VII: Facial strength and movements: intact and symmetric  IX: Normal palatal elevation and shoulder shrug  XII: Tongue movements are normal  Musculoskeletal: 5/5 in all 4 extremities. Good range of motion. No muscle atrophy. Tone: Normal tone. Reflexes: Symmetric 2+ in the arms and 2+ in the legs   Planters: Flexor bilaterally  Coordination: no pronator drift, no dysmetria with FNF and normal REM  Sensation: normal in both arms and legs. Gait/Posture: Not tested due to poor cooperation. Medical decision making:      A. Problems (any 1)    High:    [x] Acute/Chronic Illness/injury posing threat to life or bodily function:    [] Severe exacerbation of chronic illness: Moderate:    [x]     1 or more chronic illness with exacerbation, progression or side effect of treatment or  []     2 or more stable chronic illnesses or  []     1 acute illness with systemic symptoms     ---------------------------------------------------------------------  B. Risk of Treatment (any 1)   [] Drugs/treatments that require intensive monitoring for toxicity include:    [] Change in code status:    [] Decision to escalate care:    [] Major surgery/procedure with associated risk factors:    [x] Prescription drug management  ----------------------------------------------------------------------  [x] High (any 2)  [] Moderate (any 1)    C.  Data (any 2 for high and any 1 for moderate)  [] Discussed management of the case with:    [x] Imaging personally reviewed and interpreted, includes:    [x] Data Review (any 3)  [] Collateral history obtained from:    [x] All available Consultant notes from yesterday/today were reviewed  [x] All current labs were accuracy, there may be errors in the  transcription that are not intended

## 2023-09-18 NOTE — PROGRESS NOTES
Occupational Therapy  Facility/Department: [facility] C3 TELE/MED SURG/ONC  Occupational Therapy Initial Assessment and Treatment Note     Name: Nella Shields  : 1945  MRN: [id number]  Date of Service: 2023    Discharge Recommendations:  Home with assist PRN, Home with Home health OT      Patient Diagnosis(es): The primary encounter diagnosis was Chest pain, unspecified type. Diagnoses of Acute nonintractable headache, unspecified headache type and Hypokalemia were also pertinent to this visit. Past Medical History:  has a past medical history of Arthritis, Asthma, Bronchitis chronic, Colon polyp, COPD (chronic obstructive pulmonary disease) ([address]), Emphysema of lung ([address]), Guillain-Independence ([address]), Hyperlipidemia, Lock jaw, Pneumonia, Post-nasal drip, Pulmonary nodule, Rash, Reflex sympathetic dystrophy, RSD (reflex sympathetic dystrophy), and TMJ (dislocation of temporomandibular joint). Past Surgical History:  has a past surgical history that includes Hysterectomy; Appendectomy; Tonsillectomy; bronchoscopy (); Colonoscopy (11/15/2012); back surgery; CT NEEDLE BIOPSY LUNG PERCUTANEOUS (2023); and Pacemaker insertion. Assessment   Performance deficits / Impairments: Decreased functional mobility ; Decreased ADL status; Decreased endurance;Decreased balance  After evaluation, pt found to be presenting with the above mentioned occupational performance deficits which are affecting participation in daily living skills. Pt would benefit from continued skilled occupational therapy to address ADLs, functional mobility, and safety while in acute care.     Prognosis: Good  Decision Making: Medium Complexity  REQUIRES OT FOLLOW-UP: Yes  Activity Tolerance  Activity Tolerance: Patient Tolerated treatment well        Plan   Occupational Therapy Plan  Times Per Week: 3-5x  Current Treatment Recommendations: Self-Care / ADL, Functional mobility training, Endurance training, Safety education & training Restrictions  Restrictions/Precautions  Restrictions/Precautions: Fall Risk, Up as Tolerated    Subjective   General  Chart Reviewed: Yes  Patient assessed for rehabilitation services?: Yes  Referring Practitioner: Hunter Nunes DO  Diagnosis: hypokalemia  Subjective  Subjective: Pt agreeable to OT. Reports headache--RN notified  General Comment  Comments: RN approved therapy     Social/Functional History  Social/Functional History  Lives With: Alone  Type of Home: Apartment  Home Layout: One level  Home Access: Level entry  Bathroom Shower/Tub: Shower chair without back, Walk-in shower  Bathroom Toilet: Standard  Bathroom Equipment: Grab bars in shower, Grab bars around toilet  Home Equipment: Rollator  Has the patient had two or more falls in the past year or any fall with injury in the past year?: Yes  Receives Help From: Neighbor  ADL Assistance: Needs assistance (has assist for shower)  Homemaking Assistance: Needs assistance (someone comes 1-2x/wk to help with cleaning. son does grocery shopping)  Homemaking Responsibilities: No  Ambulation Assistance: Independent (with use of rollator)  Transfer Assistance: Independent  Active : No     Objective   Pulse: 78  Heart Rate Source: Monitor  BP: 127/76  BP Location: Left upper arm  Patient Position: Semi fowlers  MAP (Calculated): 93  Respirations: 16  SpO2: 97 %  O2 Device: None (Room air)         Safety Devices  Type of Devices: All fall risk precautions in place;Call light within reach; Patient at risk for falls; Left in chair;Chair alarm in place;Nurse notified    Toilet Transfers  Toilet - Technique: Ambulating (RW)  Equipment Used: Grab bars  Toilet Transfer: Stand by assistance  AROM: Within functional limits  Strength: Within functional limits  Coordination: Within functional limits  Tone: Normal  Sensation: Intact  ADL  Feeding: Independent  Grooming Skilled Clinical Factors: Pt declined standing at sink to wash  hands.  Provided hand sanitizing wipe

## 2023-09-18 NOTE — PLAN OF CARE
Problem: Pain  Goal: Verbalizes/displays adequate comfort level or baseline comfort level  Outcome: Progressing     Problem: Safety - Adult  Goal: Free from fall injury  Outcome: Progressing  Flowsheets (Taken 9/18/2023 0014)  Free From Fall Injury: Instruct family/caregiver on patient safety

## 2023-09-18 NOTE — CARE COORDINATION
Case Management Assessment  Initial Evaluation    Date/Time of Evaluation: 9/18/2023 2:20 PM  Assessment Completed by: Melissa Huber RN    If patient is discharged prior to next notation, then this note serves as note for discharge by case management. Patient Name: Elvis Morel                   YOB: 1945  Diagnosis: Hypokalemia [E87.6]  Acute nonintractable headache, unspecified headache type [R51.9]  Chest pain, unspecified type [R07.9]                   Date / Time: 9/17/2023  8:25 AM    Patient Admission Status: Inpatient   Readmission Risk (Low < 19, Mod (19-27), High > 27): Readmission Risk Score: 16.1    Current PCP: Naina Quarles MD  PCP verified by CM? Yes    Chart Reviewed: Yes      History Provided by: Patient  Patient Orientation: Alert and Oriented    Patient Cognition: Alert    Hospitalization in the last 30 days (Readmission):  No    If yes, Readmission Assessment in CM Navigator will be completed. Advance Directives:      Code Status: Full Code   Patient's Primary Decision Maker is: Legal Next of Kin    Primary Decision Maker (Active): Pamela Chaudhari Child - [phone]    Primary Decision Maker: Emmanuel Snow - Child - [phone]    Discharge Planning:    Patient lives with: Alone Type of Home: Assisted living  Primary Care Giver: Self  Patient Support Systems include: Children   Current Financial resources: Medicare  Current community resources: None  Current services prior to admission: Home Care            Current DME:              Type of Home Care services:  PT, OT    ADLS  Prior functional level: Independent in ADLs/IADLs  Current functional level: Independent in ADLs/IADLs    PT AM-PAC: 20 /24  OT AM-PAC: 23 /24    Family can provide assistance at DC: Yes  Would you like Case Management to discuss the discharge plan with any other family members/significant others, and if so, who?  No  Plans to Return to Present Housing: Yes  Other Identified Issues/Barriers to

## 2023-09-18 NOTE — PROGRESS NOTES
Shift assessment completed. Pt A&O with intermittent confusion at times, VSS. Pt c/o unresolved headache, MD notified. Denies any needs at this time. Bed locked and in lowest position, side rails up 2/4. Call light within reach. Will continue to monitor.

## 2023-09-18 NOTE — PROGRESS NOTES
Physical Therapy  Facility/Department: [facility] C3 TELE/MED SURG/ONC  Physical Therapy Initial Assessment    Name: Rom Mccollum  : 1945  MRN: [id number]  Date of Service: 2023    Discharge Recommendations:  Home with assist PRN, Home with Home health PT   PT Equipment Recommendations  Equipment Needed: No      Patient Diagnosis(es): The primary encounter diagnosis was Chest pain, unspecified type. Diagnoses of Acute nonintractable headache, unspecified headache type and Hypokalemia were also pertinent to this visit. Past Medical History:  has a past medical history of Arthritis, Asthma, Bronchitis chronic, Colon polyp, COPD (chronic obstructive pulmonary disease) ([address]), Emphysema of lung ([address]), Guillain-[place]er ([address]), Hyperlipidemia, Lock jaw, Pneumonia, Post-nasal drip, Pulmonary nodule, Rash, Reflex sympathetic dystrophy, RSD (reflex sympathetic dystrophy), and TMJ (dislocation of temporomandibular joint). Past Surgical History:  has a past surgical history that includes Hysterectomy; Appendectomy; Tonsillectomy; bronchoscopy (); Colonoscopy (11/15/2012); back surgery; CT NEEDLE BIOPSY LUNG PERCUTANEOUS (2023); and Pacemaker insertion. Assessment   Body Structures, Functions, Activity Limitations Requiring Skilled Therapeutic Intervention: Decreased functional mobility ; Decreased endurance;Decreased strength;Decreased balance  Assessment: Pt is an 79 y/o female who presents with hypokalemia. Pt was independent prior to admit living alone in apartment. Pt currently requires SBA for transfers and ambulation with RW. Pt is limited by endurance. Pt would benefit from continued skilled PT to address these limitations. Recommend home with PRN A and home PT.   Treatment Diagnosis: impaired functional mobility  Therapy Prognosis: Good  Decision Making: Low Complexity  Requires PT Follow-Up: Yes  Activity Tolerance  Activity Tolerance: Patient tolerated evaluation without incident  Activity Tolerance Comments: pt gets short of breath easily at rest and with activity - SpO2 remains normal throughout session     Plan   Physcial Therapy Plan  General Plan: 3-5 times per week  Current Treatment Recommendations: Strengthening, Balance training, Functional mobility training, Transfer training, Endurance training, Gait training, Home exercise program, Safety education & training, Therapeutic activities, Patient/Caregiver education & training  Safety Devices  Type of Devices: All fall risk precautions in place, Call light within reach, Patient at risk for falls, Left in chair, Chair alarm in place, Nurse notified     Restrictions  Restrictions/Precautions  Restrictions/Precautions: Fall Risk     Subjective   General  Chart Reviewed: Yes  Patient assessed for rehabilitation services?: Yes  Family / Caregiver Present: No  Referring Practitioner: Robb Banks DO  Referral Date : 09/17/23  Diagnosis: hypokalemia  Follows Commands: Within Functional Limits  Subjective  Subjective: pt agreeable to evaluation. Social/Functional History  Social/Functional History  Lives With: Alone  Type of Home: Apartment  Home Layout: One level  Home Access: Level entry  Bathroom Shower/Tub: Shower chair without back, Walk-in shower  Bathroom Toilet: Standard  Bathroom Equipment: Grab bars in shower, Grab bars around toilet  Home Equipment: Rollator  Has the patient had two or more falls in the past year or any fall with injury in the past year?: Yes  Receives Help From: Neighbor  ADL Assistance: Needs assistance (has assist for shower)  Homemaking Assistance: Needs assistance (someone comes 1-2x/wk to help with cleaning.  son does grocery shopping)  Homemaking Responsibilities: No  Ambulation Assistance: Independent (with use of rollator)  Transfer Assistance: Independent  Active : No  Vision/Hearing  Vision  Vision: Impaired  Vision Exceptions: Wears glasses at all times  Hearing  Hearing: Within functional limits

## 2023-09-19 ENCOUNTER — APPOINTMENT (OUTPATIENT)
Dept: MRI IMAGING | Age: 78
DRG: 313 | End: 2023-09-19
Payer: MEDICARE

## 2023-09-19 LAB
ALBUMIN SERPL-MCNC: 3.4 G/DL (ref 3.4–5)
ALBUMIN/GLOB SERPL: 1.4 {RATIO} (ref 1.1–2.2)
ALP SERPL-CCNC: 64 U/L (ref 40–129)
ALT SERPL-CCNC: 10 U/L (ref 10–40)
ANION GAP SERPL CALCULATED.3IONS-SCNC: 10 MMOL/L (ref 3–16)
ANION GAP SERPL CALCULATED.3IONS-SCNC: 12 MMOL/L (ref 3–16)
ANION GAP SERPL CALCULATED.3IONS-SCNC: 12 MMOL/L (ref 3–16)
AST SERPL-CCNC: 14 U/L (ref 15–37)
BASOPHILS # BLD: 0.1 K/UL (ref 0–0.2)
BASOPHILS NFR BLD: 0.8 %
BILIRUB SERPL-MCNC: 0.4 MG/DL (ref 0–1)
BUN SERPL-MCNC: 3 MG/DL (ref 7–20)
BUN SERPL-MCNC: 4 MG/DL (ref 7–20)
BUN SERPL-MCNC: 4 MG/DL (ref 7–20)
C DIFF TOX A+B STL QL IA: NORMAL
CALCIUM SERPL-MCNC: 9 MG/DL (ref 8.3–10.6)
CALCIUM SERPL-MCNC: 9.1 MG/DL (ref 8.3–10.6)
CALCIUM SERPL-MCNC: 9.3 MG/DL (ref 8.3–10.6)
CHLORIDE SERPL-SCNC: 100 MMOL/L (ref 99–110)
CHLORIDE SERPL-SCNC: 97 MMOL/L (ref 99–110)
CHLORIDE SERPL-SCNC: 99 MMOL/L (ref 99–110)
CO2 SERPL-SCNC: 20 MMOL/L (ref 21–32)
CO2 SERPL-SCNC: 21 MMOL/L (ref 21–32)
CO2 SERPL-SCNC: 22 MMOL/L (ref 21–32)
CREAT SERPL-MCNC: <0.5 MG/DL (ref 0.6–1.2)
CRP SERPL-MCNC: 5.4 MG/L (ref 0–5.1)
DEPRECATED RDW RBC AUTO: 13.4 % (ref 12.4–15.4)
EOSINOPHIL # BLD: 0.2 K/UL (ref 0–0.6)
EOSINOPHIL NFR BLD: 3 %
ERYTHROCYTE [SEDIMENTATION RATE] IN BLOOD BY WESTERGREN METHOD: 10 MM/HR (ref 0–30)
GFR SERPLBLD CREATININE-BSD FMLA CKD-EPI: >60 ML/MIN/{1.73_M2}
GLUCOSE SERPL-MCNC: 121 MG/DL (ref 70–99)
GLUCOSE SERPL-MCNC: 122 MG/DL (ref 70–99)
GLUCOSE SERPL-MCNC: 125 MG/DL (ref 70–99)
HCT VFR BLD AUTO: 36.3 % (ref 36–48)
HGB BLD-MCNC: 12.2 G/DL (ref 12–16)
LYMPHOCYTES # BLD: 1.9 K/UL (ref 1–5.1)
LYMPHOCYTES NFR BLD: 23.1 %
MCH RBC QN AUTO: 32 PG (ref 26–34)
MCHC RBC AUTO-ENTMCNC: 33.7 G/DL (ref 31–36)
MCV RBC AUTO: 95 FL (ref 80–100)
MONOCYTES # BLD: 0.9 K/UL (ref 0–1.3)
MONOCYTES NFR BLD: 11.4 %
NEUTROPHILS # BLD: 5.1 K/UL (ref 1.7–7.7)
NEUTROPHILS NFR BLD: 61.7 %
PLATELET # BLD AUTO: 210 K/UL (ref 135–450)
PMV BLD AUTO: 11.1 FL (ref 5–10.5)
POTASSIUM SERPL-SCNC: 3.5 MMOL/L (ref 3.5–5.1)
POTASSIUM SERPL-SCNC: 3.6 MMOL/L (ref 3.5–5.1)
POTASSIUM SERPL-SCNC: 3.6 MMOL/L (ref 3.5–5.1)
PROT SERPL-MCNC: 5.8 G/DL (ref 6.4–8.2)
RBC # BLD AUTO: 3.83 M/UL (ref 4–5.2)
SODIUM SERPL-SCNC: 130 MMOL/L (ref 136–145)
SODIUM SERPL-SCNC: 131 MMOL/L (ref 136–145)
SODIUM SERPL-SCNC: 132 MMOL/L (ref 136–145)
WBC # BLD AUTO: 8.2 K/UL (ref 4–11)

## 2023-09-19 PROCEDURE — 1200000000 HC SEMI PRIVATE

## 2023-09-19 PROCEDURE — 99232 SBSQ HOSP IP/OBS MODERATE 35: CPT | Performed by: PSYCHIATRY & NEUROLOGY

## 2023-09-19 PROCEDURE — 86140 C-REACTIVE PROTEIN: CPT

## 2023-09-19 PROCEDURE — 6370000000 HC RX 637 (ALT 250 FOR IP): Performed by: STUDENT IN AN ORGANIZED HEALTH CARE EDUCATION/TRAINING PROGRAM

## 2023-09-19 PROCEDURE — 87493 C DIFF AMPLIFIED PROBE: CPT

## 2023-09-19 PROCEDURE — 36415 COLL VENOUS BLD VENIPUNCTURE: CPT

## 2023-09-19 PROCEDURE — 70551 MRI BRAIN STEM W/O DYE: CPT

## 2023-09-19 PROCEDURE — 80053 COMPREHEN METABOLIC PANEL: CPT

## 2023-09-19 PROCEDURE — 85025 COMPLETE CBC W/AUTO DIFF WBC: CPT

## 2023-09-19 PROCEDURE — APPSS45 APP SPLIT SHARED TIME 31-45 MINUTES: Performed by: NURSE PRACTITIONER

## 2023-09-19 PROCEDURE — 94640 AIRWAY INHALATION TREATMENT: CPT

## 2023-09-19 PROCEDURE — 87324 CLOSTRIDIUM AG IA: CPT

## 2023-09-19 PROCEDURE — 85652 RBC SED RATE AUTOMATED: CPT

## 2023-09-19 PROCEDURE — 87449 NOS EACH ORGANISM AG IA: CPT

## 2023-09-19 PROCEDURE — 2580000003 HC RX 258: Performed by: STUDENT IN AN ORGANIZED HEALTH CARE EDUCATION/TRAINING PROGRAM

## 2023-09-19 RX ADMIN — Medication 2 PUFF: at 20:16

## 2023-09-19 RX ADMIN — OXYCODONE HYDROCHLORIDE AND ACETAMINOPHEN 500 MG: 500 TABLET ORAL at 21:36

## 2023-09-19 RX ADMIN — PRAVASTATIN SODIUM 40 MG: 40 TABLET ORAL at 20:13

## 2023-09-19 RX ADMIN — ACETAMINOPHEN 650 MG: 325 TABLET ORAL at 07:19

## 2023-09-19 RX ADMIN — ACETAMINOPHEN 1000 MG: 500 TABLET ORAL at 20:12

## 2023-09-19 RX ADMIN — POTASSIUM CHLORIDE 20 MEQ: 1500 TABLET, EXTENDED RELEASE ORAL at 09:06

## 2023-09-19 RX ADMIN — OXYBUTYNIN CHLORIDE 5 MG: 5 TABLET, EXTENDED RELEASE ORAL at 20:13

## 2023-09-19 RX ADMIN — METOPROLOL SUCCINATE 25 MG: 25 TABLET, EXTENDED RELEASE ORAL at 20:13

## 2023-09-19 RX ADMIN — Medication 10 ML: at 09:07

## 2023-09-19 RX ADMIN — APIXABAN 5 MG: 5 TABLET, FILM COATED ORAL at 09:06

## 2023-09-19 RX ADMIN — APIXABAN 5 MG: 5 TABLET, FILM COATED ORAL at 20:13

## 2023-09-19 RX ADMIN — MONTELUKAST 10 MG: 10 TABLET, FILM COATED ORAL at 20:13

## 2023-09-19 ASSESSMENT — PAIN DESCRIPTION - DESCRIPTORS: DESCRIPTORS: ACHING

## 2023-09-19 ASSESSMENT — PAIN - FUNCTIONAL ASSESSMENT
PAIN_FUNCTIONAL_ASSESSMENT: ACTIVITIES ARE NOT PREVENTED
PAIN_FUNCTIONAL_ASSESSMENT: ACTIVITIES ARE NOT PREVENTED

## 2023-09-19 ASSESSMENT — PAIN DESCRIPTION - LOCATION
LOCATION: HEAD
LOCATION: HEAD

## 2023-09-19 ASSESSMENT — PAIN SCALES - GENERAL
PAINLEVEL_OUTOF10: 1
PAINLEVEL_OUTOF10: 7

## 2023-09-19 NOTE — CARE COORDINATION
[facility]    Referral received from  to follow for home care services. I will follow for needs, and speak with patient to verify demos.     Previous patient [facility]; discharged 9/11/23    Marsha Bell RN, BSN [phone]  [facility]   [phone] fax [address] [address] [phone]

## 2023-09-19 NOTE — PROGRESS NOTES
Message sent to Dr. Pamela Bolanos. Patient is very anxious to go home. States she no longer has care for her dog tonight. States she will lose her [employer] housing if she does not take care of her animals. States she will \"walk out of here\" if she is not released by the physician.

## 2023-09-19 NOTE — FLOWSHEET NOTE
09/19/23 1908   Assessment   Charting Type Shift assessment   Psychosocial   Psychosocial (WDL) X   Patient Behaviors Anxious   Neurological   Neuro (WDL) X   Level of Consciousness 0   Orientation Level Oriented X4;Other (comment); Oriented/disoriented at times  (Pt unable to state todays date but aware of month & year, answers all other orientation questions correctly)   Cognition Follows commands;Poor safety awareness   R Hand  Strong   L Hand  Strong   R Foot Dorsiflexion Moderate   L Foot Dorsiflexion Moderate   R Foot Plantar Flexion Moderate   L Foot Plantar Flexion Moderate   RUE Motor Response Normal extension;Normal flexion   LUE Motor Response Normal extension;Normal flexion   RLE Motor Response Normal extension;Normal flexion   LLE Motor Response Normal extension;Normal flexion   Gag Present   Cough Reflex Present   Branson Coma Scale   Eye Opening 4   Best Verbal Response 5   Best Motor Response 6   Branson Coma Scale Score 15   HEENT (Head, Ears, Eyes, Nose, & Throat)   HEENT (WDL) X   Right Eye Blurred;Glasses   Left Eye Blurred;Glasses   Tongue Dry   Teeth Dentures upper;Dentures lower   Respiratory   Respiratory (WDL) WDL   Cardiac   Cardiac (WDL) X   Cardiac Regularity Irregular   Cardiac Monitor   Cardiac/Telemetry Monitor On No   Pacemaker   Pacemaker Yes   Pacemaker Type Permanent   Gastrointestinal   Abdominal (WDL) X   Abdomen Inspection Rounded   RUQ Bowel Sounds Active   LUQ Bowel Sounds Active   RLQ Bowel Sounds Active   LLQ Bowel Sounds Active   GI Symptoms Diarrhea   Tenderness Nontender   Genitourinary   Genitourinary (WDL) WDL   Peripheral Vascular   Peripheral Vascular (WDL) WDL   Edema None   RLE Neurovascular Assessment   Capillary Refill Less than/Equal to 3 seconds   Color Dilip   Temperature Cool   R Pedal Pulse +2   LLE Neurovascular Assessment   Capillary Refill Less than/Equal to 3 seconds   Color Dilip   Temperature Cool   L Pedal Pulse +2   Skin Integumentary

## 2023-09-19 NOTE — CARE COORDINATION
Chart reviewed. Spoke with patient. Discussed therapy recs for [facility]. Agreeable to resume [facility] at d/c. Spoke with Shana Nino with [facility], stated she just d/c'd on 9/11. Brain MRI completed today. C-DIFF pending. Pateint would like to d/c today. Spoke with Shy ROBLES.  Will need [address] [facility] orders for d/c. Cat Byrd RN

## 2023-09-19 NOTE — PROGRESS NOTES
Physician Progress Note      PATIENT:               April Karen  CSN #:                  [id number]  :                       1945  ADMIT DATE:       2023 8:25 AM  DISCH DATE:  RESPONDING  PROVIDER #:        Marlys Bermudez DO          QUERY TEXT:    Patient admitted with hypokalemia, noted to have atrial fibrillation and is   maintained on Eliquis. If possible, please document in progress notes and   discharge summary if you are evaluating and/or treating any of the following: The medical record reflects the following:  Risk Factors: advanced age, female  Clinical Indicators: increased risk of clots d/t afib  Treatment: Eliquis, supportive care    Thank you,  Jon Chase RN, CDS  [email or web address]  Options provided:  -- Secondary hypercoagulable state in a patient with atrial fibrillation  -- Other - I will add my own diagnosis  -- Disagree - Not applicable / Not valid  -- Disagree - Clinically unable to determine / Unknown  -- Refer to Clinical Documentation Reviewer    PROVIDER RESPONSE TEXT:    This patient has secondary hypercoagulable state in a patient with atrial   fibrillation.     Query created by: Jon Chase on 2023 2:56 PM      Electronically signed by:  Marlys Bermudez DO 2023 4:03 PM

## 2023-09-19 NOTE — PLAN OF CARE
Calls out for assistance and when needing to get up to the bathroom.      Problem: Safety - Adult  Goal: Free from fall injury  Outcome: Progressing

## 2023-09-19 NOTE — PROGRESS NOTES
Patient is sitting up in bed, wanted to call her son. Asked if he could come visit after visiting hours. Did not have transportation until this time. Patients son was able to come up and visit. Patient was on the phone most of the time when he was visiting. They didn't talk much. Assisted patient to the bathroom twice since the start of the shift. Patient said her stool was green, it could have been earlier however the small amount she was able to do was brown. Plan of care remains in progress. No additional needs are noted. Plan of care in progress. Call light in reach.

## 2023-09-19 NOTE — PROGRESS NOTES
Pt having multiple liquid mucous stool. CDIFF precautions in place and sample sent to lab.  Electronically signed by Prabhjot Haque RN on 9/19/2023 at 8:50 AM

## 2023-09-20 VITALS
WEIGHT: 145 LBS | TEMPERATURE: 98.2 F | BODY MASS INDEX: 24.16 KG/M2 | HEART RATE: 66 BPM | HEIGHT: 65 IN | RESPIRATION RATE: 18 BRPM | DIASTOLIC BLOOD PRESSURE: 100 MMHG | SYSTOLIC BLOOD PRESSURE: 145 MMHG | OXYGEN SATURATION: 95 %

## 2023-09-20 LAB
ALBUMIN SERPL-MCNC: 3.2 G/DL (ref 3.4–5)
ALBUMIN/GLOB SERPL: 1.3 {RATIO} (ref 1.1–2.2)
ALP SERPL-CCNC: 61 U/L (ref 40–129)
ALT SERPL-CCNC: 7 U/L (ref 10–40)
ANION GAP SERPL CALCULATED.3IONS-SCNC: 11 MMOL/L (ref 3–16)
ANION GAP SERPL CALCULATED.3IONS-SCNC: 14 MMOL/L (ref 3–16)
AST SERPL-CCNC: 13 U/L (ref 15–37)
BASOPHILS # BLD: 0 K/UL (ref 0–0.2)
BASOPHILS NFR BLD: 0.7 %
BILIRUB SERPL-MCNC: 0.5 MG/DL (ref 0–1)
BUN SERPL-MCNC: <2 MG/DL (ref 7–20)
BUN SERPL-MCNC: <2 MG/DL (ref 7–20)
C DIFF TOX GENS STL QL NAA+PROBE: ABNORMAL
CALCIUM SERPL-MCNC: 8.7 MG/DL (ref 8.3–10.6)
CALCIUM SERPL-MCNC: 9.3 MG/DL (ref 8.3–10.6)
CHLORIDE SERPL-SCNC: 101 MMOL/L (ref 99–110)
CHLORIDE SERPL-SCNC: 96 MMOL/L (ref 99–110)
CO2 SERPL-SCNC: 19 MMOL/L (ref 21–32)
CO2 SERPL-SCNC: 21 MMOL/L (ref 21–32)
CREAT SERPL-MCNC: <0.5 MG/DL (ref 0.6–1.2)
CREAT SERPL-MCNC: <0.5 MG/DL (ref 0.6–1.2)
DEPRECATED RDW RBC AUTO: 13.3 % (ref 12.4–15.4)
EOSINOPHIL # BLD: 0.3 K/UL (ref 0–0.6)
EOSINOPHIL NFR BLD: 4.5 %
GFR SERPLBLD CREATININE-BSD FMLA CKD-EPI: >60 ML/MIN/{1.73_M2}
GFR SERPLBLD CREATININE-BSD FMLA CKD-EPI: >60 ML/MIN/{1.73_M2}
GLUCOSE SERPL-MCNC: 109 MG/DL (ref 70–99)
GLUCOSE SERPL-MCNC: 161 MG/DL (ref 70–99)
HCT VFR BLD AUTO: 35.3 % (ref 36–48)
HGB BLD-MCNC: 12 G/DL (ref 12–16)
LYMPHOCYTES # BLD: 1.9 K/UL (ref 1–5.1)
LYMPHOCYTES NFR BLD: 26.9 %
MCH RBC QN AUTO: 32.3 PG (ref 26–34)
MCHC RBC AUTO-ENTMCNC: 34.1 G/DL (ref 31–36)
MCV RBC AUTO: 94.8 FL (ref 80–100)
MONOCYTES # BLD: 0.7 K/UL (ref 0–1.3)
MONOCYTES NFR BLD: 9.3 %
NEUTROPHILS # BLD: 4.2 K/UL (ref 1.7–7.7)
NEUTROPHILS NFR BLD: 58.6 %
ORGANISM: ABNORMAL
PLATELET # BLD AUTO: 197 K/UL (ref 135–450)
PMV BLD AUTO: 11.1 FL (ref 5–10.5)
POTASSIUM SERPL-SCNC: 3.4 MMOL/L (ref 3.5–5.1)
POTASSIUM SERPL-SCNC: 4.1 MMOL/L (ref 3.5–5.1)
PROT SERPL-MCNC: 5.6 G/DL (ref 6.4–8.2)
RBC # BLD AUTO: 3.72 M/UL (ref 4–5.2)
SODIUM SERPL-SCNC: 129 MMOL/L (ref 136–145)
SODIUM SERPL-SCNC: 133 MMOL/L (ref 136–145)
WBC # BLD AUTO: 7.1 K/UL (ref 4–11)

## 2023-09-20 PROCEDURE — 36415 COLL VENOUS BLD VENIPUNCTURE: CPT

## 2023-09-20 PROCEDURE — 6370000000 HC RX 637 (ALT 250 FOR IP): Performed by: STUDENT IN AN ORGANIZED HEALTH CARE EDUCATION/TRAINING PROGRAM

## 2023-09-20 PROCEDURE — 80053 COMPREHEN METABOLIC PANEL: CPT

## 2023-09-20 PROCEDURE — 2580000003 HC RX 258: Performed by: STUDENT IN AN ORGANIZED HEALTH CARE EDUCATION/TRAINING PROGRAM

## 2023-09-20 PROCEDURE — 94640 AIRWAY INHALATION TREATMENT: CPT

## 2023-09-20 PROCEDURE — 85025 COMPLETE CBC W/AUTO DIFF WBC: CPT

## 2023-09-20 RX ORDER — VANCOMYCIN HYDROCHLORIDE 50 MG/ML
125 KIT ORAL EVERY 6 HOURS SCHEDULED
Status: DISCONTINUED | OUTPATIENT
Start: 2023-09-20 | End: 2023-09-20 | Stop reason: HOSPADM

## 2023-09-20 RX ORDER — VANCOMYCIN HYDROCHLORIDE 125 MG/1
125 CAPSULE ORAL 4 TIMES DAILY
Qty: 40 CAPSULE | Refills: 0 | Status: SHIPPED | OUTPATIENT
Start: 2023-09-20 | End: 2023-09-30

## 2023-09-20 RX ADMIN — Medication 2 PUFF: at 08:43

## 2023-09-20 RX ADMIN — Medication 10 ML: at 08:51

## 2023-09-20 RX ADMIN — APIXABAN 5 MG: 5 TABLET, FILM COATED ORAL at 08:51

## 2023-09-20 RX ADMIN — POTASSIUM CHLORIDE 20 MEQ: 1500 TABLET, EXTENDED RELEASE ORAL at 08:51

## 2023-09-20 RX ADMIN — FLUTICASONE PROPIONATE 2 SPRAY: 50 SPRAY, METERED NASAL at 08:52

## 2023-09-20 RX ADMIN — Medication 125 MG: at 08:52

## 2023-09-20 NOTE — CARE COORDINATION
[facility]    DC order noted, all docs needed have been faxed to [facility] for home care services.     Home care to see patient within 24-48 hrs    Saint Hoffmann RN, BSN CTN  [facility] [phone]

## 2023-09-20 NOTE — CARE COORDINATION
CASE MANAGEMENT DISCHARGE SUMMARY      Discharge to: home alone, patient tearful as daughter took her dog. She is not sure where the dog is and if she can get it back. Supportive counseling provided. Pending sale to [facility] notified. Patient denies other needs    Precertification completed: NA  Hospital Exemption Notification (HENS) completed: NA    IMM given: 09/20/23    New Durable Medical Equipment ordered/agency: NA    Transportation:    Family/car: family       Confirmed discharge plan with:     Patient: yes     Family:  yes, per patient     Facility/Agency, name:  [facility] faxed per [place], name: Yoan Grace    Note: Discharging nurse to complete [facility], reconcile [facility], and place final copy with patient's discharge packet. RN to ensure that written prescriptions for  Level II medications are sent with patient to the facility as per protocol.
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
## 2023-09-20 NOTE — PROGRESS NOTES
Patient discharged home with home health care. Discharge instructions provided with no questions/concerns. IV removed without complication. Medications picked up at outpatient pharmacy. Patient discharged via w/c.

## 2023-09-25 ENCOUNTER — TELEPHONE (OUTPATIENT)
Dept: CARDIOLOGY CLINIC | Age: 78
End: 2023-09-25

## 2023-09-25 NOTE — TELEPHONE ENCOUNTER
PT called and stated she has a piece of her pacemaker sticking out of her skin. PT stated it has been there since it was put in. PT stated there is no pain, and no discomfort. PT wants to know to if its something she should be concerned about it. Spoke w Medical Staff asked pt to come in for device check. . PT stated she can not come in bc she \"has cdiff and cant get away from the toilet\" Asked Pt if she could take picture and send through [employer]. PT stated she \" does not have a camera, does not have a cell phone, and does not have Pidgonhart set up\". Advised pt that medical staff needs to physically see how the piece is sticking out. Advised pt someone will follow up with her.  PT ph#[phone]

## 2023-09-25 NOTE — TELEPHONE ENCOUNTER
Unable to leave message. Will attempt to call at another time to relay KXA message. Page Aldana MD  to [facility] [address] [place] [place], [place]        9/25/23  3:25 PM  We absolutely must get her in for in-person check. If unable to come to clinic, then go to ED. Minimum send us a picture.

## 2023-09-26 ENCOUNTER — HOSPITAL ENCOUNTER (EMERGENCY)
Age: 78
Discharge: HOME | End: 2023-09-26
Attending: EMERGENCY MEDICINE
Payer: MEDICARE

## 2023-09-26 VITALS
DIASTOLIC BLOOD PRESSURE: 89 MMHG | TEMPERATURE: 98.1 F | HEART RATE: 72 BPM | SYSTOLIC BLOOD PRESSURE: 186 MMHG | BODY MASS INDEX: 23.32 KG/M2 | RESPIRATION RATE: 18 BRPM | HEIGHT: 65 IN | WEIGHT: 140 LBS | OXYGEN SATURATION: 98 %

## 2023-09-26 DIAGNOSIS — A04.72 C. DIFFICILE DIARRHEA: ICD-10-CM

## 2023-09-26 DIAGNOSIS — R53.83 FATIGUE, UNSPECIFIED TYPE: Primary | ICD-10-CM

## 2023-09-26 LAB
ALBUMIN SERPL-MCNC: 3.9 G/DL (ref 3.4–5)
ALBUMIN/GLOB SERPL: 1.5 {RATIO} (ref 1.1–2.2)
ALP SERPL-CCNC: 70 U/L (ref 40–129)
ALT SERPL-CCNC: 12 U/L (ref 10–40)
ANION GAP SERPL CALCULATED.3IONS-SCNC: 12 MMOL/L (ref 3–16)
AST SERPL-CCNC: 13 U/L (ref 15–37)
BASOPHILS # BLD: 0.1 K/UL (ref 0–0.2)
BASOPHILS NFR BLD: 0.9 %
BILIRUB SERPL-MCNC: 0.4 MG/DL (ref 0–1)
BILIRUB UR QL STRIP.AUTO: NEGATIVE
BUN SERPL-MCNC: 6 MG/DL (ref 7–20)
CALCIUM SERPL-MCNC: 9 MG/DL (ref 8.3–10.6)
CHLORIDE SERPL-SCNC: 104 MMOL/L (ref 99–110)
CLARITY UR: CLEAR
CO2 SERPL-SCNC: 20 MMOL/L (ref 21–32)
COLOR UR: ABNORMAL
CREAT SERPL-MCNC: <0.5 MG/DL (ref 0.6–1.2)
DEPRECATED RDW RBC AUTO: 13.6 % (ref 12.4–15.4)
EOSINOPHIL # BLD: 0.2 K/UL (ref 0–0.6)
EOSINOPHIL NFR BLD: 2.1 %
EPI CELLS #/AREA URNS HPF: NORMAL /HPF (ref 0–5)
GFR SERPLBLD CREATININE-BSD FMLA CKD-EPI: >60 ML/MIN/{1.73_M2}
GLUCOSE SERPL-MCNC: 104 MG/DL (ref 70–99)
GLUCOSE UR STRIP.AUTO-MCNC: NEGATIVE MG/DL
HCT VFR BLD AUTO: 35.1 % (ref 36–48)
HGB BLD-MCNC: 12 G/DL (ref 12–16)
HGB UR QL STRIP.AUTO: NEGATIVE
KETONES UR STRIP.AUTO-MCNC: NEGATIVE MG/DL
LACTATE BLDV-SCNC: 1 MMOL/L (ref 0.4–1.9)
LEUKOCYTE ESTERASE UR QL STRIP.AUTO: ABNORMAL
LYMPHOCYTES # BLD: 1.7 K/UL (ref 1–5.1)
LYMPHOCYTES NFR BLD: 22.6 %
MAGNESIUM SERPL-MCNC: 1.9 MG/DL (ref 1.8–2.4)
MCH RBC QN AUTO: 32.4 PG (ref 26–34)
MCHC RBC AUTO-ENTMCNC: 34.2 G/DL (ref 31–36)
MCV RBC AUTO: 94.8 FL (ref 80–100)
MONOCYTES # BLD: 0.8 K/UL (ref 0–1.3)
MONOCYTES NFR BLD: 10 %
NEUTROPHILS # BLD: 5 K/UL (ref 1.7–7.7)
NEUTROPHILS NFR BLD: 64.4 %
NITRITE UR QL STRIP.AUTO: NEGATIVE
PH UR STRIP.AUTO: 6 [PH] (ref 5–8)
PLATELET # BLD AUTO: 220 K/UL (ref 135–450)
PMV BLD AUTO: 10.3 FL (ref 5–10.5)
POTASSIUM SERPL-SCNC: 3.4 MMOL/L (ref 3.5–5.1)
PROT SERPL-MCNC: 6.5 G/DL (ref 6.4–8.2)
PROT UR STRIP.AUTO-MCNC: NEGATIVE MG/DL
RBC # BLD AUTO: 3.7 M/UL (ref 4–5.2)
RBC #/AREA URNS HPF: NORMAL /HPF (ref 0–4)
SODIUM SERPL-SCNC: 136 MMOL/L (ref 136–145)
SP GR UR STRIP.AUTO: <=1.005 (ref 1–1.03)
TROPONIN, HIGH SENSITIVITY: 11 NG/L (ref 0–14)
TROPONIN, HIGH SENSITIVITY: 11 NG/L (ref 0–14)
UA COMPLETE W REFLEX CULTURE PNL UR: ABNORMAL
UA DIPSTICK W REFLEX MICRO PNL UR: YES
URN SPEC COLLECT METH UR: ABNORMAL
UROBILINOGEN UR STRIP-ACNC: 0.2 E.U./DL
WBC # BLD AUTO: 7.7 K/UL (ref 4–11)
WBC #/AREA URNS HPF: NORMAL /HPF (ref 0–5)

## 2023-09-26 PROCEDURE — 93005 ELECTROCARDIOGRAM TRACING: CPT | Performed by: EMERGENCY MEDICINE

## 2023-09-26 PROCEDURE — 93296 REM INTERROG EVL PM/IDS: CPT | Performed by: INTERNAL MEDICINE

## 2023-09-26 PROCEDURE — 83605 ASSAY OF LACTIC ACID: CPT

## 2023-09-26 PROCEDURE — 2580000003 HC RX 258: Performed by: EMERGENCY MEDICINE

## 2023-09-26 PROCEDURE — 99284 EMERGENCY DEPT VISIT MOD MDM: CPT

## 2023-09-26 PROCEDURE — 81001 URINALYSIS AUTO W/SCOPE: CPT

## 2023-09-26 PROCEDURE — 93294 REM INTERROG EVL PM/LDLS PM: CPT | Performed by: INTERNAL MEDICINE

## 2023-09-26 PROCEDURE — 83735 ASSAY OF MAGNESIUM: CPT

## 2023-09-26 PROCEDURE — 85025 COMPLETE CBC W/AUTO DIFF WBC: CPT

## 2023-09-26 PROCEDURE — 80053 COMPREHEN METABOLIC PANEL: CPT

## 2023-09-26 PROCEDURE — 84484 ASSAY OF TROPONIN QUANT: CPT

## 2023-09-26 RX ORDER — 0.9 % SODIUM CHLORIDE 0.9 %
500 INTRAVENOUS SOLUTION INTRAVENOUS ONCE
Status: COMPLETED | OUTPATIENT
Start: 2023-09-26 | End: 2023-09-26

## 2023-09-26 RX ADMIN — SODIUM CHLORIDE 500 ML: 9 INJECTION, SOLUTION INTRAVENOUS at 20:45

## 2023-09-26 ASSESSMENT — ENCOUNTER SYMPTOMS
RHINORRHEA: 0
SORE THROAT: 0
SHORTNESS OF BREATH: 0
ABDOMINAL PAIN: 0
EYE REDNESS: 0
BACK PAIN: 0

## 2023-09-26 ASSESSMENT — PAIN - FUNCTIONAL ASSESSMENT
PAIN_FUNCTIONAL_ASSESSMENT: NONE - DENIES PAIN
PAIN_FUNCTIONAL_ASSESSMENT: NONE - DENIES PAIN

## 2023-09-26 NOTE — ED PROVIDER NOTES
[address] [place] [address]  ED     EMERGENCY DEPARTMENT ENCOUNTER            Pt Name: Roxann Loyola   MRN: [id number]   [address] [facility] 1945   Date of evaluation: 9/26/2023   Provider: Willow Mcgregor MD   PCP: Jessika Asencio MD   Note Started: 7:11 PM EDT 9/26/23          CHIEF COMPLAINT     Chief Complaint   Patient presents with    Diarrhea    Fatigue             HISTORY OF PRESENT ILLNESS:   History from : Patient   Limitations to history : None     Roxann Loyola is a 78 y.o. female who presents complaining of fatigue and malaise. This patient was recently admitted to the hospital.  She was admitted to the hospital and found to have headache, chest pain and hypokalemia. During that admission she was also diagnosed with C. difficile colitis. She was discharged from the hospital on 10 days of oral vancomycin. She states since time of discharge she continues to have large amounts of bowel movements that have mucus in it. No fever. No blood in her stools. She states she just does not feel well and has not had an appetite for days and really has not been taking in much p.o. She comes in today due to fatigue and malaise. Nursing Notes were all reviewed and agreed with, or any disagreements were addressed in the HPI. REVIEW OF SYSTEMS :    Review of Systems   Constitutional:  Positive for fatigue. Negative for fever. HENT:  Negative for rhinorrhea and sore throat. Eyes:  Negative for redness. Respiratory:  Negative for shortness of breath. Cardiovascular:  Negative for chest pain. Gastrointestinal:  Negative for abdominal pain. Genitourinary:  Negative for flank pain. Musculoskeletal:  Negative for back pain. Skin:  Negative for rash. Neurological:  Negative for headaches. Hematological:  Negative for adenopathy. Psychiatric/Behavioral:  Negative for confusion.          MEDICAL HISTORY   has a past medical history of Arthritis, Asthma, Bronchitis chronic,

## 2023-09-26 NOTE — TELEPHONE ENCOUNTER
I spoke with the patient and advised her to go to the emergency room for evaluation. She was very hesitant as she is recovering from C. Diff. I educated the patient about risk for sepsis and possibilities if pacemaker gets/or is infected. She V/U . She refused to take an ambulance but will have her son drop her off at the emergency room from 3 - 4 pm tomorrow as this is the earliest he is available. RITA BERTRAND.

## 2023-09-27 LAB
EKG ATRIAL RATE: 68 BPM
EKG DIAGNOSIS: NORMAL
EKG P AXIS: 86 DEGREES
EKG P-R INTERVAL: 158 MS
EKG Q-T INTERVAL: 402 MS
EKG QRS DURATION: 80 MS
EKG QTC CALCULATION (BAZETT): 427 MS
EKG R AXIS: -17 DEGREES
EKG T AXIS: 19 DEGREES
EKG VENTRICULAR RATE: 68 BPM

## 2023-09-27 PROCEDURE — 93010 ELECTROCARDIOGRAM REPORT: CPT | Performed by: INTERNAL MEDICINE

## 2023-09-27 NOTE — ED NOTES
--Patient provided with discharge instructions and any prescriptions. --Instructions, dosing, and follow-up appointments reviewed with patient/family. No further questions or needs at this time. --Vital signs and patient stable upon discharge. --Patient ambulatory to lobby with family.       Tadeo Braden RN  09/26/23 1172

## 2023-09-27 NOTE — ED PROVIDER NOTES
9:11 PM: I discussed the history, physical examination, laboratory and imaging studies, and treatment plan with Dr. Mat Polanco. Stone Farris was signed out to me in stable condition. Please see Dr. Cathleen Stewart documentation for details of their history, physical, and laboratory studies. Upon re-examination, a summary of Eri Hernandez's history, physical examination, and studies are as follows:      Patient presented with fatigue. She has known C. difficile diarrhea and has been taking vancomycin. Laboratory studies reassuring. Patient receiving fluids. At the time of signout, patient was pending repeat troponin, p.o. challenge, and ambulation challenge. If patient felt better and all this was reassuring, patient to be discharged home. Delta troponin stable. Patient tolerating p.o. Patient is ambulated multiple times in the emergency department. Patient wishes to go home. Patient discharged in stable condition.      Nikunj Phoenix MD  09/26/23 9314

## 2023-09-29 NOTE — CARE COORDINATION
[facility]    DC order noted, all docs needed have been faxed to [facility] for home care services.     Home care to see patient within 24-48 hrs    Eli Roberto RN, BSN CTN  [facility] [phone]

## 2023-10-02 ENCOUNTER — TELEPHONE (OUTPATIENT)
Dept: PULMONOLOGY | Age: 78
End: 2023-10-02

## 2023-10-05 ENCOUNTER — TELEPHONE (OUTPATIENT)
Dept: PULMONOLOGY | Age: 78
End: 2023-10-05

## 2023-10-05 NOTE — TELEPHONE ENCOUNTER
Pt went to Dr. Carter Robin spot on her lung was not big enough to do a CAT scan.   And she is having a dental procedure so she is canceling her appointment with Dr. Josselin Ventura and will call back once she is healed from her dental procedure

## 2023-10-09 NOTE — TELEPHONE ENCOUNTER
Pt states she does want to reschedule but doesn't know when she wants to reschedule Pt states she will call us when she wants to schedule.

## 2023-10-31 ENCOUNTER — HOSPITAL ENCOUNTER (EMERGENCY)
Age: 78
Discharge: HOME | End: 2023-10-31
Attending: EMERGENCY MEDICINE
Payer: MEDICARE

## 2023-10-31 ENCOUNTER — APPOINTMENT (OUTPATIENT)
Dept: GENERAL RADIOLOGY | Age: 78
End: 2023-10-31
Payer: MEDICARE

## 2023-10-31 VITALS
RESPIRATION RATE: 20 BRPM | SYSTOLIC BLOOD PRESSURE: 139 MMHG | BODY MASS INDEX: 23.3 KG/M2 | TEMPERATURE: 98.4 F | HEART RATE: 69 BPM | DIASTOLIC BLOOD PRESSURE: 78 MMHG | OXYGEN SATURATION: 96 % | WEIGHT: 140 LBS

## 2023-10-31 DIAGNOSIS — R42 LIGHTHEADEDNESS: Primary | ICD-10-CM

## 2023-10-31 LAB
ALBUMIN SERPL-MCNC: 4.3 G/DL (ref 3.4–5)
ALBUMIN/GLOB SERPL: 1.2 {RATIO} (ref 1.1–2.2)
ALP SERPL-CCNC: 98 U/L (ref 40–129)
ALT SERPL-CCNC: 11 U/L (ref 10–40)
AMORPH SED URNS QL MICRO: ABNORMAL /HPF
ANION GAP SERPL CALCULATED.3IONS-SCNC: 14 MMOL/L (ref 3–16)
AST SERPL-CCNC: 16 U/L (ref 15–37)
BACTERIA URNS QL MICRO: ABNORMAL /HPF
BASOPHILS # BLD: 0.1 K/UL (ref 0–0.2)
BASOPHILS NFR BLD: 1 %
BILIRUB SERPL-MCNC: 0.3 MG/DL (ref 0–1)
BILIRUB UR QL STRIP.AUTO: ABNORMAL
BUN SERPL-MCNC: 11 MG/DL (ref 7–20)
CALCIUM SERPL-MCNC: 9.8 MG/DL (ref 8.3–10.6)
CHLORIDE SERPL-SCNC: 94 MMOL/L (ref 99–110)
CLARITY UR: CLEAR
CO2 SERPL-SCNC: 25 MMOL/L (ref 21–32)
COLOR UR: ABNORMAL
CREAT SERPL-MCNC: 0.9 MG/DL (ref 0.6–1.2)
DEPRECATED RDW RBC AUTO: 13.3 % (ref 12.4–15.4)
EOSINOPHIL # BLD: 0.6 K/UL (ref 0–0.6)
EOSINOPHIL NFR BLD: 7 %
EPI CELLS #/AREA URNS HPF: ABNORMAL /HPF (ref 0–5)
FLUAV RNA RESP QL NAA+PROBE: NOT DETECTED
FLUBV RNA RESP QL NAA+PROBE: NOT DETECTED
GFR SERPLBLD CREATININE-BSD FMLA CKD-EPI: >60 ML/MIN/{1.73_M2}
GLUCOSE SERPL-MCNC: 159 MG/DL (ref 70–99)
GLUCOSE UR STRIP.AUTO-MCNC: ABNORMAL MG/DL
HCT VFR BLD AUTO: 40.5 % (ref 36–48)
HGB BLD-MCNC: 13.4 G/DL (ref 12–16)
HGB UR QL STRIP.AUTO: NEGATIVE
KETONES UR STRIP.AUTO-MCNC: ABNORMAL MG/DL
LEUKOCYTE ESTERASE UR QL STRIP.AUTO: ABNORMAL
LYMPHOCYTES # BLD: 1.2 K/UL (ref 1–5.1)
LYMPHOCYTES NFR BLD: 13.4 %
MAGNESIUM SERPL-MCNC: 2 MG/DL (ref 1.8–2.4)
MCH RBC QN AUTO: 31.6 PG (ref 26–34)
MCHC RBC AUTO-ENTMCNC: 33.2 G/DL (ref 31–36)
MCV RBC AUTO: 95.1 FL (ref 80–100)
MONOCYTES # BLD: 0.8 K/UL (ref 0–1.3)
MONOCYTES NFR BLD: 8.9 %
NEUTROPHILS # BLD: 6.4 K/UL (ref 1.7–7.7)
NEUTROPHILS NFR BLD: 69.7 %
NITRITE UR QL STRIP.AUTO: ABNORMAL
PH UR STRIP.AUTO: ABNORMAL [PH] (ref 5–8)
PLATELET # BLD AUTO: 252 K/UL (ref 135–450)
PMV BLD AUTO: 11 FL (ref 5–10.5)
POTASSIUM SERPL-SCNC: 3.3 MMOL/L (ref 3.5–5.1)
PROT SERPL-MCNC: 7.9 G/DL (ref 6.4–8.2)
PROT UR STRIP.AUTO-MCNC: ABNORMAL MG/DL
RBC # BLD AUTO: 4.26 M/UL (ref 4–5.2)
RBC #/AREA URNS HPF: ABNORMAL /HPF (ref 0–4)
RENAL EPI CELLS #/AREA UR COMP ASSIST: ABNORMAL /HPF (ref 0–1)
SARS-COV-2 RNA RESP QL NAA+PROBE: NOT DETECTED
SODIUM SERPL-SCNC: 133 MMOL/L (ref 136–145)
SP GR UR STRIP.AUTO: ABNORMAL (ref 1–1.03)
TROPONIN, HIGH SENSITIVITY: 11 NG/L (ref 0–14)
TROPONIN, HIGH SENSITIVITY: 12 NG/L (ref 0–14)
UA COMPLETE W REFLEX CULTURE PNL UR: ABNORMAL
UA DIPSTICK W REFLEX MICRO PNL UR: YES
URN SPEC COLLECT METH UR: ABNORMAL
UROBILINOGEN UR STRIP-ACNC: ABNORMAL E.U./DL
WBC # BLD AUTO: 9.2 K/UL (ref 4–11)
WBC #/AREA URNS HPF: ABNORMAL /HPF (ref 0–5)

## 2023-10-31 PROCEDURE — 85025 COMPLETE CBC W/AUTO DIFF WBC: CPT

## 2023-10-31 PROCEDURE — 87636 SARSCOV2 & INF A&B AMP PRB: CPT

## 2023-10-31 PROCEDURE — 81001 URINALYSIS AUTO W/SCOPE: CPT

## 2023-10-31 PROCEDURE — 71046 X-RAY EXAM CHEST 2 VIEWS: CPT

## 2023-10-31 PROCEDURE — 2580000003 HC RX 258: Performed by: EMERGENCY MEDICINE

## 2023-10-31 PROCEDURE — 80053 COMPREHEN METABOLIC PANEL: CPT

## 2023-10-31 PROCEDURE — 93005 ELECTROCARDIOGRAM TRACING: CPT | Performed by: EMERGENCY MEDICINE

## 2023-10-31 PROCEDURE — 84484 ASSAY OF TROPONIN QUANT: CPT

## 2023-10-31 PROCEDURE — 99285 EMERGENCY DEPT VISIT HI MDM: CPT

## 2023-10-31 PROCEDURE — 83735 ASSAY OF MAGNESIUM: CPT

## 2023-10-31 RX ORDER — 0.9 % SODIUM CHLORIDE 0.9 %
500 INTRAVENOUS SOLUTION INTRAVENOUS ONCE
Status: COMPLETED | OUTPATIENT
Start: 2023-10-31 | End: 2023-10-31

## 2023-10-31 RX ADMIN — SODIUM CHLORIDE 500 ML: 9 INJECTION, SOLUTION INTRAVENOUS at 17:42

## 2023-10-31 ASSESSMENT — ENCOUNTER SYMPTOMS
SORE THROAT: 0
BLOOD IN STOOL: 0
ABDOMINAL PAIN: 0
VOMITING: 0
RHINORRHEA: 0
COUGH: 0
SHORTNESS OF BREATH: 0
EYE REDNESS: 0
DIARRHEA: 0
NAUSEA: 0

## 2023-10-31 ASSESSMENT — PAIN SCALES - GENERAL: PAINLEVEL_OUTOF10: 7

## 2023-10-31 ASSESSMENT — PAIN - FUNCTIONAL ASSESSMENT: PAIN_FUNCTIONAL_ASSESSMENT: 0-10

## 2023-10-31 ASSESSMENT — PAIN DESCRIPTION - LOCATION: LOCATION: BACK

## 2023-10-31 NOTE — ED NOTES
Pt. Able to walk to the restroom and back without any assistive device, steady gait, no cp or sob noted.       Eyal Baldwin RN  10/31/23 3441

## 2023-11-01 ENCOUNTER — TELEPHONE (OUTPATIENT)
Dept: CARDIOLOGY CLINIC | Age: 78
End: 2023-11-01

## 2023-11-01 ENCOUNTER — OFFICE VISIT (OUTPATIENT)
Dept: CARDIOLOGY CLINIC | Age: 78
End: 2023-11-01
Payer: MEDICARE

## 2023-11-01 ENCOUNTER — NURSE ONLY (OUTPATIENT)
Dept: CARDIOLOGY CLINIC | Age: 78
End: 2023-11-01
Payer: MEDICARE

## 2023-11-01 VITALS
HEART RATE: 65 BPM | WEIGHT: 140 LBS | SYSTOLIC BLOOD PRESSURE: 118 MMHG | BODY MASS INDEX: 23.32 KG/M2 | HEIGHT: 65 IN | DIASTOLIC BLOOD PRESSURE: 68 MMHG | OXYGEN SATURATION: 98 %

## 2023-11-01 DIAGNOSIS — R42 DIZZINESS: ICD-10-CM

## 2023-11-01 DIAGNOSIS — I48.0 PAF (PAROXYSMAL ATRIAL FIBRILLATION) (HCC): Primary | ICD-10-CM

## 2023-11-01 DIAGNOSIS — I49.5 SINUS NODE DYSFUNCTION (HCC): ICD-10-CM

## 2023-11-01 DIAGNOSIS — Z95.0 PACEMAKER: Primary | ICD-10-CM

## 2023-11-01 DIAGNOSIS — Z95.0 PACEMAKER: ICD-10-CM

## 2023-11-01 DIAGNOSIS — R53.83 EASY FATIGABILITY: ICD-10-CM

## 2023-11-01 DIAGNOSIS — I48.91 ATRIAL FIBRILLATION, UNSPECIFIED TYPE (HCC): ICD-10-CM

## 2023-11-01 PROCEDURE — 93280 PM DEVICE PROGR EVAL DUAL: CPT | Performed by: INTERNAL MEDICINE

## 2023-11-01 PROCEDURE — G8420 CALC BMI NORM PARAMETERS: HCPCS | Performed by: INTERNAL MEDICINE

## 2023-11-01 PROCEDURE — 1036F TOBACCO NON-USER: CPT | Performed by: INTERNAL MEDICINE

## 2023-11-01 PROCEDURE — G8484 FLU IMMUNIZE NO ADMIN: HCPCS | Performed by: INTERNAL MEDICINE

## 2023-11-01 PROCEDURE — 1090F PRES/ABSN URINE INCON ASSESS: CPT | Performed by: INTERNAL MEDICINE

## 2023-11-01 PROCEDURE — 1123F ACP DISCUSS/DSCN MKR DOCD: CPT | Performed by: INTERNAL MEDICINE

## 2023-11-01 PROCEDURE — G8427 DOCREV CUR MEDS BY ELIG CLIN: HCPCS | Performed by: INTERNAL MEDICINE

## 2023-11-01 PROCEDURE — G8400 PT W/DXA NO RESULTS DOC: HCPCS | Performed by: INTERNAL MEDICINE

## 2023-11-01 PROCEDURE — 99214 OFFICE O/P EST MOD 30 MIN: CPT | Performed by: INTERNAL MEDICINE

## 2023-11-01 RX ORDER — HYDROXYZINE HYDROCHLORIDE 25 MG/1
25 TABLET, FILM COATED ORAL
COMMUNITY
Start: 2023-08-07

## 2023-11-01 RX ORDER — BACLOFEN 10 MG/1
TABLET ORAL
COMMUNITY
Start: 2023-10-25

## 2023-11-01 ASSESSMENT — ENCOUNTER SYMPTOMS
WHEEZING: 0
SHORTNESS OF BREATH: 0
BACK PAIN: 1
LEFT EYE: 0
HEMATOCHEZIA: 0
RIGHT EYE: 0
STRIDOR: 0
HEMATEMESIS: 0

## 2023-11-01 NOTE — PROGRESS NOTES
20 MEQ extended release tablet Take 1 tablet by mouth daily      metoprolol succinate (TOPROL XL) 25 MG extended release tablet Take 1 tablet by mouth daily 30 tablet 3    montelukast (SINGULAIR) 10 MG tablet TAKE 1 TABLET BY MOUTH EACH NIGHT AT BEDTIME 90 tablet 2    vitamin C (ASCORBIC ACID) 500 MG tablet Take 1 tablet by mouth daily      Cholecalciferol (VITAMIN D3) 125 MCG (5000 UT) TABS Take by mouth daily Unknown dose      guaiFENesin (MUCINEX) 600 MG extended release tablet Take 1 tablet by mouth daily      oxybutynin (DITROPAN-XL) 5 MG extended release tablet Take 1 tablet by mouth at bedtime      fluticasone (FLONASE) 50 MCG/ACT nasal spray SQUIRT 1 SPRAY IN EACH NOSTRIL TWICE DAILY 16 g 5    pravastatin (PRAVACHOL) 40 MG tablet Take 1 tablet by mouth daily      acetaminophen (TYLENOL) 500 MG tablet Take 2 tablets by mouth at bedtime (Patient not taking: Reported on 11/1/2023)       No current facility-administered medications for this visit. Lab Review     Lab Results   Component Value Date/Time     10/31/2023 05:07 PM    K 3.3 10/31/2023 05:07 PM    CL 94 10/31/2023 05:07 PM    CO2 25 10/31/2023 05:07 PM    BUN 11 10/31/2023 05:07 PM    CREATININE 0.9 10/31/2023 05:07 PM    GLUCOSE 159 10/31/2023 05:07 PM    CALCIUM 9.8 10/31/2023 05:07 PM        Lab Results   Component Value Date    WBC 9.2 10/31/2023    HGB 13.4 10/31/2023    HCT 40.5 10/31/2023    MCV 95.1 10/31/2023     10/31/2023       Lab Results   Component Value Date    TSHFT4 1.70 02/09/2023    TSH 1.79 11/23/2011       No results found for: \"BNP\"    I, Javid Foster RN, am scribing for and in the presence of Dr. Kolton Lakhani.  11/01/23 11:24 AM   Javid Foster RN

## 2023-11-01 NOTE — TELEPHONE ENCOUNTER
MANGO,    Pt saw Amina Sheldon today and I was informed by [facility] that he would specifically like for the pt to see EPNP in 6-8wks. The pt has seen you before on 6/29/23. Would you be willing to [facility] your schedule or should we ask Fanny Aruna if she can overbook hers? Next available with you is 1/11/24, and npkk is 1/15/24. I informed the pt that I will call back with a plan.

## 2023-11-01 NOTE — PATIENT INSTRUCTIONS
Plan:     Remote device checks every 3 months. Stop metoprolol. Let us know how you do with this change. Referral to coumadin clinic, Eliquis is cost prohibitive. Start this after Eliquis is completed. Follow up with EP NP in 6-8 weeks.

## 2023-11-02 LAB
EKG ATRIAL RATE: 67 BPM
EKG DIAGNOSIS: NORMAL
EKG P AXIS: 30 DEGREES
EKG P-R INTERVAL: 194 MS
EKG Q-T INTERVAL: 432 MS
EKG QRS DURATION: 80 MS
EKG QTC CALCULATION (BAZETT): 456 MS
EKG R AXIS: -6 DEGREES
EKG T AXIS: 25 DEGREES
EKG VENTRICULAR RATE: 67 BPM

## 2023-11-02 PROCEDURE — 93010 ELECTROCARDIOGRAM REPORT: CPT | Performed by: INTERNAL MEDICINE

## 2023-11-15 ENCOUNTER — HOSPITAL ENCOUNTER (EMERGENCY)
Age: 78
Discharge: HOME | End: 2023-11-15
Payer: MEDICARE

## 2023-11-15 ENCOUNTER — APPOINTMENT (OUTPATIENT)
Dept: CT IMAGING | Age: 78
End: 2023-11-15
Payer: MEDICARE

## 2023-11-15 VITALS
HEART RATE: 62 BPM | HEIGHT: 65 IN | SYSTOLIC BLOOD PRESSURE: 102 MMHG | WEIGHT: 140 LBS | BODY MASS INDEX: 23.32 KG/M2 | RESPIRATION RATE: 16 BRPM | OXYGEN SATURATION: 98 % | DIASTOLIC BLOOD PRESSURE: 60 MMHG | TEMPERATURE: 98.2 F

## 2023-11-15 DIAGNOSIS — R32 DERMATITIS ASSOCIATED WITH MOISTURE FROM URINARY INCONTINENCE: ICD-10-CM

## 2023-11-15 DIAGNOSIS — N95.2 ATROPHIC VAGINITIS: Primary | ICD-10-CM

## 2023-11-15 DIAGNOSIS — L25.8 DERMATITIS ASSOCIATED WITH MOISTURE FROM URINARY INCONTINENCE: ICD-10-CM

## 2023-11-15 LAB
ALBUMIN SERPL-MCNC: 4.3 G/DL (ref 3.4–5)
ALBUMIN/GLOB SERPL: 1.3 {RATIO} (ref 1.1–2.2)
ALP SERPL-CCNC: 104 U/L (ref 40–129)
ALT SERPL-CCNC: 11 U/L (ref 10–40)
AMORPH SED URNS QL MICRO: ABNORMAL /HPF
ANION GAP SERPL CALCULATED.3IONS-SCNC: 13 MMOL/L (ref 3–16)
AST SERPL-CCNC: 16 U/L (ref 15–37)
BACTERIA GENITAL QL WET PREP: ABNORMAL
BACTERIA URNS QL MICRO: ABNORMAL /HPF
BASOPHILS # BLD: 0.1 K/UL (ref 0–0.2)
BASOPHILS NFR BLD: 1.2 %
BILIRUB SERPL-MCNC: 0.3 MG/DL (ref 0–1)
BILIRUB UR QL STRIP.AUTO: NEGATIVE
BUN SERPL-MCNC: 8 MG/DL (ref 7–20)
CALCIUM SERPL-MCNC: 9.5 MG/DL (ref 8.3–10.6)
CHLORIDE SERPL-SCNC: 97 MMOL/L (ref 99–110)
CLARITY UR: CLEAR
CLUE CELLS SPEC QL WET PREP: ABNORMAL
CO2 SERPL-SCNC: 25 MMOL/L (ref 21–32)
COLOR UR: YELLOW
CREAT SERPL-MCNC: 0.7 MG/DL (ref 0.6–1.2)
DEPRECATED RDW RBC AUTO: 13.7 % (ref 12.4–15.4)
EOSINOPHIL # BLD: 0.5 K/UL (ref 0–0.6)
EOSINOPHIL NFR BLD: 4.8 %
EPI CELLS #/AREA URNS HPF: ABNORMAL /HPF (ref 0–5)
EPI CELLS SPEC QL WET PREP: ABNORMAL
GFR SERPLBLD CREATININE-BSD FMLA CKD-EPI: >60 ML/MIN/{1.73_M2}
GLUCOSE SERPL-MCNC: 90 MG/DL (ref 70–99)
GLUCOSE UR STRIP.AUTO-MCNC: NEGATIVE MG/DL
HCT VFR BLD AUTO: 40.6 % (ref 36–48)
HGB BLD-MCNC: 13.4 G/DL (ref 12–16)
HGB UR QL STRIP.AUTO: ABNORMAL
KETONES UR STRIP.AUTO-MCNC: NEGATIVE MG/DL
LACTATE BLDV-SCNC: 1.4 MMOL/L (ref 0.4–1.9)
LEUKOCYTE ESTERASE UR QL STRIP.AUTO: NEGATIVE
LIPASE SERPL-CCNC: 30 U/L (ref 13–60)
LYMPHOCYTES # BLD: 2.2 K/UL (ref 1–5.1)
LYMPHOCYTES NFR BLD: 22.4 %
MAGNESIUM SERPL-MCNC: 1.9 MG/DL (ref 1.8–2.4)
MCH RBC QN AUTO: 31.5 PG (ref 26–34)
MCHC RBC AUTO-ENTMCNC: 33.1 G/DL (ref 31–36)
MCV RBC AUTO: 95.2 FL (ref 80–100)
MONOCYTES # BLD: 1.1 K/UL (ref 0–1.3)
MONOCYTES NFR BLD: 11.3 %
NEUTROPHILS # BLD: 5.8 K/UL (ref 1.7–7.7)
NEUTROPHILS NFR BLD: 60.3 %
NITRITE UR QL STRIP.AUTO: NEGATIVE
PH UR STRIP.AUTO: 6 [PH] (ref 5–8)
PLATELET # BLD AUTO: 251 K/UL (ref 135–450)
PMV BLD AUTO: 11.2 FL (ref 5–10.5)
POTASSIUM SERPL-SCNC: 3.5 MMOL/L (ref 3.5–5.1)
PROT SERPL-MCNC: 7.5 G/DL (ref 6.4–8.2)
PROT UR STRIP.AUTO-MCNC: NEGATIVE MG/DL
RBC # BLD AUTO: 4.26 M/UL (ref 4–5.2)
RBC #/AREA URNS HPF: ABNORMAL /HPF (ref 0–4)
RBC SPEC QL WET PREP: ABNORMAL
SODIUM SERPL-SCNC: 135 MMOL/L (ref 136–145)
SP GR UR STRIP.AUTO: <=1.005 (ref 1–1.03)
SPECIMEN SOURCE FLD: ABNORMAL
T VAGINALIS GENITAL QL WET PREP: ABNORMAL
UA DIPSTICK W REFLEX MICRO PNL UR: YES
URN SPEC COLLECT METH UR: ABNORMAL
UROBILINOGEN UR STRIP-ACNC: 0.2 E.U./DL
WBC # BLD AUTO: 9.7 K/UL (ref 4–11)
WBC #/AREA URNS HPF: ABNORMAL /HPF (ref 0–5)
WBC SPEC QL WET PREP: ABNORMAL
YEAST GENITAL QL WET PREP: ABNORMAL

## 2023-11-15 PROCEDURE — 80053 COMPREHEN METABOLIC PANEL: CPT

## 2023-11-15 PROCEDURE — 81001 URINALYSIS AUTO W/SCOPE: CPT

## 2023-11-15 PROCEDURE — 83605 ASSAY OF LACTIC ACID: CPT

## 2023-11-15 PROCEDURE — 87210 SMEAR WET MOUNT SALINE/INK: CPT

## 2023-11-15 PROCEDURE — 83735 ASSAY OF MAGNESIUM: CPT

## 2023-11-15 PROCEDURE — 99284 EMERGENCY DEPT VISIT MOD MDM: CPT

## 2023-11-15 PROCEDURE — 83690 ASSAY OF LIPASE: CPT

## 2023-11-15 PROCEDURE — 74176 CT ABD & PELVIS W/O CONTRAST: CPT

## 2023-11-15 PROCEDURE — 85025 COMPLETE CBC W/AUTO DIFF WBC: CPT

## 2023-11-15 RX ORDER — CONJUGATED ESTROGENS 0.62 MG/G
0.5 CREAM VAGINAL DAILY
Qty: 30 G | Refills: 0 | Status: SHIPPED | OUTPATIENT
Start: 2023-11-15

## 2023-11-15 ASSESSMENT — PAIN - FUNCTIONAL ASSESSMENT: PAIN_FUNCTIONAL_ASSESSMENT: 0-10

## 2023-11-15 ASSESSMENT — PAIN DESCRIPTION - LOCATION: LOCATION: BACK

## 2023-11-15 ASSESSMENT — PAIN SCALES - GENERAL: PAINLEVEL_OUTOF10: 5

## 2023-11-15 NOTE — ED PROVIDER NOTES
[address] [place] [address]  ED  EMERGENCY DEPARTMENT ENCOUNTER        Pt Name: Morro Gomez  MRN: [id number]  [address] [facility] [place] 1945  Date of evaluation: 11/15/2023  Provider: HOWIE Riley - CNP  PCP: Elkin Fritz MD  Note Started: 5:26 PM EST 11/15/23    Evaluated by BC. I have evaluated this patient. My supervising physician was available for consultation. CHIEF COMPLAINT       Chief Complaint   Patient presents with    Urinary Tract Infection     Pt was recently treated for a UTI. Three days ago, she started having blood where she wipes. HISTORY OF PRESENT ILLNESS: 1 or more Elements     History From: Patient  Limitations to history : None    Morro Gomez is a 78 y.o. female who presents to ER with UTI, bleeding. She reports she is bleeding fom the UTI. Her doctor told her to come to in, the one at her insurance company. She reports in September she had a UTI. She reports she hurts and burns, its terrible. She uses desitin to relieve the pain. It has been hurting and burning for a couple weeks and she had no way to get here. She had a urine sample sent to a lab 2-3 weeks ago and they said she did not have a UTI. She had taken Azo for the pain and her urine was red. They could see the Azo was there but she didn't have a UTI. Started bleeding 3 days ago. She has had a hysterectomy. She sees blood with wiping and on her underwear. The desitin helps, just started using this last night. Has pain in the right side of the abdomen. Reports she has lost 15 pounds in the last couple of weeks. Nursing Notes were all reviewed and agreed with or any disagreements were addressed in the HPI. REVIEW OF SYSTEMS :      Review of Systems    Positives and Pertinent negatives as per HPI.      MEDICAL HISTORY     Past Medical History:   Diagnosis Date    Arthritis     Asthma     Bronchitis chronic     Colon polyp     COPD (chronic obstructive pulmonary disease) (HCC)     Emphysema of lung

## 2023-11-29 RX ORDER — FLUTICASONE FUROATE, UMECLIDINIUM BROMIDE AND VILANTEROL TRIFENATATE 200; 62.5; 25 UG/1; UG/1; UG/1
1 POWDER RESPIRATORY (INHALATION) DAILY
Qty: 3 EACH | Refills: 0 | Status: SHIPPED | OUTPATIENT
Start: 2023-11-29

## 2024-01-02 PROCEDURE — 93296 REM INTERROG EVL PM/IDS: CPT | Performed by: INTERNAL MEDICINE

## 2024-01-02 PROCEDURE — 93294 REM INTERROG EVL PM/LDLS PM: CPT | Performed by: INTERNAL MEDICINE

## 2024-01-04 RX ORDER — APIXABAN 5 MG/1
TABLET, FILM COATED ORAL
Qty: 60 TABLET | Refills: 5 | Status: SHIPPED | OUTPATIENT
Start: 2024-01-04

## 2024-01-04 NOTE — TELEPHONE ENCOUNTER
Last ov 11/01/2023 KXA  Last ov 06/29/2023 NPAM   Upcoming ov 01/11/2024  CBC & BMP 12/2023    KXA OOT

## 2024-01-09 RX ORDER — MONTELUKAST SODIUM 10 MG/1
TABLET ORAL
Qty: 90 TABLET | Refills: 2 | Status: SHIPPED | OUTPATIENT
Start: 2024-01-09

## 2024-01-09 NOTE — TELEPHONE ENCOUNTER
Refill Request     CONFIRM preferrred pharmacy with the patient.    If Mail Order Rx - Pend for 90 day refill.      Last Seen: Last Seen Department: 8/10/2023  Last Seen by PCP: 8/10/2023    Last Written: 04/17/2023    If no future appointment scheduled, route STAFF MESSAGE with patient name to the  Pool for scheduling.      Next Appointment:   Future Appointments   Date Time Provider Department Center   1/11/2024  1:15 PM SCHEDULE, [facility] DEVICE CHECK [facility]   1/11/2024  1:15 PM Bonnie Luna, APRN - CNP [facility]       Message sent to  to schedule appt with patient?  NO      Requested Prescriptions     Pending Prescriptions Disp Refills    montelukast (SINGULAIR) 10 MG tablet [Pharmacy Med Name: MONTELUKAST SODIUM 10 MG ORAL TABLET] 90 tablet 2     Sig: TAKE 1 TABLET BY MOUTH EACH NIGHT AT BEDTIME      Assessment:   Moderate persistent Asthma   Enlarging RUL 8 mm nodule with other nodules on CT 11/11/21.  Low activities on PET scan 6/2/2022 with resolution/stability of other nodules.  Enlarging RUL 1.6 cm nodule on repeat CT 1/27/2023. DDx malignancy, granuloma, LCH, lymphoma,. TTNBx 4/4/23 with chronic inflammation.  Enlarging on repeat CT 7/13/2023 concerning for bronchogenic carcinoma.  54 pack years smoking-quit 11/2017   RUL nodule 8 mm on CT chest 11/15/2017. Smaller on repeat CT chest 6/11/2018 and CT 9/2/2020.   Lobulated LLL pulmonary nodule on CT chest 2/3/2016 (SUV of 1.48). CT chest 8/12/16 showed stability with calcification. ? Growth on CT chest 2/13/2017 with hilar adenopathy. Negative EBUS TBNA of the LN 2/27/2017.  Smaller on repeat CT 9/2/2020  Pulmonary nodules stable between 5/2007 and 4/2009. Likely granulomas. 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

## 2024-01-10 ENCOUNTER — APPOINTMENT (OUTPATIENT)
Dept: GENERAL RADIOLOGY | Age: 79
End: 2024-01-10
Payer: MEDICARE

## 2024-01-10 ENCOUNTER — HOSPITAL ENCOUNTER (EMERGENCY)
Age: 79
Discharge: HOME | End: 2024-01-10
Attending: EMERGENCY MEDICINE
Payer: MEDICARE

## 2024-01-10 VITALS
HEART RATE: 73 BPM | OXYGEN SATURATION: 95 % | TEMPERATURE: 97.9 F | BODY MASS INDEX: 22.47 KG/M2 | WEIGHT: 135 LBS | SYSTOLIC BLOOD PRESSURE: 155 MMHG | RESPIRATION RATE: 21 BRPM | DIASTOLIC BLOOD PRESSURE: 73 MMHG

## 2024-01-10 DIAGNOSIS — R53.81 MALAISE AND FATIGUE: ICD-10-CM

## 2024-01-10 DIAGNOSIS — R53.83 MALAISE AND FATIGUE: ICD-10-CM

## 2024-01-10 DIAGNOSIS — R07.9 CHEST PAIN, UNSPECIFIED TYPE: Primary | ICD-10-CM

## 2024-01-10 LAB
ALBUMIN SERPL-MCNC: 4.2 G/DL (ref 3.4–5)
ALBUMIN/GLOB SERPL: 1.4 {RATIO} (ref 1.1–2.2)
ALP SERPL-CCNC: 93 U/L (ref 40–129)
ALT SERPL-CCNC: <5 U/L (ref 10–40)
ANION GAP SERPL CALCULATED.3IONS-SCNC: 9 MMOL/L (ref 3–16)
AST SERPL-CCNC: 11 U/L (ref 15–37)
BACTERIA URNS QL MICRO: ABNORMAL /HPF
BASOPHILS # BLD: 0.1 K/UL (ref 0–0.2)
BASOPHILS NFR BLD: 0.8 %
BILIRUB SERPL-MCNC: 0.5 MG/DL (ref 0–1)
BILIRUB UR QL STRIP.AUTO: NEGATIVE
BUN SERPL-MCNC: 5 MG/DL (ref 7–20)
CALCIUM SERPL-MCNC: 9.5 MG/DL (ref 8.3–10.6)
CHLORIDE SERPL-SCNC: 98 MMOL/L (ref 99–110)
CLARITY UR: CLEAR
CO2 SERPL-SCNC: 25 MMOL/L (ref 21–32)
COLOR UR: YELLOW
CREAT SERPL-MCNC: <0.5 MG/DL (ref 0.6–1.2)
DEPRECATED RDW RBC AUTO: 13.7 % (ref 12.4–15.4)
EKG ATRIAL RATE: 60 BPM
EKG DIAGNOSIS: NORMAL
EKG P AXIS: 51 DEGREES
EKG P-R INTERVAL: 176 MS
EKG Q-T INTERVAL: 430 MS
EKG QRS DURATION: 80 MS
EKG QTC CALCULATION (BAZETT): 430 MS
EKG R AXIS: -12 DEGREES
EKG T AXIS: 21 DEGREES
EKG VENTRICULAR RATE: 60 BPM
EOSINOPHIL # BLD: 0.4 K/UL (ref 0–0.6)
EOSINOPHIL NFR BLD: 5.1 %
GFR SERPLBLD CREATININE-BSD FMLA CKD-EPI: >60 ML/MIN/{1.73_M2}
GLUCOSE SERPL-MCNC: 100 MG/DL (ref 70–99)
GLUCOSE UR STRIP.AUTO-MCNC: NEGATIVE MG/DL
HCT VFR BLD AUTO: 39.4 % (ref 36–48)
HGB BLD-MCNC: 13 G/DL (ref 12–16)
HGB UR QL STRIP.AUTO: NEGATIVE
KETONES UR STRIP.AUTO-MCNC: NEGATIVE MG/DL
LEUKOCYTE ESTERASE UR QL STRIP.AUTO: NEGATIVE
LYMPHOCYTES # BLD: 1.8 K/UL (ref 1–5.1)
LYMPHOCYTES NFR BLD: 21.7 %
MCH RBC QN AUTO: 31.2 PG (ref 26–34)
MCHC RBC AUTO-ENTMCNC: 32.9 G/DL (ref 31–36)
MCV RBC AUTO: 95 FL (ref 80–100)
MONOCYTES # BLD: 0.7 K/UL (ref 0–1.3)
MONOCYTES NFR BLD: 7.8 %
NEUTROPHILS # BLD: 5.5 K/UL (ref 1.7–7.7)
NEUTROPHILS NFR BLD: 64.6 %
NITRITE UR QL STRIP.AUTO: POSITIVE
NT-PROBNP SERPL-MCNC: 931 PG/ML (ref 0–449)
PH UR STRIP.AUTO: 7 [PH] (ref 5–8)
PLATELET # BLD AUTO: 259 K/UL (ref 135–450)
PMV BLD AUTO: 10.2 FL (ref 5–10.5)
POTASSIUM SERPL-SCNC: 3.8 MMOL/L (ref 3.5–5.1)
PROT SERPL-MCNC: 7.2 G/DL (ref 6.4–8.2)
PROT UR STRIP.AUTO-MCNC: NEGATIVE MG/DL
RBC # BLD AUTO: 4.15 M/UL (ref 4–5.2)
RBC #/AREA URNS HPF: ABNORMAL /HPF (ref 0–4)
SODIUM SERPL-SCNC: 132 MMOL/L (ref 136–145)
SP GR UR STRIP.AUTO: 1.01 (ref 1–1.03)
TROPONIN, HIGH SENSITIVITY: 11 NG/L (ref 0–14)
TROPONIN, HIGH SENSITIVITY: 14 NG/L (ref 0–14)
UA COMPLETE W REFLEX CULTURE PNL UR: ABNORMAL
UA DIPSTICK W REFLEX MICRO PNL UR: YES
URN SPEC COLLECT METH UR: ABNORMAL
UROBILINOGEN UR STRIP-ACNC: 0.2 E.U./DL
WBC # BLD AUTO: 8.5 K/UL (ref 4–11)
WBC #/AREA URNS HPF: ABNORMAL /HPF (ref 0–5)

## 2024-01-10 PROCEDURE — 99285 EMERGENCY DEPT VISIT HI MDM: CPT

## 2024-01-10 PROCEDURE — 6360000002 HC RX W HCPCS: Performed by: PHYSICIAN ASSISTANT

## 2024-01-10 PROCEDURE — 85025 COMPLETE CBC W/AUTO DIFF WBC: CPT

## 2024-01-10 PROCEDURE — 80053 COMPREHEN METABOLIC PANEL: CPT

## 2024-01-10 PROCEDURE — 81001 URINALYSIS AUTO W/SCOPE: CPT

## 2024-01-10 PROCEDURE — 93005 ELECTROCARDIOGRAM TRACING: CPT | Performed by: EMERGENCY MEDICINE

## 2024-01-10 PROCEDURE — 71045 X-RAY EXAM CHEST 1 VIEW: CPT

## 2024-01-10 PROCEDURE — 84484 ASSAY OF TROPONIN QUANT: CPT

## 2024-01-10 PROCEDURE — 96374 THER/PROPH/DIAG INJ IV PUSH: CPT

## 2024-01-10 PROCEDURE — 83880 ASSAY OF NATRIURETIC PEPTIDE: CPT

## 2024-01-10 PROCEDURE — 6370000000 HC RX 637 (ALT 250 FOR IP): Performed by: PHYSICIAN ASSISTANT

## 2024-01-10 PROCEDURE — 93010 ELECTROCARDIOGRAM REPORT: CPT | Performed by: INTERNAL MEDICINE

## 2024-01-10 RX ORDER — ACETAMINOPHEN 325 MG/1
650 TABLET ORAL ONCE
Status: COMPLETED | OUTPATIENT
Start: 2024-01-10 | End: 2024-01-10

## 2024-01-10 RX ORDER — ONDANSETRON 2 MG/ML
4 INJECTION INTRAMUSCULAR; INTRAVENOUS ONCE
Status: COMPLETED | OUTPATIENT
Start: 2024-01-10 | End: 2024-01-10

## 2024-01-10 RX ORDER — ASPIRIN 325 MG
325 TABLET ORAL ONCE
Status: COMPLETED | OUTPATIENT
Start: 2024-01-10 | End: 2024-01-10

## 2024-01-10 RX ADMIN — ASPIRIN 325 MG: 325 TABLET ORAL at 17:24

## 2024-01-10 RX ADMIN — ACETAMINOPHEN 650 MG: 325 TABLET ORAL at 19:27

## 2024-01-10 RX ADMIN — ONDANSETRON 4 MG: 2 INJECTION INTRAMUSCULAR; INTRAVENOUS at 17:23

## 2024-01-10 ASSESSMENT — PAIN DESCRIPTION - LOCATION
LOCATION: CHEST
LOCATION: CHEST

## 2024-01-10 ASSESSMENT — PAIN DESCRIPTION - ORIENTATION
ORIENTATION: MID
ORIENTATION: MID

## 2024-01-10 ASSESSMENT — PAIN DESCRIPTION - DESCRIPTORS
DESCRIPTORS: TIGHTNESS
DESCRIPTORS: TIGHTNESS

## 2024-01-10 ASSESSMENT — HEART SCORE: ECG: 0

## 2024-01-10 ASSESSMENT — PAIN SCALES - GENERAL
PAINLEVEL_OUTOF10: 6
PAINLEVEL_OUTOF10: 6
PAINLEVEL_OUTOF10: 8

## 2024-01-10 ASSESSMENT — PAIN DESCRIPTION - FREQUENCY: FREQUENCY: INTERMITTENT

## 2024-01-10 ASSESSMENT — PAIN - FUNCTIONAL ASSESSMENT: PAIN_FUNCTIONAL_ASSESSMENT: 0-10

## 2024-01-10 ASSESSMENT — PAIN DESCRIPTION - PAIN TYPE: TYPE: ACUTE PAIN

## 2024-01-10 ASSESSMENT — PAIN DESCRIPTION - ONSET: ONSET: ON-GOING

## 2024-01-10 NOTE — ED NOTES
Patient resting in bed with rails up x2, helped patient to dial phone so she could take with her son, Lisa

## 2024-01-10 NOTE — ED PROVIDER NOTES
[facility]  ED     EMERGENCY DEPARTMENT ENCOUNTER     Location: [facility]  ED  1/10/2024  Note Started: 6:23 PM EST 1/10/24      Patient Identification  Isa Hernandez is a 78 y.o. female      HPI:Isa Hernandez was evaluated in the Emergency Department for chest comfort.  The patient states that she has had difficulty sleeping for the last 2 days.  She feels like she is get a little bit more swelling than normal on her legs and feet and it makes her feet feel hot making it difficult for her to sleep.  She also states earlier in the week she had nausea and vomiting x 2.  She has not vomited in over 24 hours and her belly feels fine now.  She also states a week or so ago she had a urinary tract infection but she states she finished all her antibiotics last Friday and has no dysuria or frequency.  Lastly, the patient states she was on the phone talking to someone today around 230 or 3.  She states while she was on the phone she had some chest discomfort that lasted about 5 minutes and resolved.  It would then come back.  She has had 4 episodes in total of 5 minutes of chest discomfort.  No palpitations. No syncope. No nausea or vomiting today.  No diaphoresis.  No dyspnea with exertion.  No fever or chills.. Although initial history and physical exam information was obtained by BC/NPP/MD/DO (who also dictated a record of this visit), I personally saw the patient and performed and made/approved the management plan and take responsibility for the patient management.      PHYSICAL EXAM:  PHYSICAL EXAM  BP (!) 155/73   Pulse 73   Temp 97.9 °F (36.6 °C) (Oral)   Resp 21   Wt 61.2 kg (135 lb)   SpO2 95%   BMI 22.47 kg/m²   GENERAL APPEARANCE: Awake and alert. Well appearing. No acute distress.  HEAD: Normocephalic. Atraumatic.  EYES: No scleral icterus  ENT: Mucous membranes are moist.   NECK: Supple. Normal ROM.   CHEST: Equal symmetric chest rise.  LUNGS: Breathing is  clarification.     Ming Crowe MD   Acute [facility]        Sebastien, Ming Ahmadi MD  01/12/24 [id number]

## 2024-01-10 NOTE — ED PROVIDER NOTES
[facility]  ED  EMERGENCY DEPARTMENT ENCOUNTER        Pt Name: Isa Hernandez  MRN: [id number]  Birthdate 1945  Date of evaluation: 1/10/2024  Provider: OTTONIEL BROWN PA-C  PCP: Jocelin Ellsworth MD  ED Attending: MD Sebastien       I have seen and evaluated this patient with my supervising physician Ming Crowe*.    CHIEF COMPLAINT:     Chief Complaint   Patient presents with    Hypertension     Feet swollen, htn for one day, this past week has had bilateral feet numbness, has a pacemaker,        HISTORY OF PRESENT ILLNESS:      History provided by the patient. No limitations.    Isa Hernandez is a 78 y.o. female who arrives to the ED by EMS.  The patient lives by herself.  Patient states she was on the phone with a friend around 3 PM when she began developing left-sided chest pain.  Additionally the patient reports she just feels completely exhausted.  Patient reports she has felt ill over the last couple days.  She had nausea and vomiting x 1 last night and again this morning.  She denies shortness of breath.  She does report some bilateral ankle and foot swelling.  In the context of her symptoms, particular the chest pain that started at 3 PM, she took her blood pressure and found it to be elevated.  This further confirmed to her that she should call 911.    Nursing Notes were reviewed     REVIEW OF SYSTEMS:     Review of Systems  Positives and pertinent negatives as per HPI.      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  The lungs are lucent and hyperinflated representing changes of COPD.  Right-sided pulmonary nodule stable in appearance.  No acute airspace abnormality.  No significant skeletal finding.  There is scoliosis of the thoracolumbar spine.     COPD with no acute finding in the chest.  Stable right pulmonary nodule.        EKG:    The Ekg interpreted by me in the absence of a cardiologist shows.    Pacemaker rhythm with a rate of 60  No evidence of acute ischemia.     See also interpretation by ED attending physician, Ming Crowe*.      PROCEDURES:   N/A      CRITICAL CARE TIME:     Due to the immediate potential for life-threatening deterioration due to chest pain in 79 yo, I spent 3 minutes providing critical care.  This time is excluding time spent performing procedures.        EMERGENCY DEPARTMENT COURSE and DIFFERENTIAL DIAGNOSIS/MDM:   Vitals:    Vitals:    01/10/24 1657 01/10/24 1836 01/10/24 1901 01/10/24 1917   BP: (!) 175/81  (!) 157/74    Pulse: 59 64 70 70   Resp: 18 16 17    Temp: 97.9 °F (36.6 °C)      TempSrc: Oral      SpO2: 95% 91% 96%    Weight: 61.2 kg (135 lb)          Patient was given the following medications:  Medications   ondansetron (ZOFRAN) injection 4 mg (4 mg IntraVENous Given 1/10/24 1723)   aspirin tablet 325 mg (325 mg Oral Given 1/10/24 1724)   acetaminophen (TYLENOL) tablet 650 mg (650 mg Oral Given 1/10/24 1927)       Patient was evaluated by both myself and Ming Crowe*.     CC/HPI Summary, DDx, ED course, and Reassessment: Patient comes in by EMS with a chief complaint of chest pain.  She additionally reports feeling \"exhausted\".  She had nausea and vomiting x 1 last night and again this morning.  She feels that her ankles and feet are swollen.  Lastly, she checked her blood pressure before coming in and found it to be elevated.  Differential diagnoses: PNEUMONIA, PNEUMOTHORAX, PULMONARY EMBOLISM, ACUTE CORONARY SYNDROME, CHF OR

## 2024-01-11 NOTE — ED NOTES
Patient reports that chest is\"tight\" pain that started this-afternoon patient reports that pain is 6 on 1-10 scale. This-afternoon it was a \"sharp pain\" that was an 8 on 1-10 scale. Patient denies taking pain medication at home. \"I didn't know what to take\" Patient reports that her son didn't want her to come to the hospital that he believes she is a hypochondriac.

## 2024-01-19 ENCOUNTER — TELEPHONE (OUTPATIENT)
Dept: PULMONOLOGY | Age: 79
End: 2024-01-19

## 2024-01-19 DIAGNOSIS — R06.02 SOB (SHORTNESS OF BREATH): Primary | ICD-10-CM

## 2024-01-19 RX ORDER — AZITHROMYCIN 250 MG/1
250 TABLET, FILM COATED ORAL SEE ADMIN INSTRUCTIONS
Qty: 6 TABLET | Refills: 0 | Status: SHIPPED | OUTPATIENT
Start: 2024-01-19 | End: 2024-01-24

## 2024-01-19 RX ORDER — PREDNISONE 10 MG/1
TABLET ORAL
Qty: 30 TABLET | Refills: 0 | Status: SHIPPED | OUTPATIENT
Start: 2024-01-19 | End: 2024-01-31

## 2024-01-19 NOTE — TELEPHONE ENCOUNTER
Pt called stating she is having a asthma flare and requested a prescription or appt to be seen    Do you have the following symptoms?  Shortness of Breath  yes  Wheezing  yes  Cough  some  Cough Characteristics:  Productive    yes  Sputum Color    clear  Hemoptysis   no  Consistency of sputum   thin     Fever:    no    Temp:no  Chills/Sweats:  off n on  What other symptoms are you having?:  not sleeping well    How long have you had these symptoms?   Last night     [facility]          Review medications and allergies:        Allergies?  is allergic to latex, iodine, penicillins, sulfa antibiotics, tetanus toxoids, clindamycin/lincomycin, influenza vaccines, albuterol, codeine, and spiriva handihaler [tiotropium bromide monohydrate].        Currently on Antibiotics?  (Drug/Dose/Frequency and how long on?) no        Systemic Steroids?  (Drug/Dose/Frequency and how long on?) no      Last OV 06/27/23 with Dr. Hernandez   (pull in last visit note assessment/plan)          Assessment:   Moderate persistent Asthma - seems to be well controlled   Dyspnea on exertion-most of deconditioning is contributing  R breast pain - unclear etiology   Enlarging RUL 8 mm nodule with other nodules on CT 11/11/21.  Low activities on PET scan 6/2/2022 with resolution/stability of other nodules.  Enlarging RUL 1.6 cm nodule on repeat CT 1/27/2023. DDx malignancy, granuloma, LCH, lymphoma,. TTNBx 4/4/23 with chronic inflammation.  Allergic rhinitis   54 pack years smoking-quit 11/2017   RUL nodule 8 mm on CT chest 11/15/2017. Smaller on repeat CT chest 6/11/2018 and CT 9/2/2020.   Lobulated LLL pulmonary nodule on CT chest 2/3/2016 (SUV of 1.48). CT chest 8/12/16 showed stability with calcification. ? Growth on CT chest 2/13/2017 with hilar adenopathy. Negative EBUS TBNA of the LN 2/27/2017.  Smaller on repeat CT 9/2/2020  Pulmonary nodules stable between 5/2007 and 4/2009. Likely granulomas. No further evaluation

## 2024-01-22 ENCOUNTER — OFFICE VISIT (OUTPATIENT)
Dept: CARDIOLOGY CLINIC | Age: 79
End: 2024-01-22
Payer: MEDICARE

## 2024-01-22 ENCOUNTER — NURSE ONLY (OUTPATIENT)
Dept: CARDIOLOGY CLINIC | Age: 79
End: 2024-01-22
Payer: MEDICARE

## 2024-01-22 VITALS
BODY MASS INDEX: 23.56 KG/M2 | HEIGHT: 65 IN | DIASTOLIC BLOOD PRESSURE: 82 MMHG | HEART RATE: 60 BPM | SYSTOLIC BLOOD PRESSURE: 138 MMHG | WEIGHT: 141.4 LBS | OXYGEN SATURATION: 94 %

## 2024-01-22 DIAGNOSIS — I48.0 PAROXYSMAL ATRIAL FIBRILLATION (HCC): ICD-10-CM

## 2024-01-22 DIAGNOSIS — I49.5 SINUS NODE DYSFUNCTION (HCC): ICD-10-CM

## 2024-01-22 DIAGNOSIS — Z95.0 PACEMAKER: Primary | ICD-10-CM

## 2024-01-22 DIAGNOSIS — I48.0 PAF (PAROXYSMAL ATRIAL FIBRILLATION) (HCC): Primary | ICD-10-CM

## 2024-01-22 PROCEDURE — 1036F TOBACCO NON-USER: CPT | Performed by: NURSE PRACTITIONER

## 2024-01-22 PROCEDURE — 93280 PM DEVICE PROGR EVAL DUAL: CPT | Performed by: INTERNAL MEDICINE

## 2024-01-22 PROCEDURE — 99214 OFFICE O/P EST MOD 30 MIN: CPT | Performed by: NURSE PRACTITIONER

## 2024-01-22 PROCEDURE — G8420 CALC BMI NORM PARAMETERS: HCPCS | Performed by: NURSE PRACTITIONER

## 2024-01-22 PROCEDURE — G8427 DOCREV CUR MEDS BY ELIG CLIN: HCPCS | Performed by: NURSE PRACTITIONER

## 2024-01-22 PROCEDURE — 1090F PRES/ABSN URINE INCON ASSESS: CPT | Performed by: NURSE PRACTITIONER

## 2024-01-22 PROCEDURE — G8400 PT W/DXA NO RESULTS DOC: HCPCS | Performed by: NURSE PRACTITIONER

## 2024-01-22 PROCEDURE — 1123F ACP DISCUSS/DSCN MKR DOCD: CPT | Performed by: NURSE PRACTITIONER

## 2024-01-22 PROCEDURE — G8484 FLU IMMUNIZE NO ADMIN: HCPCS | Performed by: NURSE PRACTITIONER

## 2024-01-22 RX ORDER — FUROSEMIDE 20 MG/1
20 TABLET ORAL DAILY PRN
Qty: 30 TABLET | Refills: 3 | Status: SHIPPED | OUTPATIENT
Start: 2024-01-22

## 2024-01-22 RX ORDER — WARFARIN SODIUM 5 MG/1
5 TABLET ORAL DAILY
Qty: 30 TABLET | Refills: 3 | Status: SHIPPED | OUTPATIENT
Start: 2024-01-22
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
## 2024-01-22 NOTE — PATIENT INSTRUCTIONS
Start Coumadin when you run out of Eliquis. For instance, if you take your last dose of Eliquis on Wednesday evening, start Coumadin on Thursday.     Please have labs checked one week after starting Coumadin.     We will refer you to the Coumadin clinic.     Remote device transmissions every three months     Monitor BP at home and call if consistently out of goal ranges    Follow up in 6 months or sooner if needed

## 2024-01-31 ENCOUNTER — TELEPHONE (OUTPATIENT)
Dept: CARDIOLOGY CLINIC | Age: 79
End: 2024-01-31

## 2024-01-31 NOTE — TELEPHONE ENCOUNTER
Called and LVM for patient. She should call them and set up appointment. Phone number is  [phone] . Thanks.

## 2024-02-09 ENCOUNTER — ANTI-COAG VISIT (OUTPATIENT)
Dept: PHARMACY | Age: 79
End: 2024-02-09
Payer: MEDICARE

## 2024-02-09 ENCOUNTER — TELEPHONE (OUTPATIENT)
Dept: CARDIOLOGY CLINIC | Age: 79
End: 2024-02-09

## 2024-02-09 DIAGNOSIS — I48.0 PAROXYSMAL ATRIAL FIBRILLATION (HCC): Primary | ICD-10-CM

## 2024-02-09 PROCEDURE — 99213 OFFICE O/P EST LOW 20 MIN: CPT | Performed by: INTERNAL MEDICINE

## 2024-02-09 PROCEDURE — 85610 PROTHROMBIN TIME: CPT | Performed by: INTERNAL MEDICINE

## 2024-02-09 NOTE — TELEPHONE ENCOUNTER
Received fax from coumadin clinic.     The form is anticoagulation service requesting NPAM signature. Will place on NPAM desk to sign. Once signed, will fax then scan into chart.

## 2024-02-09 NOTE — PROGRESS NOTES
Ms. Hernandez is here for management of anticoagulation for Paroxysmal AFib.   She presents today with A LOT of confusion about her medications.  Pt verifies dosing regimen as listed above.   Pt denies s/s bleeding/bruising/swelling/SOB.  No BRBPR. No melena.  Address missed doses  Reviewed pt medication list  No changes in RX/OTCs/Herbal medications.  Reviewed dietary concerns  Address EToH and tobacco use.  As initial clinic visit, provided pt with counseling for medication use/compliance, adverse events, and nutrition; clinic overview & orientation; and educational materials.    Pt was thankfully accompanied by her son to this appt as there was A LOT of confusion on what she was supposed to be taken. At first she was very insistent that she did not have any warfarin.     She brought her bottle of Eliquis with her so I could count her pills let to determine when she should start the warfarin. When I dumped out the bottle of Eliquis, there was also 4 tablets of warfarin 5mg and 1 tablet of citalopram. She had accidentally grabbed these additional pills from her pill box when attempting to gather all Eliquis for me to count. That's when she remember she does have the warfarin and she has already been taking them. It is completely unclear how long she has been doing both warfarin and Eliquis. She has enough Eliquis to last her until Friday 2/23, so she will stay on the Eliquis ONLY until 2/21, then she will start the warfarin 5mg daily, overlapping for 3 days. Then continue on the warfarin 5mg only until seen on Monday 2/26.    I printed off a daily calendar from now until 2/26 with daily instructions on which medication to take. She feels overwhelmed but understood the calendar. Thankfully her son was there to verbalize understand and he will remove all warfarin from her current pill box and put it aside until 2/21.      INR was not checked at this visit since she is still on warfarin.  Recommend to start warfarin 5mg

## 2024-02-14 RX ORDER — FLUTICASONE FUROATE, UMECLIDINIUM BROMIDE AND VILANTEROL TRIFENATATE 200; 62.5; 25 UG/1; UG/1; UG/1
POWDER RESPIRATORY (INHALATION)
Qty: 60 EACH | Refills: 5 | Status: SHIPPED | OUTPATIENT
Start: 2024-02-14

## 2024-02-21 ENCOUNTER — TELEPHONE (OUTPATIENT)
Dept: CARDIOLOGY CLINIC | Age: 79
End: 2024-02-21

## 2024-02-21 NOTE — TELEPHONE ENCOUNTER
I spoke with Concepcion and clarified that Eliquis should be stopped and she should be starting Warfarin. She V/U. I spoke with the patient as well to relay information and confirm plan. She V/U.

## 2024-02-21 NOTE — TELEPHONE ENCOUNTER
Concepcion from [facility] states pt insurance will not cover warfarin and Eliquis because it is a duplicate therapy. Concepcion Reyes may need to be called to fix this issue. Please advise.

## 2024-02-27 ENCOUNTER — APPOINTMENT (OUTPATIENT)
Dept: CT IMAGING | Age: 79
DRG: 551 | End: 2024-02-27
Payer: MEDICARE

## 2024-02-27 ENCOUNTER — HOSPITAL ENCOUNTER (INPATIENT)
Age: 79
LOS: 7 days | Discharge: NURSING, REHAB OR LONG-TERM CARE | DRG: 551 | End: 2024-03-05
Attending: EMERGENCY MEDICINE | Admitting: INTERNAL MEDICINE
Payer: MEDICARE

## 2024-02-27 ENCOUNTER — APPOINTMENT (OUTPATIENT)
Dept: GENERAL RADIOLOGY | Age: 79
DRG: 551 | End: 2024-02-27
Payer: MEDICARE

## 2024-02-27 DIAGNOSIS — S22.030G: ICD-10-CM
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
## 2024-02-27 DIAGNOSIS — S22.030A CLOSED WEDGE COMPRESSION FRACTURE OF T3 VERTEBRA, INITIAL ENCOUNTER (HCC): ICD-10-CM

## 2024-02-27 DIAGNOSIS — R91.8 LUNG MASS: ICD-10-CM

## 2024-02-27 DIAGNOSIS — N28.89 KIDNEY MASS: ICD-10-CM

## 2024-02-27 LAB
ALBUMIN SERPL-MCNC: 4.1 G/DL (ref 3.4–5)
ALBUMIN/GLOB SERPL: 1.5 {RATIO} (ref 1.1–2.2)
ALP SERPL-CCNC: 91 U/L (ref 40–129)
ALT SERPL-CCNC: 12 U/L (ref 10–40)
ANION GAP SERPL CALCULATED.3IONS-SCNC: 10 MMOL/L (ref 3–16)
AST SERPL-CCNC: 16 U/L (ref 15–37)
BASOPHILS # BLD: 0.1 K/UL (ref 0–0.2)
BASOPHILS NFR BLD: 1.1 %
BILIRUB SERPL-MCNC: 0.4 MG/DL (ref 0–1)
BILIRUB UR QL STRIP.AUTO: NEGATIVE
BUN SERPL-MCNC: 5 MG/DL (ref 7–20)
CALCIUM SERPL-MCNC: 9.2 MG/DL (ref 8.3–10.6)
CHLORIDE SERPL-SCNC: 101 MMOL/L (ref 99–110)
CLARITY UR: CLEAR
CO2 SERPL-SCNC: 24 MMOL/L (ref 21–32)
COLOR UR: YELLOW
CREAT SERPL-MCNC: <0.5 MG/DL (ref 0.6–1.2)
DEPRECATED RDW RBC AUTO: 13.9 % (ref 12.4–15.4)
EKG ATRIAL RATE: 67 BPM
EKG DIAGNOSIS: NORMAL
EKG P AXIS: 103 DEGREES
EKG P-R INTERVAL: 164 MS
EKG Q-T INTERVAL: 448 MS
EKG QRS DURATION: 82 MS
EKG QTC CALCULATION (BAZETT): 473 MS
EKG R AXIS: -24 DEGREES
EKG T AXIS: 32 DEGREES
EKG VENTRICULAR RATE: 67 BPM
EOSINOPHIL # BLD: 0.3 K/UL (ref 0–0.6)
EOSINOPHIL NFR BLD: 2.8 %
GFR SERPLBLD CREATININE-BSD FMLA CKD-EPI: >60 ML/MIN/{1.73_M2}
GLUCOSE SERPL-MCNC: 91 MG/DL (ref 70–99)
GLUCOSE UR STRIP.AUTO-MCNC: NEGATIVE MG/DL
HCT VFR BLD AUTO: 39 % (ref 36–48)
HGB BLD-MCNC: 13.1 G/DL (ref 12–16)
HGB UR QL STRIP.AUTO: NEGATIVE
INR PPP: 2.15 (ref 0.84–1.16)
KETONES UR STRIP.AUTO-MCNC: NEGATIVE MG/DL
LEUKOCYTE ESTERASE UR QL STRIP.AUTO: NEGATIVE
LYMPHOCYTES # BLD: 1.7 K/UL (ref 1–5.1)
LYMPHOCYTES NFR BLD: 18.3 %
MCH RBC QN AUTO: 31.5 PG (ref 26–34)
MCHC RBC AUTO-ENTMCNC: 33.6 G/DL (ref 31–36)
MCV RBC AUTO: 93.9 FL (ref 80–100)
MONOCYTES # BLD: 0.8 K/UL (ref 0–1.3)
MONOCYTES NFR BLD: 8.7 %
NEUTROPHILS # BLD: 6.5 K/UL (ref 1.7–7.7)
NEUTROPHILS NFR BLD: 69.1 %
NITRITE UR QL STRIP.AUTO: NEGATIVE
PH UR STRIP.AUTO: 6 [PH] (ref 5–8)
PLATELET # BLD AUTO: 240 K/UL (ref 135–450)
PMV BLD AUTO: 10.2 FL (ref 5–10.5)
POTASSIUM SERPL-SCNC: 3.7 MMOL/L (ref 3.5–5.1)
PROT SERPL-MCNC: 6.8 G/DL (ref 6.4–8.2)
PROT UR STRIP.AUTO-MCNC: NEGATIVE MG/DL
PROTHROMBIN TIME: 23.9 SEC (ref 11.5–14.8)
RBC # BLD AUTO: 4.15 M/UL (ref 4–5.2)
SODIUM SERPL-SCNC: 135 MMOL/L (ref 136–145)
SP GR UR STRIP.AUTO: <=1.005 (ref 1–1.03)
TROPONIN, HIGH SENSITIVITY: 12 NG/L (ref 0–14)
UA COMPLETE W REFLEX CULTURE PNL UR: NORMAL
UA DIPSTICK W REFLEX MICRO PNL UR: NORMAL
URN SPEC COLLECT METH UR: NORMAL
UROBILINOGEN UR STRIP-ACNC: 0.2 E.U./DL
WBC # BLD AUTO: 9.3 K/UL (ref 4–11)

## 2024-02-27 PROCEDURE — 72125 CT NECK SPINE W/O DYE: CPT

## 2024-02-27 PROCEDURE — 94761 N-INVAS EAR/PLS OXIMETRY MLT: CPT

## 2024-02-27 PROCEDURE — 84484 ASSAY OF TROPONIN QUANT: CPT

## 2024-02-27 PROCEDURE — 2700000000 HC OXYGEN THERAPY PER DAY

## 2024-02-27 PROCEDURE — 96375 TX/PRO/DX INJ NEW DRUG ADDON: CPT

## 2024-02-27 PROCEDURE — 2580000003 HC RX 258: Performed by: INTERNAL MEDICINE

## 2024-02-27 PROCEDURE — 81003 URINALYSIS AUTO W/O SCOPE: CPT

## 2024-02-27 PROCEDURE — 96374 THER/PROPH/DIAG INJ IV PUSH: CPT

## 2024-02-27 PROCEDURE — 73030 X-RAY EXAM OF SHOULDER: CPT

## 2024-02-27 PROCEDURE — 80053 COMPREHEN METABOLIC PANEL: CPT

## 2024-02-27 PROCEDURE — 93005 ELECTROCARDIOGRAM TRACING: CPT | Performed by: EMERGENCY MEDICINE

## 2024-02-27 PROCEDURE — 85610 PROTHROMBIN TIME: CPT

## 2024-02-27 PROCEDURE — 6370000000 HC RX 637 (ALT 250 FOR IP): Performed by: INTERNAL MEDICINE

## 2024-02-27 PROCEDURE — 85025 COMPLETE CBC W/AUTO DIFF WBC: CPT

## 2024-02-27 PROCEDURE — 72131 CT LUMBAR SPINE W/O DYE: CPT

## 2024-02-27 PROCEDURE — 71250 CT THORAX DX C-: CPT

## 2024-02-27 PROCEDURE — 6360000002 HC RX W HCPCS: Performed by: INTERNAL MEDICINE

## 2024-02-27 PROCEDURE — 93010 ELECTROCARDIOGRAM REPORT: CPT | Performed by: INTERNAL MEDICINE

## 2024-02-27 PROCEDURE — 72128 CT CHEST SPINE W/O DYE: CPT

## 2024-02-27 PROCEDURE — 99285 EMERGENCY DEPT VISIT HI MDM: CPT

## 2024-02-27 PROCEDURE — 6360000002 HC RX W HCPCS: Performed by: EMERGENCY MEDICINE

## 2024-02-27 PROCEDURE — 70450 CT HEAD/BRAIN W/O DYE: CPT

## 2024-02-27 PROCEDURE — 1200000000 HC SEMI PRIVATE

## 2024-02-27 RX ORDER — FLUTICASONE PROPIONATE 50 MCG
1 SPRAY, SUSPENSION (ML) NASAL DAILY
Status: DISCONTINUED | OUTPATIENT
Start: 2024-02-27 | End: 2024-03-05 | Stop reason: HOSPADM

## 2024-02-27 RX ORDER — OXYBUTYNIN CHLORIDE 5 MG/1
5 TABLET, EXTENDED RELEASE ORAL NIGHTLY
Status: DISCONTINUED | OUTPATIENT
Start: 2024-02-27 | End: 2024-03-05 | Stop reason: HOSPADM

## 2024-02-27 RX ORDER — MORPHINE SULFATE 2 MG/ML
2 INJECTION, SOLUTION INTRAMUSCULAR; INTRAVENOUS EVERY 4 HOURS PRN
Status: DISCONTINUED | OUTPATIENT
Start: 2024-02-27 | End: 2024-03-01

## 2024-02-27 RX ORDER — POTASSIUM CHLORIDE 20 MEQ/1
40 TABLET, EXTENDED RELEASE ORAL PRN
Status: DISCONTINUED | OUTPATIENT
Start: 2024-02-27 | End: 2024-03-05 | Stop reason: HOSPADM

## 2024-02-27 RX ORDER — ASCORBIC ACID 500 MG
500 TABLET ORAL DAILY
Status: DISCONTINUED | OUTPATIENT
Start: 2024-02-27 | End: 2024-03-05 | Stop reason: HOSPADM

## 2024-02-27 RX ORDER — MAGNESIUM SULFATE IN WATER 40 MG/ML
2000 INJECTION, SOLUTION INTRAVENOUS PRN
Status: DISCONTINUED | OUTPATIENT
Start: 2024-02-27 | End: 2024-03-05 | Stop reason: HOSPADM

## 2024-02-27 RX ORDER — SODIUM CHLORIDE 9 MG/ML
INJECTION, SOLUTION INTRAVENOUS PRN
Status: DISCONTINUED | OUTPATIENT
Start: 2024-02-27 | End: 2024-03-05 | Stop reason: HOSPADM

## 2024-02-27 RX ORDER — FENTANYL CITRATE 50 UG/ML
100 INJECTION, SOLUTION INTRAMUSCULAR; INTRAVENOUS ONCE
Status: COMPLETED | OUTPATIENT
Start: 2024-02-27 | End: 2024-02-27

## 2024-02-27 RX ORDER — ACETAMINOPHEN 325 MG/1
650 TABLET ORAL EVERY 6 HOURS PRN
Status: DISCONTINUED | OUTPATIENT
Start: 2024-02-27 | End: 2024-03-05 | Stop reason: HOSPADM

## 2024-02-27 RX ORDER — OXYCODONE HYDROCHLORIDE 5 MG/1
5 TABLET ORAL EVERY 4 HOURS PRN
Status: DISCONTINUED | OUTPATIENT
Start: 2024-02-27 | End: 2024-03-01

## 2024-02-27 RX ORDER — MONTELUKAST SODIUM 10 MG/1
10 TABLET ORAL NIGHTLY
Status: DISCONTINUED | OUTPATIENT
Start: 2024-02-27 | End: 2024-03-05 | Stop reason: HOSPADM

## 2024-02-27 RX ORDER — POTASSIUM CHLORIDE 20 MEQ/1
20 TABLET, EXTENDED RELEASE ORAL DAILY
Status: DISCONTINUED | OUTPATIENT
Start: 2024-02-27 | End: 2024-03-05 | Stop reason: HOSPADM

## 2024-02-27 RX ORDER — SODIUM CHLORIDE 0.9 % (FLUSH) 0.9 %
5-40 SYRINGE (ML) INJECTION PRN
Status: DISCONTINUED | OUTPATIENT
Start: 2024-02-27 | End: 2024-03-05 | Stop reason: HOSPADM

## 2024-02-27 RX ORDER — PROCHLORPERAZINE EDISYLATE 5 MG/ML
10 INJECTION INTRAMUSCULAR; INTRAVENOUS EVERY 6 HOURS PRN
Status: DISCONTINUED | OUTPATIENT
Start: 2024-02-27 | End: 2024-03-05 | Stop reason: HOSPADM

## 2024-02-27 RX ORDER — SODIUM CHLORIDE 0.9 % (FLUSH) 0.9 %
5-40 SYRINGE (ML) INJECTION EVERY 12 HOURS SCHEDULED
Status: DISCONTINUED | OUTPATIENT
Start: 2024-02-27 | End: 2024-03-05 | Stop reason: HOSPADM

## 2024-02-27 RX ORDER — ACETAMINOPHEN 650 MG/1
650 SUPPOSITORY RECTAL EVERY 6 HOURS PRN
Status: DISCONTINUED | OUTPATIENT
Start: 2024-02-27 | End: 2024-03-05 | Stop reason: HOSPADM

## 2024-02-27 RX ORDER — ENOXAPARIN SODIUM 100 MG/ML
1 INJECTION SUBCUTANEOUS ONCE
Status: CANCELLED | OUTPATIENT
Start: 2024-02-27

## 2024-02-27 RX ORDER — POLYETHYLENE GLYCOL 3350 17 G/17G
17 POWDER, FOR SOLUTION ORAL DAILY PRN
Status: DISCONTINUED | OUTPATIENT
Start: 2024-02-27 | End: 2024-03-05 | Stop reason: HOSPADM

## 2024-02-27 RX ORDER — MECOBALAMIN 5000 MCG
10 TABLET,DISINTEGRATING ORAL NIGHTLY
Status: COMPLETED | OUTPATIENT
Start: 2024-02-27 | End: 2024-02-27

## 2024-02-27 RX ORDER — FUROSEMIDE 20 MG/1
20 TABLET ORAL DAILY PRN
Status: DISCONTINUED | OUTPATIENT
Start: 2024-02-27 | End: 2024-03-05 | Stop reason: HOSPADM

## 2024-02-27 RX ORDER — ALBUTEROL SULFATE 90 UG/1
2 AEROSOL, METERED RESPIRATORY (INHALATION) EVERY 4 HOURS PRN
Status: DISCONTINUED | OUTPATIENT
Start: 2024-02-27 | End: 2024-03-05 | Stop reason: HOSPADM

## 2024-02-27 RX ORDER — LIDOCAINE 4 G/G
1 PATCH TOPICAL DAILY
Status: DISCONTINUED | OUTPATIENT
Start: 2024-02-27 | End: 2024-03-05 | Stop reason: HOSPADM

## 2024-02-27 RX ORDER — ONDANSETRON 2 MG/ML
4 INJECTION INTRAMUSCULAR; INTRAVENOUS
Status: COMPLETED | OUTPATIENT
Start: 2024-02-27 | End: 2024-03-01

## 2024-02-27 RX ORDER — BACLOFEN 10 MG/1
10 TABLET ORAL NIGHTLY PRN
Status: DISCONTINUED | OUTPATIENT
Start: 2024-02-27 | End: 2024-03-05 | Stop reason: HOSPADM

## 2024-02-27 RX ORDER — MORPHINE SULFATE 4 MG/ML
4 INJECTION, SOLUTION INTRAMUSCULAR; INTRAVENOUS
Status: DISCONTINUED | OUTPATIENT
Start: 2024-02-27 | End: 2024-02-27

## 2024-02-27 RX ORDER — HYDRALAZINE HYDROCHLORIDE 20 MG/ML
5 INJECTION INTRAMUSCULAR; INTRAVENOUS EVERY 4 HOURS PRN
Status: DISCONTINUED | OUTPATIENT
Start: 2024-02-27 | End: 2024-03-05 | Stop reason: HOSPADM

## 2024-02-27 RX ORDER — PRAVASTATIN SODIUM 40 MG
40 TABLET ORAL DAILY
Status: DISCONTINUED | OUTPATIENT
Start: 2024-02-27 | End: 2024-03-05 | Stop reason: HOSPADM

## 2024-02-27 RX ORDER — POTASSIUM CHLORIDE 7.45 MG/ML
10 INJECTION INTRAVENOUS PRN
Status: DISCONTINUED | OUTPATIENT
Start: 2024-02-27 | End: 2024-03-05 | Stop reason: HOSPADM

## 2024-02-27 RX ADMIN — MORPHINE SULFATE 4 MG: 4 INJECTION, SOLUTION INTRAMUSCULAR; INTRAVENOUS at 13:53

## 2024-02-27 RX ADMIN — Medication 10 ML: at 13:53

## 2024-02-27 RX ADMIN — OXYCODONE HYDROCHLORIDE AND ACETAMINOPHEN 500 MG: 500 TABLET ORAL at 19:58

## 2024-02-27 RX ADMIN — PRAVASTATIN SODIUM 40 MG: 40 TABLET ORAL at 19:58

## 2024-02-27 RX ADMIN — OXYCODONE 5 MG: 5 TABLET ORAL at 16:47

## 2024-02-27 RX ADMIN — MORPHINE SULFATE 2 MG: 2 INJECTION, SOLUTION INTRAMUSCULAR; INTRAVENOUS at 23:19

## 2024-02-27 RX ADMIN — ONDANSETRON 4 MG: 2 INJECTION INTRAMUSCULAR; INTRAVENOUS at 08:42

## 2024-02-27 RX ADMIN — MORPHINE SULFATE 4 MG: 4 INJECTION, SOLUTION INTRAMUSCULAR; INTRAVENOUS at 08:42

## 2024-02-27 RX ADMIN — MONTELUKAST 10 MG: 10 TABLET, FILM COATED ORAL at 19:58

## 2024-02-27 RX ADMIN — Medication 10 MG: at 19:58

## 2024-02-27 RX ADMIN — OXYCODONE 5 MG: 5 TABLET ORAL at 12:48

## 2024-02-27 RX ADMIN — Medication 10 ML: at 19:59

## 2024-02-27 RX ADMIN — BACLOFEN 10 MG: 10 TABLET ORAL at 23:25

## 2024-02-27 RX ADMIN — FLUTICASONE PROPIONATE 1 SPRAY: 50 SPRAY, METERED NASAL at 18:35

## 2024-02-27 RX ADMIN — OXYBUTYNIN CHLORIDE 5 MG: 5 TABLET, EXTENDED RELEASE ORAL at 19:59

## 2024-02-27 RX ADMIN — FENTANYL CITRATE 100 MCG: 50 INJECTION INTRAMUSCULAR; INTRAVENOUS at 06:38

## 2024-02-27 RX ADMIN — ONDANSETRON 4 MG: 2 INJECTION INTRAMUSCULAR; INTRAVENOUS at 19:58

## 2024-02-27 ASSESSMENT — PAIN SCALES - GENERAL
PAINLEVEL_OUTOF10: 9
PAINLEVEL_OUTOF10: 10
PAINLEVEL_OUTOF10: 10
PAINLEVEL_OUTOF10: 9
PAINLEVEL_OUTOF10: 7
PAINLEVEL_OUTOF10: 5
PAINLEVEL_OUTOF10: 7
PAINLEVEL_OUTOF10: 9
PAINLEVEL_OUTOF10: 8
PAINLEVEL_OUTOF10: 10
PAINLEVEL_OUTOF10: 9
PAINLEVEL_OUTOF10: 9
PAINLEVEL_OUTOF10: 8
PAINLEVEL_OUTOF10: 9

## 2024-02-27 ASSESSMENT — PAIN DESCRIPTION - LOCATION
LOCATION: BACK;SHOULDER
LOCATION: BACK
LOCATION: BACK;SHOULDER
LOCATION: GENERALIZED;SHOULDER
LOCATION: BACK;SHOULDER

## 2024-02-27 ASSESSMENT — PAIN DESCRIPTION - ORIENTATION
ORIENTATION: RIGHT

## 2024-02-27 ASSESSMENT — PAIN DESCRIPTION - DESCRIPTORS
DESCRIPTORS: ACHING

## 2024-02-27 ASSESSMENT — LIFESTYLE VARIABLES
HOW MANY STANDARD DRINKS CONTAINING ALCOHOL DO YOU HAVE ON A TYPICAL DAY: 1 OR 2
HOW OFTEN DO YOU HAVE A DRINK CONTAINING ALCOHOL: MONTHLY OR LESS

## 2024-02-27 ASSESSMENT — PAIN - FUNCTIONAL ASSESSMENT
PAIN_FUNCTIONAL_ASSESSMENT: ACTIVITIES ARE NOT PREVENTED
PAIN_FUNCTIONAL_ASSESSMENT: PREVENTS OR INTERFERES SOME ACTIVE ACTIVITIES AND ADLS

## 2024-02-27 NOTE — ED NOTES
RN spoke to MRI tech who states they are waiting on cardiology form to verify pacemaker at this time. States patients MRI will not be completed until later this afternoon.

## 2024-02-27 NOTE — ED NOTES
ED ONCOLOGY CONSULT  RE- Tumors  [id number]-Paged  through PS  [id number]-Repaged  [id number]- returned page, transferred to

## 2024-02-27 NOTE — H&P
Hospital Medicine History and Physical      Chief Complaint:  got dizzy and fell      History and Present Illness:  The patient is a pleasant 78 Y F with a h/o former smoking, COPD, HLD, and PAF and SSS s/p pacer.  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  She lives alone at [facility].   She had been feeling normal lately.  Got up out of bed this morning and felt very dizzy, which never happens to her.  She lost her balance, toppled forward, hit her face on a door, then bounced backward and landed flat on her back on the floor.  She had immediate pain in her upper back.  She came to the ED and CT showed an acute T3 compression fracture, no spinal cord compression.  Her pain wasn't well controlled so the ED consulted IR, who requested an MRI prior to consideration of kyphoplasty.   Her trauma imaging also incidentally showed that her known RUL nodules had enlarged (now 2.1 cm each).  The mediastinal and R hilar adenopathy was worse as well, suspicious for lung cancer.  Unknown as of yet whether the T3 fracture might be pathologic.  The patient feels like she has unintentionally lost lots of weight recently, but records suggest she is only 7 lbs lower than a year ago.  Of note, she had a lung biopsy in 4/2023 which only showed \"fragments of benign lung tissue.\"  Patient says [facility] had been monitoring these nodules.  [facility] was consulted by the ED.   I tried to call 3 family members in the ED.  Two no answers, one wrong number.        General appearance: No apparent distress, appears stated age and cooperative.  HEENT: Pupils equal, round.  Conjunctivae/corneas clear.  Neck: No jugular venous distention.   Respiratory:  Normal respiratory effort.  Bilaterally without Rales/Wheezes/Rhonchi.  Cardiovascular: Normal rate and regular rhythm with normal S1/S2 without murmurs, rubs or

## 2024-02-27 NOTE — ED PROVIDER NOTES
Emergency Physician Note  [facility]  ED    Pt Name: Isa Hernandez  MRN: [id number]  Birthdate 1945  Date of evaluation: 2/27/2024  Provider: Thanh Burton MD  PCP: Jocelin Ellsworth MD    Note Open Time: 11:21 AM EST 2/27/24    Chief Complaint  Fall (Patient fell walking to the bathroom, over \"my own feet\" patient reports at triage had LOC. Patient reports takes warfarin. Pain to the upper back and left arm)       History of Present Illness  Isa Hernandez is a 78 y.o. female who presents to the ED for fall.  Patient reports that she fell at her assisted living facility.  She denies any loss of consciousness.  She complains of back and right shoulder pain.  Patient denies any shortness of breath.  No nausea, vomiting or diarrhea.    History from : Patient    Limitations to history : Mild dementia    REVIEW OF SYSTEMS :      Review of Systems    Positives and Pertinent negatives as per HPI.     Medications/allergies/medical/social/family history  I have reviewed the following from the nursing documentation:      Prior to Admission medications    Medication Sig Start Date End Date Taking? Authorizing Provider   TRELEGY ELLIPTA 200-62.5-25 MCG/ACT AEPB inhaler INHALE 1 PUFF INTO THE LUNGS ONCE DAILY (RINSE MOUTH AFTER EACH USE WITH WATER THEN SPIT OUT) 2/14/24   Anastacio Hernandez MD   warfarin (COUMADIN) 5 MG tablet Take 1 tablet by mouth daily 1/22/24   Bonnie Luna APRN - CNP   furosemide (LASIX) 20 MG tablet Take 1 tablet by mouth daily as needed (Swelling) 1/22/24   Bonnie Luna APRN - CNP   montelukast (SINGULAIR) 10 MG tablet TAKE 1 TABLET BY MOUTH EACH NIGHT AT BEDTIME 1/9/24   Anastacio Hernandez MD   estrogens conjugated (PREMARIN) 0.625 MG/GM CREA vaginal cream Place 0.5 g vaginally daily 11/15/23   Allison Soto APRN - CNP   hydrOXYzine HCl (ATARAX) 25 MG tablet Take 1 tablet by mouth at bedtime. 8/7/23   Provider, MD Robson   baclofen (LIORESAL) 10 MG tablet TAKE 1/2

## 2024-02-28 ENCOUNTER — APPOINTMENT (OUTPATIENT)
Dept: MRI IMAGING | Age: 79
DRG: 551 | End: 2024-02-28
Payer: MEDICARE

## 2024-02-28 PROCEDURE — 6360000004 HC RX CONTRAST MEDICATION: Performed by: INTERNAL MEDICINE

## 2024-02-28 PROCEDURE — 6370000000 HC RX 637 (ALT 250 FOR IP): Performed by: INTERNAL MEDICINE

## 2024-02-28 PROCEDURE — 72146 MRI CHEST SPINE W/O DYE: CPT

## 2024-02-28 PROCEDURE — 2580000003 HC RX 258: Performed by: INTERNAL MEDICINE

## 2024-02-28 PROCEDURE — 94761 N-INVAS EAR/PLS OXIMETRY MLT: CPT

## 2024-02-28 PROCEDURE — 2700000000 HC OXYGEN THERAPY PER DAY

## 2024-02-28 PROCEDURE — 70553 MRI BRAIN STEM W/O & W/DYE: CPT

## 2024-02-28 PROCEDURE — A9579 GAD-BASE MR CONTRAST NOS,1ML: HCPCS | Performed by: INTERNAL MEDICINE

## 2024-02-28 PROCEDURE — 1200000000 HC SEMI PRIVATE

## 2024-02-28 RX ADMIN — PRAVASTATIN SODIUM 40 MG: 40 TABLET ORAL at 19:58

## 2024-02-28 RX ADMIN — OXYBUTYNIN CHLORIDE 5 MG: 5 TABLET, EXTENDED RELEASE ORAL at 19:58

## 2024-02-28 RX ADMIN — Medication 10 ML: at 11:26

## 2024-02-28 RX ADMIN — OXYCODONE HYDROCHLORIDE AND ACETAMINOPHEN 500 MG: 500 TABLET ORAL at 19:57

## 2024-02-28 RX ADMIN — OXYCODONE 5 MG: 5 TABLET ORAL at 14:43

## 2024-02-28 RX ADMIN — GADOTERIDOL 12 ML: 279.3 INJECTION, SOLUTION INTRAVENOUS at 13:36

## 2024-02-28 RX ADMIN — MONTELUKAST 10 MG: 10 TABLET, FILM COATED ORAL at 19:57

## 2024-02-28 RX ADMIN — Medication 10 ML: at 19:59

## 2024-02-28 RX ADMIN — FLUTICASONE PROPIONATE 1 SPRAY: 50 SPRAY, METERED NASAL at 09:00

## 2024-02-28 RX ADMIN — OXYCODONE 5 MG: 5 TABLET ORAL at 19:56

## 2024-02-28 RX ADMIN — OXYCODONE 5 MG: 5 TABLET ORAL at 08:00

## 2024-02-28 ASSESSMENT — PAIN DESCRIPTION - ORIENTATION
ORIENTATION: RIGHT;MID;LOWER;UPPER
ORIENTATION: RIGHT;MID;LOWER;UPPER
ORIENTATION: RIGHT

## 2024-02-28 ASSESSMENT — PAIN DESCRIPTION - DESCRIPTORS
DESCRIPTORS: CRUSHING;DISCOMFORT;ACHING
DESCRIPTORS: CRUSHING;ACHING
DESCRIPTORS: CRUSHING;DISCOMFORT

## 2024-02-28 ASSESSMENT — PAIN SCALES - GENERAL
PAINLEVEL_OUTOF10: 10
PAINLEVEL_OUTOF10: 6
PAINLEVEL_OUTOF10: 8
PAINLEVEL_OUTOF10: 10
PAINLEVEL_OUTOF10: 10
PAINLEVEL_OUTOF10: 7
PAINLEVEL_OUTOF10: 9
PAINLEVEL_OUTOF10: 5
PAINLEVEL_OUTOF10: 5

## 2024-02-28 ASSESSMENT — PAIN DESCRIPTION - LOCATION
LOCATION: GENERALIZED
LOCATION: BACK;SHOULDER
LOCATION: BACK;SHOULDER
LOCATION: GENERALIZED
LOCATION: SHOULDER
LOCATION: GENERALIZED

## 2024-02-28 NOTE — PLAN OF CARE
Pain medication being given.     Problem: Pain  Goal: Verbalizes/displays adequate comfort level or baseline comfort level  Outcome: Progressing

## 2024-02-29 LAB
ANION GAP SERPL CALCULATED.3IONS-SCNC: 9 MMOL/L (ref 3–16)
BASOPHILS # BLD: 0.1 K/UL (ref 0–0.2)
BASOPHILS NFR BLD: 0.6 %
BUN SERPL-MCNC: 3 MG/DL (ref 7–20)
CALCIUM SERPL-MCNC: 8.5 MG/DL (ref 8.3–10.6)
CHLORIDE SERPL-SCNC: 94 MMOL/L (ref 99–110)
CO2 SERPL-SCNC: 27 MMOL/L (ref 21–32)
CREAT SERPL-MCNC: <0.5 MG/DL (ref 0.6–1.2)
DEPRECATED RDW RBC AUTO: 13.6 % (ref 12.4–15.4)
EKG ATRIAL RATE: 82 BPM
EKG DIAGNOSIS: NORMAL
EKG P AXIS: 13 DEGREES
EKG P-R INTERVAL: 168 MS
EKG Q-T INTERVAL: 374 MS
EKG QRS DURATION: 86 MS
EKG QTC CALCULATION (BAZETT): 436 MS
EKG R AXIS: -29 DEGREES
EKG T AXIS: 26 DEGREES
EKG VENTRICULAR RATE: 82 BPM
EOSINOPHIL # BLD: 0.4 K/UL (ref 0–0.6)
EOSINOPHIL NFR BLD: 3.8 %
GFR SERPLBLD CREATININE-BSD FMLA CKD-EPI: >60 ML/MIN/{1.73_M2}
GLUCOSE SERPL-MCNC: 136 MG/DL (ref 70–99)
HCT VFR BLD AUTO: 38.9 % (ref 36–48)
HGB BLD-MCNC: 13.1 G/DL (ref 12–16)
INR PPP: 1.64 (ref 0.84–1.16)
LYMPHOCYTES # BLD: 1.1 K/UL (ref 1–5.1)
LYMPHOCYTES NFR BLD: 11.2 %
MCH RBC QN AUTO: 31.6 PG (ref 26–34)
MCHC RBC AUTO-ENTMCNC: 33.7 G/DL (ref 31–36)
MCV RBC AUTO: 94 FL (ref 80–100)
MONOCYTES # BLD: 1 K/UL (ref 0–1.3)
MONOCYTES NFR BLD: 10.1 %
NEUTROPHILS # BLD: 7 K/UL (ref 1.7–7.7)
NEUTROPHILS NFR BLD: 74.3 %
PLATELET # BLD AUTO: 237 K/UL (ref 135–450)
PMV BLD AUTO: 10.4 FL (ref 5–10.5)
POTASSIUM SERPL-SCNC: 3.5 MMOL/L (ref 3.5–5.1)
PROTHROMBIN TIME: 19.4 SEC (ref 11.5–14.8)
RBC # BLD AUTO: 4.14 M/UL (ref 4–5.2)
SODIUM SERPL-SCNC: 130 MMOL/L (ref 136–145)
TROPONIN, HIGH SENSITIVITY: 11 NG/L (ref 0–14)
WBC # BLD AUTO: 9.5 K/UL (ref 4–11)

## 2024-02-29 PROCEDURE — 94761 N-INVAS EAR/PLS OXIMETRY MLT: CPT

## 2024-02-29 PROCEDURE — 1200000000 HC SEMI PRIVATE

## 2024-02-29 PROCEDURE — 80048 BASIC METABOLIC PNL TOTAL CA: CPT

## 2024-02-29 PROCEDURE — 85025 COMPLETE CBC W/AUTO DIFF WBC: CPT

## 2024-02-29 PROCEDURE — 93010 ELECTROCARDIOGRAM REPORT: CPT | Performed by: INTERNAL MEDICINE

## 2024-02-29 PROCEDURE — 6360000002 HC RX W HCPCS: Performed by: INTERNAL MEDICINE

## 2024-02-29 PROCEDURE — 6370000000 HC RX 637 (ALT 250 FOR IP): Performed by: INTERNAL MEDICINE

## 2024-02-29 PROCEDURE — 36415 COLL VENOUS BLD VENIPUNCTURE: CPT

## 2024-02-29 PROCEDURE — 85610 PROTHROMBIN TIME: CPT

## 2024-02-29 PROCEDURE — 2700000000 HC OXYGEN THERAPY PER DAY

## 2024-02-29 PROCEDURE — 84484 ASSAY OF TROPONIN QUANT: CPT

## 2024-02-29 PROCEDURE — 93005 ELECTROCARDIOGRAM TRACING: CPT | Performed by: INTERNAL MEDICINE

## 2024-02-29 PROCEDURE — 2580000003 HC RX 258: Performed by: INTERNAL MEDICINE

## 2024-02-29 RX ORDER — CALCITONIN SALMON 200 [USP'U]/ML
100 INJECTION, SOLUTION INTRAMUSCULAR; SUBCUTANEOUS ONCE
Status: COMPLETED | OUTPATIENT
Start: 2024-02-29 | End: 2024-02-29

## 2024-02-29 RX ADMIN — MORPHINE SULFATE 2 MG: 2 INJECTION, SOLUTION INTRAMUSCULAR; INTRAVENOUS at 05:42

## 2024-02-29 RX ADMIN — OXYBUTYNIN CHLORIDE 5 MG: 5 TABLET, EXTENDED RELEASE ORAL at 23:43

## 2024-02-29 RX ADMIN — CALCITONIN SALMON 100 UNITS: 200 INJECTION, SOLUTION INTRAMUSCULAR; SUBCUTANEOUS at 16:48

## 2024-02-29 RX ADMIN — PROCHLORPERAZINE EDISYLATE 10 MG: 5 INJECTION INTRAMUSCULAR; INTRAVENOUS at 20:04

## 2024-02-29 RX ADMIN — Medication 10 ML: at 09:30

## 2024-02-29 RX ADMIN — MORPHINE SULFATE 2 MG: 2 INJECTION, SOLUTION INTRAMUSCULAR; INTRAVENOUS at 20:04

## 2024-02-29 RX ADMIN — OXYCODONE HYDROCHLORIDE AND ACETAMINOPHEN 500 MG: 500 TABLET ORAL at 23:43

## 2024-02-29 RX ADMIN — MONTELUKAST 10 MG: 10 TABLET, FILM COATED ORAL at 23:44

## 2024-02-29 RX ADMIN — FLUTICASONE PROPIONATE 1 SPRAY: 50 SPRAY, METERED NASAL at 09:30

## 2024-02-29 RX ADMIN — Medication 10 ML: at 20:06

## 2024-02-29 RX ADMIN — OXYCODONE 5 MG: 5 TABLET ORAL at 10:53

## 2024-02-29 RX ADMIN — PRAVASTATIN SODIUM 40 MG: 40 TABLET ORAL at 23:43

## 2024-02-29 ASSESSMENT — PAIN - FUNCTIONAL ASSESSMENT: PAIN_FUNCTIONAL_ASSESSMENT: ACTIVITIES ARE NOT PREVENTED

## 2024-02-29 ASSESSMENT — PAIN DESCRIPTION - LOCATION
LOCATION: BACK
LOCATION: BACK

## 2024-02-29 ASSESSMENT — PAIN DESCRIPTION - PAIN TYPE: TYPE: CHRONIC PAIN

## 2024-02-29 ASSESSMENT — PAIN SCALES - GENERAL
PAINLEVEL_OUTOF10: 9
PAINLEVEL_OUTOF10: 10
PAINLEVEL_OUTOF10: 10

## 2024-02-29 ASSESSMENT — PAIN DESCRIPTION - ORIENTATION
ORIENTATION: LOWER
ORIENTATION: MID;LOWER;UPPER

## 2024-02-29 ASSESSMENT — PAIN DESCRIPTION - DESCRIPTORS
DESCRIPTORS: STABBING
DESCRIPTORS: CRUSHING;DISCOMFORT

## 2024-02-29 NOTE — CONSULTS
Again attempted to meet with patient.  To go down today for kyphoplasty. In too much discomfort and asked that I come back another time.  Please call me when she is feeling able to talk.      Dmitri Fernando MD  
Attempted consult but patient was in too much pain to conduct interview.  I will return after procedure to re-evaluate.     Dmitri Fernando MD  
Consult PerfectServed/called to Psychiatry on 02/27/24 at 6:17 PM Angela Dave  
Consult PerfectServed/called to Spiritual Services on 02/27/24 at 5:25 PM Angela Dave  
IR received a consult for a kyphoplasty from ER. Patient will need an MRI to compare to initial imaging to assess acuity of fracture and if the patient is a candidate for this procedure. If patient is ineligible to have an MRI, a Nuclear Medicine Bone scan will need to be completed. Once scan is complete we will evaluate and contact the patient.       Anish Barnes MD  Vascular and Interventional Radiology    
extra-axial fluid collection.  The gray-white  differentiation is maintained without evidence of an acute infarct.  There is  no evidence of hydrocephalus. There is stable age-appropriate atrophy.  There  is stable confluent hypoattenuation throughout the bilateral subcortical and  periventricular white matter, similar to the study of 09/17/2023, most  consistent with chronic small vessel ischemic white matter disease.  No focus  of acute abnormal brain attenuation is identified.    ORBITS: The visualized portion of the orbits demonstrate no acute abnormality.    SINUSES: There is mild mucosal thickening within the bilateral ethmoid sinus  cellules.  There are multiple small osteomas within the bilateral ethmoid  sinuses, similar to the prior exam.  The remainder of the paranasal sinuses  are clear.  The left mastoid air cells are clear.  There is stable chronic  partial opacification of the right mastoid air cells.    SOFT TISSUES/SKULL:  No acute abnormality of the visualized skull or soft  tissues.  Impression: No acute intracranial abnormality.  XR SHOULDER RIGHT (MIN 2 VIEWS)  Narrative: EXAMINATION:  THREE XRAY VIEWS OF THE RIGHT SHOULDER    2/27/2024 8:37 am    COMPARISON:  None.    HISTORY:  ORDERING SYSTEM PROVIDED HISTORY: pain  TECHNOLOGIST PROVIDED HISTORY:  Reason for exam:->pain    FINDINGS:  Glenohumeral joint is normally aligned.  No evidence of acute fracture or  dislocation.  No abnormal periarticular calcifications.  The AC joint is  unremarkable in appearance.    Visualized lung is unremarkable.  Impression: No acute abnormality.  CT THORACIC SPINE WO CONTRAST  Narrative: EXAMINATION:  CT OF THE THORACIC SPINE WITHOUT CONTRAST  2/27/2024 7:19 am:    TECHNIQUE:  CT of the thoracic spine was performed without the administration of  intravenous contrast. Multiplanar reformatted images are provided for review.  Automated exposure control, iterative reconstruction, and/or weight based  adjustment of

## 2024-03-01 ENCOUNTER — ANESTHESIA (OUTPATIENT)
Dept: INTERVENTIONAL RADIOLOGY/VASCULAR | Age: 79
DRG: 551 | End: 2024-03-01
Payer: MEDICARE

## 2024-03-01 ENCOUNTER — ANESTHESIA EVENT (OUTPATIENT)
Dept: INTERVENTIONAL RADIOLOGY/VASCULAR | Age: 79
DRG: 551 | End: 2024-03-01
Payer: MEDICARE

## 2024-03-01 LAB
ANION GAP SERPL CALCULATED.3IONS-SCNC: 11 MMOL/L (ref 3–16)
BUN SERPL-MCNC: 4 MG/DL (ref 7–20)
CALCIUM SERPL-MCNC: 9 MG/DL (ref 8.3–10.6)
CHLORIDE SERPL-SCNC: 94 MMOL/L (ref 99–110)
CO2 SERPL-SCNC: 27 MMOL/L (ref 21–32)
CREAT SERPL-MCNC: <0.5 MG/DL (ref 0.6–1.2)
DEPRECATED RDW RBC AUTO: 13.5 % (ref 12.4–15.4)
GFR SERPLBLD CREATININE-BSD FMLA CKD-EPI: >60 ML/MIN/{1.73_M2}
GLUCOSE SERPL-MCNC: 118 MG/DL (ref 70–99)
HCT VFR BLD AUTO: 38.6 % (ref 36–48)
HGB BLD-MCNC: 12.7 G/DL (ref 12–16)
INR PPP: 1.26 (ref 0.84–1.16)
MCH RBC QN AUTO: 30.8 PG (ref 26–34)
MCHC RBC AUTO-ENTMCNC: 32.9 G/DL (ref 31–36)
MCV RBC AUTO: 93.7 FL (ref 80–100)
PLATELET # BLD AUTO: 254 K/UL (ref 135–450)
PMV BLD AUTO: 10.3 FL (ref 5–10.5)
POTASSIUM SERPL-SCNC: 3.5 MMOL/L (ref 3.5–5.1)
PROCALCITONIN SERPL IA-MCNC: 0.08 NG/ML (ref 0–0.15)
PROTHROMBIN TIME: 15.8 SEC (ref 11.5–14.8)
RBC # BLD AUTO: 4.12 M/UL (ref 4–5.2)
SODIUM SERPL-SCNC: 132 MMOL/L (ref 136–145)
WBC # BLD AUTO: 11.3 K/UL (ref 4–11)

## 2024-03-01 PROCEDURE — 6360000002 HC RX W HCPCS: Performed by: STUDENT IN AN ORGANIZED HEALTH CARE EDUCATION/TRAINING PROGRAM

## 2024-03-01 PROCEDURE — 94761 N-INVAS EAR/PLS OXIMETRY MLT: CPT

## 2024-03-01 PROCEDURE — 36415 COLL VENOUS BLD VENIPUNCTURE: CPT

## 2024-03-01 PROCEDURE — 6360000002 HC RX W HCPCS: Performed by: INTERNAL MEDICINE

## 2024-03-01 PROCEDURE — 51798 US URINE CAPACITY MEASURE: CPT

## 2024-03-01 PROCEDURE — 6370000000 HC RX 637 (ALT 250 FOR IP): Performed by: INTERNAL MEDICINE

## 2024-03-01 PROCEDURE — 2700000000 HC OXYGEN THERAPY PER DAY

## 2024-03-01 PROCEDURE — 2580000003 HC RX 258: Performed by: INTERNAL MEDICINE

## 2024-03-01 PROCEDURE — 97166 OT EVAL MOD COMPLEX 45 MIN: CPT

## 2024-03-01 PROCEDURE — 97535 SELF CARE MNGMENT TRAINING: CPT

## 2024-03-01 PROCEDURE — 80048 BASIC METABOLIC PNL TOTAL CA: CPT

## 2024-03-01 PROCEDURE — 51702 INSERT TEMP BLADDER CATH: CPT

## 2024-03-01 PROCEDURE — 1200000000 HC SEMI PRIVATE

## 2024-03-01 PROCEDURE — 85027 COMPLETE CBC AUTOMATED: CPT

## 2024-03-01 PROCEDURE — 97110 THERAPEUTIC EXERCISES: CPT

## 2024-03-01 PROCEDURE — 97162 PT EVAL MOD COMPLEX 30 MIN: CPT

## 2024-03-01 PROCEDURE — 85610 PROTHROMBIN TIME: CPT

## 2024-03-01 PROCEDURE — 97530 THERAPEUTIC ACTIVITIES: CPT

## 2024-03-01 PROCEDURE — 6370000000 HC RX 637 (ALT 250 FOR IP): Performed by: STUDENT IN AN ORGANIZED HEALTH CARE EDUCATION/TRAINING PROGRAM

## 2024-03-01 PROCEDURE — 84145 PROCALCITONIN (PCT): CPT

## 2024-03-01 RX ORDER — LEVOFLOXACIN 5 MG/ML
750 INJECTION, SOLUTION INTRAVENOUS EVERY 24 HOURS
Status: DISCONTINUED | OUTPATIENT
Start: 2024-03-01 | End: 2024-03-04

## 2024-03-01 RX ORDER — OXYCODONE HYDROCHLORIDE 5 MG/1
5 TABLET ORAL EVERY 6 HOURS PRN
Status: DISCONTINUED | OUTPATIENT
Start: 2024-03-01 | End: 2024-03-01

## 2024-03-01 RX ORDER — OXYCODONE HYDROCHLORIDE 5 MG/1
10 TABLET ORAL EVERY 6 HOURS PRN
Status: DISCONTINUED | OUTPATIENT
Start: 2024-03-01 | End: 2024-03-01

## 2024-03-01 RX ORDER — MORPHINE SULFATE 4 MG/ML
4 INJECTION, SOLUTION INTRAMUSCULAR; INTRAVENOUS EVERY 4 HOURS PRN
Status: DISCONTINUED | OUTPATIENT
Start: 2024-03-01 | End: 2024-03-01

## 2024-03-01 RX ORDER — HYDROXYZINE PAMOATE 25 MG/1
50 CAPSULE ORAL NIGHTLY
Status: DISCONTINUED | OUTPATIENT
Start: 2024-03-01 | End: 2024-03-05 | Stop reason: HOSPADM

## 2024-03-01 RX ORDER — MORPHINE SULFATE 2 MG/ML
1 INJECTION, SOLUTION INTRAMUSCULAR; INTRAVENOUS EVERY 4 HOURS PRN
Status: DISCONTINUED | OUTPATIENT
Start: 2024-03-01 | End: 2024-03-04

## 2024-03-01 RX ORDER — TRAMADOL HYDROCHLORIDE 50 MG/1
50 TABLET ORAL EVERY 6 HOURS PRN
Status: DISCONTINUED | OUTPATIENT
Start: 2024-03-01 | End: 2024-03-05 | Stop reason: HOSPADM

## 2024-03-01 RX ORDER — TRAMADOL HYDROCHLORIDE 50 MG/1
25 TABLET ORAL EVERY 6 HOURS PRN
Status: DISCONTINUED | OUTPATIENT
Start: 2024-03-01 | End: 2024-03-05 | Stop reason: HOSPADM

## 2024-03-01 RX ADMIN — Medication 10 ML: at 11:39

## 2024-03-01 RX ADMIN — OXYBUTYNIN CHLORIDE 5 MG: 5 TABLET, EXTENDED RELEASE ORAL at 20:12

## 2024-03-01 RX ADMIN — Medication 10 ML: at 09:16

## 2024-03-01 RX ADMIN — Medication 10 ML: at 17:01

## 2024-03-01 RX ADMIN — OXYCODONE 5 MG: 5 TABLET ORAL at 06:15

## 2024-03-01 RX ADMIN — FLUTICASONE PROPIONATE 1 SPRAY: 50 SPRAY, METERED NASAL at 09:11

## 2024-03-01 RX ADMIN — PROCHLORPERAZINE EDISYLATE 10 MG: 5 INJECTION INTRAMUSCULAR; INTRAVENOUS at 17:00

## 2024-03-01 RX ADMIN — OXYCODONE 5 MG: 5 TABLET ORAL at 00:34

## 2024-03-01 RX ADMIN — Medication 10 ML: at 09:04

## 2024-03-01 RX ADMIN — HYDROXYZINE PAMOATE 50 MG: 25 CAPSULE ORAL at 20:12

## 2024-03-01 RX ADMIN — OXYCODONE HYDROCHLORIDE AND ACETAMINOPHEN 500 MG: 500 TABLET ORAL at 20:12

## 2024-03-01 RX ADMIN — LEVOFLOXACIN 750 MG: 5 INJECTION, SOLUTION INTRAVENOUS at 09:08

## 2024-03-01 RX ADMIN — MORPHINE SULFATE 2 MG: 2 INJECTION, SOLUTION INTRAMUSCULAR; INTRAVENOUS at 09:16

## 2024-03-01 RX ADMIN — OXYCODONE 10 MG: 5 TABLET ORAL at 10:53

## 2024-03-01 RX ADMIN — TRAMADOL HYDROCHLORIDE 50 MG: 50 TABLET ORAL at 20:12

## 2024-03-01 RX ADMIN — PROCHLORPERAZINE EDISYLATE 10 MG: 5 INJECTION INTRAMUSCULAR; INTRAVENOUS at 23:01

## 2024-03-01 RX ADMIN — SODIUM CHLORIDE: 9 INJECTION, SOLUTION INTRAVENOUS at 09:06

## 2024-03-01 RX ADMIN — ONDANSETRON 4 MG: 2 INJECTION INTRAMUSCULAR; INTRAVENOUS at 11:38

## 2024-03-01 RX ADMIN — PRAVASTATIN SODIUM 40 MG: 40 TABLET ORAL at 20:12

## 2024-03-01 RX ADMIN — MONTELUKAST 10 MG: 10 TABLET, FILM COATED ORAL at 20:12

## 2024-03-01 ASSESSMENT — PAIN DESCRIPTION - LOCATION
LOCATION: SHOULDER
LOCATION: SHOULDER;BACK
LOCATION: SHOULDER;BACK
LOCATION: BACK;SHOULDER

## 2024-03-01 ASSESSMENT — PAIN DESCRIPTION - PAIN TYPE
TYPE: ACUTE PAIN
TYPE: ACUTE PAIN

## 2024-03-01 ASSESSMENT — PAIN SCALES - GENERAL
PAINLEVEL_OUTOF10: 10
PAINLEVEL_OUTOF10: 9
PAINLEVEL_OUTOF10: 8
PAINLEVEL_OUTOF10: 10

## 2024-03-01 ASSESSMENT — PAIN DESCRIPTION - ORIENTATION
ORIENTATION: RIGHT
ORIENTATION: LOWER
ORIENTATION: RIGHT;MID
ORIENTATION: RIGHT;MID

## 2024-03-01 ASSESSMENT — PAIN - FUNCTIONAL ASSESSMENT
PAIN_FUNCTIONAL_ASSESSMENT: 0-10
PAIN_FUNCTIONAL_ASSESSMENT: PREVENTS OR INTERFERES WITH ALL ACTIVE AND SOME PASSIVE ACTIVITIES
PAIN_FUNCTIONAL_ASSESSMENT: PREVENTS OR INTERFERES WITH ALL ACTIVE AND SOME PASSIVE ACTIVITIES
PAIN_FUNCTIONAL_ASSESSMENT: PREVENTS OR INTERFERES SOME ACTIVE ACTIVITIES AND ADLS

## 2024-03-01 ASSESSMENT — PAIN DESCRIPTION - DESCRIPTORS
DESCRIPTORS: PATIENT UNABLE TO DESCRIBE
DESCRIPTORS: PATIENT UNABLE TO DESCRIBE
DESCRIPTORS: DISCOMFORT
DESCRIPTORS: DISCOMFORT

## 2024-03-01 ASSESSMENT — PAIN DESCRIPTION - FREQUENCY
FREQUENCY: INTERMITTENT
FREQUENCY: INTERMITTENT

## 2024-03-01 NOTE — PLAN OF CARE
Problem: Safety - Adult  Goal: Free from fall injury  Outcome: Progressing     Problem: Discharge Planning  Goal: Discharge to home or other facility with appropriate resources  Outcome: Progressing     Problem: Skin/Tissue Integrity  Goal: Absence of new skin breakdown  Description: 1.  Monitor for areas of redness and/or skin breakdown  2.  Assess vascular access sites hourly  3.  Every 4-6 hours minimum:  Change oxygen saturation probe site  4.  Every 4-6 hours:  If on nasal continuous positive airway pressure, respiratory therapy assess nares and determine need for appliance change or resting period.  Outcome: Progressing     Problem: ABCDS Injury Assessment  Goal: Absence of physical injury  Outcome: Progressing     Problem: Pain  Goal: Verbalizes/displays adequate comfort level or baseline comfort level  Outcome: Progressing

## 2024-03-01 NOTE — PLAN OF CARE
Problem: Safety - Adult  Goal: Free from fall injury  Outcome: Progressing  Bed in lowest position, wheels locked, 2/4 side rails up, nonskid footwear on. Bed/ chair check alarm in place, call light within reach. Pt instructed to call out when needing assistance. Pt stated understanding.      Problem: Skin/Tissue Integrity  Goal: Absence of new skin breakdown  Description: 1.  Monitor for areas of redness and/or skin breakdown  2.  Assess vascular access sites hourly  3.  Every 4-6 hours minimum:  Change oxygen saturation probe site  4.  Every 4-6 hours:  If on nasal continuous positive airway pressure, respiratory therapy assess nares and determine need for appliance change or resting period.  Outcome: Progressing     Problem: ABCDS Injury Assessment  Goal: Absence of physical injury  Outcome: Progressing     Problem: Pain  Goal: Verbalizes/displays adequate comfort level or baseline comfort level  Outcome: Progressing  Pt scoring pain on 0-10 scale. Pain medications given per MAR. Pt instructed to call out when pain level increasing. Call light within reach.

## 2024-03-02 PROCEDURE — 6370000000 HC RX 637 (ALT 250 FOR IP): Performed by: STUDENT IN AN ORGANIZED HEALTH CARE EDUCATION/TRAINING PROGRAM

## 2024-03-02 PROCEDURE — 1200000000 HC SEMI PRIVATE

## 2024-03-02 PROCEDURE — 6360000002 HC RX W HCPCS: Performed by: STUDENT IN AN ORGANIZED HEALTH CARE EDUCATION/TRAINING PROGRAM

## 2024-03-02 PROCEDURE — 94761 N-INVAS EAR/PLS OXIMETRY MLT: CPT

## 2024-03-02 PROCEDURE — 6370000000 HC RX 637 (ALT 250 FOR IP): Performed by: INTERNAL MEDICINE

## 2024-03-02 PROCEDURE — 2700000000 HC OXYGEN THERAPY PER DAY

## 2024-03-02 PROCEDURE — 2580000003 HC RX 258: Performed by: INTERNAL MEDICINE

## 2024-03-02 RX ADMIN — FLUTICASONE PROPIONATE 1 SPRAY: 50 SPRAY, METERED NASAL at 08:40

## 2024-03-02 RX ADMIN — TRAMADOL HYDROCHLORIDE 50 MG: 50 TABLET ORAL at 06:49

## 2024-03-02 RX ADMIN — SODIUM CHLORIDE: 9 INJECTION, SOLUTION INTRAVENOUS at 08:37

## 2024-03-02 RX ADMIN — HYDROXYZINE PAMOATE 50 MG: 25 CAPSULE ORAL at 20:21

## 2024-03-02 RX ADMIN — TRAMADOL HYDROCHLORIDE 50 MG: 50 TABLET ORAL at 20:22

## 2024-03-02 RX ADMIN — MORPHINE SULFATE 1 MG: 2 INJECTION, SOLUTION INTRAMUSCULAR; INTRAVENOUS at 08:39

## 2024-03-02 RX ADMIN — LEVOFLOXACIN 750 MG: 5 INJECTION, SOLUTION INTRAVENOUS at 08:38

## 2024-03-02 RX ADMIN — MONTELUKAST 10 MG: 10 TABLET, FILM COATED ORAL at 20:22

## 2024-03-02 RX ADMIN — ACETAMINOPHEN 650 MG: 325 TABLET ORAL at 06:49

## 2024-03-02 RX ADMIN — Medication 10 ML: at 20:24

## 2024-03-02 RX ADMIN — OXYBUTYNIN CHLORIDE 5 MG: 5 TABLET, EXTENDED RELEASE ORAL at 20:22

## 2024-03-02 RX ADMIN — PRAVASTATIN SODIUM 40 MG: 40 TABLET ORAL at 20:22

## 2024-03-02 RX ADMIN — OXYCODONE HYDROCHLORIDE AND ACETAMINOPHEN 500 MG: 500 TABLET ORAL at 20:21

## 2024-03-02 RX ADMIN — Medication 10 ML: at 08:35

## 2024-03-02 ASSESSMENT — PAIN DESCRIPTION - LOCATION
LOCATION: BACK
LOCATION: BACK;SHOULDER

## 2024-03-02 ASSESSMENT — PAIN SCALES - GENERAL
PAINLEVEL_OUTOF10: 0
PAINLEVEL_OUTOF10: 10
PAINLEVEL_OUTOF10: 8
PAINLEVEL_OUTOF10: 10
PAINLEVEL_OUTOF10: 6
PAINLEVEL_OUTOF10: 8
PAINLEVEL_OUTOF10: 7
PAINLEVEL_OUTOF10: 10

## 2024-03-02 ASSESSMENT — PAIN DESCRIPTION - DESCRIPTORS: DESCRIPTORS: SHARP

## 2024-03-02 ASSESSMENT — PAIN DESCRIPTION - ORIENTATION: ORIENTATION: UPPER;RIGHT;LEFT

## 2024-03-03 ENCOUNTER — APPOINTMENT (OUTPATIENT)
Dept: GENERAL RADIOLOGY | Age: 79
DRG: 551 | End: 2024-03-03
Payer: MEDICARE

## 2024-03-03 LAB
ANION GAP SERPL CALCULATED.3IONS-SCNC: 12 MMOL/L (ref 3–16)
BUN SERPL-MCNC: 7 MG/DL (ref 7–20)
CALCIUM SERPL-MCNC: 8.6 MG/DL (ref 8.3–10.6)
CHLORIDE SERPL-SCNC: 92 MMOL/L (ref 99–110)
CO2 SERPL-SCNC: 26 MMOL/L (ref 21–32)
CREAT SERPL-MCNC: <0.5 MG/DL (ref 0.6–1.2)
DEPRECATED RDW RBC AUTO: 13.6 % (ref 12.4–15.4)
EKG ATRIAL RATE: 75 BPM
EKG DIAGNOSIS: NORMAL
EKG P AXIS: 24 DEGREES
EKG P-R INTERVAL: 152 MS
EKG Q-T INTERVAL: 402 MS
EKG QRS DURATION: 88 MS
EKG QTC CALCULATION (BAZETT): 448 MS
EKG R AXIS: -34 DEGREES
EKG T AXIS: 27 DEGREES
EKG VENTRICULAR RATE: 75 BPM
GFR SERPLBLD CREATININE-BSD FMLA CKD-EPI: >60 ML/MIN/{1.73_M2}
GLUCOSE SERPL-MCNC: 186 MG/DL (ref 70–99)
HCT VFR BLD AUTO: 37.4 % (ref 36–48)
HGB BLD-MCNC: 12.4 G/DL (ref 12–16)
LACTATE BLDV-SCNC: 2.5 MMOL/L (ref 0.4–2)
MAGNESIUM SERPL-MCNC: 1.6 MG/DL (ref 1.8–2.4)
MCH RBC QN AUTO: 31.1 PG (ref 26–34)
MCHC RBC AUTO-ENTMCNC: 33 G/DL (ref 31–36)
MCV RBC AUTO: 94.1 FL (ref 80–100)
NT-PROBNP SERPL-MCNC: 606 PG/ML (ref 0–449)
PLATELET # BLD AUTO: 264 K/UL (ref 135–450)
PMV BLD AUTO: 10.7 FL (ref 5–10.5)
POTASSIUM SERPL-SCNC: 3.5 MMOL/L (ref 3.5–5.1)
RBC # BLD AUTO: 3.97 M/UL (ref 4–5.2)
SODIUM SERPL-SCNC: 130 MMOL/L (ref 136–145)
TROPONIN, HIGH SENSITIVITY: 11 NG/L (ref 0–14)
WBC # BLD AUTO: 11.1 K/UL (ref 4–11)

## 2024-03-03 PROCEDURE — 6370000000 HC RX 637 (ALT 250 FOR IP): Performed by: INTERNAL MEDICINE

## 2024-03-03 PROCEDURE — 85027 COMPLETE CBC AUTOMATED: CPT

## 2024-03-03 PROCEDURE — 36415 COLL VENOUS BLD VENIPUNCTURE: CPT

## 2024-03-03 PROCEDURE — 87276 INFLUENZA A AG IF: CPT

## 2024-03-03 PROCEDURE — 83605 ASSAY OF LACTIC ACID: CPT

## 2024-03-03 PROCEDURE — 80048 BASIC METABOLIC PNL TOTAL CA: CPT

## 2024-03-03 PROCEDURE — 2580000003 HC RX 258: Performed by: INTERNAL MEDICINE

## 2024-03-03 PROCEDURE — 6360000002 HC RX W HCPCS: Performed by: INTERNAL MEDICINE

## 2024-03-03 PROCEDURE — 83880 ASSAY OF NATRIURETIC PEPTIDE: CPT

## 2024-03-03 PROCEDURE — 93005 ELECTROCARDIOGRAM TRACING: CPT | Performed by: STUDENT IN AN ORGANIZED HEALTH CARE EDUCATION/TRAINING PROGRAM

## 2024-03-03 PROCEDURE — 84484 ASSAY OF TROPONIN QUANT: CPT

## 2024-03-03 PROCEDURE — 87260 ADENOVIRUS AG IF: CPT

## 2024-03-03 PROCEDURE — 87279 PARAINFLUENZA AG IF: CPT

## 2024-03-03 PROCEDURE — 6370000000 HC RX 637 (ALT 250 FOR IP): Performed by: STUDENT IN AN ORGANIZED HEALTH CARE EDUCATION/TRAINING PROGRAM

## 2024-03-03 PROCEDURE — 87275 INFLUENZA B AG IF: CPT

## 2024-03-03 PROCEDURE — 93010 ELECTROCARDIOGRAM REPORT: CPT | Performed by: INTERNAL MEDICINE

## 2024-03-03 PROCEDURE — 87280 RESPIRATORY SYNCYTIAL AG IF: CPT

## 2024-03-03 PROCEDURE — 2700000000 HC OXYGEN THERAPY PER DAY

## 2024-03-03 PROCEDURE — 6360000002 HC RX W HCPCS: Performed by: STUDENT IN AN ORGANIZED HEALTH CARE EDUCATION/TRAINING PROGRAM

## 2024-03-03 PROCEDURE — 83735 ASSAY OF MAGNESIUM: CPT

## 2024-03-03 PROCEDURE — 94761 N-INVAS EAR/PLS OXIMETRY MLT: CPT

## 2024-03-03 PROCEDURE — 71045 X-RAY EXAM CHEST 1 VIEW: CPT

## 2024-03-03 PROCEDURE — 87299 ANTIBODY DETECTION NOS IF: CPT

## 2024-03-03 PROCEDURE — 1200000000 HC SEMI PRIVATE

## 2024-03-03 RX ORDER — ENOXAPARIN SODIUM 100 MG/ML
40 INJECTION SUBCUTANEOUS DAILY
Status: DISCONTINUED | OUTPATIENT
Start: 2024-03-03 | End: 2024-03-05 | Stop reason: HOSPADM

## 2024-03-03 RX ADMIN — FLUTICASONE PROPIONATE 1 SPRAY: 50 SPRAY, METERED NASAL at 08:16

## 2024-03-03 RX ADMIN — ENOXAPARIN SODIUM 40 MG: 100 INJECTION SUBCUTANEOUS at 08:17

## 2024-03-03 RX ADMIN — TRAMADOL HYDROCHLORIDE 50 MG: 50 TABLET ORAL at 23:43

## 2024-03-03 RX ADMIN — PRAVASTATIN SODIUM 40 MG: 40 TABLET ORAL at 20:20

## 2024-03-03 RX ADMIN — MAGNESIUM SULFATE HEPTAHYDRATE 2000 MG: 40 INJECTION, SOLUTION INTRAVENOUS at 11:52

## 2024-03-03 RX ADMIN — MONTELUKAST 10 MG: 10 TABLET, FILM COATED ORAL at 20:20

## 2024-03-03 RX ADMIN — Medication 10 ML: at 11:42

## 2024-03-03 RX ADMIN — POLYETHYLENE GLYCOL 3350 17 G: 17 POWDER, FOR SOLUTION ORAL at 10:11

## 2024-03-03 RX ADMIN — LEVOFLOXACIN 750 MG: 5 INJECTION, SOLUTION INTRAVENOUS at 08:16

## 2024-03-03 RX ADMIN — SODIUM CHLORIDE: 9 INJECTION, SOLUTION INTRAVENOUS at 11:51

## 2024-03-03 RX ADMIN — OXYBUTYNIN CHLORIDE 5 MG: 5 TABLET, EXTENDED RELEASE ORAL at 20:20

## 2024-03-03 RX ADMIN — HYDROXYZINE PAMOATE 50 MG: 25 CAPSULE ORAL at 20:20

## 2024-03-03 RX ADMIN — OXYCODONE HYDROCHLORIDE AND ACETAMINOPHEN 500 MG: 500 TABLET ORAL at 20:20

## 2024-03-03 RX ADMIN — MORPHINE SULFATE 1 MG: 2 INJECTION, SOLUTION INTRAMUSCULAR; INTRAVENOUS at 00:00

## 2024-03-03 RX ADMIN — Medication 10 ML: at 20:20

## 2024-03-03 RX ADMIN — SODIUM CHLORIDE: 9 INJECTION, SOLUTION INTRAVENOUS at 08:13

## 2024-03-03 RX ADMIN — TRAMADOL HYDROCHLORIDE 50 MG: 50 TABLET ORAL at 17:02

## 2024-03-03 RX ADMIN — MORPHINE SULFATE 1 MG: 2 INJECTION, SOLUTION INTRAMUSCULAR; INTRAVENOUS at 19:53

## 2024-03-03 RX ADMIN — Medication 10 ML: at 08:11

## 2024-03-03 ASSESSMENT — PAIN SCALES - GENERAL
PAINLEVEL_OUTOF10: 8
PAINLEVEL_OUTOF10: 10
PAINLEVEL_OUTOF10: 8
PAINLEVEL_OUTOF10: 9

## 2024-03-03 ASSESSMENT — PAIN DESCRIPTION - LOCATION
LOCATION: CHEST
LOCATION: BACK

## 2024-03-03 ASSESSMENT — PAIN DESCRIPTION - ORIENTATION: ORIENTATION: LEFT

## 2024-03-04 LAB
ANION GAP SERPL CALCULATED.3IONS-SCNC: 9 MMOL/L (ref 3–16)
BUN SERPL-MCNC: 7 MG/DL (ref 7–20)
CALCIUM SERPL-MCNC: 8.7 MG/DL (ref 8.3–10.6)
CHLORIDE SERPL-SCNC: 93 MMOL/L (ref 99–110)
CO2 SERPL-SCNC: 29 MMOL/L (ref 21–32)
CREAT SERPL-MCNC: <0.5 MG/DL (ref 0.6–1.2)
DEPRECATED RDW RBC AUTO: 13.5 % (ref 12.4–15.4)
FLUAV AG SPEC QL IF: NEGATIVE
FLUBV AG SPEC QL IF: NEGATIVE
GFR SERPLBLD CREATININE-BSD FMLA CKD-EPI: >60 ML/MIN/{1.73_M2}
GLUCOSE SERPL-MCNC: 107 MG/DL (ref 70–99)
HADV AG SPEC QL IF: NEGATIVE
HCT VFR BLD AUTO: 36.4 % (ref 36–48)
HGB BLD-MCNC: 12.2 G/DL (ref 12–16)
HMPV AG SPEC QL IF: NEGATIVE
HPIV1 AG SPEC QL IF: NEGATIVE
HPIV2 AG SPEC QL IF: NEGATIVE
HPIV3 AG SPEC QL IF: NEGATIVE
MAGNESIUM SERPL-MCNC: 2.1 MG/DL (ref 1.8–2.4)
MCH RBC QN AUTO: 31.8 PG (ref 26–34)
MCHC RBC AUTO-ENTMCNC: 33.6 G/DL (ref 31–36)
MCV RBC AUTO: 94.5 FL (ref 80–100)
PLATELET # BLD AUTO: 274 K/UL (ref 135–450)
PMV BLD AUTO: 10.3 FL (ref 5–10.5)
POTASSIUM SERPL-SCNC: 3.7 MMOL/L (ref 3.5–5.1)
RBC # BLD AUTO: 3.85 M/UL (ref 4–5.2)
RSV AG SPEC QL IF: NEGATIVE
SODIUM SERPL-SCNC: 131 MMOL/L (ref 136–145)
SPECIMEN SOURCE: NORMAL
WBC # BLD AUTO: 9.6 K/UL (ref 4–11)

## 2024-03-04 PROCEDURE — 6370000000 HC RX 637 (ALT 250 FOR IP): Performed by: INTERNAL MEDICINE

## 2024-03-04 PROCEDURE — 1200000000 HC SEMI PRIVATE

## 2024-03-04 PROCEDURE — 2700000000 HC OXYGEN THERAPY PER DAY

## 2024-03-04 PROCEDURE — 6370000000 HC RX 637 (ALT 250 FOR IP): Performed by: STUDENT IN AN ORGANIZED HEALTH CARE EDUCATION/TRAINING PROGRAM

## 2024-03-04 PROCEDURE — 36415 COLL VENOUS BLD VENIPUNCTURE: CPT

## 2024-03-04 PROCEDURE — 97530 THERAPEUTIC ACTIVITIES: CPT

## 2024-03-04 PROCEDURE — 80048 BASIC METABOLIC PNL TOTAL CA: CPT

## 2024-03-04 PROCEDURE — 83735 ASSAY OF MAGNESIUM: CPT

## 2024-03-04 PROCEDURE — 94640 AIRWAY INHALATION TREATMENT: CPT

## 2024-03-04 PROCEDURE — 85027 COMPLETE CBC AUTOMATED: CPT

## 2024-03-04 PROCEDURE — 97535 SELF CARE MNGMENT TRAINING: CPT

## 2024-03-04 PROCEDURE — 6370000000 HC RX 637 (ALT 250 FOR IP): Performed by: NURSE PRACTITIONER

## 2024-03-04 PROCEDURE — 94669 MECHANICAL CHEST WALL OSCILL: CPT

## 2024-03-04 PROCEDURE — 6360000002 HC RX W HCPCS: Performed by: STUDENT IN AN ORGANIZED HEALTH CARE EDUCATION/TRAINING PROGRAM

## 2024-03-04 PROCEDURE — 2580000003 HC RX 258: Performed by: INTERNAL MEDICINE

## 2024-03-04 PROCEDURE — 94761 N-INVAS EAR/PLS OXIMETRY MLT: CPT

## 2024-03-04 RX ORDER — OXYCODONE HYDROCHLORIDE AND ACETAMINOPHEN 5; 325 MG/1; MG/1
1 TABLET ORAL EVERY 4 HOURS PRN
Status: DISCONTINUED | OUTPATIENT
Start: 2024-03-04 | End: 2024-03-05 | Stop reason: HOSPADM

## 2024-03-04 RX ORDER — LEVOFLOXACIN 500 MG/1
500 TABLET, FILM COATED ORAL DAILY
Status: DISCONTINUED | OUTPATIENT
Start: 2024-03-05 | End: 2024-03-05

## 2024-03-04 RX ORDER — LIDOCAINE 4 G/G
1 PATCH TOPICAL DAILY
Qty: 10 PATCH | Refills: 0 | Status: SHIPPED | OUTPATIENT
Start: 2024-03-05

## 2024-03-04 RX ORDER — OXYCODONE HYDROCHLORIDE AND ACETAMINOPHEN 5; 325 MG/1; MG/1
1 TABLET ORAL EVERY 4 HOURS PRN
Qty: 18 TABLET | Refills: 0 | Status: SHIPPED | OUTPATIENT
Start: 2024-03-04 | End: 2024-03-05

## 2024-03-04 RX ORDER — GUAIFENESIN 600 MG/1
600 TABLET, EXTENDED RELEASE ORAL 2 TIMES DAILY
Status: DISCONTINUED | OUTPATIENT
Start: 2024-03-04 | End: 2024-03-05 | Stop reason: HOSPADM

## 2024-03-04 RX ORDER — LEVOFLOXACIN 500 MG/1
500 TABLET, FILM COATED ORAL DAILY
Qty: 3 TABLET | Refills: 0 | Status: SHIPPED | OUTPATIENT
Start: 2024-03-05 | End: 2024-03-05 | Stop reason: HOSPADM

## 2024-03-04 RX ORDER — MECOBALAMIN 5000 MCG
10 TABLET,DISINTEGRATING ORAL NIGHTLY
Status: DISCONTINUED | OUTPATIENT
Start: 2024-03-04 | End: 2024-03-05 | Stop reason: HOSPADM

## 2024-03-04 RX ADMIN — HYDROXYZINE PAMOATE 50 MG: 25 CAPSULE ORAL at 21:22

## 2024-03-04 RX ADMIN — Medication 2 PUFF: at 22:13

## 2024-03-04 RX ADMIN — MONTELUKAST 10 MG: 10 TABLET, FILM COATED ORAL at 21:21

## 2024-03-04 RX ADMIN — LEVOFLOXACIN 750 MG: 5 INJECTION, SOLUTION INTRAVENOUS at 08:11

## 2024-03-04 RX ADMIN — PRAVASTATIN SODIUM 40 MG: 40 TABLET ORAL at 21:21

## 2024-03-04 RX ADMIN — SODIUM CHLORIDE: 9 INJECTION, SOLUTION INTRAVENOUS at 08:11

## 2024-03-04 RX ADMIN — OXYBUTYNIN CHLORIDE 5 MG: 5 TABLET, EXTENDED RELEASE ORAL at 21:21

## 2024-03-04 RX ADMIN — OXYCODONE AND ACETAMINOPHEN 1 TABLET: 5; 325 TABLET ORAL at 21:22

## 2024-03-04 RX ADMIN — Medication 10 MG: at 21:21

## 2024-03-04 RX ADMIN — GUAIFENESIN 600 MG: 600 TABLET ORAL at 10:26

## 2024-03-04 RX ADMIN — Medication 10 MG: at 00:59

## 2024-03-04 RX ADMIN — GUAIFENESIN 600 MG: 600 TABLET ORAL at 21:21

## 2024-03-04 RX ADMIN — Medication 10 ML: at 08:09

## 2024-03-04 RX ADMIN — OXYCODONE HYDROCHLORIDE AND ACETAMINOPHEN 500 MG: 500 TABLET ORAL at 21:21

## 2024-03-04 RX ADMIN — OXYCODONE AND ACETAMINOPHEN 1 TABLET: 5; 325 TABLET ORAL at 10:27

## 2024-03-04 RX ADMIN — ENOXAPARIN SODIUM 40 MG: 100 INJECTION SUBCUTANEOUS at 08:09

## 2024-03-04 RX ADMIN — TRAMADOL HYDROCHLORIDE 50 MG: 50 TABLET ORAL at 08:09

## 2024-03-04 RX ADMIN — FLUTICASONE PROPIONATE 1 SPRAY: 50 SPRAY, METERED NASAL at 08:12

## 2024-03-04 ASSESSMENT — PAIN DESCRIPTION - DESCRIPTORS
DESCRIPTORS: ACHING
DESCRIPTORS: ACHING
DESCRIPTORS: STABBING

## 2024-03-04 ASSESSMENT — PAIN DESCRIPTION - LOCATION
LOCATION: NECK
LOCATION: BACK
LOCATION: BACK;NECK

## 2024-03-04 ASSESSMENT — PAIN DESCRIPTION - ORIENTATION
ORIENTATION: UPPER
ORIENTATION: RIGHT
ORIENTATION: UPPER

## 2024-03-04 ASSESSMENT — PAIN SCALES - GENERAL
PAINLEVEL_OUTOF10: 8
PAINLEVEL_OUTOF10: 10
PAINLEVEL_OUTOF10: 8

## 2024-03-04 NOTE — DISCHARGE SUMMARY
reasonably achievable. COMPARISON: CT chest 09/17/2023. HISTORY: ORDERING SYSTEM PROVIDED HISTORY: fall TECHNOLOGIST PROVIDED HISTORY: Reason for exam:->fall Reason for Exam: fall, c/o back pain FINDINGS: BONES/ALIGNMENT: Acute compression fracture involving the superior and inferior endplates of the T3 vertebral body with approximately 30% height loss.  Prominent S shaped curvature of the thoracic spine, unchanged. Redemonstration of the advanced chronic compression fracture of L1. DEGENERATIVE CHANGES: Generalized mild disc space narrowing, endplate changes and facet arthropathy.  The bones are osteopenic. SOFT TISSUES: The paraspinal soft tissues are unremarkable.  Exophytic left renal lesion with minimal interval increase in size.  For findings in the chest, currently refer to report from CT thorax completed at the same time.     1. Acute compression fracture involving the superior and inferior endplates of the T3 vertebral body with approximately 30% height loss. 2. Redemonstration of the advanced chronic compression fracture of L1. 3. Exophytic left renal lesion with minimal interval increase in size. 4. For findings in the chest, candy refer to report from dedicated CT thorax completed at the same time.     CT LUMBAR SPINE WO CONTRAST    Result Date: 2/27/2024  EXAMINATION: CT OF THE LUMBAR SPINE WITHOUT CONTRAST  2/27/2024 TECHNIQUE: CT of the lumbar spine was performed without the administration of intravenous contrast. Multiplanar reformatted images are provided for review. Adjustment of mA and/or kV according to patient size was utilized.  Automated exposure control, iterative reconstruction, and/or weight based adjustment of the mA/kV was utilized to reduce the radiation dose to as low as reasonably achievable. COMPARISON: CT abdomen pelvis 11/15/2023. HISTORY: ORDERING SYSTEM PROVIDED HISTORY: fall TECHNOLOGIST PROVIDED HISTORY: Reason for exam:->fall Decision Support Exception - unselect if not a
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
## 2024-03-05 ENCOUNTER — APPOINTMENT (OUTPATIENT)
Dept: GENERAL RADIOLOGY | Age: 79
DRG: 551 | End: 2024-03-05
Payer: MEDICARE

## 2024-03-05 VITALS
TEMPERATURE: 97.7 F | HEART RATE: 65 BPM | OXYGEN SATURATION: 96 % | HEIGHT: 65 IN | BODY MASS INDEX: 20.31 KG/M2 | WEIGHT: 121.91 LBS | SYSTOLIC BLOOD PRESSURE: 127 MMHG | RESPIRATION RATE: 18 BRPM | DIASTOLIC BLOOD PRESSURE: 75 MMHG

## 2024-03-05 PROCEDURE — 71045 X-RAY EXAM CHEST 1 VIEW: CPT

## 2024-03-05 PROCEDURE — 94761 N-INVAS EAR/PLS OXIMETRY MLT: CPT

## 2024-03-05 PROCEDURE — 2580000003 HC RX 258: Performed by: INTERNAL MEDICINE

## 2024-03-05 PROCEDURE — 6360000002 HC RX W HCPCS: Performed by: STUDENT IN AN ORGANIZED HEALTH CARE EDUCATION/TRAINING PROGRAM

## 2024-03-05 PROCEDURE — 6370000000 HC RX 637 (ALT 250 FOR IP): Performed by: INTERNAL MEDICINE

## 2024-03-05 PROCEDURE — 6370000000 HC RX 637 (ALT 250 FOR IP): Performed by: STUDENT IN AN ORGANIZED HEALTH CARE EDUCATION/TRAINING PROGRAM

## 2024-03-05 PROCEDURE — 2700000000 HC OXYGEN THERAPY PER DAY

## 2024-03-05 RX ORDER — OXYCODONE HYDROCHLORIDE AND ACETAMINOPHEN 5; 325 MG/1; MG/1
1 TABLET ORAL EVERY 6 HOURS PRN
Qty: 12 TABLET | Refills: 0 | Status: SHIPPED | OUTPATIENT
Start: 2024-03-05 | End: 2024-03-08

## 2024-03-05 RX ORDER — LEVOFLOXACIN 500 MG/1
500 TABLET, FILM COATED ORAL DAILY
Status: DISCONTINUED | OUTPATIENT
Start: 2024-03-05 | End: 2024-03-05 | Stop reason: HOSPADM

## 2024-03-05 RX ORDER — LEVOFLOXACIN 500 MG/1
500 TABLET, FILM COATED ORAL DAILY
Qty: 4 TABLET | Refills: 0 | Status: SHIPPED | OUTPATIENT
Start: 2024-03-06 | End: 2024-03-10

## 2024-03-05 RX ORDER — OXYCODONE HYDROCHLORIDE AND ACETAMINOPHEN 5; 325 MG/1; MG/1
1 TABLET ORAL EVERY 4 HOURS PRN
Qty: 18 TABLET | Refills: 0 | Status: SHIPPED | OUTPATIENT
Start: 2024-03-05 | End: 2024-03-05

## 2024-03-05 RX ADMIN — GUAIFENESIN 600 MG: 600 TABLET ORAL at 09:34

## 2024-03-05 RX ADMIN — FLUTICASONE PROPIONATE 1 SPRAY: 50 SPRAY, METERED NASAL at 09:37

## 2024-03-05 RX ADMIN — LEVOFLOXACIN 500 MG: 500 TABLET, FILM COATED ORAL at 09:39

## 2024-03-05 RX ADMIN — TRAMADOL HYDROCHLORIDE 50 MG: 50 TABLET ORAL at 09:34

## 2024-03-05 RX ADMIN — Medication 10 ML: at 09:37

## 2024-03-05 RX ADMIN — OXYCODONE AND ACETAMINOPHEN 1 TABLET: 5; 325 TABLET ORAL at 14:01

## 2024-03-05 RX ADMIN — ENOXAPARIN SODIUM 40 MG: 100 INJECTION SUBCUTANEOUS at 09:34

## 2024-03-05 ASSESSMENT — ENCOUNTER SYMPTOMS
SINUS PAIN: 0
COLOR CHANGE: 0
SORE THROAT: 0
VOMITING: 0
BACK PAIN: 0
DIARRHEA: 0
COUGH: 1
ABDOMINAL PAIN: 0
NAUSEA: 0
WHEEZING: 0
CONSTIPATION: 0
SINUS PRESSURE: 0
SHORTNESS OF BREATH: 0

## 2024-03-05 ASSESSMENT — PAIN DESCRIPTION - ORIENTATION
ORIENTATION: MID
ORIENTATION: RIGHT

## 2024-03-05 ASSESSMENT — PAIN SCALES - GENERAL
PAINLEVEL_OUTOF10: 10
PAINLEVEL_OUTOF10: 10

## 2024-03-05 ASSESSMENT — PAIN DESCRIPTION - LOCATION
LOCATION: BACK
LOCATION: SHOULDER

## 2024-03-05 ASSESSMENT — PAIN DESCRIPTION - DESCRIPTORS
DESCRIPTORS: ACHING
DESCRIPTORS: ACHING

## 2024-03-05 NOTE — CARE COORDINATION
CASE MANAGEMENT DISCHARGE SUMMARY      Discharge to: [facility]    Precertification completed: Yes  Hospital Exemption Notification (HENS) completed: Yes    IMM given: (date) 3/5/24    New Durable Medical Equipment ordered/agency: Deferred    Transportation: Squad      Medical Transport explained to pt/family. Pt/family voice no agency preference.    Agency used: [facility]   time: 1400   Ambulance form completed: Yes    Confirmed discharge plan with:     Patient: yes     Family:  yes    Name: Penny Contact number: [phone]     Facility/Agency, name:  [facility] faxed   Phone number for report to facility: [phone]     RN, name: Jamia    Note: Discharging nurse to complete [facility], reconcile [facility], and place final copy with patient's discharge packet. RN to ensure that written prescriptions for  Level II medications are sent with patient to the facility as per protocol.    Elena Pearl RN      
Pt not medically ready to start cert per MD  
Social Work consulted.  Reason for Consult? Answer: Patient states sometimes worries food will run out before more money comes in. please provide food assitance     Chart reviewed, pt discussed during huddle with primary RN, pt to have kyphoplasty tomorrow morning.  Writer met with pt, asked about food concerns, pt denies any concerns, states her son buys groceries and she pays him.  Writer asked if pt would want referral to senior services for meals on wheels, pt declined, states she had [facility] in past and she hated the food.  Writer asked pt about plan for after surgery, pt would like to go to [facility] again for skilled rehab stay.  Writer gave referral to Sandra/Cinthya Gray, she will follow in [facility].  PT/OT will be ordered after kyphoplasty tomorrow.  CASSIUS Huffman-TRAVIS   
Writer attempted to do precert in [facility], and was told [employer]  on 24, writer found that patient has [employer] Medicare #[id number], writer will attempt precert with this new information. Per Annie this a commercial insurance and Herlinda at [facility] is starting cert this day.     Elena eParl RN    
Writer reviewed chart day 3, Hem- Oc on Treatment team, kypho can be done when WBC improves. Needs MRI as well. Plan looks like [facility] needs a cert with [facility] when we know when she may be medically ready. DCP will continue to follow.    Elena Pearl RN    
Yes (Daughter Penny)  Plans to Return to Present Housing: Unknown at present  Other Identified Issues/Barriers to RETURNING to current housing: weakness, fall  Potential Assistance needed at discharge: Skilled Nursing Facility            Potential DME:    Patient expects to discharge to: Unknown  Plan for transportation at discharge: Self    Financial    Payor: [employer] MEDICARE / Plan: [employer] GOLD PLUS HMO / Product Type: *No Product type* /     Does insurance require precert for SNF: Yes    Potential assistance Purchasing Medications: No  Meds-to-Beds request:        [facility] [address] - P [phone] - F [phone]  [address]  [address]  [place] [address]  Phone: [phone] Fax: [phone]    [facility] [id number] - [place], [place] - [address] -  [phone] - F [phone]  [address] [address]  Phone: [phone] Fax: [phone]      Notes:    Factors facilitating achievement of predicted outcomes: Cooperative and Pleasant    Barriers to discharge: Pain and Medical complications    Additional Case Management Notes: Referred to patient for d/c planning.  Spoke to patient.  Patient is an 78 year old female admitted for s/p fall with comp. Fx.  Patient lives at home alone.  Patient is usually independent in ADLs with cane.  Patient currently tearful.  Patient with new CA dx.  Supportive counseling provided.  Patient reports MD told her she has one year to live.  Patient reports children are not supportive.  Patient reports she will need SNF on d/c.  Referral to  for support.  No other needs at this time.      The Plan for Transition of Care is related to the following treatment goals of Closed wedge compression fracture of T3 vertebra with delayed healing [S22.030G]    IF APPLICABLE: The Patient and/or patient representative Isa and her family were provided with a choice of provider and agrees with the discharge plan.

## 2024-03-05 NOTE — PROGRESS NOTES
Hospital Medicine Progress Note      Date of Admission: 2/27/2024  Hospital Day: 5    Chief Admission Complaint:  Fall     Subjective:  Patient is in bed alert and oriented. No new issues or complaints today. Spoke with nursing staff and informed of no acute issues occurring over night.     In evening around 1400 received a PS from nurse informing me of patient having blurred vision. Proceeded to evaluate patient. No gross deficits in motor or sensory regions. CN intact. Finger to nose intact. Heel to shin intact. NIHHS score 0. Informed nurse to keep an eye on this situation and if it got worse to call me.     Presenting Admission History:       The patient is a pleasant 78 Y F with a h/o former smoking, COPD, HLD, and PAF and SSS s/p pacer.  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  She lives alone at [facility].              She had been feeling normal lately.  Got up out of bed this morning and felt very dizzy, which never happens to her.  She lost her balance, toppled forward, hit her face on a door, then bounced backward and landed flat on her back on the floor.  She had immediate pain in her upper back.  She came to the ED and CT showed an acute T3 compression fracture, no spinal cord compression.  Her pain wasn't well controlled so the ED consulted IR, who requested an MRI prior to consideration of kyphoplasty.              Her trauma imaging also incidentally showed that her known RUL nodules had enlarged (now 2.1 cm each).  The mediastinal and R hilar adenopathy was worse as well, suspicious for lung cancer.  Unknown as of yet whether the T3 fracture might be pathologic.  The patient feels like she has unintentionally lost lots of weight recently, but records suggest she is only 7 lbs lower than a year ago.  Of note, she had a lung biopsy in 4/2023 which only showed 
  Hospital Medicine Progress Note      Date of Admission: 2/27/2024  Hospital Day: 6    Chief Admission Complaint:  Fall     Subjective:  Patient is in hospital bed alert and oriented. Patient states she feels bad because her \"back is broken\". Complaints of central chest pain with shortness of breath which is new. Not on at home O2. Patient states her pain is not being controlled. States the pain medication does help but also states she is not getting it as often as she would like. Spoke with nursing staff and was informed of no acute issues overnight.     Presenting Admission History:       The patient is a pleasant 78 Y F with a h/o former smoking, COPD, HLD, and PAF and SSS s/p pacer.  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  She lives alone at [facility].              She had been feeling normal lately.  Got up out of bed this morning and felt very dizzy, which never happens to her.  She lost her balance, toppled forward, hit her face on a door, then bounced backward and landed flat on her back on the floor.  She had immediate pain in her upper back.  She came to the ED and CT showed an acute T3 compression fracture, no spinal cord compression.  Her pain wasn't well controlled so the ED consulted IR, who requested an MRI prior to consideration of kyphoplasty.              Her trauma imaging also incidentally showed that her known RUL nodules had enlarged (now 2.1 cm each).  The mediastinal and R hilar adenopathy was worse as well, suspicious for lung cancer.  Unknown as of yet whether the T3 fracture might be pathologic.  The patient feels like she has unintentionally lost lots of weight recently, but records suggest she is only 7 lbs lower than a year ago.  Of note, she had a lung biopsy in 4/2023 which only showed \"fragments of benign lung tissue.\"  Patient says [facility] had 
  Physician Progress Note      PATIENT:               CLIVE ROQUE  [facility] #:                  [id number]  :                       1945  ADMIT DATE:       2024 5:14 AM  DISCH DATE:  RESPONDING  PROVIDER #:        Immanuel Martin MD          QUERY TEXT:    Pt admitted with Closed wedge compression T3 vertebra fracture. Pt noted to   have The bones are osteopenic in CT Spine. If possible, please document in   progress notes and discharge summary if you are evaluating and/or treating any   of the following:    The medical record reflects the following:  Risk Factors: HLD, High blood pressure, Advance age  Clinical Indicators: H&P notes -78 year old female admitted for s/p fall   with comp. Got up out of bed this morning and felt very dizzy, which never   happens to her.  She lost her balance, toppled forward, hit her face on a   door, then bounced backward and landed flat on her back on the floor.  She had   immediate pain in her upper back.  She came to the ED and CT showed an acute   T3 compression fracture. CT Spine-The bones are osteopenic.  Treatment: Cholecalciferol, CT/XR.  Options provided:  -- Pathological T3 vertebra fracture due to osteopenia following fall which   would not usually break a normal, healthy bone  -- Traumatic T3 vertebra fracture  -- Other - I will add my own diagnosis  -- Disagree - Not applicable / Not valid  -- Disagree - Clinically unable to determine / Unknown  -- Refer to Clinical Documentation Reviewer    PROVIDER RESPONSE TEXT:    This patient has a traumatic T3 vertebra fracture.    Query created by: Salvador Shukla on 2024 5:01 PM      Electronically signed by:  Immanuel Martin MD 2024 1:29 PM          
4 Eyes Skin Assessment     NAME:  Isa Hernandez  YOB: 1945  MEDICAL RECORD NUMBER:  [id number]    The patient is being assessed for  Admission    I agree that at least one RN has performed a thorough Head to Toe Skin Assessment on the patient. ALL assessment sites listed below have been assessed.      Areas assessed by both nurses:    Head, Face, Ears, Shoulders, Back, Chest, Arms, Elbows, Hands, Sacrum. Buttock, Coccyx, Ischium, Legs. Feet and Heels, and Under Medical Devices         Does the Patient have a Wound? Skin tear R elbow        Jeison Prevention initiated by RN: Yes   Wound Care Orders initiated by RN: No    Pressure Injury (Stage 3,4, Unstageable, DTI, NWPT, and Complex wounds) if present, place Wound referral order by RN under : No    New Ostomies, if present place, Ostomy referral order under : No     Nurse 1 eSignature: Electronically signed by Alvina Rueda RN on 2/27/24 at 7:01 PM EST    **SHARE this note so that the co-signing nurse can place an eSignature**    Nurse 2 eSignature: Electronically signed by Farhad Brenner LPN on 2/27/24 at 6:51 PM EST   
Both myself and Dr Barnes spoke to the pt in the Emergency Department. Pt is deciding if she wants the kypho or not. Must have MRI prior to having the kypho, but has a pacemaker. Rep. For pacemaker will be here 2/28/24, so that pt can complete the MRI.   
Dr Barnes went and spoke to the patient about not being able to do the kypo due to WBC. See Dr Barnes note. Nurse made aware  
Dr Barnes went to bedside and spoke to the patient and consent was given for kyphoplasty. NPO at midnight  
Hospitalist updated on Pt c/o chest pain 8/10 and SOB . STAT EKG ordered.    Current VS: /68 RR 20, 92% 2  LNC, afebrile  
IR Consulted for T3 Kyphoplasty:    - Small increase in WBC count today. Kyphoplasty can not be performed until infectious workup and appropriate antibiotics have been completed if applicable  - Recommend starting sub q lovenox  - Tentatively will plan for kyphoplasty Monday or Tuesday per availability       - Further instructions to follow from IR nursing    Anish Barnes MD  Vascular and Interventional Radiology    
MRI T spine reviewed.    - Plan for T3 kyphoplasty today in the afternoon  - NPO order placed  - Please send STAT INR and reverse as appropriate  - Further instructions to follow from IR nursing        Anish Barnes MD  Vascular and Interventional Radiology    
Occupational/Physical Therapy      OT/PT orders received, chart reviewed. Pt to undergo kyphoplasty this date. Will hold and follow up post procedure pending pt condition and therapy schedule. Thank you.       Keerthi Curry, OTR/L    Abby Narvaez, PT, DPT  
Patient admitted to room C544 from ED. VS assessed. Admission completed with [place]co ROBLES. Patient oriented to room, call light, bed rails, phone, lights and bathroom. Bed alarm in place, patient aware of placement and reason. Bed locked, in lowest position, side rails up 2/4, call light within reach.   
Patient is pleasant. Cooperative. Anxious. Pain to the right shoulder. Pain medication was given. In addition to the muscle relaxer, see EMAR.   Heat packs and a warm blanket provided as well. Comfort  and emotional support provided.     Plan of care remains in progress. Bed alarm activated and call light in reach.     
Patient presents with increased confusion. Calling out repeatedly with scattered thoughts, word salad. Patient is refusing straight cath for urinary retention. MD made aware.   
Perfect Serve:    Pt admitted on 2/27 w. T3 frx. Scheduled Kyphoplasty tomorrow. Pt ambulated to bathroom- became diaphoretic/ nauseated/lightheaded . Bp Sitting 90/57 HR 59, Bp Lying 120/79 HR 70. I was unable to obtain a BP standing d/t patient unable to tolerate.Please advise  
Physical Therapy  Facility/Department: [place] [id number] - MED SURG/ORTHO  Physical Therapy Initial Assessment/Treatment    Name: Isa Hernandez  : 1945  MRN: [id number]  Date of Service: 3/1/2024    Discharge Recommendations:  Subacute/Skilled Nursing Facility   PT Equipment Recommendations  Equipment Needed: No    Therapy discharge recommendations are subject to collaboration from the patient’s interdisciplinary healthcare team, including MD and case management recommendations.    Barriers to Home Discharge:   [] Steps to access home entry or bed/bath:   [x] Unable to transfer, ambulate, or propel wheelchair household distances without assist   [x] Limited available assist at home upon discharge    [] Patient or family requests d/c to post-acute facility    [x] Poor cognition/safety awareness for d/c to home alone    [] Unable to maintain ordered weight bearing status    [x] Patient with salient signs of long-standing immobility   [x] Decreased independence with ADLs   [x] Increased risk for falls   [] Other:    If pt is unable to be seen after this session, please let this note serve as discharge summary.  Please see case management note for discharge disposition.  Thank you.      Patient Diagnosis(es): The primary encounter diagnosis was Fall, initial encounter. Diagnoses of Closed wedge compression fracture of T3 vertebra, initial encounter ([facility]), Lung mass, and Kidney mass were also pertinent to this visit.  Past Medical History:  has a past medical history of Arthritis, Asthma, Bronchitis chronic, Colon polyp, COPD (chronic obstructive pulmonary disease) ([facility]), Emphysema of lung (HCC), Guillain-Turpin (HCC), Hyperlipidemia, Lock jaw, Pneumonia, Post-nasal drip, Pulmonary nodule, Rash, Reflex sympathetic dystrophy, RSD (reflex sympathetic dystrophy), and TMJ (dislocation of temporomandibular joint).  Past Surgical History:  has a past surgical history that includes Hysterectomy; Appendectomy; Tonsillectomy; 
Physical Therapy/Occupational Therapy  Pt order received. Attempted to see pt this morning. Pt a medical hold. Pt plan to have MRI this morning and proceed with kyphoplasty. Will follow up with updated restrictions post surgery.  Will re-attempt at a later date         Brianna Branch, PT  Juli Mcdowell, OT      
Pt complaining of blurred vision. Vital signs obtained. Perfect serve message sent to Dr. Dominguez.  
Pt requested inhaler around 2200, pt states not SOB. Around 0134 pt states \"can't catch breath.\" VSS, SpO2 93% on 3L which pt has been on, RR 16-19. Pt refused IS, reposition, or sitting up in bed. [place] NP notified, Stat CXR ordered. Waiting for results.  
Pt states she has wished to go to sleep and not wake up in the last month. Pt denies thoughts of suicidal ideation, epic flags as low risk. Dr. Malachi PRIDE crosscovering and Checo ROBLES made aware. Awaiting MD response.   
Report called to EGS given to nurse Bentley. All questions answered, and call back number given.     Report given to transport. IV and tele removed. Pt sent with script.  
Report called to SANDEEP Llamas RN.   
Reviewed kyphoplasty with Dr Chacon for next week. Per Dr Chacon there is minimal signal in T3. Its too high and she has scoliosis. He doesn't feel that she will benefit much from the kyphoplasty. Anything above T5 usually let heal on its own.   Nurse was made aware.  
Skin tears x2 noted to left forearm. Wounds cleansed with saline, mepitel, 4x4, and gauze wraps applied.   
Will plan for kyphoplasty at 0745 3/1/2024 with anesthesia. NPO at midnight NO THINNERS. Nurse made aware  
solutions  Initiation: Requires cues for some  Sequencing: Requires cues for some     Objective   Vitals  Vitals:    03/04/24    BP: 123/78   Pulse: 68   Resp:    Temp:    SpO2: 94%          Bed Mobility Training  Bed Mobility Training: Yes  Rolling: Moderate assistance;Additional time;Adaptive equipment (to pts R)  Supine to Sit: Moderate assistance;Assist X2;Additional time;Adaptive equipment (to R with HOB elevated and cues for technique, pt would not allow HOB to be flatrtened for log roll technique)  Balance  Sitting: Intact, sat EOB SBA 5 minutes  Standing: Impaired  Transfer Training  Transfer Training: Yes  Interventions: Verbal cues;Safety awareness training;Visual cues;Weight shifting training/pressure relief  Sit to Stand: Minimum assistance;Assist X1;Assist X2;Additional time;Adaptive equipment (stedy)  Stand to Sit: Minimum assistance;Assist X1;Assist X2;Additional time;Adaptive equipment (stedy)  Bed to Chair: Total assistance (stedy)  Toilet Transfer: Total assistance (stedy and min A)  Gait Training  Gait Training: No           Safety Devices  Type of Devices: All fall risk precautions in place;Call light within reach;Nurse notified;Patient at risk for falls;Heels elevated for pressure relief;Chair alarm in place;Left in chair       Goals  Short Term Goals  Time Frame for Short Term Goals: 7 days (3/8/24) unless otherwise noted  Short Term Goal 1: Pt will perform supine <> sit with min(A); 3/4 A of 2  Short Term Goal 2: Pt will perform transfers with LRAD and min(A); 3/4 progressing  Short Term Goal 3: Pt will tolerate sitting EOB x5 minutes with SBA; 3/4 met  Short Term Goal 4: Pt will perform 12-15 reps of BLE exercises by 3/4/24; 3/4 not met  Short Term Goal 5: Pt will tolerate gait to set appropriate goal  Patient Goals   Patient Goals : \"to have less pain\"    Education  Patient Education  Education Given To: Patient  Education Provided: Role of Therapy;Plan of Care;Transfer Training;Home 
tried to call 3 family members in the ED.  Two no answers, one wrong number.    Assessment/Plan:      Current Principal Problem:  Closed wedge compression fracture of T3 vertebra with delayed healing    Compression Fracture of T3 Vertebra s/p fall   -Likely pathological   -IR cancelled kyphoplasty today due to elevated WBC   -Lidocaine patch ordered     CAP  -Procal ordered  -Started on levofloxacin    Insomnia   -Hydroxyzine 50 mg ordered     HLD  -continue statin therapy     Physical Exam Performed:      General appearance:  No apparent distress  Respiratory:  Normal respiratory effort.   Cardiovascular:  Regular rate and rhythm.  Abdomen:  Soft, non-tender, non-distended.  Musculoskeletal:  No edema  Neurologic:  Non-focal  Psychiatric:  Alert and oriented    /64   Pulse 61   Temp 97.8 °F (36.6 °C)   Resp 16   Ht 1.65 m (5' 4.96\")   Wt 55.3 kg (121 lb 14.6 oz)   SpO2 94%   BMI 20.31 kg/m²     Diet: ADULT DIET; Regular  ADULT ORAL NUTRITION SUPPLEMENT; AM Snack, PM Snack; Standard High Calorie/High Protein Oral Supplement  DVT Prophylaxis: []PPx LMWH  []SQ Heparin  [x]IPC/SCDs  []Eliquis  []Xarelto  []Coumadin  []Other -      Code status: Full Code  PT/OT Eval Status:   [x]NOT yet ordered  []Ordered and Pending   []Seen with Recommendations for:  []Home independently  []Home w/ assist  []HHC  []SNF  []Acute Rehab    Anticipated Discharge Day/Date:  3 days    Anticipated Discharge Location: []Home  []HHC  [x]SNF  []Acute Rehab  []ECF  []LTAC  []Hospice  []Other -      Consults:      IP CONSULT TO HOSPITALIST  IP CONSULT TO ONCOLOGY  IP CONSULT TO SPIRITUAL SERVICES  IP CONSULT TO SOCIAL WORK  IP CONSULT TO PSYCHIATRY      This patient has a high likelihood of being discharged tomorrow and is appropriate for A1 Discharge Unit in AM pending clinical course overnight: []Yes  
     Heart sounds: Normal heart sounds.   Pulmonary:      Effort: Pulmonary effort is normal.      Breath sounds: Normal breath sounds.   Abdominal:      General: Abdomen is flat. Bowel sounds are normal.      Palpations: Abdomen is soft.   Musculoskeletal:         General: No swelling. Normal range of motion.      Cervical back: Normal range of motion and neck supple.   Skin:     General: Skin is warm and dry.   Neurological:      General: No focal deficit present.      Mental Status: She is alert and oriented to person, place, and time. Mental status is at baseline.   Psychiatric:         Mood and Affect: Mood normal.         Behavior: Behavior normal.         Thought Content: Thought content normal.         Judgment: Judgment normal.         Medications:   Medications:    levoFLOXacin  500 mg Oral Daily    melatonin  10 mg Oral Nightly    guaiFENesin  600 mg Oral BID    enoxaparin  40 mg SubCUTAneous Daily    hydrOXYzine pamoate  50 mg Oral Nightly    lidocaine  1 patch TransDERmal Daily    fluticasone  1 spray Each Nostril Daily    montelukast  10 mg Oral Nightly    oxyBUTYnin  5 mg Oral Nightly    [Held by provider] potassium chloride  20 mEq Oral Daily    pravastatin  40 mg Oral Daily    fluticasone-umeclidin-vilant  1 puff Inhalation Daily    vitamin C  500 mg Oral Daily    sodium chloride flush  5-40 mL IntraVENous 2 times per day      Infusions:    sodium chloride Stopped (03/04/24 1025)     PRN Meds: oxyCODONE-acetaminophen, 1 tablet, Q4H PRN  traMADol, 25 mg, Q6H PRN  traMADol, 50 mg, Q6H PRN  albuterol sulfate HFA, 2 puff, Q4H PRN  baclofen, 10 mg, Nightly PRN  [Held by provider] furosemide, 20 mg, Daily PRN  sodium chloride flush, 5-40 mL, PRN  sodium chloride, , PRN  potassium chloride, 40 mEq, PRN   Or  potassium alternative oral replacement, 40 mEq, PRN   Or  potassium chloride, 10 mEq, PRN  magnesium sulfate, 2,000 mg, PRN  polyethylene glycol, 17 g, Daily PRN  acetaminophen, 650 mg, Q6H PRN   
(3L)  Bed Mobility Training  Bed Mobility Training: Yes  Rolling: Moderate assistance;Additional time;Adaptive equipment (to pts R)  Supine to Sit: Moderate assistance;Assist X2;Additional time;Adaptive equipment (to R with HOB elevated and cues for technique, pt would not allow HOB to be flatrtened for log roll technique)  Balance  Sitting: Intact  Standing: Impaired  Transfer Training  Transfer Training: Yes  Interventions: Verbal cues;Safety awareness training;Visual cues;Weight shifting training/pressure relief  Sit to Stand: Minimum assistance;Assist X1;Assist X2;Additional time;Adaptive equipment (stedy)  Stand to Sit: Minimum assistance;Assist X1;Assist X2;Additional time;Adaptive equipment (stedy)  Bed to Chair: Total assistance (stedy)  Toilet Transfer: Total assistance (stedy and min A)  Gait Training  Gait Training: No     ADL  LE Dressing: Dependent/Total  LE Dressing Skilled Clinical Factors: seated on toilet for therapist to thread BLE into brief, standing at stedy for therapist to pull brief up over hips  Toileting: Dependent/Total  Toileting Skilled Clinical Factors: standing at stedy for therapist to assist w/ clothing management and lazara hygiene  Functional Mobility: Dependent/Total  Functional Mobility Skilled Clinical Factors: colin stedy to/from bathroom        Safety Devices  Type of Devices: All fall risk precautions in place;Call light within reach;Nurse notified;Patient at risk for falls;Heels elevated for pressure relief;Chair alarm in place;Left in chair  Restraints  Restraints Initially in Place: No     Patient Education  Education Given To: Patient  Education Provided: Role of Therapy;Plan of Care;Precautions;ADL Adaptive Strategies;Transfer Training;Energy Conservation  Education Provided Comments: disease specific: role of OT in acute care, log roll technique  Education Method: Demonstration;Verbal  Barriers to Learning: None  Education Outcome: Verbalized understanding;Continued 
Exceptions: Wears glasses at all times  Hearing  Hearing: Within functional limits  Cognition  Overall Cognitive Status: Exceptions  Arousal/Alertness: Delayed responses to stimuli  Following Commands: Follows one step commands with increased time  Attention Span: Attends with cues to redirect  Safety Judgement: Decreased awareness of need for assistance  Problem Solving: Assistance required to implement solutions;Assistance required to generate solutions  Insights: Decreased awareness of deficits  Initiation: Requires cues for some  Sequencing: Requires cues for some  Orientation  Orientation Level: Oriented to person;Oriented to situation;Oriented to place;Oriented to time;Oriented X4     Education Given To: Patient  Education Provided: Role of Therapy;Plan of Care;ADL Adaptive Strategies  Education Provided Comments: role of OT in acute care  Education Method: Demonstration;Verbal  Barriers to Learning: None  Education Outcome: Verbalized understanding;Continued education needed     AM-PAC - ADL  AM-PAC Daily Activity - Inpatient   How much help is needed for putting on and taking off regular lower body clothing?: Total  How much help is needed for bathing (which includes washing, rinsing, drying)?: Total  How much help is needed for toileting (which includes using toilet, bedpan, or urinal)?: Total  How much help is needed for putting on and taking off regular upper body clothing?: Total  How much help is needed for taking care of personal grooming?: A Lot  How much help for eating meals?: A Little  AM-PAC Inpatient Daily Activity Raw Score: 9  AM-PAC Inpatient ADL T-Scale Score : 25.33  ADL Inpatient CMS 0-100% Score: 79.59  ADL Inpatient CMS G-Code Modifier : CL    Goals  Short Term Goals  Time Frame for Short Term Goals: 1 week (3/08/2024): unless otherwise noted  Short Term Goal 1: Pt will complete functional transfer w/ LRD and minAx1 by 3/05/2024.  Short Term Goal 2: Pt will complete 4 x 15 BUE AROM 
cyst off of the medial aspect of the left kidney upper pole.  The remainder of the upper abdomen is unremarkable. Soft Tissues/Bones: There is an acute compression fracture of the T3 vertebral body, without significant retropulsion or canal stenosis.  No additional acute fracture is identified.  There is a chronic severe compression fracture of L1 demonstrating a near vertebra plana appearance. There is a stable chronic compression deformity of the superior endplate of T12.  There is an S-shaped scoliosis of the thoracic spine with multilevel degenerative change evident.  There is a chronic healed sternal fracture. There is no evidence of an acute rib fracture.  Chronic healed left rib fractures are noted.  No osteolytic or osteoblastic lesion is seen.     1. No definite acute traumatic soft tissue abnormality within the chest or mediastinum. 2. Progressive curvilinear and consolidative opacities within both lung bases, which likely represent atelectasis, though aspiration or pneumonia are also considerations. 3. Interval enlargement of a now 2.1 cm cavitary nodule within the peripheral right upper lobe and a 2.1 cm solid nodule within the posterior right upper lobe, both of which are concerning for progressive malignancy. 4. Significant interval progression of pathologic mediastinal and right hilar lymphadenopathy, concerning for metastatic disease. 5. Additional smaller solid and ground-glass nodules throughout both lungs are stable and unchanged from 09/17/2023. 6. Mild interstitial pulmonary edema. 7. Acute compression fracture of the T3 vertebral body, without significant retropulsion or canal stenosis.  No additional acute fracture is identified.     CT CERVICAL SPINE WO CONTRAST    Result Date: 2/27/2024  EXAMINATION: CT OF THE CERVICAL SPINE WITHOUT CONTRAST 2/27/2024 6:01 am TECHNIQUE: CT of the cervical spine was performed without the administration of intravenous contrast. Multiplanar reformatted images 
vertebral body with approximately 30% height loss. 2. Redemonstration of the advanced chronic compression fracture of L1. 3. Exophytic left renal lesion with minimal interval increase in size. 4. For findings in the chest, candy refer to report from dedicated CT thorax completed at the same time.     CT LUMBAR SPINE WO CONTRAST    Result Date: 2/27/2024  EXAMINATION: CT OF THE LUMBAR SPINE WITHOUT CONTRAST  2/27/2024 TECHNIQUE: CT of the lumbar spine was performed without the administration of intravenous contrast. Multiplanar reformatted images are provided for review. Adjustment of mA and/or kV according to patient size was utilized.  Automated exposure control, iterative reconstruction, and/or weight based adjustment of the mA/kV was utilized to reduce the radiation dose to as low as reasonably achievable. COMPARISON: CT abdomen pelvis 11/15/2023. HISTORY: ORDERING SYSTEM PROVIDED HISTORY: fall TECHNOLOGIST PROVIDED HISTORY: Reason for exam:->fall Decision Support Exception - unselect if not a suspected or confirmed emergency medical condition->Emergency Medical Condition (MA) Reason for Exam: fall, c/o back pain FINDINGS: BONES/ALIGNMENT: No acute fractures of the lumbar spine.  Unchanged advanced compression deformity of L1.  Unchanged very mild compression of the left superior endplate of L5.  Schmorl's node in the superior endplate of T12. Leftward curvature of the lumbar spine. DEGENERATIVE CHANGES: Mild generalized disc space narrowing and facet arthropathy. SOFT TISSUES/RETROPERITONEUM: Hyperdense mass at the medial aspect of the upper pole the right kidney has minimally increased in size as compared to the previous study now measuring 1.6 x 1.3 cm, previously 1.4 x 1.2 cm.  The remaining paraspinal soft tissues are unremarkable.     1. No acute findings in the lumbar spine.  No acute fractures or traumatic listhesis.  No significant changes in findings as described above, since the prior study from

## 2024-03-16 ENCOUNTER — HOSPITAL ENCOUNTER (INPATIENT)
Age: 79
LOS: 1 days | Discharge: HOME | DRG: 313 | End: 2024-03-18
Attending: EMERGENCY MEDICINE | Admitting: INTERNAL MEDICINE
Payer: MEDICARE

## 2024-03-16 ENCOUNTER — APPOINTMENT (OUTPATIENT)
Dept: GENERAL RADIOLOGY | Age: 79
DRG: 313 | End: 2024-03-16
Payer: MEDICARE

## 2024-03-16 DIAGNOSIS — R07.9 CHEST PAIN, UNSPECIFIED TYPE: Primary | ICD-10-CM

## 2024-03-16 DIAGNOSIS — R79.1 SUPRATHERAPEUTIC INR: ICD-10-CM

## 2024-03-16 LAB
ALBUMIN SERPL-MCNC: 3.6 G/DL (ref 3.4–5)
ALBUMIN/GLOB SERPL: 1.2 {RATIO} (ref 1.1–2.2)
ALP SERPL-CCNC: 114 U/L (ref 40–129)
ALT SERPL-CCNC: 9 U/L (ref 10–40)
ANION GAP SERPL CALCULATED.3IONS-SCNC: 12 MMOL/L (ref 3–16)
AST SERPL-CCNC: 14 U/L (ref 15–37)
BASOPHILS # BLD: 0.1 K/UL (ref 0–0.2)
BASOPHILS NFR BLD: 1.2 %
BILIRUB SERPL-MCNC: 0.3 MG/DL (ref 0–1)
BUN SERPL-MCNC: 5 MG/DL (ref 7–20)
CALCIUM SERPL-MCNC: 9 MG/DL (ref 8.3–10.6)
CHLORIDE SERPL-SCNC: 101 MMOL/L (ref 99–110)
CO2 SERPL-SCNC: 23 MMOL/L (ref 21–32)
CREAT SERPL-MCNC: <0.5 MG/DL (ref 0.6–1.2)
DEPRECATED RDW RBC AUTO: 13.7 % (ref 12.4–15.4)
EOSINOPHIL # BLD: 0.2 K/UL (ref 0–0.6)
EOSINOPHIL NFR BLD: 2.4 %
FLUAV RNA RESP QL NAA+PROBE: NOT DETECTED
FLUBV RNA RESP QL NAA+PROBE: NOT DETECTED
GFR SERPLBLD CREATININE-BSD FMLA CKD-EPI: >60 ML/MIN/{1.73_M2}
GLUCOSE SERPL-MCNC: 116 MG/DL (ref 70–99)
HCT VFR BLD AUTO: 36.1 % (ref 36–48)
HGB BLD-MCNC: 12.1 G/DL (ref 12–16)
INR PPP: 5.93 (ref 0.84–1.16)
LYMPHOCYTES # BLD: 2.2 K/UL (ref 1–5.1)
LYMPHOCYTES NFR BLD: 25.4 %
MCH RBC QN AUTO: 30.9 PG (ref 26–34)
MCHC RBC AUTO-ENTMCNC: 33.4 G/DL (ref 31–36)
MCV RBC AUTO: 92.4 FL (ref 80–100)
MONOCYTES # BLD: 0.8 K/UL (ref 0–1.3)
MONOCYTES NFR BLD: 9.5 %
NEUTROPHILS # BLD: 5.3 K/UL (ref 1.7–7.7)
NEUTROPHILS NFR BLD: 61.5 %
NT-PROBNP SERPL-MCNC: 318 PG/ML (ref 0–449)
PLATELET # BLD AUTO: 396 K/UL (ref 135–450)
PMV BLD AUTO: 9.7 FL (ref 5–10.5)
POTASSIUM SERPL-SCNC: 3.6 MMOL/L (ref 3.5–5.1)
PROT SERPL-MCNC: 6.7 G/DL (ref 6.4–8.2)
PROTHROMBIN TIME: 52.5 SEC (ref 11.5–14.8)
RBC # BLD AUTO: 3.91 M/UL (ref 4–5.2)
SARS-COV-2 RNA RESP QL NAA+PROBE: NOT DETECTED
SODIUM SERPL-SCNC: 136 MMOL/L (ref 136–145)
TROPONIN, HIGH SENSITIVITY: 13 NG/L (ref 0–14)
TROPONIN, HIGH SENSITIVITY: 14 NG/L (ref 0–14)
WBC # BLD AUTO: 8.6 K/UL (ref 4–11)

## 2024-03-16 PROCEDURE — 36415 COLL VENOUS BLD VENIPUNCTURE: CPT

## 2024-03-16 PROCEDURE — 85610 PROTHROMBIN TIME: CPT

## 2024-03-16 PROCEDURE — 83880 ASSAY OF NATRIURETIC PEPTIDE: CPT

## 2024-03-16 PROCEDURE — 87636 SARSCOV2 & INF A&B AMP PRB: CPT

## 2024-03-16 PROCEDURE — 84484 ASSAY OF TROPONIN QUANT: CPT

## 2024-03-16 PROCEDURE — 99285 EMERGENCY DEPT VISIT HI MDM: CPT

## 2024-03-16 PROCEDURE — 85025 COMPLETE CBC W/AUTO DIFF WBC: CPT

## 2024-03-16 PROCEDURE — 71045 X-RAY EXAM CHEST 1 VIEW: CPT

## 2024-03-16 PROCEDURE — 93005 ELECTROCARDIOGRAM TRACING: CPT | Performed by: EMERGENCY MEDICINE

## 2024-03-16 PROCEDURE — 80053 COMPREHEN METABOLIC PANEL: CPT

## 2024-03-16 ASSESSMENT — PAIN - FUNCTIONAL ASSESSMENT: PAIN_FUNCTIONAL_ASSESSMENT: NONE - DENIES PAIN

## 2024-03-16 ASSESSMENT — HEART SCORE: ECG: NORMAL

## 2024-03-17 LAB
ANION GAP SERPL CALCULATED.3IONS-SCNC: 11 MMOL/L (ref 3–16)
BUN SERPL-MCNC: 4 MG/DL (ref 7–20)
CALCIUM SERPL-MCNC: 8.8 MG/DL (ref 8.3–10.6)
CHLORIDE SERPL-SCNC: 101 MMOL/L (ref 99–110)
CO2 SERPL-SCNC: 24 MMOL/L (ref 21–32)
CREAT SERPL-MCNC: <0.5 MG/DL (ref 0.6–1.2)
EKG ATRIAL RATE: 65 BPM
EKG DIAGNOSIS: NORMAL
EKG P AXIS: 9 DEGREES
EKG P-R INTERVAL: 154 MS
EKG Q-T INTERVAL: 428 MS
EKG QRS DURATION: 84 MS
EKG QTC CALCULATION (BAZETT): 445 MS
EKG R AXIS: -36 DEGREES
EKG T AXIS: 21 DEGREES
EKG VENTRICULAR RATE: 65 BPM
GFR SERPLBLD CREATININE-BSD FMLA CKD-EPI: >60 ML/MIN/{1.73_M2}
GLUCOSE SERPL-MCNC: 95 MG/DL (ref 70–99)
MAGNESIUM SERPL-MCNC: 1.8 MG/DL (ref 1.8–2.4)
POTASSIUM SERPL-SCNC: 3.2 MMOL/L (ref 3.5–5.1)
SODIUM SERPL-SCNC: 136 MMOL/L (ref 136–145)

## 2024-03-17 PROCEDURE — 83735 ASSAY OF MAGNESIUM: CPT

## 2024-03-17 PROCEDURE — 93010 ELECTROCARDIOGRAM REPORT: CPT | Performed by: INTERNAL MEDICINE

## 2024-03-17 PROCEDURE — 2060000000 HC ICU INTERMEDIATE R&B

## 2024-03-17 PROCEDURE — 2580000003 HC RX 258: Performed by: NURSE PRACTITIONER

## 2024-03-17 PROCEDURE — 80048 BASIC METABOLIC PNL TOTAL CA: CPT

## 2024-03-17 PROCEDURE — 6370000000 HC RX 637 (ALT 250 FOR IP): Performed by: NURSE PRACTITIONER

## 2024-03-17 PROCEDURE — 36415 COLL VENOUS BLD VENIPUNCTURE: CPT

## 2024-03-17 PROCEDURE — 99223 1ST HOSP IP/OBS HIGH 75: CPT | Performed by: INTERNAL MEDICINE

## 2024-03-17 RX ORDER — SODIUM CHLORIDE 0.9 % (FLUSH) 0.9 %
5-40 SYRINGE (ML) INJECTION EVERY 12 HOURS SCHEDULED
Status: DISCONTINUED | OUTPATIENT
Start: 2024-03-17 | End: 2024-03-18 | Stop reason: HOSPADM

## 2024-03-17 RX ORDER — ONDANSETRON 4 MG/1
4 TABLET, ORALLY DISINTEGRATING ORAL EVERY 8 HOURS PRN
Status: DISCONTINUED | OUTPATIENT
Start: 2024-03-17 | End: 2024-03-18 | Stop reason: HOSPADM

## 2024-03-17 RX ORDER — WARFARIN SODIUM 5 MG/1
5 TABLET ORAL DAILY
Status: DISCONTINUED | OUTPATIENT
Start: 2024-03-17 | End: 2024-03-17

## 2024-03-17 RX ORDER — REGADENOSON 0.08 MG/ML
0.4 INJECTION, SOLUTION INTRAVENOUS
Status: COMPLETED | OUTPATIENT
Start: 2024-03-17 | End: 2024-03-18

## 2024-03-17 RX ORDER — SODIUM CHLORIDE 9 MG/ML
INJECTION, SOLUTION INTRAVENOUS PRN
Status: DISCONTINUED | OUTPATIENT
Start: 2024-03-17 | End: 2024-03-18 | Stop reason: HOSPADM

## 2024-03-17 RX ORDER — POLYETHYLENE GLYCOL 3350 17 G/17G
17 POWDER, FOR SOLUTION ORAL DAILY PRN
Status: DISCONTINUED | OUTPATIENT
Start: 2024-03-17 | End: 2024-03-18 | Stop reason: HOSPADM

## 2024-03-17 RX ORDER — BACLOFEN 10 MG/1
5 TABLET ORAL NIGHTLY
Status: DISCONTINUED | OUTPATIENT
Start: 2024-03-17 | End: 2024-03-18 | Stop reason: HOSPADM

## 2024-03-17 RX ORDER — ALBUTEROL SULFATE 90 UG/1
2 AEROSOL, METERED RESPIRATORY (INHALATION) EVERY 6 HOURS PRN
Status: DISCONTINUED | OUTPATIENT
Start: 2024-03-17 | End: 2024-03-18 | Stop reason: HOSPADM

## 2024-03-17 RX ORDER — OXYBUTYNIN CHLORIDE 5 MG/1
5 TABLET, EXTENDED RELEASE ORAL NIGHTLY
Status: DISCONTINUED | OUTPATIENT
Start: 2024-03-17 | End: 2024-03-18 | Stop reason: HOSPADM

## 2024-03-17 RX ORDER — MONTELUKAST SODIUM 10 MG/1
10 TABLET ORAL NIGHTLY
Status: DISCONTINUED | OUTPATIENT
Start: 2024-03-17 | End: 2024-03-18 | Stop reason: HOSPADM

## 2024-03-17 RX ORDER — SODIUM CHLORIDE 0.9 % (FLUSH) 0.9 %
5-40 SYRINGE (ML) INJECTION PRN
Status: DISCONTINUED | OUTPATIENT
Start: 2024-03-17 | End: 2024-03-18 | Stop reason: HOSPADM

## 2024-03-17 RX ORDER — ONDANSETRON 2 MG/ML
4 INJECTION INTRAMUSCULAR; INTRAVENOUS EVERY 6 HOURS PRN
Status: DISCONTINUED | OUTPATIENT
Start: 2024-03-17 | End: 2024-03-18 | Stop reason: HOSPADM

## 2024-03-17 RX ORDER — PRAVASTATIN SODIUM 40 MG
40 TABLET ORAL DAILY
Status: DISCONTINUED | OUTPATIENT
Start: 2024-03-17 | End: 2024-03-18 | Stop reason: HOSPADM

## 2024-03-17 RX ORDER — ACETAMINOPHEN 325 MG/1
650 TABLET ORAL EVERY 6 HOURS PRN
Status: DISCONTINUED | OUTPATIENT
Start: 2024-03-17 | End: 2024-03-18 | Stop reason: HOSPADM

## 2024-03-17 RX ORDER — ACETAMINOPHEN 650 MG/1
650 SUPPOSITORY RECTAL EVERY 6 HOURS PRN
Status: DISCONTINUED | OUTPATIENT
Start: 2024-03-17 | End: 2024-03-18 | Stop reason: HOSPADM

## 2024-03-17 RX ADMIN — ACETAMINOPHEN 650 MG: 325 TABLET ORAL at 20:57

## 2024-03-17 RX ADMIN — BACLOFEN 5 MG: 10 TABLET ORAL at 20:57

## 2024-03-17 RX ADMIN — SODIUM CHLORIDE, PRESERVATIVE FREE 10 ML: 5 INJECTION INTRAVENOUS at 13:00

## 2024-03-17 RX ADMIN — MONTELUKAST 10 MG: 10 TABLET, FILM COATED ORAL at 20:57

## 2024-03-17 RX ADMIN — OXYBUTYNIN CHLORIDE 5 MG: 5 TABLET, EXTENDED RELEASE ORAL at 20:57

## 2024-03-17 RX ADMIN — SODIUM CHLORIDE, PRESERVATIVE FREE 10 ML: 5 INJECTION INTRAVENOUS at 21:01

## 2024-03-17 RX ADMIN — ACETAMINOPHEN 650 MG: 325 TABLET ORAL at 15:37

## 2024-03-17 RX ADMIN — PRAVASTATIN SODIUM 40 MG: 40 TABLET ORAL at 09:01

## 2024-03-17 ASSESSMENT — PAIN SCALES - GENERAL
PAINLEVEL_OUTOF10: 3
PAINLEVEL_OUTOF10: 5
PAINLEVEL_OUTOF10: 5
PAINLEVEL_OUTOF10: 2

## 2024-03-17 ASSESSMENT — PAIN DESCRIPTION - DESCRIPTORS: DESCRIPTORS: ACHING

## 2024-03-17 ASSESSMENT — PAIN DESCRIPTION - LOCATION
LOCATION: BACK
LOCATION: CHEST

## 2024-03-17 ASSESSMENT — PAIN DESCRIPTION - ORIENTATION: ORIENTATION: LEFT

## 2024-03-17 NOTE — H&P
albuterol inhaler  -As needed oxygen therapy    HLD  -Lipid panel 3/25/2022, cholesterol 211, HDL 69, , triglyceride 117.  -Pending repeat lipid panel  -Continue home dose pravastatin    Paroxysmal atrial fibrillation/Supratherapeutic INR  -On Coumadin  -PT/INR, current INR 5.93  -Pharmacy consult to dose    Sick sinus syndrome  -S/p dual-chamber pacemaker implant 3/23    Reflex sympathetic dystrophy  -Continue home dose oxybutynin and baclofen    Discussed management of the case in detail with the emergency department provider: Dr. Reno     All Imaging: I have reviewed all the radiographic images and reports pertinent to this encounter with the following interpretation(s):     CXR 3/16/2024: no acute cardiopulmonary findings.     EKG:  I have reviewed the EKG with the following interpretation: SR, ventricular rate of 65, , QRS 84, QT/Qtc 428/445.     Physical Exam Performed:      BP (!) 154/77   Pulse 63   Temp 97.9 °F (36.6 °C) (Oral)   Resp 21   Ht 1.651 m (5' 5\")   Wt 61.2 kg (135 lb)   SpO2 97%   BMI 22.47 kg/m²     General appearance:  No apparent distress, appears stated age and cooperative.  Elderly and frail  HEENT:  Pupils equal, round, and reactive to light. Conjunctivae/corneas clear.  Respiratory:  Normal respiratory effort. Clear to auscultation, bilaterally without Rales/Wheezes/Rhonchi.  Cardiovascular:  Regular rate and rhythm with normal S1/S2 without murmurs, rubs or gallops.  Abdomen:  Soft, non-tender, non-distended with normal bowel sounds.  Musculoskelatal:  No clubbing, cyanosis or edema bilaterally.  Full range of motion without deformity.  Neurologic:  Neurovascularly intact without any focal sensory/motor deficits. Cranial nerves: II-XII intact, grossly non-focal.  Psychiatric:  Alert and oriented x 3, thought content appropriate, normal insight  Skin:  Skin color pale, texture, turgor normal.  No rashes or lesions.  Capillary Refill:  Brisk,< 3 seconds   Peripheral

## 2024-03-17 NOTE — ED NOTES
Placed patient on purewick. Updated on plan of care and waiting on bed assignment. Call light in reach.

## 2024-03-17 NOTE — ED NOTES
Called patient's son per request of MD and patient. Placed patient on phone with son and after patient hung up reports \"ok, I want to be admitted.\" Patient spoke to son Barney.

## 2024-03-17 NOTE — ED NOTES
Called Barney (patient's son) and let him know she was here in the ED per patient request. Penny (patient's daughter) phone goes to voicemail. Notified patient

## 2024-03-17 NOTE — ED PROVIDER NOTES
Emergency Department Provider Note     Location: [facility]  ED  3/16/2024    I independently performed a history and physical on Isa Hernandez.   All diagnostic, treatment, and disposition decisions were made by myself in conjunction with the mid-level provider.     Isa Hernandez is a 78 y.o. female here for CP, SOB that started around 530.  Pain described as tightness.  EMS gave sublingual nitro and 324 mg aspirin.  On arrival, patient said pain resolved with nitroglycerin and she felt a lot better.  Patient is anticoagulated due to history of paroxysmal A-fib.  Status post pacemaker implant.      ED Triage Vitals [03/16/24 2042]   BP Temp Temp Source Pulse Respirations SpO2 Height Weight - Scale   134/82 97.9 °F (36.6 °C) Oral 62 22 95 % 1.651 m (5' 5\") 61.2 kg (135 lb)        Exam showed WDWN female awake, alert, no facial droop, no slurred speech, heart RRR, lungs clear,       ED course/MDM  Patient seen and examined in room 18    EKG  The Ekg interpreted by me in the absence of a cardiologist shows.  Sinus rhythm  with a rate of 65  Axis is   left axis deviation  QTc is normal  PAC noted  No specific ST-T wave changes appreciated.  No evidence of acute ischemia.   No significant change from prior EKG dated 3/3/24      Radiology  XR CHEST PORTABLE    Result Date: 3/16/2024  EXAMINATION: ONE XRAY VIEW OF THE CHEST 3/16/2024 8:53 pm COMPARISON: CT chest 02/27/2024, radiograph 03/05/2024 HISTORY: ORDERING SYSTEM PROVIDED HISTORY: sob TECHNOLOGIST PROVIDED HISTORY: Reason for exam:->sob Reason for Exam: SOB FINDINGS: Patient is rotated.  Cardiac pacer stable in positioning.  Persistent 2.4 cm nodule within the right mid lung correlating to known cavitary nodule seen on prior CT.  Stable prominence of the right hilum.  Bibasilar atelectasis.  No obvious pleural effusion or pneumothorax.  The osseous structures otherwise stable.     Stable chest.  No acute cardiopulmonary findings.  
03/16/2024 10:53:21 PM      PATIENT REFERRED TO:  No follow-up provider specified.    DISCHARGE MEDICATIONS:  New Prescriptions    No medications on file       DISCONTINUED MEDICATIONS:  Discontinued Medications    No medications on file              (Please note that portions of this note were completed with a voice recognition program.  Efforts were made to edit the dictations but occasionally words are mis-transcribed.)    Javed Wilson PA-C (electronically signed)        Javed Wilson PA-C  03/17/24 0013

## 2024-03-18 ENCOUNTER — APPOINTMENT (OUTPATIENT)
Dept: NUCLEAR MEDICINE | Age: 79
DRG: 313 | End: 2024-03-18
Payer: MEDICARE

## 2024-03-18 ENCOUNTER — PROCEDURE VISIT (OUTPATIENT)
Dept: CARDIOLOGY CLINIC | Age: 79
End: 2024-03-18

## 2024-03-18 VITALS
OXYGEN SATURATION: 95 % | BODY MASS INDEX: 21.74 KG/M2 | DIASTOLIC BLOOD PRESSURE: 72 MMHG | WEIGHT: 130.5 LBS | RESPIRATION RATE: 16 BRPM | HEART RATE: 65 BPM | SYSTOLIC BLOOD PRESSURE: 122 MMHG | TEMPERATURE: 97.9 F | HEIGHT: 65 IN

## 2024-03-18 DIAGNOSIS — Z95.0 PACEMAKER: Primary | ICD-10-CM

## 2024-03-18 DIAGNOSIS — I49.5 SINUS NODE DYSFUNCTION (HCC): ICD-10-CM

## 2024-03-18 PROBLEM — I10 PRIMARY HYPERTENSION: Status: ACTIVE | Noted: 2024-03-18

## 2024-03-18 LAB
ANION GAP SERPL CALCULATED.3IONS-SCNC: 10 MMOL/L (ref 3–16)
BUN SERPL-MCNC: <2 MG/DL (ref 7–20)
CALCIUM SERPL-MCNC: 8.9 MG/DL (ref 8.3–10.6)
CHLORIDE SERPL-SCNC: 104 MMOL/L (ref 99–110)
CHOLEST SERPL-MCNC: 192 MG/DL (ref 0–199)
CO2 SERPL-SCNC: 23 MMOL/L (ref 21–32)
CREAT SERPL-MCNC: <0.5 MG/DL (ref 0.6–1.2)
DEPRECATED RDW RBC AUTO: 13.7 % (ref 12.4–15.4)
GFR SERPLBLD CREATININE-BSD FMLA CKD-EPI: >60 ML/MIN/{1.73_M2}
GLUCOSE SERPL-MCNC: 94 MG/DL (ref 70–99)
HCT VFR BLD AUTO: 37.1 % (ref 36–48)
HDLC SERPL-MCNC: 56 MG/DL (ref 40–60)
HGB BLD-MCNC: 12.3 G/DL (ref 12–16)
INR PPP: 3.47 (ref 0.84–1.16)
LDLC SERPL CALC-MCNC: 123 MG/DL
MAGNESIUM SERPL-MCNC: 2 MG/DL (ref 1.8–2.4)
MCH RBC QN AUTO: 30.7 PG (ref 26–34)
MCHC RBC AUTO-ENTMCNC: 33.2 G/DL (ref 31–36)
MCV RBC AUTO: 92.6 FL (ref 80–100)
PLATELET # BLD AUTO: 388 K/UL (ref 135–450)
PMV BLD AUTO: 10.2 FL (ref 5–10.5)
POTASSIUM SERPL-SCNC: 3.3 MMOL/L (ref 3.5–5.1)
PROTHROMBIN TIME: 34.6 SEC (ref 11.5–14.8)
RBC # BLD AUTO: 4.01 M/UL (ref 4–5.2)
SODIUM SERPL-SCNC: 137 MMOL/L (ref 136–145)
TRIGL SERPL-MCNC: 63 MG/DL (ref 0–150)
VLDLC SERPL CALC-MCNC: 13 MG/DL
WBC # BLD AUTO: 8.2 K/UL (ref 4–11)

## 2024-03-18 PROCEDURE — 6360000002 HC RX W HCPCS: Performed by: INTERNAL MEDICINE

## 2024-03-18 PROCEDURE — 2580000003 HC RX 258: Performed by: NURSE PRACTITIONER

## 2024-03-18 PROCEDURE — 78452 HT MUSCLE IMAGE SPECT MULT: CPT

## 2024-03-18 PROCEDURE — 83735 ASSAY OF MAGNESIUM: CPT

## 2024-03-18 PROCEDURE — 99232 SBSQ HOSP IP/OBS MODERATE 35: CPT | Performed by: NURSE PRACTITIONER

## 2024-03-18 PROCEDURE — 80061 LIPID PANEL: CPT

## 2024-03-18 PROCEDURE — 3430000000 HC RX DIAGNOSTIC RADIOPHARMACEUTICAL: Performed by: INTERNAL MEDICINE

## 2024-03-18 PROCEDURE — A9502 TC99M TETROFOSMIN: HCPCS | Performed by: INTERNAL MEDICINE

## 2024-03-18 PROCEDURE — 93017 CV STRESS TEST TRACING ONLY: CPT

## 2024-03-18 PROCEDURE — 85027 COMPLETE CBC AUTOMATED: CPT

## 2024-03-18 PROCEDURE — 80048 BASIC METABOLIC PNL TOTAL CA: CPT

## 2024-03-18 PROCEDURE — 36415 COLL VENOUS BLD VENIPUNCTURE: CPT

## 2024-03-18 PROCEDURE — 85610 PROTHROMBIN TIME: CPT

## 2024-03-18 PROCEDURE — 6370000000 HC RX 637 (ALT 250 FOR IP): Performed by: NURSE PRACTITIONER

## 2024-03-18 PROCEDURE — 6370000000 HC RX 637 (ALT 250 FOR IP): Performed by: INTERNAL MEDICINE

## 2024-03-18 RX ORDER — POTASSIUM CHLORIDE 7.45 MG/ML
10 INJECTION INTRAVENOUS
Status: DISPENSED | OUTPATIENT
Start: 2024-03-18 | End: 2024-03-18

## 2024-03-18 RX ADMIN — ACETAMINOPHEN 650 MG: 325 TABLET ORAL at 14:51

## 2024-03-18 RX ADMIN — TETROFOSMIN 31.4 MILLICURIE: 1.38 INJECTION, POWDER, LYOPHILIZED, FOR SOLUTION INTRAVENOUS at 11:06

## 2024-03-18 RX ADMIN — SODIUM CHLORIDE, PRESERVATIVE FREE 10 ML: 5 INJECTION INTRAVENOUS at 08:49

## 2024-03-18 RX ADMIN — POTASSIUM CHLORIDE 10 MEQ: 7.46 INJECTION, SOLUTION INTRAVENOUS at 11:00

## 2024-03-18 RX ADMIN — ACETAMINOPHEN 650 MG: 325 TABLET ORAL at 08:58

## 2024-03-18 RX ADMIN — POTASSIUM CHLORIDE 10 MEQ: 7.46 INJECTION, SOLUTION INTRAVENOUS at 12:15

## 2024-03-18 RX ADMIN — PRAVASTATIN SODIUM 40 MG: 40 TABLET ORAL at 08:49

## 2024-03-18 RX ADMIN — POTASSIUM BICARBONATE 20 MEQ: 782 TABLET, EFFERVESCENT ORAL at 08:49

## 2024-03-18 RX ADMIN — REGADENOSON 0.4 MG: 0.08 INJECTION, SOLUTION INTRAVENOUS at 11:06

## 2024-03-18 RX ADMIN — TETROFOSMIN 10.9 MILLICURIE: 1.38 INJECTION, POWDER, LYOPHILIZED, FOR SOLUTION INTRAVENOUS at 09:46

## 2024-03-18 ASSESSMENT — PAIN - FUNCTIONAL ASSESSMENT: PAIN_FUNCTIONAL_ASSESSMENT: ACTIVITIES ARE NOT PREVENTED

## 2024-03-18 ASSESSMENT — PAIN SCALES - GENERAL
PAINLEVEL_OUTOF10: 3
PAINLEVEL_OUTOF10: 0

## 2024-03-18 NOTE — CARE COORDINATION
Case Management Assessment  Initial Evaluation    Date/Time of Evaluation: 3/18/2024 3:21 PM  Assessment Completed by: Gabrielle Dale RN    If patient is discharged prior to next notation, then this note serves as note for discharge by case management.    Patient Name: Isa Hernandez                   YOB: 1945  Diagnosis: Chest pain [R07.9]  Supratherapeutic INR [R79.1]  Chest pain, unspecified type [R07.9]                   Date / Time: 3/16/2024  8:37 PM    Patient Admission Status: Inpatient   Readmission Risk (Low < 19, Mod (19-27), High > 27): Readmission Risk Score: 19    Current PCP: Jocelin Ellsworth MD  PCP verified by CM? (P) Yes    Chart Reviewed: Yes      History Provided by: (P) Patient  Patient Orientation: (P) Alert and Oriented    Patient Cognition: (P) Alert    Hospitalization in the last 30 days (Readmission):  Yes    If yes, Readmission Assessment in  Navigator will be completed.    Advance Directives:      Code Status: Full Code   Patient's Primary Decision Maker is: (P) Legal Next of Kin    Primary Decision Maker: Barney Hernandez - Child - [phone]    Primary Decision Maker (Active): luisPenny muniz - Child - [phone]    Discharge Planning:    Patient lives with: (P) Alone Type of Home: (P) Apartment  Primary Care Giver: (P) Self  Patient Support Systems include: (P) Children, Family Members   Current Financial resources: (P) Medicare  Current community resources: (P) Housing  Current services prior to admission: (P) None            Current DME: (P) Walker, Cane, Shower Chair            Type of Home Care services:  (P) None    ADLS  Prior functional level: (P) Independent in ADLs/IADLs  Current functional level: (P) Assistance with the following:, Housework, Shopping, Mobility    PT AM-PAC:   /24  OT AM-PAC:   /24    Family can provide assistance at DC: (P) No  Would you like Case Management to discuss the discharge plan with any other family members/significant others, and

## 2024-03-18 NOTE — CARE COORDINATION
[facility]    Referral received from  to follow for home care services.   [facility] unable to staff timely    Patient has no preference In agency  Referral sent to [facility]  Accepted; office will call patient and pull referral    Concepcion Lawler RN, BSN -[phone]  [facility]   [phone] fax [phone]  [facility] -[phone]  [facility] -[phone]

## 2024-03-18 NOTE — PROGRESS NOTES
Hospital Medicine Progress Note      Date of Admission: 3/16/2024  Hospital Day: 2    Chief Admission Complaint:  Chest Pain     Subjective: Patient is in hospital bed alert and oriented.  No new issues or complaints today.  Continues to state that she feels \"lousy\".  Informed by nursing staff about talks with daughter.  Informed by nursing staff that we would likely need psych consult after he talks with daughter. Chest pain mainly on the left upper chest , pain can be reproduced by pressing on the chest and deep inspiration, thus most likely musculoskeletal. Denies fevers, chills, sweats. Denies palpitations. Denies shortness of breath and cough. Denies nausea, vomiting, or diarrhea. Denies changes in urination. Spoke with nursing staff and was informed of no acute issues overnight.      Presenting Admission History:       Isa Hernandez is a/an 78 y.o. female with a significant past medical history of COPD, HLD, asthma, reflex sympathetic dystrophy, lung nodule, PAC on Coumadin, sick sinus syndrome s/p dual chamber pacemaker implant 3/2023 presents via EMS to [facility] ED for c/o chest pain. Per EMR and patient, her chest pain started 1730 yesterday (3/16/24), she described sharp pain located on the left upper side of her chest, the pain can be reproduced with touch and deep inspiration. She received sublingual nitro and 324 mg aspirin en route which did improve her symptoms.      During her ED workup, troponin negative, pro-, COVID/influenza negative, EKG sinus rhythm.  CMP, CBC unremarkable, chest x-ray negative for any acute process.  She was resting in bed without any apparent distress, saturation above 90 on room air.  Decision was to admit patient for inpatient cardiology consult based on patient's cardiac history and age.    Assessment/Plan:      Current Principal Problem:  Chest pain    Chest pain unspecified  -Etiology unclear at this time; patient has a history of stating she has chest pain with 
4 Eyes Skin Assessment     The patient is being assess for  Admission    I agree that 2 RN's have performed a thorough Head to Toe Skin Assessment on the patient. ALL assessment sites listed below have been assessed.       Areas assessed by both nurses:   [x]   Head, Face, and Ears   [x]   Shoulders, Back, and Chest  [x]   Arms, Elbows, and Hands   [x]   Coccyx, Sacrum, and Ischum  [x]   Legs, Feet, and Heels  Scattered bruising BUE, Coccyx red with old small abrasion scabbed area dry.      Does the Patient have Skin Breakdown?  No         Jeison Prevention initiated:  Yes   Wound Care Orders initiated:  No      [facility] nurse consulted for Pressure Injury (Stage 3,4, Unstageable, DTI, NWPT, and Complex wounds):  No      Nurse 1 eSignature: Electronically signed by Jaci Owens RN on 3/17/24 at 2:48 AM EDT    **SHARE this note so that the co-signing nurse is able to place an eSignature**    Nurse 2 eSignature: Electronically signed by Ashley Hernandez RN on 3/17/24 at 3:25 AM EDT            
A lexiscan stress test was completed on this patient as ordered. The patient tolerated the procedure well.  Awaiting stress imaging at this time.    
Call placed for consult. Spoke with answering service staff ( Ms. Tai) Dr. Dickson is Cardiologist on call. Will notify AM consult. NINA @ [phone] hrs  
Discharge orders noted. Patient's IV and telemetry box removed without complications. Discharge paperwork reviewed with patient, verbalized understanding. Patient discharge via private vehicle, wheeled to vehicle in wheelchair. CMU notified.     
[facility]   Daily Progress Note    Admit Date:  3/16/2024  HPI:    Chief Complaint   Patient presents with    Shortness of Breath     Pt brought in by EMS called out for SOB and CP/ ems gave 324 mg asa and 1 nitro SL with relief.       Isa Hernandez presented with chest pain after fall and landing on her left side.    Cardiology consulted for chest pain.    PMH: SSS s/p dual PPM, PAF, hypertension, hyperlipidemia, COPD    Subjective:  Ms. Hernandez sitting up in bed eating lunch.  Still with chest pain that is worse with palpation to anterior chest.    Objective:   Patient Vitals for the past 24 hrs:   BP Temp Temp src Pulse Resp SpO2 Weight   03/18/24 1215 122/72 97.9 °F (36.6 °C) Oral 65 16 95 % --   03/18/24 0815 126/79 97.8 °F (36.6 °C) Oral 71 16 95 % --   03/18/24 0304 123/71 98.3 °F (36.8 °C) Oral 60 16 96 % --   03/18/24 0300 -- -- -- -- -- -- 59.2 kg (130 lb 8 oz)   03/17/24 2306 (!) 144/78 97.5 °F (36.4 °C) Oral 74 16 95 % --   03/17/24 2045 (!) 160/90 -- -- -- 16 94 % --       Intake/Output Summary (Last 24 hours) at 3/18/2024 1318  Last data filed at 3/18/2024 0300  Gross per 24 hour   Intake 0 ml   Output 1500 ml   Net -1500 ml     Wt Readings from Last 3 Encounters:   03/18/24 59.2 kg (130 lb 8 oz)   02/27/24 55.3 kg (121 lb 14.6 oz)   02/27/24 55.3 kg (122 lb)         ASSESSMENT:   Chest pain: EKG and troponins negative for ACS, stress test negative for inducible ischemia  PAF: AV paced per telemetry, INR therapeutic on warfarin  SSS s/p dual PPM: For device check per Dr. Asher Dickson  HYPERTENSION: improved   HLD: on pravastatin  COPD      PLAN:  Device check today per EP  Nothing further from cardiac perspective.   Continue warfarin, pravastatin and potassium   Okay for discharge from cardiac perspective. Will review cardiac medications on discharge medication reconciliation form. Patient has follow up appointment with Bonnie Luna CNP in 4 months.      HOWIE Santillan - 
K 3.3 this AM. Orders for potassium replacement obtained by nightshift RN. Per Maliha PRIDE, ok to give PO replacement for now and IV if necessary after stress test.   
Patient admitted to room 439 from ED.  Patient oriented to room, call light, bed rails, phone, lights and bathroom.  Patient instructed about the schedule of the day including: vital sign frequency, lab draws, possible tests, frequency of MD and staff rounds, including RN/MD rounding together at bedside, daily weights, and I &O's.  Patient instructed about prescribed diet, how to use 8MENU, and television.   bed alarm in place, patient aware of placement and reason.   Telemetry box #75 in place, patient aware of placement and reason.  Bed locked, in lowest position, side rails up 2/4, call light within reach.  Will continue to monitor.  NINA  
Pt AxOx4 assisted to ambulate to rest room per her request. Pt noted gait steady with use of front wheel walker. Hand  strong and steady. Pt back in bed. Kept warm and comfortable. Safety/fall prevention maintained. Personal belongings and call light within reach. MARCELLN  
Pt sleeping, No visual s/s of discomfort. Personal belongings and call light within reach. Will continue to monitor. TITIRN  
for support services and placement decision   [x] Imaging personally reviewed and interpreted, includes:   [x] Telemetry monitoring w/ NSR   [x] Data Review (any 3)  [] Collateral history obtained from:    [x] All available Consultant notes from yesterday/today were reviewed  [x] All current labs were reviewed and interpreted for clinical significance   [x] Appropriate follow-up labs were ordered    Medications:  Personally reviewed in detail in conjunction w/ labs as documented for evidence of drug toxicity.     Infusion Medications    sodium chloride       Scheduled Medications    potassium chloride  10 mEq IntraVENous Q1H    sodium chloride flush  5-40 mL IntraVENous 2 times per day    baclofen  5 mg Oral Nightly    pravastatin  40 mg Oral Daily    oxyBUTYnin  5 mg Oral Nightly    montelukast  10 mg Oral Nightly    warfarin placeholder: dosing by pharmacy   Other RX Placeholder     PRN Meds: sodium chloride flush, sodium chloride, ondansetron **OR** ondansetron, acetaminophen **OR** acetaminophen, polyethylene glycol, albuterol sulfate HFA, regadenoson     Labs:  Personally reviewed and interpreted for clinical significance.     Recent Labs     03/16/24 2049 03/18/24  0607   WBC 8.6 8.2   HGB 12.1 12.3   HCT 36.1 37.1    388     Recent Labs     03/16/24 2049 03/17/24  0449 03/18/24  0607    136 137   K 3.6 3.2* 3.3*    101 104   CO2 23 24 23   BUN 5* 4* <2*   CREATININE <0.5* <0.5* <0.5*   CALCIUM 9.0 8.8 8.9   MG  --  1.80 2.00     Recent Labs     03/16/24 2049 03/16/24  2240   PROBNP 318  --    TROPHS 14 13     No results for input(s): \"LABA1C\" in the last 72 hours.  Recent Labs     03/16/24 2049   AST 14*   ALT 9*   BILITOT 0.3   ALKPHOS 114     Recent Labs     03/16/24 2049 03/18/24  0607   INR 5.93* 3.47*       Urine Cultures:   Lab Results   Component Value Date/Time    LABURIN <10,000 CFU/ml  No further workup   12/17/2023 09:13 PM     Blood Cultures:   Lab Results   Component Value

## 2024-03-18 NOTE — CARE COORDINATION
Assessment attempted. Pt NANETTE at this time. Will re attempt at a later time. No family at bedside.

## 2024-03-18 NOTE — RT PROTOCOL NOTE
RT Inhaler-Nebulizer Bronchodilator Protocol Note    There is a bronchodilator order in the chart from a provider indicating to follow the RT Bronchodilator Protocol and there is an “Initiate RT Inhaler-Nebulizer Bronchodilator Protocol” order as well (see protocol at bottom of note).    CXR Findings:  XR CHEST PORTABLE    Result Date: 3/16/2024  Stable chest.  No acute cardiopulmonary findings.       The findings from the last RT Protocol Assessment were as follows:   History Pulmonary Disease: Chronic pulmonary disease  Respiratory Pattern: Regular pattern and RR 12-20 bpm  Breath Sounds: Slightly diminished and/or crackles  Cough: Strong, spontaneous, non-productive  Indication for Bronchodilator Therapy: On home bronchodilators  Bronchodilator Assessment Score: 4    Aerosolized bronchodilator medication orders have been revised according to the RT Inhaler-Nebulizer Bronchodilator Protocol below.    Respiratory Therapist to perform RT Therapy Protocol Assessment initially then follow the protocol.  Repeat RT Therapy Protocol Assessment PRN for score 0-3 or on second treatment, BID, and PRN for scores above 3.    No Indications - adjust the frequency to every 6 hours PRN wheezing or bronchospasm, if no treatments needed after 48 hours then discontinue using Per Protocol order mode.     If indication present, adjust the RT bronchodilator orders based on the Bronchodilator Assessment Score as indicated below.  Use Inhaler orders unless patient has one or more of the following: on home nebulizer, not able to hold breath for 10 seconds, is not alert and oriented, cannot activate and use MDI correctly, or respiratory rate 25 breaths per minute or more, then use the equivalent nebulizer order(s) with same Frequency and PRN reasons based on the score.  If a patient is on this medication at home then do not decrease Frequency below that used at home.    0-3 - enter or revise RT bronchodilator order(s) to equivalent RT

## 2024-03-18 NOTE — CONSULTS
Pharmacy Note  Warfarin Consult  Dx: afib  Goal INR range 2-3   Home Warfarin dose: 5mg daily    Date  INR  Warfarin  3/16                5.93                     Hold    Recommend Holding Warfarin tonight.  Daily INR ordered.  Rx will continue to manage therapy per consult order.      
Consult Perfect Served to [facility] for patient due to confusion, Waiting to hear back. Perfect serve placed at 09:44 am 3-17-24  
Patient was off the floor.  I did speak to daughter Penny who indicates patient has a life long history of manipulating her children and others around her for attention.  She tends to overuse health care and daughter is concerned about all the medications she may have access to at her senior care housing.  I will see patient when she is available.     Dmitri Fernando MD  
Per pt's nurse, Tena, states the dayshift PCA/HUC, Juan J, had contacted psychiatry re: consult. Dr. Fernando is on the pt's treatment team.-Meagan Merlos   
Perfect Serve was sent to Dmitri Fernando at 14:25 following new phys consult. Waiting for response. Juan J Boyce PCT.  
risk factors to proceed with a stress test.  Will order stress test for patient however I suspect that her chest pain is most likely related to neuropathic pain.  Agree with baclofen.  Will continue to follow pending results of her stress test.  - Stress test.  - Pacemaker interrogation tomorrow.  - We will continue to follow along with you pending results of stress test.    2. Sick sinus syndrome status post DC pacemaker.  - Plan as per above.    3. Paroxysmal atrial fibrillation ([id number] score 4).  Patient with a history of paroxysmal atrial fibrillation.  No atrial fibrillation since he been in house.  EKGs have been reassuring.  Will have her device interrogated to see if she has had any atrial fibrillation.  Plan to continue warfarin.  - Plan as per above.  - Continue warfarin with goal INR 2-3.    4. Depression.  - Per primary team.    Thank you for allowing me to participate in the care of Isa Hernandez . If you have any questions/comments, please do not hesitate to contact us.    Asher Dickson MD  Cardiac Electrophysiology  [facility]  [phone] [place] Office

## 2024-03-18 NOTE — DISCHARGE INSTR - COC
Concerns:022566029}    Impairments/Disabilities:      { ANDI Impairments/Disabilities:549317283}    Nutrition Therapy:  Current Nutrition Therapy:   {[facility] Diet List:902595978}    Routes of Feeding: {[facility] DME Other Feedings:878220168}  Liquids: {Slp liquid thickness:39486}  Daily Fluid Restriction: {[facility] DME Yes amt example:789148450}  Last Modified Barium Swallow with Video (Video Swallowing Test): {Done Not Done Date:}    Treatments at the Time of Hospital Discharge:   Respiratory Treatments: ***  Oxygen Therapy:  {Therapy; copd oxygen:32529}  Ventilator:    {[facility] Vent List:882698941}    Rehab Therapies: Physical Therapy and Occupational Therapy; Nurse  Weight Bearing Status/Restrictions: {[facility] Weight Bearin}  Other Medical Equipment (for information only, NOT a DME order):  {EQUIPMENT:659788860}  Other Treatments: ***    Patient's personal belongings (please select all that are sent with patient):  {[facility] DME Belongings:038701156}    RN SIGNATURE:  {Esignature:310680054}    CASE MANAGEMENT/SOCIAL WORK SECTION    Inpatient Status Date: 24    Readmission Risk Assessment Score:  Readmission Risk              Risk of Unplanned Readmission:  25           Discharging to Facility/ Agency   Name: [facility]  Phone:[phone]  Fax:[phone]    / signature: Electronically signed by Gabrielle Dale RN on 3/18/24 at 3:34 PM EDT    PHYSICIAN SECTION    Prognosis: Good    Condition at Discharge: Stable    Rehab Potential (if transferring to Rehab): {Prognosis:1103534657}    Recommended Labs or Other Treatments After Discharge: ***    Physician Certification: I certify the above information and transfer of Isa Hernandez  is necessary for the continuing treatment of the diagnosis listed and that she requires Home Care for less 30 days.     Update Admission H&P: {[facility] DME Changes in HandP:510316403}    PHYSICIAN SIGNATURE:  Electronically signed by Lucas Orlando

## 2024-03-19 NOTE — DISCHARGE SUMMARY
Hospital Medicine Discharge Summary    Patient: Isa Hernandez   : 1945     Admit Date: 3/16/2024   Discharge Date: 3/18/2024    Disposition:  [x]Home   []HHC  []SNF  []Acute Rehab  []LTAC  []Hospice  Code status:  [x]Full  []DNR/CCA  []Limited (DNR/CCA with Do Not Intubate)  []DNRCC  Condition at Discharge: Stable  Primary Care Provider: Jocelin Ellsworth MD    Admitting Provider: Gbariele Rosa MD  Discharge Provider: Tavo Jett MD     Discharge Diagnoses:      Active Hospital Problems    Diagnosis     SSS (sick sinus syndrome) ([facility]) [I49.5]      Priority: Medium    Primary hypertension [I10]     PAF (paroxysmal atrial fibrillation) ([facility]) [I48.0]     Chest pain [R07.9]        Presenting Admission History:        Isa Hernandez is a/an 78 y.o. female with a significant past medical history of COPD, HLD, asthma, reflex sympathetic dystrophy, lung nodule, PAC on Coumadin, sick sinus syndrome s/p dual chamber pacemaker implant 3/2023 presents via EMS to [facility] ED for c/o chest pain. Per EMR and patient, her chest pain started 1730 yesterday (3/16/24), she described sharp pain located on the left upper side of her chest, the pain can be reproduced with touch and deep inspiration. She received sublingual nitro and 324 mg aspirin en route which did improve her symptoms.      During her ED workup, troponin negative, pro-, COVID/influenza negative, EKG sinus rhythm.  CMP, CBC unremarkable, chest x-ray negative for any acute process.  She was resting in bed without any apparent distress, saturation above 90 on room air.  Decision was to admit patient for inpatient cardiology consult based on patient's cardiac history and age.     Assessment/Plan:          Chest pain - likely MSK. Stress test negative for evidence of reversible ischemia. Cardiology consulted and appreciated.        COPD - w/out chronic hypoxic respiratory failure on no baseline home O2.  Controlled on home medication

## 2024-03-20 ENCOUNTER — TELEPHONE (OUTPATIENT)
Dept: CARDIOLOGY CLINIC | Age: 79
End: 2024-03-20

## 2024-03-20 RX ORDER — WARFARIN SODIUM 5 MG/1
5 TABLET ORAL DAILY
Qty: 30 TABLET | Refills: 3 | Status: ON HOLD | OUTPATIENT
Start: 2024-03-20 | End: 2024-05-17 | Stop reason: HOSPADM

## 2024-03-20 NOTE — TELEPHONE ENCOUNTER
Anna ROBLES from [facility] ([phone]) contacted office, pt recently discharged from hospital and unsure if was advised to stop or resume WARFRAIN, if pt to resume, pt needs a refill sent in. Anna also attempted to touch base with whom does the pt's INR to see when it needs to be checked. Please advise.

## 2024-03-20 NOTE — TELEPHONE ENCOUNTER
Allison from Dr. Lamar office called to review pt medication list. Allison states they want to make sure they have correct med list for pt. Allison's phone number is [phone]. Please advise.

## 2024-03-20 NOTE — TELEPHONE ENCOUNTER
Per [facility] consult note 3/17/2024  3. Paroxysmal atrial fibrillation ([id number] score 4).  Patient with a history of paroxysmal atrial fibrillation.  No atrial fibrillation since he been in house.  EKGs have been reassuring.  Will have her device interrogated to see if she has had any atrial fibrillation.  Plan to continue warfarin.  - Plan as per above.  - Continue warfarin with goal INR 2-3.    Also-noted to continue on discharged medication list.     Called and spoke with TRAVIS Castillo from [facility] about this. She states patient does not have any Warfarin at home-medication pending for signoff. Also, she notes that she will contact the anticoagulation clinic to get her set up for a check (referral placed by MANGO 1/2024).

## 2024-03-20 NOTE — TELEPHONE ENCOUNTER
Called and spoke with Allison-reviewed our medication list. Found multiple discrepancies. Patient needs to bring in all medication bottles with her to office visit to clarify what she is taking.

## 2024-03-22 ENCOUNTER — TELEPHONE (OUTPATIENT)
Dept: CARDIOLOGY CLINIC | Age: 79
End: 2024-03-22

## 2024-03-22 NOTE — TELEPHONE ENCOUNTER
I spoke with ELZA and he advised ER visit as well as secondary recommendations if she could not present to the ER for hives and shortness of breath. I called the patient and she was waiting on EMS to arrive to transport her to the hospital. The patient then explained that her kids were yelling at her and she would not be speaking with me anymore as \"she just could not do this.\" To the last of my knowledge, patient will be going to the ER. I will re-evaluate the situation on Monday.

## 2024-03-22 NOTE — TELEPHONE ENCOUNTER
Pt contacted office stating she started WARFARIN 1 week ago and last night she started to break out in hives on her belly and they are very itchy and painful. Pt states she is SOB at times. Pt would like to know what NPAM would like her to do. Please advise.

## 2024-03-22 NOTE — TELEPHONE ENCOUNTER
I spoke with the patient and she stated that she has three quarter sized spots on her abdomen that itch and are very painful. Her voice is hoarse and she states the shortness of breath is not new to the patient but is worsening. She denies using new laundry detergents, soaps, lotions, or other topical irritants. She states that these spots could be from the EKG stickers she had on a week or so ago. She does not have a phone with picture capabilities and she does not have [employer]t to send a picture. She stated that she does not have transportation to come into the office for evaluation. I advised her to call EMS and report to the ER as we do not know if this is an allergic reaction that could cause anaphylaxis. She asked to place me on a brief hold, and after five minutes I was sent to voicemail. I called the patient back and she stated that she \"was getting yelled at by her son because she was sick.\" She stated that her son refused to take her to the emergency room or office. She stated that she cannot afford an EMS or ER visit. I once again offered the ability to come into the office to have RN evaluate as I cannot assess over the phone with no visuals. She stated that she does not have transportation. The patient then hung up the phone.     ELZA please advise.

## 2024-03-25 ENCOUNTER — TELEPHONE (OUTPATIENT)
Dept: CARDIOLOGY CLINIC | Age: 79
End: 2024-03-25

## 2024-03-25 NOTE — TELEPHONE ENCOUNTER
Leilani from [facility] contacted office stating she was out to see pt today, INR was 2.1, PT was 24.8. Leilani states pt does not take her coumadin, and pt does not go to clinic due to no transportation.

## 2024-03-25 NOTE — TELEPHONE ENCOUNTER
Leilani from [facility] contacted office stating she was out to see pt today, INR was 2.1, PT was 24.8. Leilani states pt does not take her coumadin, and pt does not go to clinic due to no transportation.    I spoke with the patient and her PCP advised her to hold warfarin, so she did. Hives/rash improved. She did not go to the ER like she told our office she would. She no longer wishes to take Coumadin and Eliquis was too expensive. I advised the patient to call her insurance to see if Warfarin would be more affordable. She V/U and will call our office back.

## 2024-03-25 NOTE — TELEPHONE ENCOUNTER
Pt states warfarin is no cost to her. Pt states she has a bottle of warfarin 5 mg at home and would like to know if that is okay to take. If not pt would like warfarin sent to [facility]. Pt would like a call once this done. Please advise.

## 2024-03-26 ENCOUNTER — TELEPHONE (OUTPATIENT)
Dept: CARDIOLOGY CLINIC | Age: 79
End: 2024-03-26

## 2024-03-26 NOTE — TELEPHONE ENCOUNTER
It is unclear to me if she has an allergy to the Coumadin. I think it would be okay to resume but is she willing to get her INR's checked?

## 2024-03-26 NOTE — TELEPHONE ENCOUNTER
Tried calling pt at [phone], no answer no vm picked up. Will try to reach pt at another time to schedule for watchman consult.

## 2024-03-26 NOTE — TELEPHONE ENCOUNTER
I spoke with the patient and she states that she cannot make it to the coumadin clinic. I spoke with [facility] and TRAVIS Castillo and she states that the patient is very confused and noncompliant. She refuses for home care to come out to see her. [facility] could manage INR but it would only be temporary. Anna explained that after her last hospitalization the patient came home and threw her Warfarin away. Eliquis and Xarelto are too expensive. She did not qualify for patient assistance. Please advise if we should consider Watchman referral.

## 2024-03-26 NOTE — TELEPHONE ENCOUNTER
Bonnie Luna, APRN - CNP  [facility] Ep51 minutes ago (1:35 PM)       Yes, let's get her in for Watchman evaluation. At this point, I think we should hold off on resuming Coumadin given her confusion and noncompliance. We can't safely start it if she will not follow with Coumadin clinic. If we could provide her with sa mples of Eliquis or Xarelto, a DOAC may be the best route.   I spoke with the patient and relayed NPAM message. She V/U ans is unable to  samples due to no transportation. She would like to proceed with Watchman device.      Please help us set the patient up for Watchman evaluation.

## 2024-03-27 ENCOUNTER — TELEPHONE (OUTPATIENT)
Dept: CARDIOLOGY CLINIC | Age: 79
End: 2024-03-27

## 2024-03-27 DIAGNOSIS — I48.0 PAF (PAROXYSMAL ATRIAL FIBRILLATION) (HCC): Primary | ICD-10-CM

## 2024-03-27 NOTE — TELEPHONE ENCOUNTER
Ruddy Kat, Sandra Vital  Caller: Unspecified (5 days ago,  2:53 PM)  I will try and call tomorrow but will be only working a half of a day

## 2024-03-27 NOTE — TELEPHONE ENCOUNTER
I spoke with the pharmacy and explained that the patient cannot take warfarin due to \"rash\" and noncompliance with INRs. She cannot take Eliquis or Xarelto as they are cost prohibitive. Our office offered to give her samples while she was awaiting Watchman consultation and she denied them and stated that she did not have transportation to pick them up. This information has been relayed to the patient. Patient will be contacted again tomorrow by our Watchman clinic to arrange consultation per Brent Kat in separate telephone encounter.

## 2024-03-27 NOTE — TELEPHONE ENCOUNTER
[facility] called and stated PT said she will pay for a blood thinner. The pharmacy stated they need to confirm medication with [facility]. Spoke with medical staff. Advised pharmacy that Medical staff is discussing with pt and Doctor then will follow up. Pharmacy said to request Floyd Tellez ph#[phone]

## 2024-03-27 NOTE — TELEPHONE ENCOUNTER
I spoke with NPAM and she is agreeable to send in Eliquis or Xarelto if patient is willing to pay for it. I spoke with the patient and she is willing to pay for medication if it is $42 monthly. I spoke with NPAM and based on her creat clearance of 80 she qualifies for 20 mg of Xarelto. RX has been sent to [facility] pharmacy per MANGO.

## 2024-03-27 NOTE — TELEPHONE ENCOUNTER
[facility] pharmacy spoke with pt and informed them that her medication will be changing from Warfrain to Xarelto or Eliquis due to reaction to Warfrain. Pharmacy is requesting that we send the prescription to them. Ph is [phone] option 0.    Last ov 01/22/2024 npam  Next ov 07/23/2024 npam

## 2024-03-27 NOTE — TELEPHONE ENCOUNTER
I called the pt and informed her I was calling to get her scheduled for a watchman consultation. The pt stated she does not remember speaking to anyone about this, I tried to refresh her memory with the msg from RNJC but the pt does not remember it. I tried explaining the procedure to the pt and she would like to speak with an RN regarding this but only wants to be called on 3/28/24 Thursday. She does not want anyone calling her today (3/27/24).

## 2024-03-28 NOTE — TELEPHONE ENCOUNTER
Pt would like to know if we can prescribe the Xarelto for 90 day supply. Pt stated that she can get it for free through [facility] pharmacy if its 90 day. Please advise.

## 2024-03-28 NOTE — TELEPHONE ENCOUNTER
I spoke with patient today, I explained to her in detail what the Watchman was and why she was being referred for the device.  After the explanation she states she is not interested in any procedures and would just start the medication that Bonnie Luna NP suggested.  Before ending conversation patient asked me to call her daughter and explained to her the Watchman to see what she thought.  I tried to reach daughter Penny but only left message to call me.

## 2024-04-01 NOTE — TELEPHONE ENCOUNTER
NPAM FYI      March 28, 2024  Ruddy Kat, TRAVIS MAZA    3/28/24 10:08 AM  Note      I spoke with patient today, I explained to her in detail what the Watchman was and why she was being referred for the device.  After the explanation she states she is not interested in any procedures and would just start the medication that Bonnie Luna NP suggested.  Before ending conversation patient asked me to call her daughter and explained to her the Watchman to see what she thought.  I tried to reach daughter Penny but only left message to call me.

## 2024-04-09 NOTE — TELEPHONE ENCOUNTER
Spoke with patient and she is not interested.  I never was able to reach patients daughter but patient remains uninterested in the Watchman procedure.

## 2024-04-11 ENCOUNTER — TELEPHONE (OUTPATIENT)
Dept: PULMONOLOGY | Age: 79
End: 2024-04-11

## 2024-04-11 NOTE — TELEPHONE ENCOUNTER
Margaux from [facility] informed us patient is changing all of her medication to pill packs. Does not seem like patient is on anything right now that this pertains to, but anything in the future is to be sent to [facility] pharmacy and if possible needs to be in a pill pack.

## 2024-05-07 ENCOUNTER — HOSPITAL ENCOUNTER (EMERGENCY)
Age: 79
Discharge: HOME | End: 2024-05-07
Payer: MEDICARE

## 2024-05-07 ENCOUNTER — APPOINTMENT (OUTPATIENT)
Dept: CT IMAGING | Age: 79
End: 2024-05-07
Payer: MEDICARE

## 2024-05-07 VITALS
OXYGEN SATURATION: 95 % | SYSTOLIC BLOOD PRESSURE: 146 MMHG | HEIGHT: 65 IN | DIASTOLIC BLOOD PRESSURE: 82 MMHG | BODY MASS INDEX: 21.66 KG/M2 | HEART RATE: 60 BPM | RESPIRATION RATE: 18 BRPM | TEMPERATURE: 97.7 F | WEIGHT: 130 LBS

## 2024-05-07 DIAGNOSIS — M51.36 DDD (DEGENERATIVE DISC DISEASE), LUMBAR: ICD-10-CM

## 2024-05-07 DIAGNOSIS — M48.061 SPINAL STENOSIS OF LUMBAR REGION WITHOUT NEUROGENIC CLAUDICATION: ICD-10-CM

## 2024-05-07 DIAGNOSIS — M51.36 BULGING LUMBAR DISC: ICD-10-CM

## 2024-05-07 DIAGNOSIS — M47.816 OSTEOARTHRITIS OF LUMBAR SPINE, UNSPECIFIED SPINAL OSTEOARTHRITIS COMPLICATION STATUS: ICD-10-CM

## 2024-05-07 DIAGNOSIS — M54.50 ACUTE BILATERAL LOW BACK PAIN WITHOUT SCIATICA: Primary | ICD-10-CM

## 2024-05-07 LAB
ANION GAP SERPL CALCULATED.3IONS-SCNC: 12 MMOL/L (ref 3–16)
BASOPHILS # BLD: 0.1 K/UL (ref 0–0.2)
BASOPHILS NFR BLD: 0.8 %
BILIRUB UR QL STRIP.AUTO: NEGATIVE
BUN SERPL-MCNC: 9 MG/DL (ref 7–20)
CALCIUM SERPL-MCNC: 8.9 MG/DL (ref 8.3–10.6)
CHLORIDE SERPL-SCNC: 96 MMOL/L (ref 99–110)
CLARITY UR: CLEAR
CO2 SERPL-SCNC: 21 MMOL/L (ref 21–32)
COLOR UR: YELLOW
CREAT SERPL-MCNC: <0.5 MG/DL (ref 0.6–1.2)
DEPRECATED RDW RBC AUTO: 14.4 % (ref 12.4–15.4)
EOSINOPHIL # BLD: 0.2 K/UL (ref 0–0.6)
EOSINOPHIL NFR BLD: 1.4 %
EPI CELLS #/AREA URNS HPF: NORMAL /HPF (ref 0–5)
GFR SERPLBLD CREATININE-BSD FMLA CKD-EPI: >90 ML/MIN/{1.73_M2}
GLUCOSE SERPL-MCNC: 103 MG/DL (ref 70–99)
GLUCOSE UR STRIP.AUTO-MCNC: NEGATIVE MG/DL
HCT VFR BLD AUTO: 37.8 % (ref 36–48)
HGB BLD-MCNC: 12.5 G/DL (ref 12–16)
HGB UR QL STRIP.AUTO: NEGATIVE
INR PPP: 1.01 (ref 0.85–1.15)
KETONES UR STRIP.AUTO-MCNC: NEGATIVE MG/DL
LEUKOCYTE ESTERASE UR QL STRIP.AUTO: ABNORMAL
LYMPHOCYTES # BLD: 1.6 K/UL (ref 1–5.1)
LYMPHOCYTES NFR BLD: 13 %
MCH RBC QN AUTO: 30.4 PG (ref 26–34)
MCHC RBC AUTO-ENTMCNC: 33 G/DL (ref 31–36)
MCV RBC AUTO: 92.2 FL (ref 80–100)
MONOCYTES # BLD: 1 K/UL (ref 0–1.3)
MONOCYTES NFR BLD: 8.1 %
NEUTROPHILS # BLD: 9.7 K/UL (ref 1.7–7.7)
NEUTROPHILS NFR BLD: 76.7 %
NITRITE UR QL STRIP.AUTO: NEGATIVE
PH UR STRIP.AUTO: 7 [PH] (ref 5–8)
PLATELET # BLD AUTO: 256 K/UL (ref 135–450)
PMV BLD AUTO: 10.2 FL (ref 5–10.5)
POTASSIUM SERPL-SCNC: 4.1 MMOL/L (ref 3.5–5.1)
PROT UR STRIP.AUTO-MCNC: NEGATIVE MG/DL
PROTHROMBIN TIME: 13.5 SEC (ref 11.9–14.9)
RBC # BLD AUTO: 4.11 M/UL (ref 4–5.2)
RBC #/AREA URNS HPF: NORMAL /HPF (ref 0–4)
SODIUM SERPL-SCNC: 129 MMOL/L (ref 136–145)
SP GR UR STRIP.AUTO: <=1.005 (ref 1–1.03)
UA COMPLETE W REFLEX CULTURE PNL UR: ABNORMAL
UA DIPSTICK W REFLEX MICRO PNL UR: YES
URN SPEC COLLECT METH UR: ABNORMAL
UROBILINOGEN UR STRIP-ACNC: 0.2 E.U./DL
WBC # BLD AUTO: 12.6 K/UL (ref 4–11)
WBC #/AREA URNS HPF: NORMAL /HPF (ref 0–5)

## 2024-05-07 PROCEDURE — 85610 PROTHROMBIN TIME: CPT

## 2024-05-07 PROCEDURE — 99284 EMERGENCY DEPT VISIT MOD MDM: CPT

## 2024-05-07 PROCEDURE — 96375 TX/PRO/DX INJ NEW DRUG ADDON: CPT

## 2024-05-07 PROCEDURE — 80048 BASIC METABOLIC PNL TOTAL CA: CPT

## 2024-05-07 PROCEDURE — 6360000002 HC RX W HCPCS: Performed by: PHYSICIAN ASSISTANT

## 2024-05-07 PROCEDURE — 85025 COMPLETE CBC W/AUTO DIFF WBC: CPT

## 2024-05-07 PROCEDURE — 6370000000 HC RX 637 (ALT 250 FOR IP): Performed by: PHYSICIAN ASSISTANT

## 2024-05-07 PROCEDURE — 72131 CT LUMBAR SPINE W/O DYE: CPT

## 2024-05-07 PROCEDURE — 81001 URINALYSIS AUTO W/SCOPE: CPT

## 2024-05-07 PROCEDURE — 96374 THER/PROPH/DIAG INJ IV PUSH: CPT

## 2024-05-07 RX ORDER — ORPHENADRINE CITRATE 30 MG/ML
30 INJECTION INTRAMUSCULAR; INTRAVENOUS ONCE
Status: COMPLETED | OUTPATIENT
Start: 2024-05-07 | End: 2024-05-07

## 2024-05-07 RX ORDER — OXYCODONE HYDROCHLORIDE 5 MG/1
5 TABLET ORAL ONCE
Status: COMPLETED | OUTPATIENT
Start: 2024-05-07 | End: 2024-05-07

## 2024-05-07 RX ORDER — OXYCODONE HYDROCHLORIDE 5 MG/1
5 TABLET ORAL EVERY 8 HOURS PRN
Qty: 12 TABLET | Refills: 0 | Status: SHIPPED | OUTPATIENT
Start: 2024-05-07 | End: 2024-05-11

## 2024-05-07 RX ORDER — KETOROLAC TROMETHAMINE 30 MG/ML
15 INJECTION, SOLUTION INTRAMUSCULAR; INTRAVENOUS ONCE
Status: COMPLETED | OUTPATIENT
Start: 2024-05-07 | End: 2024-05-07

## 2024-05-07 RX ADMIN — OXYCODONE 5 MG: 5 TABLET ORAL at 18:42

## 2024-05-07 RX ADMIN — KETOROLAC TROMETHAMINE 15 MG: 30 INJECTION, SOLUTION INTRAMUSCULAR at 18:41

## 2024-05-07 RX ADMIN — OXYCODONE 5 MG: 5 TABLET ORAL at 16:50

## 2024-05-07 RX ADMIN — ORPHENADRINE CITRATE 30 MG: 60 INJECTION INTRAMUSCULAR; INTRAVENOUS at 16:49

## 2024-05-07 ASSESSMENT — LIFESTYLE VARIABLES
HOW MANY STANDARD DRINKS CONTAINING ALCOHOL DO YOU HAVE ON A TYPICAL DAY: PATIENT DOES NOT DRINK
HOW OFTEN DO YOU HAVE A DRINK CONTAINING ALCOHOL: NEVER

## 2024-05-07 ASSESSMENT — PAIN DESCRIPTION - LOCATION
LOCATION: BACK
LOCATION: BACK

## 2024-05-07 ASSESSMENT — PAIN DESCRIPTION - ORIENTATION: ORIENTATION: LOWER

## 2024-05-07 ASSESSMENT — PAIN SCALES - GENERAL
PAINLEVEL_OUTOF10: 10
PAINLEVEL_OUTOF10: 6

## 2024-05-07 ASSESSMENT — PAIN - FUNCTIONAL ASSESSMENT: PAIN_FUNCTIONAL_ASSESSMENT: 0-10

## 2024-05-07 NOTE — ED PROVIDER NOTES
reflexes.  She is not having any chest or abdominal pain and moves about well.  Based on her age and complaints I did order screening labs including PT/INR as it seems the patient is on Coumadin (though she does not know for sure) along with CT L-spine.  Patient ordered oxycodone 5 mg orally and Norflex 30 mg IV for pain.    CBC white count 12.6 with H&H 12.5 and 37.8  BMP slight hyponatremia 129.  Chloride 96.  Normal kidney function  PT 13.5 and INR 1.01 (I suspect the patient's warfarin was stopped and I suspect she is on Xarelto now)    Patient reassessed at 5:20 PM.  She is witnessed ambulating to and from the bathroom with her cane though otherwise independently.     She does admit to feeling better but also requests a little bit more pain medicine.  She is given a second dose of oxycodone 5 mg orally and Toradol 15 mg IV.    CT L-spine without contrast shows:  Severe chronic wedge compression fracture of L1 which is unchanged.  There is  mild bulging of the posterior cortex causing mild to moderate anterior dural  sac effacement which is unchanged.  Recommend surgical management of the  fracture for follow-up.  Mild old compression fracture of T12 superiorly which is unchanged.  Moderate degenerative disc changes throughout with no acute lumbar spine  fracture seen  Small central disc bulges from L3 through S1.  Moderate osteoarthritic changes of facets throughout causing diffuse mild  spinal stenosis  1.6 cm hemorrhagic cyst or solid nodule left kidney which is unchanged.  Recommend ultrasound follow-up.    Patient was reassessed and was able to get up and ambulate again to the bathroom independently.  I reviewed results with her.  Told her it may be worth following up with [facility].  She is given a referral to see Dr. Angel.  She will be given a short course of oxycodone.  At this time, she is not experiencing any concerning red flag symptoms such as neurologic deficit or infectious signs/symptoms.  I

## 2024-05-07 NOTE — ED NOTES
Went to give discharge paperwork, pt had already removed PIV, band aid was placed, paperwork was given, pt wheeled to son's car with all belongings in hand

## 2024-05-14 ENCOUNTER — HOSPITAL ENCOUNTER (INPATIENT)
Age: 79
LOS: 3 days | Discharge: HOME | DRG: 378 | End: 2024-05-17
Attending: EMERGENCY MEDICINE | Admitting: INTERNAL MEDICINE
Payer: MEDICARE

## 2024-05-14 ENCOUNTER — APPOINTMENT (OUTPATIENT)
Dept: CT IMAGING | Age: 79
DRG: 378 | End: 2024-05-14
Payer: MEDICARE

## 2024-05-14 DIAGNOSIS — K92.1 MELENA: Primary | ICD-10-CM

## 2024-05-14 DIAGNOSIS — I48.0 PAF (PAROXYSMAL ATRIAL FIBRILLATION) (HCC): ICD-10-CM

## 2024-05-14 DIAGNOSIS — Z79.01 ANTICOAGULATED: ICD-10-CM

## 2024-05-14 LAB
ALBUMIN SERPL-MCNC: 3.9 G/DL (ref 3.4–5)
ALBUMIN/GLOB SERPL: 1.3 {RATIO} (ref 1.1–2.2)
ALP SERPL-CCNC: 76 U/L (ref 40–129)
ALT SERPL-CCNC: 9 U/L (ref 10–40)
ANION GAP SERPL CALCULATED.3IONS-SCNC: 12 MMOL/L (ref 3–16)
AST SERPL-CCNC: 14 U/L (ref 15–37)
BACTERIA URNS QL MICRO: ABNORMAL /HPF
BASOPHILS # BLD: 0.1 K/UL (ref 0–0.2)
BASOPHILS NFR BLD: 0.7 %
BILIRUB SERPL-MCNC: 0.3 MG/DL (ref 0–1)
BILIRUB UR QL STRIP.AUTO: NEGATIVE
BUN SERPL-MCNC: 6 MG/DL (ref 7–20)
CALCIUM SERPL-MCNC: 9 MG/DL (ref 8.3–10.6)
CHLORIDE SERPL-SCNC: 97 MMOL/L (ref 99–110)
CLARITY UR: CLEAR
CO2 SERPL-SCNC: 24 MMOL/L (ref 21–32)
COLOR UR: YELLOW
CREAT SERPL-MCNC: <0.5 MG/DL (ref 0.6–1.2)
DEPRECATED RDW RBC AUTO: 14.4 % (ref 12.4–15.4)
EOSINOPHIL # BLD: 0.1 K/UL (ref 0–0.6)
EOSINOPHIL NFR BLD: 1.1 %
EPI CELLS #/AREA URNS HPF: ABNORMAL /HPF (ref 0–5)
GFR SERPLBLD CREATININE-BSD FMLA CKD-EPI: >90 ML/MIN/{1.73_M2}
GLUCOSE SERPL-MCNC: 98 MG/DL (ref 70–99)
GLUCOSE UR STRIP.AUTO-MCNC: NEGATIVE MG/DL
HCT VFR BLD AUTO: 34.7 % (ref 36–48)
HGB BLD-MCNC: 11.3 G/DL (ref 12–16)
HGB UR QL STRIP.AUTO: NEGATIVE
KETONES UR STRIP.AUTO-MCNC: 15 MG/DL
LEUKOCYTE ESTERASE UR QL STRIP.AUTO: ABNORMAL
LYMPHOCYTES # BLD: 1 K/UL (ref 1–5.1)
LYMPHOCYTES NFR BLD: 10 %
MCH RBC QN AUTO: 30.2 PG (ref 26–34)
MCHC RBC AUTO-ENTMCNC: 32.5 G/DL (ref 31–36)
MCV RBC AUTO: 92.9 FL (ref 80–100)
MONOCYTES # BLD: 0.6 K/UL (ref 0–1.3)
MONOCYTES NFR BLD: 6.3 %
NEUTROPHILS # BLD: 8.3 K/UL (ref 1.7–7.7)
NEUTROPHILS NFR BLD: 81.9 %
NITRITE UR QL STRIP.AUTO: NEGATIVE
PH UR STRIP.AUTO: 5.5 [PH] (ref 5–8)
PLATELET # BLD AUTO: 281 K/UL (ref 135–450)
PMV BLD AUTO: 10.8 FL (ref 5–10.5)
POTASSIUM SERPL-SCNC: 3.7 MMOL/L (ref 3.5–5.1)
PROT SERPL-MCNC: 6.8 G/DL (ref 6.4–8.2)
PROT UR STRIP.AUTO-MCNC: NEGATIVE MG/DL
RBC # BLD AUTO: 3.74 M/UL (ref 4–5.2)
RBC #/AREA URNS HPF: ABNORMAL /HPF (ref 0–4)
RENAL EPI CELLS #/AREA UR COMP ASSIST: ABNORMAL /HPF (ref 0–1)
SODIUM SERPL-SCNC: 133 MMOL/L (ref 136–145)
SP GR UR STRIP.AUTO: <=1.005 (ref 1–1.03)
UA COMPLETE W REFLEX CULTURE PNL UR: YES
UA DIPSTICK W REFLEX MICRO PNL UR: YES
URN SPEC COLLECT METH UR: ABNORMAL
UROBILINOGEN UR STRIP-ACNC: 0.2 E.U./DL
WBC # BLD AUTO: 10.1 K/UL (ref 4–11)
WBC #/AREA URNS HPF: ABNORMAL /HPF (ref 0–5)

## 2024-05-14 PROCEDURE — 6360000002 HC RX W HCPCS: Performed by: EMERGENCY MEDICINE

## 2024-05-14 PROCEDURE — 80053 COMPREHEN METABOLIC PANEL: CPT

## 2024-05-14 PROCEDURE — 2580000003 HC RX 258: Performed by: EMERGENCY MEDICINE

## 2024-05-14 PROCEDURE — 85025 COMPLETE CBC W/AUTO DIFF WBC: CPT

## 2024-05-14 PROCEDURE — 2580000003 HC RX 258: Performed by: INTERNAL MEDICINE

## 2024-05-14 PROCEDURE — 6370000000 HC RX 637 (ALT 250 FOR IP): Performed by: INTERNAL MEDICINE

## 2024-05-14 PROCEDURE — 6360000002 HC RX W HCPCS: Performed by: INTERNAL MEDICINE

## 2024-05-14 PROCEDURE — 96375 TX/PRO/DX INJ NEW DRUG ADDON: CPT

## 2024-05-14 PROCEDURE — 81001 URINALYSIS AUTO W/SCOPE: CPT

## 2024-05-14 PROCEDURE — 96374 THER/PROPH/DIAG INJ IV PUSH: CPT

## 2024-05-14 PROCEDURE — 74177 CT ABD & PELVIS W/CONTRAST: CPT

## 2024-05-14 PROCEDURE — 87086 URINE CULTURE/COLONY COUNT: CPT

## 2024-05-14 PROCEDURE — 99285 EMERGENCY DEPT VISIT HI MDM: CPT

## 2024-05-14 PROCEDURE — 94640 AIRWAY INHALATION TREATMENT: CPT

## 2024-05-14 PROCEDURE — 1200000000 HC SEMI PRIVATE

## 2024-05-14 PROCEDURE — 6360000004 HC RX CONTRAST MEDICATION: Performed by: EMERGENCY MEDICINE

## 2024-05-14 PROCEDURE — C9113 INJ PANTOPRAZOLE SODIUM, VIA: HCPCS | Performed by: NURSE PRACTITIONER

## 2024-05-14 PROCEDURE — 6360000002 HC RX W HCPCS: Performed by: NURSE PRACTITIONER

## 2024-05-14 RX ORDER — HYDROXYZINE HYDROCHLORIDE 10 MG/1
25 TABLET, FILM COATED ORAL NIGHTLY
Status: DISCONTINUED | OUTPATIENT
Start: 2024-05-14 | End: 2024-05-17 | Stop reason: HOSPADM

## 2024-05-14 RX ORDER — ALBUTEROL SULFATE 90 UG/1
1 AEROSOL, METERED RESPIRATORY (INHALATION)
Status: DISCONTINUED | OUTPATIENT
Start: 2024-05-14 | End: 2024-05-17 | Stop reason: HOSPADM

## 2024-05-14 RX ORDER — ACETAMINOPHEN 650 MG/1
650 SUPPOSITORY RECTAL EVERY 6 HOURS PRN
Status: DISCONTINUED | OUTPATIENT
Start: 2024-05-14 | End: 2024-05-17 | Stop reason: HOSPADM

## 2024-05-14 RX ORDER — ONDANSETRON 4 MG/1
4 TABLET, ORALLY DISINTEGRATING ORAL EVERY 8 HOURS PRN
Status: DISCONTINUED | OUTPATIENT
Start: 2024-05-14 | End: 2024-05-17 | Stop reason: HOSPADM

## 2024-05-14 RX ORDER — POTASSIUM CHLORIDE 20 MEQ/1
20 TABLET, EXTENDED RELEASE ORAL DAILY
Status: DISCONTINUED | OUTPATIENT
Start: 2024-05-15 | End: 2024-05-17 | Stop reason: HOSPADM

## 2024-05-14 RX ORDER — SODIUM CHLORIDE 0.9 % (FLUSH) 0.9 %
5-40 SYRINGE (ML) INJECTION EVERY 12 HOURS SCHEDULED
Status: DISCONTINUED | OUTPATIENT
Start: 2024-05-14 | End: 2024-05-17 | Stop reason: HOSPADM

## 2024-05-14 RX ORDER — ONDANSETRON 2 MG/ML
4 INJECTION INTRAMUSCULAR; INTRAVENOUS EVERY 6 HOURS PRN
Status: DISCONTINUED | OUTPATIENT
Start: 2024-05-14 | End: 2024-05-17 | Stop reason: HOSPADM

## 2024-05-14 RX ORDER — SODIUM CHLORIDE 9 MG/ML
INJECTION, SOLUTION INTRAVENOUS CONTINUOUS
Status: ACTIVE | OUTPATIENT
Start: 2024-05-14 | End: 2024-05-15

## 2024-05-14 RX ORDER — PANTOPRAZOLE SODIUM 40 MG/10ML
40 INJECTION, POWDER, LYOPHILIZED, FOR SOLUTION INTRAVENOUS 2 TIMES DAILY
Status: DISCONTINUED | OUTPATIENT
Start: 2024-05-14 | End: 2024-05-17 | Stop reason: HOSPADM

## 2024-05-14 RX ORDER — DIPHENHYDRAMINE HYDROCHLORIDE 50 MG/ML
50 INJECTION INTRAMUSCULAR; INTRAVENOUS ONCE
Status: COMPLETED | OUTPATIENT
Start: 2024-05-14 | End: 2024-05-14

## 2024-05-14 RX ORDER — ACETAMINOPHEN 325 MG/1
650 TABLET ORAL EVERY 6 HOURS PRN
Status: DISCONTINUED | OUTPATIENT
Start: 2024-05-14 | End: 2024-05-17 | Stop reason: HOSPADM

## 2024-05-14 RX ORDER — MORPHINE SULFATE 2 MG/ML
2 INJECTION, SOLUTION INTRAMUSCULAR; INTRAVENOUS EVERY 4 HOURS PRN
Status: DISCONTINUED | OUTPATIENT
Start: 2024-05-14 | End: 2024-05-17 | Stop reason: HOSPADM

## 2024-05-14 RX ORDER — SODIUM CHLORIDE 0.9 % (FLUSH) 0.9 %
5-40 SYRINGE (ML) INJECTION PRN
Status: DISCONTINUED | OUTPATIENT
Start: 2024-05-14 | End: 2024-05-17 | Stop reason: HOSPADM

## 2024-05-14 RX ORDER — MORPHINE SULFATE 2 MG/ML
1 INJECTION, SOLUTION INTRAMUSCULAR; INTRAVENOUS EVERY 4 HOURS PRN
Status: DISCONTINUED | OUTPATIENT
Start: 2024-05-14 | End: 2024-05-17 | Stop reason: HOSPADM

## 2024-05-14 RX ORDER — OXYBUTYNIN CHLORIDE 5 MG/1
5 TABLET, EXTENDED RELEASE ORAL NIGHTLY
Status: DISCONTINUED | OUTPATIENT
Start: 2024-05-14 | End: 2024-05-17 | Stop reason: HOSPADM

## 2024-05-14 RX ORDER — PRAVASTATIN SODIUM 40 MG
40 TABLET ORAL DAILY
Status: DISCONTINUED | OUTPATIENT
Start: 2024-05-15 | End: 2024-05-17 | Stop reason: HOSPADM

## 2024-05-14 RX ORDER — SODIUM CHLORIDE 9 MG/ML
INJECTION, SOLUTION INTRAVENOUS PRN
Status: DISCONTINUED | OUTPATIENT
Start: 2024-05-14 | End: 2024-05-17 | Stop reason: HOSPADM

## 2024-05-14 RX ORDER — BACLOFEN 10 MG/1
10 TABLET ORAL 2 TIMES DAILY
Status: DISCONTINUED | OUTPATIENT
Start: 2024-05-14 | End: 2024-05-17 | Stop reason: HOSPADM

## 2024-05-14 RX ORDER — FLUTICASONE PROPIONATE 50 MCG
1 SPRAY, SUSPENSION (ML) NASAL 2 TIMES DAILY PRN
Status: DISCONTINUED | OUTPATIENT
Start: 2024-05-14 | End: 2024-05-17 | Stop reason: HOSPADM

## 2024-05-14 RX ORDER — MORPHINE SULFATE 4 MG/ML
4 INJECTION, SOLUTION INTRAMUSCULAR; INTRAVENOUS
Status: DISCONTINUED | OUTPATIENT
Start: 2024-05-14 | End: 2024-05-14 | Stop reason: HOSPADM

## 2024-05-14 RX ORDER — MONTELUKAST SODIUM 10 MG/1
10 TABLET ORAL NIGHTLY
Status: DISCONTINUED | OUTPATIENT
Start: 2024-05-14 | End: 2024-05-17 | Stop reason: HOSPADM

## 2024-05-14 RX ORDER — POLYETHYLENE GLYCOL 3350 17 G/17G
17 POWDER, FOR SOLUTION ORAL DAILY PRN
Status: DISCONTINUED | OUTPATIENT
Start: 2024-05-14 | End: 2024-05-17 | Stop reason: HOSPADM

## 2024-05-14 RX ADMIN — SODIUM CHLORIDE, PRESERVATIVE FREE 10 ML: 5 INJECTION INTRAVENOUS at 20:08

## 2024-05-14 RX ADMIN — DIPHENHYDRAMINE HYDROCHLORIDE 50 MG: 50 INJECTION INTRAMUSCULAR; INTRAVENOUS at 10:31

## 2024-05-14 RX ADMIN — MONTELUKAST 10 MG: 10 TABLET, FILM COATED ORAL at 20:08

## 2024-05-14 RX ADMIN — MORPHINE SULFATE 4 MG: 4 INJECTION, SOLUTION INTRAMUSCULAR; INTRAVENOUS at 10:31

## 2024-05-14 RX ADMIN — IOPAMIDOL 75 ML: 755 INJECTION, SOLUTION INTRAVENOUS at 11:35

## 2024-05-14 RX ADMIN — Medication 1 PUFF: at 21:46

## 2024-05-14 RX ADMIN — SODIUM CHLORIDE: 9 INJECTION, SOLUTION INTRAVENOUS at 17:38

## 2024-05-14 RX ADMIN — WATER 125 MG: 1 INJECTION INTRAMUSCULAR; INTRAVENOUS; SUBCUTANEOUS at 10:31

## 2024-05-14 RX ADMIN — OXYBUTYNIN CHLORIDE 5 MG: 5 TABLET, EXTENDED RELEASE ORAL at 20:08

## 2024-05-14 RX ADMIN — PANTOPRAZOLE SODIUM 40 MG: 40 INJECTION, POWDER, FOR SOLUTION INTRAVENOUS at 20:08

## 2024-05-14 RX ADMIN — HYDROXYZINE HYDROCHLORIDE 25 MG: 10 TABLET ORAL at 20:08

## 2024-05-14 RX ADMIN — BACLOFEN 10 MG: 10 TABLET ORAL at 20:08

## 2024-05-14 RX ADMIN — MORPHINE SULFATE 2 MG: 2 INJECTION, SOLUTION INTRAMUSCULAR; INTRAVENOUS at 17:58

## 2024-05-14 ASSESSMENT — PAIN SCALES - GENERAL
PAINLEVEL_OUTOF10: 10
PAINLEVEL_OUTOF10: 10
PAINLEVEL_OUTOF10: 0

## 2024-05-14 ASSESSMENT — PAIN DESCRIPTION - LOCATION
LOCATION: ABDOMEN;BACK
LOCATION: BACK

## 2024-05-14 ASSESSMENT — PAIN - FUNCTIONAL ASSESSMENT: PAIN_FUNCTIONAL_ASSESSMENT: NONE - DENIES PAIN

## 2024-05-14 NOTE — ED PROVIDER NOTES
11/01/2023    Clindamycin/lincomycin  11/13/2012    Influenza vaccines Other (See Comments) 02/02/2016    Tetanus immune globulin  11/01/2023    Albuterol Nausea Only and Other (See Comments) 07/17/2012    Codeine Nausea And Vomiting 10/18/2010    Doxycycline Nausea And Vomiting and Dizziness or Vertigo 11/01/2023    Spiriva handihaler [tiotropium bromide monohydrate] Rash 08/02/2017       Past Medical History:   Diagnosis Date    Arthritis     Asthma     Bronchitis chronic     Colon polyp     COPD (chronic obstructive pulmonary disease) (HCC)     Emphysema of lung (HCC)     Guillain-Greenfield (HCC)     Hyperlipidemia     Lock jaw     Pneumonia     Post-nasal drip     Primary hypertension 3/18/2024    Pulmonary nodule     bi lateral    Rash     itchy, bumps    Reflex sympathetic dystrophy     RSD (reflex sympathetic dystrophy)     TMJ (dislocation of temporomandibular joint)         Surgical History:   Past Surgical History:   Procedure Laterality Date    APPENDECTOMY      BACK SURGERY      BRONCHOSCOPY  2008    COLONOSCOPY  11/15/2012    1 snared polyp    CT NEEDLE BIOPSY LUNG PERCUTANEOUS  04/04/2023    CT NEEDLE BIOPSY LUNG PERCUTANEOUS 4/4/2023 [facility] CT SCAN    HYSTERECTOMY (CERVIX STATUS UNKNOWN)      PACEMAKER INSERTION      TONSILLECTOMY          Family History:    Family History   Problem Relation Age of Onset    Diabetes Brother     Cancer Mother     Colon Cancer Mother     Asthma Neg Hx     Emphysema Neg Hx     Heart Failure Neg Hx     Hypertension Neg Hx        Social History     Socioeconomic History    Marital status:      Spouse name: Not on file    Number of children: Not on file    Years of education: Not on file    Highest education level: Not on file   Occupational History    Not on file   Tobacco Use    Smoking status: Former     Current packs/day: 0.00     Average packs/day: 0.3 packs/day for 51.0 years (12.8 ttl pk-yrs)     Types: Cigarettes     Start date: 11/29/1966     Quit date: 11/29/2017

## 2024-05-14 NOTE — ED NOTES
353 @ 1435  
@5801 Called GI Per   RE:jamaica  @7666 Es[address] Rascon in ER department and spoke to   
CONTRAST    Result Date: 5/7/2024  EXAMINATION: CT OF THE LUMBAR SPINE WITHOUT CONTRAST  5/7/2024 TECHNIQUE: CT of the lumbar spine was performed without the administration of intravenous contrast. Multiplanar reformatted images are provided for review. Adjustment of mA and/or kV according to patient size was utilized.  Automated exposure control, iterative reconstruction, and/or weight based adjustment of the mA/kV was utilized to reduce the radiation dose to as low as reasonably achievable. COMPARISON: 02/27/2024 HISTORY: ORDERING SYSTEM PROVIDED HISTORY: pain TECHNOLOGIST PROVIDED HISTORY: Reason for exam:->pain Decision Support Exception - unselect if not a suspected or confirmed emergency medical condition->Emergency Medical Condition (MA) Reason for Exam: low back pain . severe since yesterday.  [facility] Relevant Medical/Surgical History: hysterectomy, APPY FINDINGS: BONES/ALIGNMENT: There is a severe wedge compression fracture of L1 which is unchanged.  There is mild bulging of the posterior cortex of L1 causing moderate dural sac effacement anteriorly which is unchanged.  There is no retropulsed fragment.  There is a mild compression fracture along the superior endplate of T12 which is unchanged.  There is mild to moderate disc space narrowing throughout.  The posterior elements are intact.  The bones are osteopenic. DEGENERATIVE CHANGES: There is moderate scoliosis.  There are small central disc bulges at L3-4, L4-5, and L5-S1 causing mild anterior dural sac effacement throughout.  There are moderate hypertrophic changes of the facets throughout with ligament flavum hypertrophy causing diffuse mild spinal stenosis. SOFT TISSUES/RETROPERITONEUM: There is a 1.6 cm hyperdense nodule upper pole left kidney which is unchanged.  There is moderate calcified plaque throughout the aorta with fusiform dilatation of the infrarenal aorta measuring 2.7 cm which is unchanged.  The sacrum and SI joints are intact. No

## 2024-05-14 NOTE — PROGRESS NOTES
05/14/24 1657   Encounter Summary   Encounter Overview/Reason Initial Encounter;Spiritual/Emotional Needs   Service Provided For Patient   Referral/Consult From Multi-disciplinary team   Support System Children   Last Encounter  05/14/24  (consult for assistance with food upon discharge, CH assessed food needs, CH will follow up)   Complexity of Encounter Moderate   Begin Time 1605   End Time  1635   Total Time Calculated 30 min   Spiritual/Emotional needs   Type Emotional Distress   Assessment/Intervention/Outcome   Assessment Concerns with suffering   Intervention Active listening;Explored/Affirmed feelings, thoughts, concerns;Explored Coping Skills/Resources;Nurtured Hope;Life review/Legacy   Outcome Encouraged

## 2024-05-14 NOTE — PROGRESS NOTES
4 Eyes Skin Assessment     NAME:  Isa Hernandez  YOB: 1945  MEDICAL RECORD NUMBER:  [id number]    The patient is being assessed for  Admission    I agree that at least one RN has performed a thorough Head to Toe Skin Assessment on the patient. ALL assessment sites listed below have been assessed.      Areas assessed by both nurses:    Head, Face, Ears, Shoulders, Back, Chest, Arms, Elbows, Hands, Sacrum. Buttock, Coccyx, Ischium, and Legs. Feet and Heels        Does the Patient have a Wound? No noted wound(s)       Jeisno Prevention initiated by RN: No  Wound Care Orders initiated by RN: No    Pressure Injury (Stage 3,4, Unstageable, DTI, NWPT, and Complex wounds) if present, place Wound referral order by RN under : No    New Ostomies, if present place, Ostomy referral order under : No     Nurse 1 eSignature: Electronically signed by Renay Hester RN on 5/14/24 at 7:22 PM EDT    **SHARE this note so that the co-signing nurse can place an eSignature**    Nurse 2 eSignature: Electronically signed by Della Venegas RN on 5/15/24 at 12:37 AM EDT

## 2024-05-14 NOTE — CONSULTS
CONSULTATION  Isa Hernandez is a 79 y.o. female asked to see us in consultation by  Jocelin Ellsworth MD & Tavo Jett MD for evaluation of melena.    Ms. Hernandez is an 79 year old female with past medical history of colon polyps, COPD,collagenous colitis, HLD, a-fib on xarelto and HTN who presents to the ER with melena.  She says she has been constipated.   She called her PCP office and they advised her not to take a laxative.  She has a history of chronic diarrhea which has been worked up in the past by Dr. Rascon.  She says she was finally able to move her bowels and stoo was black.  On rectal exam in ER melena was also noted.  She denies pepto bismol and iron use.  No hematemesis or abdominal pain.  Denies NSAID use.  She is on xarelto for a-fib.    Last colonoscopy 2022 demonstrated diverticulosis and several pre cancerous polyps.  Last EGD 2002 was normal.    Her Hgb is 11.3 down from 12.5 one week ago.  BUN is normal.  Vitals are stable.  CTap unremarkable.          Not in a hospital admission.    Medication:        Allergies:  Allergies   Allergen Reactions    Latex Itching and Rash    Iodine      IV dye    Penicillins      Pt states she stopped breathing    Sulfa Antibiotics      Pt stopped breathing.    Tetanus Toxoids Shortness Of Breath     Pt stopped breathing    Cephalexin      Other reaction(s): Not available    Clindamycin/Lincomycin     Influenza Vaccines Other (See Comments)     History of GBS    Tetanus Immune Globulin      Other reaction(s): Not available    Albuterol Nausea Only and Other (See Comments)     Cold chills, shakes. States it is only with the pill, but she is fine with breathing treatments.    Codeine Nausea And Vomiting    Doxycycline Nausea And Vomiting and Dizziness or Vertigo     Other reaction(s): Not available    Spiriva Handihaler [Tiotropium Bromide Monohydrate] Rash

## 2024-05-15 ENCOUNTER — ANESTHESIA (OUTPATIENT)
Dept: ENDOSCOPY | Age: 79
DRG: 378 | End: 2024-05-15
Payer: MEDICARE

## 2024-05-15 ENCOUNTER — ANESTHESIA EVENT (OUTPATIENT)
Dept: ENDOSCOPY | Age: 79
DRG: 378 | End: 2024-05-15
Payer: MEDICARE

## 2024-05-15 LAB
ALBUMIN SERPL-MCNC: 3.4 G/DL (ref 3.4–5)
ANION GAP SERPL CALCULATED.3IONS-SCNC: 10 MMOL/L (ref 3–16)
BACTERIA UR CULT: NORMAL
BUN SERPL-MCNC: 3 MG/DL (ref 7–20)
CALCIUM SERPL-MCNC: 8.5 MG/DL (ref 8.3–10.6)
CHLORIDE SERPL-SCNC: 105 MMOL/L (ref 99–110)
CO2 SERPL-SCNC: 21 MMOL/L (ref 21–32)
CREAT SERPL-MCNC: <0.5 MG/DL (ref 0.6–1.2)
DEPRECATED RDW RBC AUTO: 14.7 % (ref 12.4–15.4)
GFR SERPLBLD CREATININE-BSD FMLA CKD-EPI: >90 ML/MIN/{1.73_M2}
GLUCOSE SERPL-MCNC: 113 MG/DL (ref 70–99)
HCT VFR BLD AUTO: 30.8 % (ref 36–48)
HGB BLD-MCNC: 10.3 G/DL (ref 12–16)
MAGNESIUM SERPL-MCNC: 1.8 MG/DL (ref 1.8–2.4)
MCH RBC QN AUTO: 30.7 PG (ref 26–34)
MCHC RBC AUTO-ENTMCNC: 33.5 G/DL (ref 31–36)
MCV RBC AUTO: 91.9 FL (ref 80–100)
PHOSPHATE SERPL-MCNC: 2.8 MG/DL (ref 2.5–4.9)
PLATELET # BLD AUTO: 250 K/UL (ref 135–450)
PMV BLD AUTO: 10.8 FL (ref 5–10.5)
POTASSIUM SERPL-SCNC: 3.6 MMOL/L (ref 3.5–5.1)
RBC # BLD AUTO: 3.35 M/UL (ref 4–5.2)
SODIUM SERPL-SCNC: 136 MMOL/L (ref 136–145)
WBC # BLD AUTO: 10.1 K/UL (ref 4–11)

## 2024-05-15 PROCEDURE — 6360000002 HC RX W HCPCS: Performed by: INTERNAL MEDICINE

## 2024-05-15 PROCEDURE — 2500000003 HC RX 250 WO HCPCS: Performed by: NURSE ANESTHETIST, CERTIFIED REGISTERED

## 2024-05-15 PROCEDURE — 0DB58ZX EXCISION OF ESOPHAGUS, VIA NATURAL OR ARTIFICIAL OPENING ENDOSCOPIC, DIAGNOSTIC: ICD-10-PCS | Performed by: INTERNAL MEDICINE

## 2024-05-15 PROCEDURE — 6360000002 HC RX W HCPCS: Performed by: NURSE PRACTITIONER

## 2024-05-15 PROCEDURE — 1200000000 HC SEMI PRIVATE

## 2024-05-15 PROCEDURE — 6370000000 HC RX 637 (ALT 250 FOR IP): Performed by: INTERNAL MEDICINE

## 2024-05-15 PROCEDURE — 0DB68ZX EXCISION OF STOMACH, VIA NATURAL OR ARTIFICIAL OPENING ENDOSCOPIC, DIAGNOSTIC: ICD-10-PCS | Performed by: INTERNAL MEDICINE

## 2024-05-15 PROCEDURE — 2580000003 HC RX 258: Performed by: INTERNAL MEDICINE

## 2024-05-15 PROCEDURE — C9113 INJ PANTOPRAZOLE SODIUM, VIA: HCPCS | Performed by: NURSE PRACTITIONER

## 2024-05-15 PROCEDURE — 83735 ASSAY OF MAGNESIUM: CPT

## 2024-05-15 PROCEDURE — 88305 TISSUE EXAM BY PATHOLOGIST: CPT

## 2024-05-15 PROCEDURE — 94640 AIRWAY INHALATION TREATMENT: CPT

## 2024-05-15 PROCEDURE — 2580000003 HC RX 258: Performed by: NURSE ANESTHETIST, CERTIFIED REGISTERED

## 2024-05-15 PROCEDURE — 36415 COLL VENOUS BLD VENIPUNCTURE: CPT

## 2024-05-15 PROCEDURE — 80069 RENAL FUNCTION PANEL: CPT

## 2024-05-15 PROCEDURE — 7100000011 HC PHASE II RECOVERY - ADDTL 15 MIN: Performed by: INTERNAL MEDICINE

## 2024-05-15 PROCEDURE — 85027 COMPLETE CBC AUTOMATED: CPT

## 2024-05-15 PROCEDURE — 6360000002 HC RX W HCPCS: Performed by: NURSE ANESTHETIST, CERTIFIED REGISTERED

## 2024-05-15 PROCEDURE — 2709999900 HC NON-CHARGEABLE SUPPLY: Performed by: INTERNAL MEDICINE

## 2024-05-15 PROCEDURE — 7100000010 HC PHASE II RECOVERY - FIRST 15 MIN: Performed by: INTERNAL MEDICINE

## 2024-05-15 PROCEDURE — 3609012400 HC EGD TRANSORAL BIOPSY SINGLE/MULTIPLE: Performed by: INTERNAL MEDICINE

## 2024-05-15 PROCEDURE — 3700000000 HC ANESTHESIA ATTENDED CARE: Performed by: INTERNAL MEDICINE

## 2024-05-15 RX ORDER — SODIUM CHLORIDE 9 MG/ML
INJECTION, SOLUTION INTRAVENOUS CONTINUOUS PRN
Status: DISCONTINUED | OUTPATIENT
Start: 2024-05-15 | End: 2024-05-15 | Stop reason: SDUPTHER

## 2024-05-15 RX ORDER — ONDANSETRON 2 MG/ML
4 INJECTION INTRAMUSCULAR; INTRAVENOUS EVERY 30 MIN PRN
Status: DISCONTINUED | OUTPATIENT
Start: 2024-05-15 | End: 2024-05-15 | Stop reason: HOSPADM

## 2024-05-15 RX ORDER — PROPOFOL 10 MG/ML
INJECTION, EMULSION INTRAVENOUS PRN
Status: DISCONTINUED | OUTPATIENT
Start: 2024-05-15 | End: 2024-05-15 | Stop reason: SDUPTHER

## 2024-05-15 RX ORDER — SODIUM CHLORIDE 0.9 % (FLUSH) 0.9 %
5-40 SYRINGE (ML) INJECTION EVERY 12 HOURS SCHEDULED
Status: DISCONTINUED | OUTPATIENT
Start: 2024-05-15 | End: 2024-05-15 | Stop reason: HOSPADM

## 2024-05-15 RX ORDER — NALOXONE HYDROCHLORIDE 0.4 MG/ML
INJECTION, SOLUTION INTRAMUSCULAR; INTRAVENOUS; SUBCUTANEOUS PRN
Status: DISCONTINUED | OUTPATIENT
Start: 2024-05-15 | End: 2024-05-15 | Stop reason: HOSPADM

## 2024-05-15 RX ORDER — SODIUM CHLORIDE 0.9 % (FLUSH) 0.9 %
5-40 SYRINGE (ML) INJECTION PRN
Status: DISCONTINUED | OUTPATIENT
Start: 2024-05-15 | End: 2024-05-15 | Stop reason: HOSPADM

## 2024-05-15 RX ORDER — SODIUM CHLORIDE 9 MG/ML
INJECTION, SOLUTION INTRAVENOUS PRN
Status: DISCONTINUED | OUTPATIENT
Start: 2024-05-15 | End: 2024-05-15 | Stop reason: HOSPADM

## 2024-05-15 RX ORDER — LIDOCAINE HYDROCHLORIDE 20 MG/ML
INJECTION, SOLUTION EPIDURAL; INFILTRATION; INTRACAUDAL; PERINEURAL PRN
Status: DISCONTINUED | OUTPATIENT
Start: 2024-05-15 | End: 2024-05-15 | Stop reason: SDUPTHER

## 2024-05-15 RX ADMIN — PROPOFOL 20 MG: 10 INJECTION, EMULSION INTRAVENOUS at 14:55

## 2024-05-15 RX ADMIN — Medication 1 PUFF: at 11:27

## 2024-05-15 RX ADMIN — PRAVASTATIN SODIUM 40 MG: 40 TABLET ORAL at 08:05

## 2024-05-15 RX ADMIN — PANTOPRAZOLE SODIUM 40 MG: 40 INJECTION, POWDER, FOR SOLUTION INTRAVENOUS at 08:04

## 2024-05-15 RX ADMIN — SODIUM CHLORIDE, PRESERVATIVE FREE 10 ML: 5 INJECTION INTRAVENOUS at 20:05

## 2024-05-15 RX ADMIN — LIDOCAINE HYDROCHLORIDE 50 MG: 20 INJECTION, SOLUTION EPIDURAL; INFILTRATION; INTRACAUDAL; PERINEURAL at 14:51

## 2024-05-15 RX ADMIN — PANTOPRAZOLE SODIUM 40 MG: 40 INJECTION, POWDER, FOR SOLUTION INTRAVENOUS at 20:05

## 2024-05-15 RX ADMIN — BACLOFEN 10 MG: 10 TABLET ORAL at 08:05

## 2024-05-15 RX ADMIN — POTASSIUM CHLORIDE 20 MEQ: 1500 TABLET, EXTENDED RELEASE ORAL at 08:05

## 2024-05-15 RX ADMIN — SODIUM CHLORIDE: 9 INJECTION, SOLUTION INTRAVENOUS at 14:26

## 2024-05-15 RX ADMIN — OXYBUTYNIN CHLORIDE 5 MG: 5 TABLET, EXTENDED RELEASE ORAL at 20:04

## 2024-05-15 RX ADMIN — ACETAMINOPHEN 650 MG: 325 TABLET ORAL at 06:20

## 2024-05-15 RX ADMIN — Medication 1 PUFF: at 20:18

## 2024-05-15 RX ADMIN — PROPOFOL 50 MG: 10 INJECTION, EMULSION INTRAVENOUS at 14:51

## 2024-05-15 RX ADMIN — SODIUM CHLORIDE, PRESERVATIVE FREE 10 ML: 5 INJECTION INTRAVENOUS at 08:05

## 2024-05-15 RX ADMIN — HYDROXYZINE HYDROCHLORIDE 25 MG: 10 TABLET ORAL at 20:04

## 2024-05-15 RX ADMIN — ONDANSETRON 4 MG: 2 INJECTION INTRAMUSCULAR; INTRAVENOUS at 09:10

## 2024-05-15 RX ADMIN — MORPHINE SULFATE 2 MG: 2 INJECTION, SOLUTION INTRAMUSCULAR; INTRAVENOUS at 19:03

## 2024-05-15 RX ADMIN — Medication 1 PUFF: at 09:02

## 2024-05-15 RX ADMIN — SODIUM CHLORIDE: 9 INJECTION, SOLUTION INTRAVENOUS at 14:48

## 2024-05-15 RX ADMIN — MONTELUKAST 10 MG: 10 TABLET, FILM COATED ORAL at 20:04

## 2024-05-15 RX ADMIN — SODIUM CHLORIDE: 9 INJECTION, SOLUTION INTRAVENOUS at 03:30

## 2024-05-15 ASSESSMENT — PAIN SCALES - GENERAL
PAINLEVEL_OUTOF10: 3
PAINLEVEL_OUTOF10: 9
PAINLEVEL_OUTOF10: 0
PAINLEVEL_OUTOF10: 0
PAINLEVEL_OUTOF10: 3
PAINLEVEL_OUTOF10: 0

## 2024-05-15 ASSESSMENT — PAIN DESCRIPTION - LOCATION: LOCATION: ABDOMEN

## 2024-05-15 NOTE — PROGRESS NOTES
Hospital Medicine Progress Note      Date of Admission: 5/14/2024  Hospital Day: 2    Chief Admission Complaint:   \"Melena\"     Subjective:  lightheaded AM 15 May    Presenting Admission History:         \"79 y.o. female reportedly NOT on any blood thinners but w/ both Xarelto/Coumadin on her active medication profile who presented to [facility] with a 2-3 day hx of constipation but now w/ c/o dark melenic stools after finally having a BM.     She denies hematemesis or significant abdominal pain but did have some mild nausea     The patient denies any fever/chills or other signs/sxs of systemic illness or identifiable aggravating/alleviating factors\"          Assessment/Plan:      Current Principal Problem:  Melena      GI Bleed - possibly outlet in nature but etiology unclear.  Likely associated w/ anticoagulation as patient is seemingly on Xarelto.  GI consulted from ED and appreciated in advance w/ plan for possible colonoscopy in AM  Clear liquids o/night and NPO after Mn.      HTN - w/out known CAD and no evidence of active signs/sxs of ischemia/failure. Currently controlled on home meds w/ vitals documented and reviewed.     COPD - w/out chronic hypoxic respiratory failure on no baseline home O2.  Controlled on home medication regimen - continued.      HyperLipidemia - normally controlled on home Statin. Continued.  F/U w/ PCP outpt for medication initiation/adjustment as needed.           Physical Exam Performed:      General appearance:  No apparent distress  Respiratory:  Normal respiratory effort.   Cardiovascular:  Regular rate and rhythm.  Abdomen:  Soft, non-tender, non-distended.  Musculoskelatal:  No edema  Neurologic:  Non-focal  Psychiatric:  Alert and oriented    Telemetry monitoring - Personally reviewed and interpreted telemetry on 15 May as ordered with the following findings      [] NSR  [] Sinus Tachycardia  [] Sinus Bradycardia  [] Rate controlled Afib  [] Afib w/ RVR  [x] Other -

## 2024-05-15 NOTE — PROGRESS NOTES
05/15/24 1624   Encounter Summary   Encounter Overview/Reason Attempted Encounter   Service Provided For Patient not available  (Pt in a procedure.)   Referral/Consult From Nurse   Last Encounter  05/15/24  (Per request for food upon D/C, left 3 bags of groceries in her room since pt was still in the O.R.)   Complexity of Encounter Moderate   Begin Time 1543   End Time  1628   Total Time Calculated 45 min   Assessment/Intervention/Outcome   Intervention   (Provided food for pt to take home.)   Plan and Referrals   Plan/Referrals Continue Support (comment);Other (Comment)  (Discuss Advance Care Planning)

## 2024-05-15 NOTE — PLAN OF CARE
Problem: Gastrointestinal - Adult  Goal: Maintains or returns to baseline bowel function  Outcome: Progressing  Flowsheets (Taken 5/15/2024 0917)  Maintains or returns to baseline bowel function:   Assess bowel function   Administer IV fluids as ordered to ensure adequate hydration   Encourage mobilization and activity

## 2024-05-15 NOTE — ANESTHESIA PRE PROCEDURE
Department of Anesthesiology  Preprocedure Note       Name:  Isa Hernandez   Age:  79 y.o.  :  1945                                          MRN:  [id number]         Date:  5/15/2024      Surgeon: Surgeon(s):  Kalyan Grullon MD    Procedure: Procedure(s):  ESOPHAGOGASTRODUODENOSCOPY DIAGNOSTIC ONLY    Medications prior to admission:   Prior to Admission medications    Medication Sig Start Date End Date Taking? Authorizing Provider   rivaroxaban (XARELTO) 20 MG TABS tablet Take 1 tablet by mouth daily (with breakfast) 3/28/24  Yes Bonnie Luna APRN - CNP   warfarin (COUMADIN) 5 MG tablet Take 1 tablet by mouth daily 3/20/24  Yes Bonnie Luna APRN - CNP   lidocaine 4 % external patch Place 1 patch onto the skin daily 3/5/24  Yes Abdullahi Dominguez DO TRELEGY ELLIPTA 200-62.5-25 MCG/ACT AEPB inhaler INHALE 1 PUFF INTO THE LUNGS ONCE DAILY (RINSE MOUTH AFTER EACH USE WITH WATER THEN SPIT OUT) 24  Yes Anastacio Hernandez MD   montelukast (SINGULAIR) 10 MG tablet TAKE 1 TABLET BY MOUTH EACH NIGHT AT BEDTIME 24  Yes Anastacio Hernandez MD   hydrOXYzine HCl (ATARAX) 25 MG tablet Take 1 tablet by mouth at bedtime at bedtime. 23  Yes Robson Rose MD   baclofen (LIORESAL) 10 MG tablet TAKE 1/2 TABLET (5 MG) TO 2 TABLETS (20 MG) BY MOUTH EVERY NIGHT AT BEDTIME FOR MUSCLE CRAMPS IN LEG 10/25/23  Yes Robson Rose MD   albuterol sulfate HFA (PROVENTIL;VENTOLIN;PROAIR) 108 (90 Base) MCG/ACT inhaler INHALE 2 PUFFS INTO THE LUNGS EVERY 6 HOURS AS NEEDED FOR WHEEZING OR SHORTNESS OF BREATH (RESCUE INHALER) 23  Yes Anastacio Hernandez MD   potassium chloride (KLOR-CON M) 20 MEQ extended release tablet Take 1 tablet by mouth daily   Yes Robson Rose MD   vitamin C (ASCORBIC ACID) 500 MG tablet Take 1 tablet by mouth daily   Yes Robson Rose MD   Cholecalciferol (VITAMIN D3) 125 MCG (5000 UT) TABS Take by mouth daily Unknown dose   Yes Robson Rose MD   guaiFENesin

## 2024-05-15 NOTE — H&P
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     Current Outpatient Medications on File Prior to Encounter   Medication Sig Dispense Refill    rivaroxaban (XARELTO) 20 MG TABS tablet Take 1 tablet by mouth daily (with breakfast) 90 tablet 3    warfarin (COUMADIN) 5 MG tablet Take 1 tablet by mouth daily 30 tablet 3    lidocaine 4 % external patch Place 1 patch onto the skin daily 10 patch 0    TRELEGY ELLIPTA 200-62.5-25 MCG/ACT AEPB inhaler INHALE 1 PUFF INTO THE LUNGS ONCE DAILY (RINSE MOUTH AFTER EACH USE WITH WATER THEN SPIT OUT) 60 each 5    montelukast (SINGULAIR) 10 MG tablet TAKE 1 TABLET BY MOUTH EACH NIGHT AT BEDTIME 90 tablet 2    hydrOXYzine HCl (ATARAX) 25 MG tablet Take 1 tablet by mouth at bedtime at bedtime.      baclofen (LIORESAL) 10 MG tablet TAKE 1/2 TABLET (5 MG) TO 2 TABLETS (20 MG) BY MOUTH EVERY NIGHT AT BEDTIME FOR MUSCLE CRAMPS IN LEG      albuterol sulfate HFA (PROVENTIL;VENTOLIN;PROAIR) 108 (90 Base) MCG/ACT inhaler INHALE 2 PUFFS INTO THE LUNGS EVERY 6 HOURS AS NEEDED FOR WHEEZING OR SHORTNESS OF BREATH (RESCUE INHALER) 8.5 g 5    potassium chloride (KLOR-CON M) 20 MEQ extended release tablet Take 1 tablet by mouth daily      vitamin C (ASCORBIC ACID) 500 MG tablet Take 1 tablet by mouth 
<0.5*   CALCIUM 9.0     No results for input(s): \"PROBNP\", \"TROPHS\" in the last 72 hours.  No results for input(s): \"LABA1C\" in the last 72 hours.  Recent Labs     05/14/24  0917   AST 14*   ALT 9*   BILITOT 0.3   ALKPHOS 76     No results for input(s): \"INR\", \"LACTA\", \"TSH\" in the last 72 hours.     Tavo Jett MD

## 2024-05-15 NOTE — PLAN OF CARE
Problem: Safety - Adult  Goal: Free from fall injury  Outcome: Progressing  Bed alarm on & in place. 2/4 side rails up. Pt wearing non skid footwear. Bed locked & in lowest position. Call light within reach.

## 2024-05-15 NOTE — RT PROTOCOL NOTE
bronchodilator order(s) to equivalent RT Bronchodilator order with Frequency of every 4 hours PRN for wheezing or increased work of breathing using Per Protocol order mode.        4-6 - enter or revise RT Bronchodilator order(s) to two equivalent RT bronchodilator orders with one order with BID Frequency and one order with Frequency of every 4 hours PRN wheezing or increased work of breathing using Per Protocol order mode.        7-10 - enter or revise RT Bronchodilator order(s) to two equivalent RT bronchodilator orders with one order with TID Frequency and one order with Frequency of every 4 hours PRN wheezing or increased work of breathing using Per Protocol order mode.       11-13 - enter or revise RT Bronchodilator order(s) to one equivalent RT bronchodilator order with QID Frequency and an Albuterol order with Frequency of every 4 hours PRN wheezing or increased work of breathing using Per Protocol order mode.      Greater than 13 - enter or revise RT Bronchodilator order(s) to one equivalent RT bronchodilator order with every 4 hours Frequency and an Albuterol order with Frequency of every 2 hours PRN wheezing or increased work of breathing using Per Protocol order mode.     RT to enter RT Home Evaluation for COPD & MDI Assessment order using Per Protocol order mode.    Electronically signed by Yumiko Yañez RCP on 5/14/2024 at 9:51 PM

## 2024-05-16 LAB
ALBUMIN SERPL-MCNC: 3.3 G/DL (ref 3.4–5)
ANION GAP SERPL CALCULATED.3IONS-SCNC: 10 MMOL/L (ref 3–16)
BUN SERPL-MCNC: 4 MG/DL (ref 7–20)
CALCIUM SERPL-MCNC: 8.3 MG/DL (ref 8.3–10.6)
CHLORIDE SERPL-SCNC: 105 MMOL/L (ref 99–110)
CO2 SERPL-SCNC: 21 MMOL/L (ref 21–32)
CREAT SERPL-MCNC: <0.5 MG/DL (ref 0.6–1.2)
DEPRECATED RDW RBC AUTO: 15.4 % (ref 12.4–15.4)
GFR SERPLBLD CREATININE-BSD FMLA CKD-EPI: >90 ML/MIN/{1.73_M2}
GLUCOSE SERPL-MCNC: 77 MG/DL (ref 70–99)
HCT VFR BLD AUTO: 33.9 % (ref 36–48)
HGB BLD-MCNC: 10.8 G/DL (ref 12–16)
MCH RBC QN AUTO: 30.2 PG (ref 26–34)
MCHC RBC AUTO-ENTMCNC: 31.7 G/DL (ref 31–36)
MCV RBC AUTO: 95.1 FL (ref 80–100)
PHOSPHATE SERPL-MCNC: 2.3 MG/DL (ref 2.5–4.9)
PLATELET # BLD AUTO: 239 K/UL (ref 135–450)
PMV BLD AUTO: 10.8 FL (ref 5–10.5)
POTASSIUM SERPL-SCNC: 3.5 MMOL/L (ref 3.5–5.1)
RBC # BLD AUTO: 3.57 M/UL (ref 4–5.2)
SODIUM SERPL-SCNC: 136 MMOL/L (ref 136–145)
WBC # BLD AUTO: 11.2 K/UL (ref 4–11)

## 2024-05-16 PROCEDURE — 85027 COMPLETE CBC AUTOMATED: CPT

## 2024-05-16 PROCEDURE — 80069 RENAL FUNCTION PANEL: CPT

## 2024-05-16 PROCEDURE — 6370000000 HC RX 637 (ALT 250 FOR IP): Performed by: INTERNAL MEDICINE

## 2024-05-16 PROCEDURE — 94760 N-INVAS EAR/PLS OXIMETRY 1: CPT

## 2024-05-16 PROCEDURE — 36415 COLL VENOUS BLD VENIPUNCTURE: CPT

## 2024-05-16 PROCEDURE — C9113 INJ PANTOPRAZOLE SODIUM, VIA: HCPCS | Performed by: NURSE PRACTITIONER

## 2024-05-16 PROCEDURE — 97166 OT EVAL MOD COMPLEX 45 MIN: CPT

## 2024-05-16 PROCEDURE — 97535 SELF CARE MNGMENT TRAINING: CPT

## 2024-05-16 PROCEDURE — 6360000002 HC RX W HCPCS: Performed by: NURSE PRACTITIONER

## 2024-05-16 PROCEDURE — 1200000000 HC SEMI PRIVATE

## 2024-05-16 PROCEDURE — 2580000003 HC RX 258: Performed by: INTERNAL MEDICINE

## 2024-05-16 PROCEDURE — 94640 AIRWAY INHALATION TREATMENT: CPT

## 2024-05-16 RX ORDER — LOPERAMIDE HYDROCHLORIDE 2 MG/1
2 CAPSULE ORAL 4 TIMES DAILY PRN
Status: DISCONTINUED | OUTPATIENT
Start: 2024-05-16 | End: 2024-05-17 | Stop reason: HOSPADM

## 2024-05-16 RX ADMIN — MONTELUKAST 10 MG: 10 TABLET, FILM COATED ORAL at 20:28

## 2024-05-16 RX ADMIN — LOPERAMIDE HYDROCHLORIDE 2 MG: 2 CAPSULE ORAL at 20:28

## 2024-05-16 RX ADMIN — Medication 1 PUFF: at 12:01

## 2024-05-16 RX ADMIN — HYDROXYZINE HYDROCHLORIDE 25 MG: 10 TABLET ORAL at 20:28

## 2024-05-16 RX ADMIN — Medication 1 PUFF: at 07:54

## 2024-05-16 RX ADMIN — PANTOPRAZOLE SODIUM 40 MG: 40 INJECTION, POWDER, FOR SOLUTION INTRAVENOUS at 20:28

## 2024-05-16 RX ADMIN — POTASSIUM CHLORIDE 20 MEQ: 1500 TABLET, EXTENDED RELEASE ORAL at 08:18

## 2024-05-16 RX ADMIN — SODIUM CHLORIDE, PRESERVATIVE FREE 10 ML: 5 INJECTION INTRAVENOUS at 20:28

## 2024-05-16 RX ADMIN — ONDANSETRON 4 MG: 4 TABLET, ORALLY DISINTEGRATING ORAL at 11:42

## 2024-05-16 RX ADMIN — PRAVASTATIN SODIUM 40 MG: 40 TABLET ORAL at 08:18

## 2024-05-16 RX ADMIN — SODIUM CHLORIDE, PRESERVATIVE FREE 10 ML: 5 INJECTION INTRAVENOUS at 08:19

## 2024-05-16 RX ADMIN — Medication 1 PUFF: at 20:11

## 2024-05-16 RX ADMIN — OXYBUTYNIN CHLORIDE 5 MG: 5 TABLET, EXTENDED RELEASE ORAL at 20:29

## 2024-05-16 RX ADMIN — Medication 1 PUFF: at 16:15

## 2024-05-16 ASSESSMENT — PAIN SCALES - GENERAL
PAINLEVEL_OUTOF10: 0
PAINLEVEL_OUTOF10: 0

## 2024-05-16 NOTE — PLAN OF CARE
Problem: Discharge Planning  Goal: Discharge to home or other facility with appropriate resources  Outcome: Progressing     Problem: Pain  Goal: Verbalizes/displays adequate comfort level or baseline comfort level  5/16/2024 0950 by Britney Lewis, RN  Outcome: Progressing  5/16/2024 0125 by Della Venegas, RN  Outcome: Progressing  Flowsheets (Taken 5/16/2024 0125)  Verbalizes/displays adequate comfort level or baseline comfort level:   Encourage patient to monitor pain and request assistance   Assess pain using appropriate pain scale   Administer analgesics based on type and severity of pain and evaluate response   Implement non-pharmacological measures as appropriate and evaluate response     Problem: Gastrointestinal - Adult  Goal: Minimal or absence of nausea and vomiting  Outcome: Progressing  Goal: Maintains or returns to baseline bowel function  Outcome: Progressing  Goal: Maintains adequate nutritional intake  Outcome: Progressing  Goal: Establish and maintain optimal ostomy function  Outcome: Progressing     Problem: Safety - Adult  Goal: Free from fall injury  Outcome: Progressing     Problem: ABCDS Injury Assessment  Goal: Absence of physical injury  Outcome: Progressing

## 2024-05-16 NOTE — PLAN OF CARE
Problem: Pain  Goal: Verbalizes/displays adequate comfort level or baseline comfort level  Outcome: Progressing  Flowsheets (Taken 5/16/2024 0125)  Verbalizes/displays adequate comfort level or baseline comfort level:   Encourage patient to monitor pain and request assistance   Assess pain using appropriate pain scale   Administer analgesics based on type and severity of pain and evaluate response   Implement non-pharmacological measures as appropriate and evaluate response   Pt c/o abd pain; PRN Morphine given. Pt resting in bed at this time.

## 2024-05-16 NOTE — PROGRESS NOTES
PROGRESS NOTE    HPI: Isa Hernandez is a(n)79 y.o. female admitted for work-up and treatment for Melena [K92.1]  Anticoagulated [Z79.01].     We are following for melena.    Subjective:     No GI Complaints.  She is worried she has lost her memory per nursing. Does not want to go home.      Objective:     I/O last 3 completed shifts:  In: 360 [P.O.:360]  Out: 700 [Urine:700]      BP (!) 145/74   Pulse 64   Temp 98.2 °F (36.8 °C) (Oral)   Resp 16   Ht 1.651 m (5' 5\")   Wt 61.2 kg (135 lb)   SpO2 98%   BMI 22.47 kg/m²     Physical Exam:  HEENT: anicteric sclera, oropharyngeal membranes pink and moist.  Cor: RRR  Lungs: non-labored, no respiratory distress  Abdomen: soft,  NT. No ascites.  No hepatomegaly or splenomegaly  Extremities: no edema  Neuro: alert and oriented to person and place.      Results:   Lab Results   Component Value Date    ALT 9 (L) 05/14/2024    AST 14 (L) 05/14/2024    ALKPHOS 76 05/14/2024    BILIDIR 0.15 11/23/2011    PROT 7.5 11/23/2011    INR 1.01 05/07/2024    LIPASE 30.0 11/15/2023     Lab Results   Component Value Date    WBC 11.2 (H) 05/16/2024    HGB 10.8 (L) 05/16/2024    HCT 33.9 (L) 05/16/2024    MCV 95.1 05/16/2024     05/16/2024     BUN/Cr/glu/ALT/AST/amyl/lip:  4/<0.5/--/--/--/--/-- (05/16 0601)  CT ABDOMEN PELVIS W IV CONTRAST Additional Contrast? None    Result Date: 5/14/2024  No acute abdominopelvic abnormality.         Impression:  79 year old female with past medical history of colon polyps, COPD, collagenous colitis (bx 2002), HLD, a-fib on xarelto (switched from coumadin recently due to reaction) and HTN who presents to the ER with melena.     Melena.  S/p EGD 5/15 with gastric ulcers, non-bleeding, and berry's esophagus.  Bx- mild non active gastritis, extensive intestinal metaplasia at GEJ.  Colonoscopy 2022 (screening due to hx colon polyps).    Plan:  Has had no further bleeding, hgb stable.  No

## 2024-05-16 NOTE — PROGRESS NOTES
Hospital Medicine Progress Note      Date of Admission: 5/14/2024  Hospital Day: 3    Chief Admission Complaint:   \"Melena\"     Subjective:  lightheaded AM 15 May    Presenting Admission History:         \"79 y.o. female reportedly NOT on any blood thinners but w/ both Xarelto/Coumadin on her active medication profile who presented to [facility] with a 2-3 day hx of constipation but now w/ c/o dark melenic stools after finally having a BM.     She denies hematemesis or significant abdominal pain but did have some mild nausea     The patient denies any fever/chills or other signs/sxs of systemic illness or identifiable aggravating/alleviating factors\"          Assessment/Plan:      Current Principal Problem:  Melena      GI Bleed - possibly outlet in nature but etiology unclear.  Likely associated w/ anticoagulation as patient is seemingly on Xarelto.  GI consulted from ED and appreciated s/p EGD 15 May w/ clinical dx of Graf's w/ Bx pending.  OK to resume Xarelto 20 May.       HTN - w/out known CAD and no evidence of active signs/sxs of ischemia/failure. Currently controlled on home meds w/ vitals documented and reviewed.     COPD - w/out chronic hypoxic respiratory failure on no baseline home O2.  Controlled on home medication regimen - continued.      HyperLipidemia - normally controlled on home Statin. Continued.  F/U w/ PCP outpt for medication initiation/adjustment as needed.           Physical Exam Performed:      General appearance:  No apparent distress  Respiratory:  Normal respiratory effort.   Cardiovascular:  Regular rate and rhythm.  Abdomen:  Soft, non-tender, non-distended.  Musculoskelatal:  No edema  Neurologic:  Non-focal  Psychiatric:  Alert and oriented    Telemetry monitoring - Personally reviewed and interpreted telemetry on 16 May as ordered with the following findings      [] NSR  [] Sinus Tachycardia  [] Sinus Bradycardia  [] Rate controlled Afib  [] Afib w/ RVR  [x] Other -

## 2024-05-16 NOTE — PROGRESS NOTES
Occupational Therapy  Facility/Department: [facility] - MED SURG  Occupational Therapy Initial Assessment    Name: Isa Hernandez  : 1945  MRN: [id number]  Date of Service: 2024    Discharge Recommendations:  24 hour supervision or assist  OT Equipment Recommendations  Equipment Needed: No       Patient Diagnosis(es): The primary encounter diagnosis was Melena. A diagnosis of Anticoagulated was also pertinent to this visit.  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  Past Surgical History:  has a past surgical history that includes Hysterectomy; Appendectomy; Tonsillectomy; bronchoscopy (); Colonoscopy (11/15/2012); back surgery; CT NEEDLE BIOPSY LUNG PERCUTANEOUS (2023); Pacemaker insertion; and Upper gastrointestinal endoscopy (N/A, 5/15/2024).           Assessment    Pt admitted to [facility] with dx of melena. PTA, pt reports being IND with ADLs and fxl mobility using quad cane. She requires assist for cleaning and laundry. Pt unable to give detailed info regarding home setup, stating \"I can't remember\" when asked about steps, additional DME, and bathroom setup. Today pt presents below baseline, requiring grossly CGA for fxl transfers and mobility with quad cane, CGA for toileting and LB dressing, and SBA for grooming tasks in stance @ sink. Pt will benefit from skilled OT services to address decreased strength and endurance impacting her safety and IND with ADLs and fxl mobility.    Performance deficits / Impairments: Decreased functional mobility ;Decreased safe awareness;Decreased balance;Decreased ADL status;Decreased

## 2024-05-16 NOTE — ANESTHESIA POSTPROCEDURE EVALUATION
Department of Anesthesiology  Postprocedure Note    Patient: Isa Hernandez  MRN: [id number]  YOB: 1945  Date of evaluation: 5/15/2024    Procedure Summary       Date: 05/15/24 Room / Location: [place] [id number] / [facility]    Anesthesia Start: 1448 Anesthesia Stop: 1501    Procedure: ESOPHAGOGASTRODUODENOSCOPY BIOPSY Diagnosis:       Melena      (Melena [K92.1])    Surgeons: Kalyan Grullon MD Responsible Provider: Elías Queen MD    Anesthesia Type: MAC ASA Status: 3            Anesthesia Type: No value filed.    Jessy Phase I: Jessy Score: 10    Jessy Phase II: Jessy Score: 10    Anesthesia Post Evaluation    Patient location during evaluation: PACU  Level of consciousness: awake  Airway patency: patent  Nausea & Vomiting: no vomiting  Cardiovascular status: blood pressure returned to baseline  Respiratory status: acceptable  Hydration status: stable  Pain management: adequate    No notable events documented.

## 2024-05-17 VITALS
RESPIRATION RATE: 17 BRPM | OXYGEN SATURATION: 95 % | SYSTOLIC BLOOD PRESSURE: 119 MMHG | BODY MASS INDEX: 22.49 KG/M2 | HEIGHT: 65 IN | HEART RATE: 80 BPM | DIASTOLIC BLOOD PRESSURE: 67 MMHG | TEMPERATURE: 98.1 F | WEIGHT: 135 LBS

## 2024-05-17 PROBLEM — K92.1 MELENA: Status: RESOLVED | Noted: 2024-05-14 | Resolved: 2024-05-17

## 2024-05-17 LAB
ALBUMIN SERPL-MCNC: 3.5 G/DL (ref 3.4–5)
ANION GAP SERPL CALCULATED.3IONS-SCNC: 10 MMOL/L (ref 3–16)
BUN SERPL-MCNC: <2 MG/DL (ref 7–20)
CALCIUM SERPL-MCNC: 8.4 MG/DL (ref 8.3–10.6)
CHLORIDE SERPL-SCNC: 102 MMOL/L (ref 99–110)
CO2 SERPL-SCNC: 26 MMOL/L (ref 21–32)
CREAT SERPL-MCNC: <0.5 MG/DL (ref 0.6–1.2)
DEPRECATED RDW RBC AUTO: 14.9 % (ref 12.4–15.4)
GFR SERPLBLD CREATININE-BSD FMLA CKD-EPI: >90 ML/MIN/{1.73_M2}
GLUCOSE SERPL-MCNC: 100 MG/DL (ref 70–99)
HCT VFR BLD AUTO: 33.8 % (ref 36–48)
HGB BLD-MCNC: 11.1 G/DL (ref 12–16)
MCH RBC QN AUTO: 30.7 PG (ref 26–34)
MCHC RBC AUTO-ENTMCNC: 33 G/DL (ref 31–36)
MCV RBC AUTO: 92.9 FL (ref 80–100)
PHOSPHATE SERPL-MCNC: 2.9 MG/DL (ref 2.5–4.9)
PLATELET # BLD AUTO: 256 K/UL (ref 135–450)
PMV BLD AUTO: 11.1 FL (ref 5–10.5)
POTASSIUM SERPL-SCNC: 3.1 MMOL/L (ref 3.5–5.1)
RBC # BLD AUTO: 3.63 M/UL (ref 4–5.2)
SODIUM SERPL-SCNC: 138 MMOL/L (ref 136–145)
WBC # BLD AUTO: 10.8 K/UL (ref 4–11)

## 2024-05-17 PROCEDURE — C9113 INJ PANTOPRAZOLE SODIUM, VIA: HCPCS | Performed by: NURSE PRACTITIONER

## 2024-05-17 PROCEDURE — 94640 AIRWAY INHALATION TREATMENT: CPT

## 2024-05-17 PROCEDURE — 6370000000 HC RX 637 (ALT 250 FOR IP): Performed by: STUDENT IN AN ORGANIZED HEALTH CARE EDUCATION/TRAINING PROGRAM

## 2024-05-17 PROCEDURE — 6370000000 HC RX 637 (ALT 250 FOR IP): Performed by: INTERNAL MEDICINE

## 2024-05-17 PROCEDURE — 80069 RENAL FUNCTION PANEL: CPT

## 2024-05-17 PROCEDURE — 36415 COLL VENOUS BLD VENIPUNCTURE: CPT

## 2024-05-17 PROCEDURE — 85027 COMPLETE CBC AUTOMATED: CPT

## 2024-05-17 PROCEDURE — 6360000002 HC RX W HCPCS: Performed by: NURSE PRACTITIONER

## 2024-05-17 PROCEDURE — 2580000003 HC RX 258: Performed by: INTERNAL MEDICINE

## 2024-05-17 RX ORDER — POTASSIUM CHLORIDE 20 MEQ/1
40 TABLET, EXTENDED RELEASE ORAL ONCE
Status: COMPLETED | OUTPATIENT
Start: 2024-05-17 | End: 2024-05-17

## 2024-05-17 RX ADMIN — POTASSIUM CHLORIDE 40 MEQ: 1500 TABLET, EXTENDED RELEASE ORAL at 09:30

## 2024-05-17 RX ADMIN — PANTOPRAZOLE SODIUM 40 MG: 40 INJECTION, POWDER, FOR SOLUTION INTRAVENOUS at 09:30

## 2024-05-17 RX ADMIN — SODIUM CHLORIDE, PRESERVATIVE FREE 10 ML: 5 INJECTION INTRAVENOUS at 09:30

## 2024-05-17 RX ADMIN — Medication 1 PUFF: at 11:23

## 2024-05-17 RX ADMIN — PRAVASTATIN SODIUM 40 MG: 40 TABLET ORAL at 09:30

## 2024-05-17 RX ADMIN — ACETAMINOPHEN 650 MG: 325 TABLET ORAL at 09:37

## 2024-05-17 RX ADMIN — POTASSIUM CHLORIDE 20 MEQ: 1500 TABLET, EXTENDED RELEASE ORAL at 09:30

## 2024-05-17 RX ADMIN — Medication 1 PUFF: at 08:34

## 2024-05-17 ASSESSMENT — PAIN SCALES - GENERAL
PAINLEVEL_OUTOF10: 0
PAINLEVEL_OUTOF10: 7

## 2024-05-17 ASSESSMENT — PAIN DESCRIPTION - LOCATION: LOCATION: HEAD

## 2024-05-17 NOTE — PROGRESS NOTES
Patient discharged. IV removed, telemetry box and leads removed and returned. Lockbox emptied. All belongings gathered and returned to patient. Discharge instructions reviewed with patient and son, all questions answered by RN. Explained to start xarelto on Tuesday. Informed pt prescription xarelto can be picked up on June 26th per pharmacy d/t supply at home and not time to refill yet. No further needs.

## 2024-05-17 NOTE — PLAN OF CARE
Problem: Discharge Planning  Goal: Discharge to home or other facility with appropriate resources  Outcome: Progressing     Problem: Pain  Goal: Verbalizes/displays adequate comfort level or baseline comfort level  Outcome: Progressing     Problem: Gastrointestinal - Adult  Goal: Minimal or absence of nausea and vomiting  Outcome: Progressing  Goal: Maintains or returns to baseline bowel function  Outcome: Progressing  Goal: Maintains adequate nutritional intake  Outcome: Progressing  Goal: Establish and maintain optimal ostomy function  Outcome: Progressing     Problem: Safety - Adult  Goal: Free from fall injury  Outcome: Progressing     Problem: ABCDS Injury Assessment  Goal: Absence of physical injury  Outcome: Progressing

## 2024-05-17 NOTE — PROGRESS NOTES
Patient states she lost her dentures. This author and another RN checked the room and all patient belongings within the room. No dentures were found.

## 2024-05-17 NOTE — PLAN OF CARE
Problem: Discharge Planning  Goal: Discharge to home or other facility with appropriate resources  5/17/2024 1119 by Deepa Norton RN  Outcome: Progressing  5/17/2024 0117 by Weston Hilliard RN  Outcome: Progressing     Problem: Pain  Goal: Verbalizes/displays adequate comfort level or baseline comfort level  5/17/2024 1119 by Deepa Norton RN  Outcome: Progressing  5/17/2024 0117 by Weston Hilliard RN  Outcome: Progressing     Problem: Gastrointestinal - Adult  Goal: Minimal or absence of nausea and vomiting  5/17/2024 1119 by Deepa Norton RN  Outcome: Progressing  5/17/2024 0117 by Weston Hilliard RN  Outcome: Progressing  Goal: Maintains or returns to baseline bowel function  5/17/2024 0117 by Weston Hilliard RN  Outcome: Progressing  Goal: Maintains adequate nutritional intake  5/17/2024 0117 by Weston Hilliard RN  Outcome: Progressing  Goal: Establish and maintain optimal ostomy function  5/17/2024 0117 by Weston Hilliard RN  Outcome: Progressing     Problem: Safety - Adult  Goal: Free from fall injury  5/17/2024 0117 by Weston Hilliard RN  Outcome: Progressing     Problem: ABCDS Injury Assessment  Goal: Absence of physical injury  5/17/2024 0117 by Weston Hilliard RN  Outcome: Progressing

## 2024-05-17 NOTE — DISCHARGE SUMMARY
criteria.  Biopsied.        -  Non-bleeding gastric ulcers with no stigmata of bleeding.  Biopsied. Recommendation:        -  Await pathology results.        -  Discontinue aspirin and NSAIDs indefinitely.        -  Resume Xarelto (rivaroxaban) at prior dose in 5 days.        -  Diabetic (ADA) diet.        -  Continue present medications. Procedure Code(s):        - 39454, Esophagogastroduodenoscopy, flexible, transoral; with biopsy, single           or multiple Diagnosis Code(s):        - K25.9, Gastric ulcer, unspecified as acute or chronic, without hemorrhage or           perforation        - K22.70, Graf's esophagus without dysplasia       CPT(R) - 2023 copyright [employer] Association. All Rights Reserved.       The CPT codes, CCI edits and ICD codes generated are intended as suggestions       and were generated based on input data.  These codes are preliminary and upon        review may be revised to meet current compliance and payer requirements.       The provider is responsible for the final determination of appropriate codes,       and modifiers. TOÑA GIRARD MD This document has been electronically signed SHIRLEY. Note Initiated:5/15/2024 Note Completed:5/15/2024 3:03 PM    CT ABDOMEN PELVIS W IV CONTRAST Additional Contrast? None    Result Date: 5/14/2024  EXAMINATION: CT OF THE ABDOMEN AND PELVIS WITH CONTRAST 5/14/2024 11:28 am TECHNIQUE: CT of the abdomen and pelvis was performed with the administration of intravenous contrast. Multiplanar reformatted images are provided for review. Automated exposure control, iterative reconstruction, and/or weight based adjustment of the mA/kV was utilized to reduce the radiation dose to as low as reasonably achievable. COMPARISON: 11/15/2023 HISTORY: ORDERING SYSTEM PROVIDED HISTORY: GI bleeding and abd pain TECHNOLOGIST PROVIDED HISTORY: Reason for exam:->GI bleeding and abd pain Additional Contrast?->None Decision Support Exception - unselect if

## 2024-05-20 ENCOUNTER — TELEPHONE (OUTPATIENT)
Dept: CASE MANAGEMENT | Age: 79
End: 2024-05-20

## 2024-05-20 NOTE — TELEPHONE ENCOUNTER
Imaging report CT [facility] 5/7/24 with f/u imaging recommendations sent to Jocelin RODRIGUEZ(R)  Patient Navigator  Incidentals/Lung Navigation  [email or web address]

## 2024-05-23 ENCOUNTER — TELEPHONE (OUTPATIENT)
Dept: PULMONOLOGY | Age: 79
End: 2024-05-23

## 2024-05-23 DIAGNOSIS — J45.40 MODERATE PERSISTENT ASTHMA, UNSPECIFIED WHETHER COMPLICATED: Primary | ICD-10-CM

## 2024-05-23 NOTE — TELEPHONE ENCOUNTER
Pt called and left multiple VM's that she was ordered a nebulizer but can't get it filled anywhere.  Pt asked for a call back ASAP and also was asking for the phone number to [facility].      Returned call to pt and got clarification and she states that the closest place to her is [facility] and pt wants an order to be sent to them.     Order pending if appropriate.  Pt wants to be called back once order is faxed so she can follow up with [facility].

## 2024-05-23 NOTE — TELEPHONE ENCOUNTER
[facility] member services called said will need a PA for nebulizer number given was [phone] to start the PA

## 2024-05-23 NOTE — TELEPHONE ENCOUNTER
Neb order faxed to [facility] via [employer] at [phone].  Called pt and informed with verbal understanding.

## 2024-05-26 ENCOUNTER — APPOINTMENT (OUTPATIENT)
Dept: GENERAL RADIOLOGY | Age: 79
End: 2024-05-26
Payer: MEDICARE

## 2024-05-26 ENCOUNTER — HOSPITAL ENCOUNTER (OUTPATIENT)
Age: 79
Setting detail: OBSERVATION
Discharge: HOME | End: 2024-05-28
Attending: EMERGENCY MEDICINE | Admitting: STUDENT IN AN ORGANIZED HEALTH CARE EDUCATION/TRAINING PROGRAM
Payer: MEDICARE

## 2024-05-26 DIAGNOSIS — J44.1 COPD EXACERBATION (HCC): ICD-10-CM

## 2024-05-26 DIAGNOSIS — J44.9 CHRONIC OBSTRUCTIVE PULMONARY DISEASE, UNSPECIFIED COPD TYPE (HCC): ICD-10-CM

## 2024-05-26 DIAGNOSIS — R07.9 CHEST PAIN, UNSPECIFIED TYPE: Primary | ICD-10-CM

## 2024-05-26 LAB
ALBUMIN SERPL-MCNC: 3.7 G/DL (ref 3.4–5)
ALBUMIN/GLOB SERPL: 1.2 {RATIO} (ref 1.1–2.2)
ALP SERPL-CCNC: 90 U/L (ref 40–129)
ALT SERPL-CCNC: 8 U/L (ref 10–40)
ANION GAP SERPL CALCULATED.3IONS-SCNC: 13 MMOL/L (ref 3–16)
AST SERPL-CCNC: 13 U/L (ref 15–37)
BASOPHILS # BLD: 0.1 K/UL (ref 0–0.2)
BASOPHILS NFR BLD: 0.8 %
BILIRUB SERPL-MCNC: 0.4 MG/DL (ref 0–1)
BUN SERPL-MCNC: 8 MG/DL (ref 7–20)
CALCIUM SERPL-MCNC: 9.3 MG/DL (ref 8.3–10.6)
CHLORIDE SERPL-SCNC: 97 MMOL/L (ref 99–110)
CO2 SERPL-SCNC: 21 MMOL/L (ref 21–32)
CREAT SERPL-MCNC: <0.5 MG/DL (ref 0.6–1.2)
DEPRECATED RDW RBC AUTO: 15.1 % (ref 12.4–15.4)
EOSINOPHIL # BLD: 0.2 K/UL (ref 0–0.6)
EOSINOPHIL NFR BLD: 1.7 %
GFR SERPLBLD CREATININE-BSD FMLA CKD-EPI: >90 ML/MIN/{1.73_M2}
GLUCOSE SERPL-MCNC: 114 MG/DL (ref 70–99)
HCT VFR BLD AUTO: 34.2 % (ref 36–48)
HGB BLD-MCNC: 11.3 G/DL (ref 12–16)
LACTATE BLDV-SCNC: 1.3 MMOL/L (ref 0.4–2)
LYMPHOCYTES # BLD: 2.1 K/UL (ref 1–5.1)
LYMPHOCYTES NFR BLD: 15.4 %
MAGNESIUM SERPL-MCNC: 1.9 MG/DL (ref 1.8–2.4)
MCH RBC QN AUTO: 30 PG (ref 26–34)
MCHC RBC AUTO-ENTMCNC: 33.1 G/DL (ref 31–36)
MCV RBC AUTO: 90.6 FL (ref 80–100)
MONOCYTES # BLD: 1.2 K/UL (ref 0–1.3)
MONOCYTES NFR BLD: 8.7 %
NEUTROPHILS # BLD: 10.1 K/UL (ref 1.7–7.7)
NEUTROPHILS NFR BLD: 73.4 %
PLATELET # BLD AUTO: 313 K/UL (ref 135–450)
PMV BLD AUTO: 10.8 FL (ref 5–10.5)
POTASSIUM SERPL-SCNC: 3.7 MMOL/L (ref 3.5–5.1)
PROT SERPL-MCNC: 6.9 G/DL (ref 6.4–8.2)
RBC # BLD AUTO: 3.78 M/UL (ref 4–5.2)
SARS-COV-2 RDRP RESP QL NAA+PROBE: NOT DETECTED
SODIUM SERPL-SCNC: 131 MMOL/L (ref 136–145)
TROPONIN, HIGH SENSITIVITY: 12 NG/L (ref 0–14)
WBC # BLD AUTO: 13.8 K/UL (ref 4–11)

## 2024-05-26 PROCEDURE — 84484 ASSAY OF TROPONIN QUANT: CPT

## 2024-05-26 PROCEDURE — 83735 ASSAY OF MAGNESIUM: CPT

## 2024-05-26 PROCEDURE — 85025 COMPLETE CBC W/AUTO DIFF WBC: CPT

## 2024-05-26 PROCEDURE — 87635 SARS-COV-2 COVID-19 AMP PRB: CPT

## 2024-05-26 PROCEDURE — 93005 ELECTROCARDIOGRAM TRACING: CPT | Performed by: EMERGENCY MEDICINE

## 2024-05-26 PROCEDURE — 96374 THER/PROPH/DIAG INJ IV PUSH: CPT

## 2024-05-26 PROCEDURE — 6370000000 HC RX 637 (ALT 250 FOR IP): Performed by: EMERGENCY MEDICINE

## 2024-05-26 PROCEDURE — 96375 TX/PRO/DX INJ NEW DRUG ADDON: CPT

## 2024-05-26 PROCEDURE — 2580000003 HC RX 258: Performed by: EMERGENCY MEDICINE

## 2024-05-26 PROCEDURE — 83605 ASSAY OF LACTIC ACID: CPT

## 2024-05-26 PROCEDURE — 80053 COMPREHEN METABOLIC PANEL: CPT

## 2024-05-26 PROCEDURE — 36415 COLL VENOUS BLD VENIPUNCTURE: CPT

## 2024-05-26 PROCEDURE — 71045 X-RAY EXAM CHEST 1 VIEW: CPT

## 2024-05-26 PROCEDURE — 6360000002 HC RX W HCPCS: Performed by: EMERGENCY MEDICINE

## 2024-05-26 PROCEDURE — 99285 EMERGENCY DEPT VISIT HI MDM: CPT

## 2024-05-26 RX ORDER — ONDANSETRON 2 MG/ML
4 INJECTION INTRAMUSCULAR; INTRAVENOUS ONCE
Status: COMPLETED | OUTPATIENT
Start: 2024-05-26 | End: 2024-05-26

## 2024-05-26 RX ORDER — IPRATROPIUM BROMIDE AND ALBUTEROL SULFATE 2.5; .5 MG/3ML; MG/3ML
1 SOLUTION RESPIRATORY (INHALATION) ONCE
Status: COMPLETED | OUTPATIENT
Start: 2024-05-26 | End: 2024-05-26

## 2024-05-26 RX ORDER — NITROGLYCERIN 0.4 MG/1
0.4 TABLET SUBLINGUAL EVERY 5 MIN PRN
Status: DISCONTINUED | OUTPATIENT
Start: 2024-05-26 | End: 2024-05-28 | Stop reason: HOSPADM

## 2024-05-26 RX ORDER — ASPIRIN 81 MG/1
324 TABLET, CHEWABLE ORAL ONCE
Status: COMPLETED | OUTPATIENT
Start: 2024-05-26 | End: 2024-05-26

## 2024-05-26 RX ADMIN — ONDANSETRON 4 MG: 2 INJECTION INTRAMUSCULAR; INTRAVENOUS at 22:31

## 2024-05-26 RX ADMIN — IPRATROPIUM BROMIDE AND ALBUTEROL SULFATE 1 DOSE: 2.5; .5 SOLUTION RESPIRATORY (INHALATION) at 22:32

## 2024-05-26 RX ADMIN — WATER 125 MG: 1 INJECTION INTRAMUSCULAR; INTRAVENOUS; SUBCUTANEOUS at 22:31

## 2024-05-26 RX ADMIN — ASPIRIN 324 MG: 81 TABLET, CHEWABLE ORAL at 22:32

## 2024-05-27 PROBLEM — R07.9 CHEST PAIN, RULE OUT ACUTE MYOCARDIAL INFARCTION: Status: ACTIVE | Noted: 2024-05-27

## 2024-05-27 LAB
D-DIMER QUANTITATIVE: 0.86 UG/ML FEU (ref 0–0.6)
EKG ATRIAL RATE: 67 BPM
EKG DIAGNOSIS: NORMAL
EKG P AXIS: 68 DEGREES
EKG P-R INTERVAL: 160 MS
EKG Q-T INTERVAL: 404 MS
EKG QRS DURATION: 76 MS
EKG QTC CALCULATION (BAZETT): 426 MS
EKG R AXIS: -8 DEGREES
EKG T AXIS: 45 DEGREES
EKG VENTRICULAR RATE: 67 BPM
TROPONIN, HIGH SENSITIVITY: 10 NG/L (ref 0–14)
TROPONIN, HIGH SENSITIVITY: 17 NG/L (ref 0–14)
TROPONIN, HIGH SENSITIVITY: 17 NG/L (ref 0–14)

## 2024-05-27 PROCEDURE — 84484 ASSAY OF TROPONIN QUANT: CPT

## 2024-05-27 PROCEDURE — 36415 COLL VENOUS BLD VENIPUNCTURE: CPT

## 2024-05-27 PROCEDURE — G0378 HOSPITAL OBSERVATION PER HR: HCPCS

## 2024-05-27 PROCEDURE — 85379 FIBRIN DEGRADATION QUANT: CPT

## 2024-05-27 PROCEDURE — 93010 ELECTROCARDIOGRAM REPORT: CPT | Performed by: INTERNAL MEDICINE

## 2024-05-27 PROCEDURE — 2580000003 HC RX 258: Performed by: STUDENT IN AN ORGANIZED HEALTH CARE EDUCATION/TRAINING PROGRAM

## 2024-05-27 PROCEDURE — 6370000000 HC RX 637 (ALT 250 FOR IP): Performed by: STUDENT IN AN ORGANIZED HEALTH CARE EDUCATION/TRAINING PROGRAM

## 2024-05-27 RX ORDER — ONDANSETRON 4 MG/1
4 TABLET, ORALLY DISINTEGRATING ORAL EVERY 8 HOURS PRN
Status: DISCONTINUED | OUTPATIENT
Start: 2024-05-27 | End: 2024-05-28 | Stop reason: HOSPADM

## 2024-05-27 RX ORDER — SODIUM CHLORIDE 0.9 % (FLUSH) 0.9 %
5-40 SYRINGE (ML) INJECTION EVERY 12 HOURS SCHEDULED
Status: DISCONTINUED | OUTPATIENT
Start: 2024-05-27 | End: 2024-05-28 | Stop reason: HOSPADM

## 2024-05-27 RX ORDER — ONDANSETRON 2 MG/ML
4 INJECTION INTRAMUSCULAR; INTRAVENOUS EVERY 6 HOURS PRN
Status: DISCONTINUED | OUTPATIENT
Start: 2024-05-27 | End: 2024-05-28 | Stop reason: HOSPADM

## 2024-05-27 RX ORDER — PRAVASTATIN SODIUM 40 MG
40 TABLET ORAL DAILY
Status: DISCONTINUED | OUTPATIENT
Start: 2024-05-27 | End: 2024-05-28 | Stop reason: HOSPADM

## 2024-05-27 RX ORDER — ENOXAPARIN SODIUM 100 MG/ML
40 INJECTION SUBCUTANEOUS DAILY
Status: DISCONTINUED | OUTPATIENT
Start: 2024-05-27 | End: 2024-05-27 | Stop reason: ALTCHOICE

## 2024-05-27 RX ORDER — POTASSIUM CHLORIDE 20 MEQ/1
40 TABLET, EXTENDED RELEASE ORAL PRN
Status: DISCONTINUED | OUTPATIENT
Start: 2024-05-27 | End: 2024-05-28 | Stop reason: HOSPADM

## 2024-05-27 RX ORDER — ACETAMINOPHEN 325 MG/1
650 TABLET ORAL EVERY 6 HOURS PRN
Status: DISCONTINUED | OUTPATIENT
Start: 2024-05-27 | End: 2024-05-28 | Stop reason: HOSPADM

## 2024-05-27 RX ORDER — GUAIFENESIN 600 MG/1
600 TABLET, EXTENDED RELEASE ORAL DAILY
Status: DISCONTINUED | OUTPATIENT
Start: 2024-05-27 | End: 2024-05-28 | Stop reason: HOSPADM

## 2024-05-27 RX ORDER — POTASSIUM CHLORIDE 7.45 MG/ML
10 INJECTION INTRAVENOUS PRN
Status: DISCONTINUED | OUTPATIENT
Start: 2024-05-27 | End: 2024-05-28 | Stop reason: HOSPADM

## 2024-05-27 RX ORDER — MAGNESIUM SULFATE IN WATER 40 MG/ML
2000 INJECTION, SOLUTION INTRAVENOUS PRN
Status: DISCONTINUED | OUTPATIENT
Start: 2024-05-27 | End: 2024-05-28 | Stop reason: HOSPADM

## 2024-05-27 RX ORDER — ACETAMINOPHEN 650 MG/1
650 SUPPOSITORY RECTAL EVERY 6 HOURS PRN
Status: DISCONTINUED | OUTPATIENT
Start: 2024-05-27 | End: 2024-05-28 | Stop reason: HOSPADM

## 2024-05-27 RX ORDER — SODIUM CHLORIDE 0.9 % (FLUSH) 0.9 %
5-40 SYRINGE (ML) INJECTION PRN
Status: DISCONTINUED | OUTPATIENT
Start: 2024-05-27 | End: 2024-05-28 | Stop reason: HOSPADM

## 2024-05-27 RX ORDER — SODIUM CHLORIDE 9 MG/ML
INJECTION, SOLUTION INTRAVENOUS PRN
Status: DISCONTINUED | OUTPATIENT
Start: 2024-05-27 | End: 2024-05-28 | Stop reason: HOSPADM

## 2024-05-27 RX ORDER — POLYETHYLENE GLYCOL 3350 17 G/17G
17 POWDER, FOR SOLUTION ORAL DAILY PRN
Status: DISCONTINUED | OUTPATIENT
Start: 2024-05-27 | End: 2024-05-28 | Stop reason: HOSPADM

## 2024-05-27 RX ORDER — OXYBUTYNIN CHLORIDE 5 MG/1
5 TABLET, EXTENDED RELEASE ORAL NIGHTLY
Status: DISCONTINUED | OUTPATIENT
Start: 2024-05-27 | End: 2024-05-28 | Stop reason: HOSPADM

## 2024-05-27 RX ORDER — ALBUTEROL SULFATE 90 UG/1
1 AEROSOL, METERED RESPIRATORY (INHALATION) EVERY 4 HOURS PRN
Status: DISCONTINUED | OUTPATIENT
Start: 2024-05-27 | End: 2024-05-28 | Stop reason: HOSPADM

## 2024-05-27 RX ORDER — MONTELUKAST SODIUM 10 MG/1
10 TABLET ORAL NIGHTLY
Status: DISCONTINUED | OUTPATIENT
Start: 2024-05-27 | End: 2024-05-28 | Stop reason: HOSPADM

## 2024-05-27 RX ADMIN — Medication 10 ML: at 15:38

## 2024-05-27 RX ADMIN — MONTELUKAST SODIUM 10 MG: 10 TABLET, COATED ORAL at 20:56

## 2024-05-27 RX ADMIN — Medication 10 ML: at 20:56

## 2024-05-27 RX ADMIN — RIVAROXABAN 20 MG: 20 TABLET, FILM COATED ORAL at 09:25

## 2024-05-27 RX ADMIN — OXYBUTYNIN CHLORIDE 5 MG: 5 TABLET, EXTENDED RELEASE ORAL at 20:56

## 2024-05-27 RX ADMIN — ACETAMINOPHEN 650 MG: 325 TABLET ORAL at 23:22

## 2024-05-27 RX ADMIN — PRAVASTATIN SODIUM 40 MG: 40 TABLET ORAL at 09:25

## 2024-05-27 RX ADMIN — GUAIFENESIN 600 MG: 600 TABLET, EXTENDED RELEASE ORAL at 09:25

## 2024-05-27 RX ADMIN — ACETAMINOPHEN 650 MG: 325 TABLET ORAL at 15:31

## 2024-05-27 RX ADMIN — ACETAMINOPHEN 650 MG: 325 TABLET ORAL at 09:38

## 2024-05-27 ASSESSMENT — PAIN SCALES - GENERAL
PAINLEVEL_OUTOF10: 5
PAINLEVEL_OUTOF10: 9
PAINLEVEL_OUTOF10: 3

## 2024-05-27 ASSESSMENT — PAIN DESCRIPTION - ONSET: ONSET: GRADUAL

## 2024-05-27 ASSESSMENT — PAIN - FUNCTIONAL ASSESSMENT: PAIN_FUNCTIONAL_ASSESSMENT: ACTIVITIES ARE NOT PREVENTED

## 2024-05-27 ASSESSMENT — PAIN DESCRIPTION - FREQUENCY: FREQUENCY: INTERMITTENT

## 2024-05-27 ASSESSMENT — PAIN DESCRIPTION - PAIN TYPE: TYPE: ACUTE PAIN

## 2024-05-27 ASSESSMENT — PAIN DESCRIPTION - DESCRIPTORS: DESCRIPTORS: ACHING;DISCOMFORT;SORE;TENDER

## 2024-05-27 ASSESSMENT — PAIN DESCRIPTION - ORIENTATION: ORIENTATION: RIGHT

## 2024-05-27 ASSESSMENT — PAIN DESCRIPTION - LOCATION: LOCATION: ARM

## 2024-05-27 NOTE — PLAN OF CARE
Problem: Discharge Planning  Goal: Discharge to home or other facility with appropriate resources  5/27/2024 1116 by Kathy Moulton RN  Outcome: Progressing  5/27/2024 0516 by Checo Thompson RN  Outcome: Progressing     Problem: Safety - Adult  Goal: Free from fall injury  5/27/2024 1116 by Kathy Moulton RN  Outcome: Progressing  5/27/2024 0516 by Checo Thompson RN  Outcome: Progressing     Problem: Skin/Tissue Integrity  Goal: Absence of new skin breakdown  Description: 1.  Monitor for areas of redness and/or skin breakdown  2.  Assess vascular access sites hourly  3.  Every 4-6 hours minimum:  Change oxygen saturation probe site  4.  Every 4-6 hours:  If on nasal continuous positive airway pressure, respiratory therapy assess nares and determine need for appliance change or resting period.  5/27/2024 1116 by Kathy Moulton RN  Outcome: Progressing  5/27/2024 0516 by Checo Thompson RN  Outcome: Progressing     Problem: Pain  Goal: Verbalizes/displays adequate comfort level or baseline comfort level  5/27/2024 1116 by Kathy Moulton RN  Outcome: Progressing  5/27/2024 0516 by Checo Thompson RN  Outcome: Progressing     Problem: Chronic Conditions and Co-morbidities  Goal: Patient's chronic conditions and co-morbidity symptoms are monitored and maintained or improved  5/27/2024 1116 by Kathy Moulton RN  Outcome: Progressing  5/27/2024 0516 by Checo Thompson RN  Outcome: Progressing

## 2024-05-27 NOTE — PROGRESS NOTES
Handoff report and transfer of care given at bedside to jessica .  Patient in stable condition, denies needs/concerns at this time.  Call light within reach.

## 2024-05-27 NOTE — H&P
V2.0  History and Physical      Name:  Isa Hernandez /Age/Sex: 1945  (79 y.o. female)   MRN & CSN:  [id number] & [id number] Encounter Date/Time: 2024 6:29 AM EDT   Location:  0220228-01 PCP: Jocelin Ellsworth MD       Hospital Day: 2    Assessment and Plan:     Patient is a 79 y.o. female who presented with chest pain.    Atypical chest pain with elevated troponin  -Less likely ACS, pain gets worse with deep palpation initial troponin 17 EKG with no acute ST-T wave changes, troponin is already trending downwards, last echo from 2023 with EF of 55% no regional wall motion abnormalities seen, will repeat echocardiogram    COPD with asthma  -Does not seem to be in exacerbation continue Singulair Trelegy and albuterol as needed    Atrial fibrillation  -Continue Xarelto    History of PPM    Checklist:  Advanced directive: full  Diet: cardiac  DVT ppx: Xarelto  Sugar: BG goal of 140-180 while inpatient    Disposition: place in observation.  Estimated discharge: 1 day(s).  Current living situation: home.  Expected disposition: home.    Spoke with ED provider who recommended admission for the patient and I agree with that plan.  Personally reviewed lab studies and imaging.  EKG interpreted personally and results as stated above.  Imaging that was interpreted personally and results as stated above.      Comment: Please note this report has been produced using speech recognition software and may contain errors related to that system including errors in grammar, punctuation, and spelling, as well as words and phrases that may be inappropriate. If there are any questions or concerns please feel free to contact the dictating provider for clarification.         History of Present Illness:     Chief Complaint: Chest pain, rule out acute myocardial infarction  Isa Hernandez is a 79 y.o. female with pmh of asthma, COPD, hyperlipidemia, pacemaker placement who presents with chest pain which is in the

## 2024-05-27 NOTE — PROGRESS NOTES
4 Eyes Skin Assessment     NAME:  Isa Hernandez  YOB: 1945  MEDICAL RECORD NUMBER:  [id number]    The patient is being assessed for  Admission    I agree that at least one RN has performed a thorough Head to Toe Skin Assessment on the patient. ALL assessment sites listed below have been assessed.      Areas assessed by both nurses:    Head, Face, Ears, Shoulders, Back, Chest, Arms, Elbows, Hands, Sacrum. Buttock, Coccyx, Ischium, Legs. Feet and Heels, and Under Medical Devices         Does the Patient have a Wound? No noted wound(s)       Jeison Prevention initiated by RN: No  Wound Care Orders initiated by RN: No    Pressure Injury (Stage 3,4, Unstageable, DTI, NWPT, and Complex wounds) if present, place Wound referral order by RN under : No    New Ostomies, if present place, Ostomy referral order under : No     Nurse 1 eSignature: Electronically signed by Checo Thompson RN on 5/27/24 at 5:16 AM EDT    **SHARE this note so that the co-signing nurse can place an eSignature**    Nurse 2 eSignature: Electronically signed by Juli Ramirez RN on 5/27/24 at 5:22 AM EDT

## 2024-05-27 NOTE — PROGRESS NOTES
Patient scratching stomach/abdomen where telemetry electrodes were. New redness and scratch noted. Picture taken. Patient says she itching. Latex free electrodes applied in place of old electrodes.

## 2024-05-27 NOTE — PROGRESS NOTES
Patient provided a COPD Educational Folder that includes the following materials:     [x]  enGreet Booklet: Managing your COPD  [x]  ALA: Getting the Most Out of Medication Delivery Devices  [x]  ALA: My COPD Action Plan  [x]  Better Breathers Club: [facility]   [x]  Smoking Cessation Classes  [x]  Outpatient Spiritual Care Services  [x]  Magnet: Signs of COPD    PATIENT/CAREGIVER TEACHING:   Level of patient/caregiver understanding able to:   [x] Verbalize understanding   [] Demonstrate understanding       [] Teach back        [] Needs reinforcement     []  Other:     Electronically signed by Kathy Moulton RN (Mandy) on 5/27/2024 at 11:18 AM

## 2024-05-27 NOTE — PROGRESS NOTES
05/27/24 1032   Encounter Summary   Encounter Overview/Reason Initial Encounter   Referral/Consult From Rounding   Support System Children  (Pet dog)   Last Encounter  05/27/24  ( visited; provided pastoral support and prayer)   Begin Time 1003   End Time  1008   Total Time Calculated 5 min   Assessment/Intervention/Outcome   Intervention Prayer (assurance of)/[place]

## 2024-05-27 NOTE — ED PROVIDER NOTES
[facility] ED  EMERGENCY DEPARTMENT ENCOUNTER      Pt Name: Isa Hernandez  MRN: [id number]  Birthdate 1945  Date of evaluation: 5/26/2024  Provider: Muna Hatch MD    CHIEF COMPLAINT       Chief Complaint   Patient presents with    Shortness of Breath     SOB for about 2 hours. Hx of asthma. Received a breathing tx in route.          HISTORY OF PRESENT ILLNESS   (Location/Symptom, Timing/Onset, Context/Setting, Quality, Duration, Modifying Factors, Severity)  Note limiting factors.   Isa Hernandez is a 79 y.o. female who presents to the emergency department with 2 hours of chest pain and shortness of breath.  She has a history of asthma and states that it feels something like that but she could not get her nebulizer to work so did not take her breathing treatment.  She did get a breathing treatment by EMS but is complaining of that substernal chest pain which is nonradiating.  No exacerbating or relieving factors.  No leg swelling or calf pain.  Patient denies cardiac history.  Stress test March 2024 was nondiagnostic    This patient is at risk for a communicable infection. Therefore, personal protection equipment consisting of a mask and gloves worn for the exam.     Nursing Notes were reviewed.    REVIEW OF SYSTEMS    (2-9 systems for level 4, 10 or more for level 5)     As per HPI    Except as noted above the remainder of the review of systems was reviewed and negative.       PAST MEDICAL HISTORY     Past Medical History:   Diagnosis Date    Arthritis     Asthma     Bronchitis chronic     Colon polyp     COPD (chronic obstructive pulmonary disease) (HCC)     Emphysema of lung (HCC)     Guillain-Alvaton (HCC)     Hyperlipidemia     Lock jaw     Pneumonia     Post-nasal drip     Primary hypertension 3/18/2024    Pulmonary nodule     bi lateral    Rash     itchy, bumps    Reflex sympathetic dystrophy     RSD (reflex sympathetic dystrophy)     TMJ (dislocation of temporomandibular joint)

## 2024-05-28 ENCOUNTER — TELEPHONE (OUTPATIENT)
Dept: PULMONOLOGY | Age: 79
End: 2024-05-28

## 2024-05-28 VITALS
SYSTOLIC BLOOD PRESSURE: 118 MMHG | DIASTOLIC BLOOD PRESSURE: 69 MMHG | TEMPERATURE: 97.6 F | RESPIRATION RATE: 18 BRPM | OXYGEN SATURATION: 95 % | WEIGHT: 135 LBS | BODY MASS INDEX: 22.49 KG/M2 | HEIGHT: 65 IN | HEART RATE: 64 BPM

## 2024-05-28 PROBLEM — J44.9 CHRONIC OBSTRUCTIVE PULMONARY DISEASE (HCC): Status: ACTIVE | Noted: 2024-05-28

## 2024-05-28 LAB
ANION GAP SERPL CALCULATED.3IONS-SCNC: 8 MMOL/L (ref 3–16)
BASOPHILS # BLD: 0 K/UL (ref 0–0.2)
BASOPHILS NFR BLD: 0.3 %
BUN SERPL-MCNC: 5 MG/DL (ref 7–20)
CALCIUM SERPL-MCNC: 9 MG/DL (ref 8.3–10.6)
CHLORIDE SERPL-SCNC: 96 MMOL/L (ref 99–110)
CO2 SERPL-SCNC: 24 MMOL/L (ref 21–32)
CREAT SERPL-MCNC: <0.5 MG/DL (ref 0.6–1.2)
DEPRECATED RDW RBC AUTO: 14.8 % (ref 12.4–15.4)
EOSINOPHIL # BLD: 0.1 K/UL (ref 0–0.6)
EOSINOPHIL NFR BLD: 0.5 %
GFR SERPLBLD CREATININE-BSD FMLA CKD-EPI: >90 ML/MIN/{1.73_M2}
GLUCOSE SERPL-MCNC: 101 MG/DL (ref 70–99)
HCT VFR BLD AUTO: 31.5 % (ref 36–48)
HGB BLD-MCNC: 10.2 G/DL (ref 12–16)
LYMPHOCYTES # BLD: 1.8 K/UL (ref 1–5.1)
LYMPHOCYTES NFR BLD: 14.1 %
MCH RBC QN AUTO: 29.4 PG (ref 26–34)
MCHC RBC AUTO-ENTMCNC: 32.4 G/DL (ref 31–36)
MCV RBC AUTO: 90.6 FL (ref 80–100)
MONOCYTES # BLD: 0.9 K/UL (ref 0–1.3)
MONOCYTES NFR BLD: 7.4 %
NEUTROPHILS # BLD: 9.8 K/UL (ref 1.7–7.7)
NEUTROPHILS NFR BLD: 77.7 %
PLATELET # BLD AUTO: 284 K/UL (ref 135–450)
PMV BLD AUTO: 10.9 FL (ref 5–10.5)
POTASSIUM SERPL-SCNC: 3.6 MMOL/L (ref 3.5–5.1)
RBC # BLD AUTO: 3.48 M/UL (ref 4–5.2)
SODIUM SERPL-SCNC: 128 MMOL/L (ref 136–145)
WBC # BLD AUTO: 12.6 K/UL (ref 4–11)

## 2024-05-28 PROCEDURE — 6370000000 HC RX 637 (ALT 250 FOR IP): Performed by: STUDENT IN AN ORGANIZED HEALTH CARE EDUCATION/TRAINING PROGRAM

## 2024-05-28 PROCEDURE — 2580000003 HC RX 258: Performed by: STUDENT IN AN ORGANIZED HEALTH CARE EDUCATION/TRAINING PROGRAM

## 2024-05-28 PROCEDURE — 85025 COMPLETE CBC W/AUTO DIFF WBC: CPT

## 2024-05-28 PROCEDURE — 80048 BASIC METABOLIC PNL TOTAL CA: CPT

## 2024-05-28 PROCEDURE — 99238 HOSP IP/OBS DSCHRG MGMT 30/<: CPT | Performed by: INTERNAL MEDICINE

## 2024-05-28 PROCEDURE — 36415 COLL VENOUS BLD VENIPUNCTURE: CPT

## 2024-05-28 PROCEDURE — G0378 HOSPITAL OBSERVATION PER HR: HCPCS

## 2024-05-28 RX ADMIN — PRAVASTATIN SODIUM 40 MG: 40 TABLET ORAL at 09:39

## 2024-05-28 RX ADMIN — GUAIFENESIN 600 MG: 600 TABLET, EXTENDED RELEASE ORAL at 09:39

## 2024-05-28 RX ADMIN — Medication 10 ML: at 09:39

## 2024-05-28 RX ADMIN — RIVAROXABAN 20 MG: 20 TABLET, FILM COATED ORAL at 09:39

## 2024-05-28 NOTE — TELEPHONE ENCOUNTER
Patient cancelled appointment on 5/29 with Dr. Hernandez for F2F for nebulizer machine.    Reason: Just got out of the hospital and is too tired for appt.    Patient did not reschedule appointment.    Last OV

## 2024-05-28 NOTE — ACP (ADVANCE CARE PLANNING)
Advance Care Planning     General Advance Care Planning (ACP) Conversation    Date of Conversation: 5/28/2024  Conducted with: Patient with Decision Making Capacity  Other persons present: None    Healthcare Decision Maker:   Primary Decision Maker: Mary,Barney - Child - [phone]    Primary Decision Maker (Active): Penny gautam - Child - [phone]  Click here to complete Healthcare Decision Makers including selection of the Healthcare Decision Maker Relationship (ie \"Primary\").   Today we documented Decision Maker(s) consistent with Legal Next of Kin hierarchy.    Content/Action Overview:  DECLINED ACP Conversation - will revisit periodically  Reviewed DNR/DNI and patient elects Full Code (Attempt Resuscitation)      Length of Voluntary ACP Conversation in minutes:  <16 minutes (Non-Billable)    Bonnie Marie

## 2024-05-28 NOTE — PLAN OF CARE
Problem: Discharge Planning  Goal: Discharge to home or other facility with appropriate resources  5/28/2024 1023 by Janette Gutierres RN  Outcome: Adequate for Discharge  5/27/2024 2057 by Checo Thompson RN  Outcome: Progressing     Problem: Safety - Adult  Goal: Free from fall injury  5/28/2024 1023 by Janette Gutierres RN  Outcome: Adequate for Discharge  5/27/2024 2057 by Checo Thompson RN  Outcome: Progressing     Problem: Skin/Tissue Integrity  Goal: Absence of new skin breakdown  Description: 1.  Monitor for areas of redness and/or skin breakdown  2.  Assess vascular access sites hourly  3.  Every 4-6 hours minimum:  Change oxygen saturation probe site  4.  Every 4-6 hours:  If on nasal continuous positive airway pressure, respiratory therapy assess nares and determine need for appliance change or resting period.  5/28/2024 1023 by Janette Gutierres RN  Outcome: Adequate for Discharge  5/27/2024 2057 by Checo Thompson RN  Outcome: Progressing     Problem: Pain  Goal: Verbalizes/displays adequate comfort level or baseline comfort level  5/28/2024 1023 by Janette Gutierres RN  Outcome: Adequate for Discharge  5/27/2024 2057 by Checo Thompson RN  Outcome: Progressing     Problem: Chronic Conditions and Co-morbidities  Goal: Patient's chronic conditions and co-morbidity symptoms are monitored and maintained or improved  5/28/2024 1023 by Janette Gutierres RN  Outcome: Adequate for Discharge  5/27/2024 2057 by Checo Thompson RN  Outcome: Progressing

## 2024-05-28 NOTE — PROGRESS NOTES
Pt given written and verbal discharge instructions. Pt indicated understanding of home medication and care instructions. No new Prescriptions. Pt packed own belongings. Pt awaiting [facility] to arrive.

## 2024-05-28 NOTE — PROGRESS NOTES
Upon discharge, pt notified writer that they had taken her money in the ER and sent it to Security. Writer checked all documentation and could not find any documentation within the chart where any belongings were collected and sent to Security. Writer went to patient to have her check her purse and wallet which was on her person, pt opened wallet which contained 28 dollars (1 20, 1 5 and 3 1s). When discussing this with the patient, she said she had the money in her wallet hidden before so they didn't see it. Pt then stated, \"I just can't afford to replace 3 thousand dollar dentures\". Writer replied \"Previously you said it was around 100 dollars in cash\". Patient responded \"and dentures\". Writer explained to the patient that they we don't send dentures to security. Writer contacted security who states that they do not have any belongings of the patient. Charge RN notified. Patient notified. [employer] submitted.

## 2024-05-28 NOTE — PROGRESS NOTES
Patient c/o pain to Rt arm where a previous IV was placed and removed. No other needs noted at this time. Bed alarm in place. Call light and bedside table within reach.

## 2024-05-28 NOTE — PLAN OF CARE
Problem: Discharge Planning  Goal: Discharge to home or other facility with appropriate resources  5/27/2024 2057 by Checo Thompson RN  Outcome: Progressing  5/27/2024 1116 by Kathy Moulton RN  Outcome: Progressing     Problem: Safety - Adult  Goal: Free from fall injury  5/27/2024 2057 by Checo Thompson RN  Outcome: Progressing  5/27/2024 1116 by Kathy Moulton RN  Outcome: Progressing     Problem: Skin/Tissue Integrity  Goal: Absence of new skin breakdown  Description: 1.  Monitor for areas of redness and/or skin breakdown  2.  Assess vascular access sites hourly  3.  Every 4-6 hours minimum:  Change oxygen saturation probe site  4.  Every 4-6 hours:  If on nasal continuous positive airway pressure, respiratory therapy assess nares and determine need for appliance change or resting period.  5/27/2024 2057 by Checo Thompson RN  Outcome: Progressing  5/27/2024 1116 by Kathy Moulton RN  Outcome: Progressing     Problem: Pain  Goal: Verbalizes/displays adequate comfort level or baseline comfort level  5/27/2024 2057 by Checo Thompson RN  Outcome: Progressing  5/27/2024 1116 by Kathy Moulton RN  Outcome: Progressing     Problem: Chronic Conditions and Co-morbidities  Goal: Patient's chronic conditions and co-morbidity symptoms are monitored and maintained or improved  5/27/2024 2057 by Checo Thompson RN  Outcome: Progressing  5/27/2024 1116 by Kathy Moulton RN  Outcome: Progressing

## 2024-05-28 NOTE — DISCHARGE SUMMARY
Name:  Isa Hernandez  Room:  0228/0228-01  MRN:    [id number]    Discharge Summary      This discharge summary is in conjunction with a complete physical exam done on the day of discharge.      Discharging Physician: LETI LEIJA MD      Admit: 5/26/2024  Discharge:  5/28/2024     Diagnoses this Admission    Principal Problem:    Chest pain, rule out acute myocardial infarction  Resolved Problems:    * No resolved hospital problems. *          Procedures (Please Review Full Report for Details)      Consults    IP CONSULT TO HOSPITALIST      HPI:  Isa Hernandez is a 79 y.o. female with pmh of asthma, COPD, hyperlipidemia, pacemaker placement who presents with chest pain which is in the center of the chest ongoing for few days intermittent in nature nonexertional worse with palpation nonradiating pressure-like sensation improves with rest.  Also complains of shortness of breath along with cough which is productive of clear sputum denies any fever or chills lives alone at home denies any orthopnea or leg swelling patient has been compliant with her medication. Denied any tobacco or alcohol use.       Physical Exam at Discharge:  /69   Pulse 64   Temp 97.6 °F (36.4 °C) (Axillary)   Resp 18   Ht 1.651 m (5' 5\")   Wt 61.2 kg (135 lb)   SpO2 95%   BMI 22.47 kg/m²     General: Awake. WDWN.    HEENT: PERRLA. Vision grossly intact. Normal hearing. Oropharynx clear.   Neck: Supple. No JVD.   CV: RRR. NL S1/S2. No murmurs. CR <2 secs. No BLE edema.  Chest pain worse with palpation of mid upper chest  Pulm: NL effort on RA.  Scattered rhonchi heard  GI: +BS x4. Soft. NT/ND.  : No CVA tenderness. No Sargent catheter.  Skin: Intact, warm and dry.  MSK: No gross joint deformities. Full ROM.   Neuro: AAOx3. CNs grossly intact. Normal speech. No focal deficit.   Psych: Good judgement and reason.     Hospital Course      Atypical chest pain   Normal troponin   EKG shows normal sinus rhythm without

## 2024-05-29 ENCOUNTER — TELEMEDICINE (OUTPATIENT)
Dept: PULMONOLOGY | Age: 79
End: 2024-05-29
Payer: MEDICARE

## 2024-05-29 DIAGNOSIS — R91.1 LUNG NODULE: ICD-10-CM

## 2024-05-29 DIAGNOSIS — J45.41 MODERATE PERSISTENT ASTHMA WITH (ACUTE) EXACERBATION: Primary | ICD-10-CM

## 2024-05-29 PROCEDURE — 99443 PR PHYS/QHP TELEPHONE EVALUATION 21-30 MIN: CPT | Performed by: INTERNAL MEDICINE

## 2024-05-29 RX ORDER — PREDNISONE 10 MG/1
TABLET ORAL
Qty: 30 TABLET | Refills: 0 | Status: SHIPPED | OUTPATIENT
Start: 2024-05-29 | End: 2024-05-31 | Stop reason: SDUPTHER

## 2024-05-29 NOTE — PROGRESS NOTES
advice provided: See above       I Affirm this is a Patient Initiated Episode with an Established Patient who has not had a related appointment within my department in the past 7 days or scheduled within the next 24 hours.    Total Time: 21-30 minutes     Note: not billable if this call serves to triage the patient into an appointment for the relevant concern      Anastacio Hernandez MD

## 2024-05-31 RX ORDER — PREDNISONE 10 MG/1
TABLET ORAL
Qty: 30 TABLET | Refills: 0 | OUTPATIENT
Start: 2024-05-31

## 2024-05-31 NOTE — TELEPHONE ENCOUNTER
Called [phone] (home)   Pt states she mixed up the medication with another trying to separate them out. Asking for a refill.

## 2024-05-31 NOTE — TELEPHONE ENCOUNTER
According to pharmacy note the Pt lost her prescription can we please resend to [facility] pharmacy.

## 2024-06-02 RX ORDER — PREDNISONE 10 MG/1
TABLET ORAL
Qty: 30 TABLET | Refills: 0 | Status: SHIPPED | OUTPATIENT
Start: 2024-06-02

## 2024-07-17 ENCOUNTER — TELEPHONE (OUTPATIENT)
Dept: PULMONOLOGY | Age: 79
End: 2024-07-17

## 2024-07-17 NOTE — TELEPHONE ENCOUNTER
Patient called and stated that she was in the emergency room and left her trelegy in the room she was in. Is there any way that she can get a sample that can last her until the next month?

## 2024-07-18 ENCOUNTER — APPOINTMENT (OUTPATIENT)
Dept: GENERAL RADIOLOGY | Age: 79
DRG: 871 | End: 2024-07-18
Payer: MEDICARE

## 2024-07-18 ENCOUNTER — APPOINTMENT (OUTPATIENT)
Dept: CT IMAGING | Age: 79
DRG: 871 | End: 2024-07-18
Attending: EMERGENCY MEDICINE
Payer: MEDICARE

## 2024-07-18 ENCOUNTER — HOSPITAL ENCOUNTER (INPATIENT)
Age: 79
LOS: 2 days | Discharge: HOME | DRG: 871 | End: 2024-07-20
Attending: EMERGENCY MEDICINE | Admitting: STUDENT IN AN ORGANIZED HEALTH CARE EDUCATION/TRAINING PROGRAM
Payer: MEDICARE

## 2024-07-18 DIAGNOSIS — I71.40 ABDOMINAL AORTIC ANEURYSM (AAA) WITHOUT RUPTURE, UNSPECIFIED PART (HCC): ICD-10-CM

## 2024-07-18 DIAGNOSIS — J91.8 PARAPNEUMONIC EFFUSION: ICD-10-CM

## 2024-07-18 DIAGNOSIS — I31.39 PERICARDIAL EFFUSION: ICD-10-CM

## 2024-07-18 DIAGNOSIS — R07.9 CHEST PAIN, UNSPECIFIED TYPE: ICD-10-CM

## 2024-07-18 DIAGNOSIS — M25.511 ACUTE PAIN OF RIGHT SHOULDER: ICD-10-CM

## 2024-07-18 DIAGNOSIS — A41.9 SEPTICEMIA (HCC): ICD-10-CM

## 2024-07-18 DIAGNOSIS — J18.9 PARAPNEUMONIC EFFUSION: ICD-10-CM

## 2024-07-18 DIAGNOSIS — J18.9 PNEUMONIA OF RIGHT UPPER LOBE DUE TO INFECTIOUS ORGANISM: Primary | ICD-10-CM

## 2024-07-18 DIAGNOSIS — R55 SYNCOPE AND COLLAPSE: ICD-10-CM

## 2024-07-18 LAB
ALBUMIN SERPL-MCNC: 3.6 G/DL (ref 3.4–5)
ALBUMIN/GLOB SERPL: 1.1 {RATIO} (ref 1.1–2.2)
ALP SERPL-CCNC: 96 U/L (ref 40–129)
ALT SERPL-CCNC: 8 U/L (ref 10–40)
ANION GAP SERPL CALCULATED.3IONS-SCNC: 12 MMOL/L (ref 3–16)
AST SERPL-CCNC: 12 U/L (ref 15–37)
BACTERIA URNS QL MICRO: ABNORMAL /HPF
BASOPHILS # BLD: 0.1 K/UL (ref 0–0.2)
BASOPHILS NFR BLD: 0.4 %
BILIRUB SERPL-MCNC: 0.8 MG/DL (ref 0–1)
BILIRUB UR QL STRIP.AUTO: NEGATIVE
BUN SERPL-MCNC: 5 MG/DL (ref 7–20)
CALCIUM SERPL-MCNC: 8.8 MG/DL (ref 8.3–10.6)
CHLORIDE SERPL-SCNC: 99 MMOL/L (ref 99–110)
CLARITY UR: CLEAR
CO2 SERPL-SCNC: 24 MMOL/L (ref 21–32)
COLOR UR: YELLOW
CREAT SERPL-MCNC: <0.5 MG/DL (ref 0.6–1.2)
DEPRECATED RDW RBC AUTO: 14.3 % (ref 12.4–15.4)
EKG ATRIAL RATE: 60 BPM
EKG DIAGNOSIS: NORMAL
EKG P AXIS: 56 DEGREES
EKG P-R INTERVAL: 168 MS
EKG Q-T INTERVAL: 396 MS
EKG QRS DURATION: 72 MS
EKG QTC CALCULATION (BAZETT): 396 MS
EKG R AXIS: -12 DEGREES
EKG T AXIS: 29 DEGREES
EKG VENTRICULAR RATE: 60 BPM
EOSINOPHIL # BLD: 0.1 K/UL (ref 0–0.6)
EOSINOPHIL NFR BLD: 0.8 %
EPI CELLS #/AREA URNS HPF: ABNORMAL /HPF (ref 0–5)
FLUAV RNA RESP QL NAA+PROBE: NOT DETECTED
FLUBV RNA RESP QL NAA+PROBE: NOT DETECTED
GFR SERPLBLD CREATININE-BSD FMLA CKD-EPI: >90 ML/MIN/{1.73_M2}
GLUCOSE SERPL-MCNC: 93 MG/DL (ref 70–99)
GLUCOSE UR STRIP.AUTO-MCNC: NEGATIVE MG/DL
HCT VFR BLD AUTO: 36.2 % (ref 36–48)
HGB BLD-MCNC: 11.7 G/DL (ref 12–16)
HGB UR QL STRIP.AUTO: ABNORMAL
KETONES UR STRIP.AUTO-MCNC: ABNORMAL MG/DL
LACTATE BLDV-SCNC: 1.7 MMOL/L (ref 0.4–2)
LEUKOCYTE ESTERASE UR QL STRIP.AUTO: ABNORMAL
LYMPHOCYTES # BLD: 1.3 K/UL (ref 1–5.1)
LYMPHOCYTES NFR BLD: 7.5 %
MAGNESIUM SERPL-MCNC: 1.8 MG/DL (ref 1.8–2.4)
MCH RBC QN AUTO: 28.7 PG (ref 26–34)
MCHC RBC AUTO-ENTMCNC: 32.4 G/DL (ref 31–36)
MCV RBC AUTO: 88.7 FL (ref 80–100)
MONOCYTES # BLD: 1.1 K/UL (ref 0–1.3)
MONOCYTES NFR BLD: 6.4 %
NEUTROPHILS # BLD: 14.5 K/UL (ref 1.7–7.7)
NEUTROPHILS NFR BLD: 84.9 %
NITRITE UR QL STRIP.AUTO: NEGATIVE
NT-PROBNP SERPL-MCNC: 963 PG/ML (ref 0–449)
PH UR STRIP.AUTO: 6 [PH] (ref 5–8)
PLATELET # BLD AUTO: 302 K/UL (ref 135–450)
PMV BLD AUTO: 10.4 FL (ref 5–10.5)
POTASSIUM SERPL-SCNC: 3.3 MMOL/L (ref 3.5–5.1)
PROCALCITONIN SERPL IA-MCNC: 0.05 NG/ML (ref 0–0.15)
PROT SERPL-MCNC: 6.8 G/DL (ref 6.4–8.2)
PROT UR STRIP.AUTO-MCNC: NEGATIVE MG/DL
RBC # BLD AUTO: 4.08 M/UL (ref 4–5.2)
RBC #/AREA URNS HPF: ABNORMAL /HPF (ref 0–4)
SARS-COV-2 RNA RESP QL NAA+PROBE: NOT DETECTED
SODIUM SERPL-SCNC: 135 MMOL/L (ref 136–145)
SP GR UR STRIP.AUTO: 1.01 (ref 1–1.03)
TROPONIN, HIGH SENSITIVITY: 14 NG/L (ref 0–14)
TROPONIN, HIGH SENSITIVITY: 15 NG/L (ref 0–14)
UA COMPLETE W REFLEX CULTURE PNL UR: YES
UA DIPSTICK W REFLEX MICRO PNL UR: YES
URN SPEC COLLECT METH UR: ABNORMAL
UROBILINOGEN UR STRIP-ACNC: 0.2 E.U./DL
WBC # BLD AUTO: 17.1 K/UL (ref 4–11)
WBC #/AREA URNS HPF: ABNORMAL /HPF (ref 0–5)

## 2024-07-18 PROCEDURE — 94640 AIRWAY INHALATION TREATMENT: CPT

## 2024-07-18 PROCEDURE — 2580000003 HC RX 258: Performed by: EMERGENCY MEDICINE

## 2024-07-18 PROCEDURE — 2580000003 HC RX 258: Performed by: STUDENT IN AN ORGANIZED HEALTH CARE EDUCATION/TRAINING PROGRAM

## 2024-07-18 PROCEDURE — 73030 X-RAY EXAM OF SHOULDER: CPT

## 2024-07-18 PROCEDURE — 93005 ELECTROCARDIOGRAM TRACING: CPT | Performed by: EMERGENCY MEDICINE

## 2024-07-18 PROCEDURE — 87086 URINE CULTURE/COLONY COUNT: CPT

## 2024-07-18 PROCEDURE — 1200000000 HC SEMI PRIVATE

## 2024-07-18 PROCEDURE — 6370000000 HC RX 637 (ALT 250 FOR IP): Performed by: STUDENT IN AN ORGANIZED HEALTH CARE EDUCATION/TRAINING PROGRAM

## 2024-07-18 PROCEDURE — 85025 COMPLETE CBC W/AUTO DIFF WBC: CPT

## 2024-07-18 PROCEDURE — 36415 COLL VENOUS BLD VENIPUNCTURE: CPT

## 2024-07-18 PROCEDURE — 6360000002 HC RX W HCPCS: Performed by: EMERGENCY MEDICINE

## 2024-07-18 PROCEDURE — 6370000000 HC RX 637 (ALT 250 FOR IP): Performed by: EMERGENCY MEDICINE

## 2024-07-18 PROCEDURE — 84145 PROCALCITONIN (PCT): CPT

## 2024-07-18 PROCEDURE — 99285 EMERGENCY DEPT VISIT HI MDM: CPT

## 2024-07-18 PROCEDURE — 81001 URINALYSIS AUTO W/SCOPE: CPT

## 2024-07-18 PROCEDURE — 83880 ASSAY OF NATRIURETIC PEPTIDE: CPT

## 2024-07-18 PROCEDURE — 96365 THER/PROPH/DIAG IV INF INIT: CPT

## 2024-07-18 PROCEDURE — 71045 X-RAY EXAM CHEST 1 VIEW: CPT

## 2024-07-18 PROCEDURE — 84484 ASSAY OF TROPONIN QUANT: CPT

## 2024-07-18 PROCEDURE — 87040 BLOOD CULTURE FOR BACTERIA: CPT

## 2024-07-18 PROCEDURE — 80053 COMPREHEN METABOLIC PANEL: CPT

## 2024-07-18 PROCEDURE — 74176 CT ABD & PELVIS W/O CONTRAST: CPT

## 2024-07-18 PROCEDURE — 83735 ASSAY OF MAGNESIUM: CPT

## 2024-07-18 PROCEDURE — 70450 CT HEAD/BRAIN W/O DYE: CPT

## 2024-07-18 PROCEDURE — 83605 ASSAY OF LACTIC ACID: CPT

## 2024-07-18 PROCEDURE — 93010 ELECTROCARDIOGRAM REPORT: CPT | Performed by: INTERNAL MEDICINE

## 2024-07-18 PROCEDURE — 87636 SARSCOV2 & INF A&B AMP PRB: CPT

## 2024-07-18 PROCEDURE — 72125 CT NECK SPINE W/O DYE: CPT

## 2024-07-18 RX ORDER — OXYCODONE HYDROCHLORIDE AND ACETAMINOPHEN 5; 325 MG/1; MG/1
1 TABLET ORAL ONCE
Status: DISCONTINUED | OUTPATIENT
Start: 2024-07-18 | End: 2024-07-18 | Stop reason: HOSPADM

## 2024-07-18 RX ORDER — LEVOFLOXACIN 5 MG/ML
750 INJECTION, SOLUTION INTRAVENOUS EVERY 24 HOURS
Status: DISCONTINUED | OUTPATIENT
Start: 2024-07-19 | End: 2024-07-19

## 2024-07-18 RX ORDER — OXYBUTYNIN CHLORIDE 5 MG/1
5 TABLET, EXTENDED RELEASE ORAL NIGHTLY
Status: DISCONTINUED | OUTPATIENT
Start: 2024-07-18 | End: 2024-07-20 | Stop reason: HOSPADM

## 2024-07-18 RX ORDER — ONDANSETRON 2 MG/ML
4 INJECTION INTRAMUSCULAR; INTRAVENOUS EVERY 6 HOURS PRN
Status: DISCONTINUED | OUTPATIENT
Start: 2024-07-18 | End: 2024-07-20 | Stop reason: HOSPADM

## 2024-07-18 RX ORDER — BUDESONIDE AND FORMOTEROL FUMARATE DIHYDRATE 160; 4.5 UG/1; UG/1
2 AEROSOL RESPIRATORY (INHALATION)
Status: DISCONTINUED | OUTPATIENT
Start: 2024-07-18 | End: 2024-07-20 | Stop reason: HOSPADM

## 2024-07-18 RX ORDER — ACETAMINOPHEN 325 MG/1
650 TABLET ORAL ONCE
Status: COMPLETED | OUTPATIENT
Start: 2024-07-18 | End: 2024-07-18

## 2024-07-18 RX ORDER — LEVOFLOXACIN 5 MG/ML
750 INJECTION, SOLUTION INTRAVENOUS ONCE
Status: COMPLETED | OUTPATIENT
Start: 2024-07-18 | End: 2024-07-18

## 2024-07-18 RX ORDER — SODIUM CHLORIDE 0.9 % (FLUSH) 0.9 %
5-40 SYRINGE (ML) INJECTION PRN
Status: DISCONTINUED | OUTPATIENT
Start: 2024-07-18 | End: 2024-07-20 | Stop reason: HOSPADM

## 2024-07-18 RX ORDER — SODIUM CHLORIDE 0.9 % (FLUSH) 0.9 %
5-40 SYRINGE (ML) INJECTION EVERY 12 HOURS SCHEDULED
Status: DISCONTINUED | OUTPATIENT
Start: 2024-07-18 | End: 2024-07-20 | Stop reason: HOSPADM

## 2024-07-18 RX ORDER — ACETAMINOPHEN 650 MG/1
650 SUPPOSITORY RECTAL EVERY 6 HOURS PRN
Status: DISCONTINUED | OUTPATIENT
Start: 2024-07-18 | End: 2024-07-20 | Stop reason: HOSPADM

## 2024-07-18 RX ORDER — 0.9 % SODIUM CHLORIDE 0.9 %
500 INTRAVENOUS SOLUTION INTRAVENOUS ONCE
Status: COMPLETED | OUTPATIENT
Start: 2024-07-18 | End: 2024-07-18

## 2024-07-18 RX ORDER — HYDROXYZINE HYDROCHLORIDE 25 MG/1
25 TABLET, FILM COATED ORAL NIGHTLY
Status: DISCONTINUED | OUTPATIENT
Start: 2024-07-18 | End: 2024-07-20 | Stop reason: HOSPADM

## 2024-07-18 RX ORDER — ACETAMINOPHEN 325 MG/1
650 TABLET ORAL EVERY 6 HOURS PRN
Status: DISCONTINUED | OUTPATIENT
Start: 2024-07-18 | End: 2024-07-20 | Stop reason: HOSPADM

## 2024-07-18 RX ORDER — SODIUM CHLORIDE 9 MG/ML
INJECTION, SOLUTION INTRAVENOUS PRN
Status: DISCONTINUED | OUTPATIENT
Start: 2024-07-18 | End: 2024-07-20 | Stop reason: HOSPADM

## 2024-07-18 RX ORDER — FLUTICASONE PROPIONATE 50 MCG
1 SPRAY, SUSPENSION (ML) NASAL 2 TIMES DAILY PRN
Status: DISCONTINUED | OUTPATIENT
Start: 2024-07-18 | End: 2024-07-20 | Stop reason: HOSPADM

## 2024-07-18 RX ORDER — POLYETHYLENE GLYCOL 3350 17 G/17G
17 POWDER, FOR SOLUTION ORAL DAILY PRN
Status: DISCONTINUED | OUTPATIENT
Start: 2024-07-18 | End: 2024-07-20 | Stop reason: HOSPADM

## 2024-07-18 RX ORDER — BACLOFEN 10 MG/1
5 TABLET ORAL NIGHTLY PRN
Status: DISCONTINUED | OUTPATIENT
Start: 2024-07-18 | End: 2024-07-20 | Stop reason: HOSPADM

## 2024-07-18 RX ORDER — ALBUTEROL SULFATE 2.5 MG/3ML
2.5 SOLUTION RESPIRATORY (INHALATION) EVERY 6 HOURS PRN
Status: DISCONTINUED | OUTPATIENT
Start: 2024-07-18 | End: 2024-07-20 | Stop reason: HOSPADM

## 2024-07-18 RX ORDER — PRAVASTATIN SODIUM 40 MG
40 TABLET ORAL DAILY
Status: DISCONTINUED | OUTPATIENT
Start: 2024-07-18 | End: 2024-07-20 | Stop reason: HOSPADM

## 2024-07-18 RX ORDER — MONTELUKAST SODIUM 10 MG/1
10 TABLET ORAL NIGHTLY
Status: DISCONTINUED | OUTPATIENT
Start: 2024-07-18 | End: 2024-07-20 | Stop reason: HOSPADM

## 2024-07-18 RX ORDER — ONDANSETRON 4 MG/1
4 TABLET, ORALLY DISINTEGRATING ORAL EVERY 8 HOURS PRN
Status: DISCONTINUED | OUTPATIENT
Start: 2024-07-18 | End: 2024-07-20 | Stop reason: HOSPADM

## 2024-07-18 RX ADMIN — SODIUM CHLORIDE 500 ML: 9 INJECTION, SOLUTION INTRAVENOUS at 11:49

## 2024-07-18 RX ADMIN — LEVOFLOXACIN 750 MG: 5 INJECTION, SOLUTION INTRAVENOUS at 13:34

## 2024-07-18 RX ADMIN — HYDROXYZINE HYDROCHLORIDE 25 MG: 25 TABLET ORAL at 20:18

## 2024-07-18 RX ADMIN — ACETAMINOPHEN 650 MG: 325 TABLET ORAL at 11:45

## 2024-07-18 RX ADMIN — ACETAMINOPHEN 650 MG: 325 TABLET ORAL at 21:16

## 2024-07-18 RX ADMIN — OXYBUTYNIN CHLORIDE 5 MG: 5 TABLET, EXTENDED RELEASE ORAL at 20:18

## 2024-07-18 RX ADMIN — Medication 2 PUFF: at 19:44

## 2024-07-18 RX ADMIN — MONTELUKAST 10 MG: 10 TABLET, FILM COATED ORAL at 20:18

## 2024-07-18 RX ADMIN — Medication 10 ML: at 20:18

## 2024-07-18 RX ADMIN — BACLOFEN 5 MG: 10 TABLET ORAL at 17:17

## 2024-07-18 RX ADMIN — PRAVASTATIN SODIUM 40 MG: 40 TABLET ORAL at 18:36

## 2024-07-18 ASSESSMENT — PAIN SCALES - GENERAL
PAINLEVEL_OUTOF10: 2
PAINLEVEL_OUTOF10: 5
PAINLEVEL_OUTOF10: 0
PAINLEVEL_OUTOF10: 8
PAINLEVEL_OUTOF10: 3
PAINLEVEL_OUTOF10: 10

## 2024-07-18 ASSESSMENT — PAIN DESCRIPTION - DESCRIPTORS
DESCRIPTORS: DISCOMFORT
DESCRIPTORS: ACHING
DESCRIPTORS: ACHING
DESCRIPTORS: SPASM

## 2024-07-18 ASSESSMENT — PAIN SCALES - WONG BAKER
WONGBAKER_NUMERICALRESPONSE: HURTS A LITTLE BIT
WONGBAKER_NUMERICALRESPONSE: NO HURT

## 2024-07-18 ASSESSMENT — PAIN - FUNCTIONAL ASSESSMENT
PAIN_FUNCTIONAL_ASSESSMENT: ACTIVITIES ARE NOT PREVENTED
PAIN_FUNCTIONAL_ASSESSMENT: 0-10
PAIN_FUNCTIONAL_ASSESSMENT: NONE - DENIES PAIN

## 2024-07-18 ASSESSMENT — PAIN DESCRIPTION - LOCATION
LOCATION: SHOULDER
LOCATION: NECK;BACK

## 2024-07-18 ASSESSMENT — PAIN DESCRIPTION - ORIENTATION
ORIENTATION: RIGHT

## 2024-07-18 ASSESSMENT — ENCOUNTER SYMPTOMS
BLOOD IN STOOL: 0
VOMITING: 0
SHORTNESS OF BREATH: 1
ABDOMINAL PAIN: 0
BACK PAIN: 0
CHEST TIGHTNESS: 1

## 2024-07-18 ASSESSMENT — PAIN DESCRIPTION - ONSET: ONSET: GRADUAL

## 2024-07-18 ASSESSMENT — PAIN DESCRIPTION - FREQUENCY: FREQUENCY: INTERMITTENT

## 2024-07-18 ASSESSMENT — PAIN DESCRIPTION - PAIN TYPE: TYPE: ACUTE PAIN

## 2024-07-18 NOTE — ED PROVIDER NOTES
[facility]  ED  EMERGENCY DEPARTMENT ENCOUNTER        Pt Name: Isa Hernandez  MRN: [id number]  Birthdate 1945  Date of evaluation: 7/18/2024  Provider: Jh Orourke MD  PCP: Jocelin Ellsworth MD      CHIEF COMPLAINT       Chief Complaint   Patient presents with    Fall     Patient fell at home, doctors office called 911, patient stated that she lost consciousness, altered and confused. Patient is on Zeralto. Patient was at home by herself. Patient on room air but was having troubling breathing  120/83 ox 99.  Patient did not want to come to ER.  Altered from baseline. Patient has  chest pain that is intermittent and pain under left arm about 7-8 min ago, patient refused ASA and nitro. Patient stated that she was dizzy and fell. Patient stated the left side of her he       HISTORY OFPRESENT ILLNESS   (Location/Symptom, Timing/Onset, Context/Setting, Quality, Duration, Modifying Factors,Severity)  Note limiting factors.     Isa Hernandez is a 79 y.o. female presenting today due to concern for reportedly being more altered today and initially trying to speak to her primary doctor office at which point she reportedly felt like she was going to pass out per the conversation and then she hung up and called back after she actually did pass out and her primary doctor's office and a squad to go pick her up to bring her to the ED.  She is complaining of right shoulder pain along with intermittent chest discomfort along with shortness of breath over the last 2 to 3 days.  She thinks she may have hit her head and does complain of a headache and some slight neck discomfort.  No reported vomiting.  She is on Xarelto.  No reported fever.  Due to concern for increased confusion today along with syncopal event and having chest pain for the past couple of days along with feeling dizzy for a few days, she came to the emergency department for further evaluation.    She denied any unilateral  There is no guarding or rebound. Negative signs include McBurney's sign.   Musculoskeletal:         General: No tenderness, deformity or signs of injury. Normal range of motion.      Right shoulder: No swelling, deformity, effusion, laceration, tenderness or bony tenderness. Normal range of motion.      Left shoulder: No swelling, deformity, effusion, laceration, tenderness or bony tenderness. Normal range of motion.      Right elbow: Normal range of motion. No tenderness.      Left elbow: Normal range of motion. No tenderness.      Right wrist: No tenderness, bony tenderness or snuff box tenderness. Normal range of motion.      Left wrist: No tenderness, bony tenderness or snuff box tenderness. Normal range of motion.      Cervical back: Full passive range of motion without pain, normal range of motion and neck supple. No swelling, edema, deformity, erythema, signs of trauma, lacerations, rigidity, spasms, torticollis, tenderness, bony tenderness or crepitus. No pain with movement. Normal range of motion.      Thoracic back: No tenderness or bony tenderness.      Right hip: No tenderness or bony tenderness. Normal range of motion.      Left hip: No tenderness or bony tenderness. Normal range of motion.      Right knee: Normal range of motion. No tenderness.      Left knee: Normal range of motion. No tenderness.      Right ankle: No tenderness. Normal range of motion.      Left ankle: No tenderness. Normal range of motion.   Skin:     General: Skin is warm and dry.      Coloration: Skin is not jaundiced or pale.      Findings: No erythema or rash.   Neurological:      General: No focal deficit present.      Mental Status: She is alert and oriented to person, place, and time. Mental status is at baseline.      GCS: GCS eye subscore is 4. GCS verbal subscore is 5. GCS motor subscore is 6.      Cranial Nerves: No dysarthria.      Sensory: Sensation is intact. No sensory deficit.      Motor: Motor function is intact. No

## 2024-07-18 NOTE — ED NOTES
Pt taken to bathroom in a wheelchair then walked to toilet, pt gait was even and steady with the use of her cane.

## 2024-07-18 NOTE — ED NOTES
Patient Name: Isa Hernandez  :  1945  79 y.o.  MRN:  [id number]  Preferred Name  Isa  ED Room #:    Family/Caregiver Present no  Restraints no  Sitter no  Sepsis Risk Score      Situation  Code Status: Prior No additional code details.    Allergies: Latex, Iodine, Penicillins, Sulfa antibiotics, Tetanus toxoids, Cephalexin, Clindamycin, Clindamycin/lincomycin, Influenza vaccines, Iodinated contrast media, Polymyxin b, Tetanus immune globulin, Tetanus toxoid, Albuterol, Codeine, Doxycycline, and Tiotropium bromide monohydrate  Weight: Patient Vitals for the past 96 hrs (Last 3 readings):   Weight   24 1049 56.7 kg (125 lb)     Arrived from: home  Chief Complaint:   Chief Complaint   Patient presents with    Fall     Patient fell at home, doctors office called 911, patient stated that she lost consciousness, altered and confused. Patient is on Zeralto. Patient was at home by herself. Patient on room air but was having troubling breathing  120/83 ox 99.  Patient did not want to come to ER.  Altered from baseline. Patient has  chest pain that is intermittent and pain under left arm about 7-8 min ago, patient refused ASA and nitro. Patient stated that she was dizzy and fell. Patient stated the left side of her he     Hospital Problem/Diagnosis:  Principal Problem:    Pneumonia, unspecified organism  Resolved Problems:    * No resolved hospital problems. *    Imaging:   XR SHOULDER RIGHT (MIN 2 VIEWS)   Final Result   No acute abnormality.  Right upper lobe infiltrates/atelectasis.         CT ABDOMEN PELVIS WO CONTRAST Additional Contrast? None   Final Result   1. No acute finding in the abdomen or pelvis.   2. Stable hepatomegaly.   3. Stable aneurysm of the infrarenal abdominal aorta.   4. Stable compression fractures of the thoracolumbar spine.   5. Interval development of a small right-sided pleural effusion and   pericardial effusion.      RECOMMENDATIONS:   AAA Size: Follow-up  Recommendation      3.0-3.4 cm Every 3 years         CT HEAD WO CONTRAST   Final Result   No acute intracranial findings.         CT CERVICAL SPINE WO CONTRAST   Final Result   No acute abnormality of the cervical spine.         XR CHEST PORTABLE   Final Result   1. New airspace consolidation throughout the right upper lobe and mild volume   loss.  Airspace consolidation could represent atelectasis or pneumonia.  A   centrally obstructing mass cannot be excluded.  Radiographic follow-up is   recommended.  Contrast enhanced chest CT could also be considered for further   evaluation.   2. Otherwise stable appearance of the chest with atelectasis or scarring at   the left lung base.           Abnormal labs:   Abnormal Labs Reviewed   CBC WITH AUTO DIFFERENTIAL - Abnormal; Notable for the following components:       Result Value    WBC 17.1 (*)     Hemoglobin 11.7 (*)     Neutrophils Absolute 14.5 (*)     All other components within normal limits   COMPREHENSIVE METABOLIC PANEL W/ REFLEX TO MG FOR LOW K - Abnormal; Notable for the following components:    Sodium 135 (*)     Potassium reflex Magnesium 3.3 (*)     BUN 5 (*)     Creatinine <0.5 (*)     ALT 8 (*)     AST 12 (*)     All other components within normal limits   TROPONIN - Abnormal; Notable for the following components:    Troponin, High Sensitivity 15 (*)     All other components within normal limits   URINALYSIS WITH REFLEX TO CULTURE - Abnormal; Notable for the following components:    Ketones, Urine TRACE (*)     Blood, Urine TRACE-INTACT (*)     Leukocyte Esterase, Urine MODERATE (*)     All other components within normal limits   BRAIN NATRIURETIC PEPTIDE - Abnormal; Notable for the following components:    Pro- (*)     All other components within normal limits   MICROSCOPIC URINALYSIS - Abnormal; Notable for the following components:    WBC, UA 21-50 (*)     Epithelial Cells, UA 6-10 (*)     Bacteria, UA Rare (*)     All other components within

## 2024-07-18 NOTE — PROGRESS NOTES
Pt is admitted to room 529. Pt has diarrhea and will not wait for assistance to go to the bathroom. Pt states she has diarrhea every time she takes any type of medication. Pt refusing tele due to itching, removed tele herself. Pt refusing gown due to \"hating the feel of the gowns.\"

## 2024-07-18 NOTE — H&P
V2.0  History and Physical      Name:  Isa Hernandez /Age/Sex: 1945  (79 y.o. female)   MRN & CSN:  [id number] & [id number] Encounter Date/Time: 2024 1:49 PM EDT   Location:   PCP: Jocelin Ellsworth MD       Hospital Day: 1    Assessment and Plan:   Patient is an 79-year-old female with past medical history of hypertension, hyperlipidemia, COPD open (chronic bronchitis/emphysema), history of GBS, and reflex sympathetic dystrophy who presents for evaluation of chest pain, shortness of breath, and recent syncope with collapse.       Plan:  SIRS Criteria/Sepsis  - On arrival, RR of 23, HR 70 bpm, Temp 97.9 F, and WBC of 17.1. Source of infection suspected  - LA Normal  - Blood Cultures Pending.   - Received IVF bolus and empiric antibiotics in ED.   - Clinical concern for: PNA vs. UTI   - Will continue patient on IV Levofloxacin at this time.     Right Upper Lobe Pneumonia (CAP)  - CXR on arrival showing RUL consolidation   - CT Chest pending   - Procal Pending.   - Urine Legionella Pending  - Urine Strep Pending   - Respiratory Culture Pending  - Was started on IV Levofloxacin in the ED empirically. Will continue with this for now.   - PRN Nebulizers and Scheduled Inhaler  - Supplemental O2 if indicated     Concern for UTI   - UA showing Trace Blood, Moderate LE, and Negative Nitrite   - Urine Culture pending  - Started on empiric abx for above for now.     Syncope with Collapse   - Unclear etiology at this. Could be 2/2 to PNA, UTI, or Hypovolemia   - EKG as below.   - Will order Echo and Carotid Duplex for now  - Hold on EP consult at this time until further workup returns.     Atrial Fibrillation   - EKG as below.   - RRR on admission   - Not on rate control per chart review  - Switched from Warfarin to Xarelto 2/2 to cost reasons. Will continue for now.     COPD (Chronic Bronchitis/Emphysema)  - No s/s of acute exacerbation at this time.   - Takes Albuterol PRN inhaler and Trelegy daily  for exam:->neck pain Decision Support Exception - unselect if not a suspected or confirmed emergency medical condition->Emergency Medical Condition (MA) Reason for Exam: fell , hit head, neck pain , rt shoulder pain FINDINGS: BONES/ALIGNMENT: There is no acute fracture or traumatic malalignment.  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 DEGENERATIVE CHANGES: There is moderate disc space narrowing and endplate osteophyte formation at the mid and lower cervical levels.  Uncinate spurring contributes to severe bilateral neural foraminal encroachment at the C5/6 and C6/7 levels. SOFT TISSUES: There is no prevertebral soft tissue swelling.  Dense consolidation is present within the right upper lobe.     No acute abnormality of the cervical spine.     XR CHEST PORTABLE    Result Date: 7/18/2024  EXAMINATION: ONE XRAY VIEW OF THE CHEST 7/18/2024 10:06 am COMPARISON: 05/26/2024. HISTORY: ORDERING SYSTEM PROVIDED HISTORY: Chest pain TECHNOLOGIST PROVIDED HISTORY: Reason for exam:->Chest pain Reason for Exam: chest pain FINDINGS: There is new airspace consolidation throughout the right upper lobe with mild volume loss.  A linear left basal opacity is unchanged.  There is no pleural effusion.  Borderline cardiomegaly appears stable.     1. New airspace consolidation throughout the right upper lobe and mild volume loss.  Airspace consolidation could represent atelectasis or pneumonia.  A centrally obstructing mass cannot be excluded.  Radiographic follow-up is recommended.  Contrast enhanced chest CT could also be considered for further evaluation. 2. Otherwise stable appearance of the chest with atelectasis or scarring at the left lung base.         Electronically signed by Elías Elena DO on 7/18/2024 at 1:49 PM        Elías Elena DO  PGY-3, Family Medicine  [facility]

## 2024-07-19 ENCOUNTER — APPOINTMENT (OUTPATIENT)
Dept: CT IMAGING | Age: 79
DRG: 871 | End: 2024-07-19
Payer: MEDICARE

## 2024-07-19 ENCOUNTER — HOSPITAL ENCOUNTER (INPATIENT)
Dept: VASCULAR LAB | Age: 79
DRG: 871 | End: 2024-07-19
Payer: MEDICARE

## 2024-07-19 LAB
ANION GAP SERPL CALCULATED.3IONS-SCNC: 9 MMOL/L (ref 3–16)
BACTERIA BLD CULT ORG #2: NORMAL
BACTERIA BLD CULT: NORMAL
BACTERIA UR CULT: NORMAL
BASOPHILS # BLD: 0.1 K/UL (ref 0–0.2)
BASOPHILS NFR BLD: 0.6 %
BUN SERPL-MCNC: 3 MG/DL (ref 7–20)
CALCIUM SERPL-MCNC: 8.5 MG/DL (ref 8.3–10.6)
CHLORIDE SERPL-SCNC: 103 MMOL/L (ref 99–110)
CO2 SERPL-SCNC: 25 MMOL/L (ref 21–32)
CREAT SERPL-MCNC: <0.5 MG/DL (ref 0.6–1.2)
DEPRECATED RDW RBC AUTO: 14.7 % (ref 12.4–15.4)
ECHO BSA: 1.61 M2
EOSINOPHIL # BLD: 0.3 K/UL (ref 0–0.6)
EOSINOPHIL NFR BLD: 1.8 %
GFR SERPLBLD CREATININE-BSD FMLA CKD-EPI: >90 ML/MIN/{1.73_M2}
GLUCOSE SERPL-MCNC: 99 MG/DL (ref 70–99)
HCT VFR BLD AUTO: 32.6 % (ref 36–48)
HGB BLD-MCNC: 10.6 G/DL (ref 12–16)
LYMPHOCYTES # BLD: 1.2 K/UL (ref 1–5.1)
LYMPHOCYTES NFR BLD: 8.2 %
MAGNESIUM SERPL-MCNC: 1.7 MG/DL (ref 1.8–2.4)
MCH RBC QN AUTO: 28.9 PG (ref 26–34)
MCHC RBC AUTO-ENTMCNC: 32.5 G/DL (ref 31–36)
MCV RBC AUTO: 88.8 FL (ref 80–100)
MONOCYTES # BLD: 1 K/UL (ref 0–1.3)
MONOCYTES NFR BLD: 6.8 %
NEUTROPHILS # BLD: 12.3 K/UL (ref 1.7–7.7)
NEUTROPHILS NFR BLD: 82.6 %
PLATELET # BLD AUTO: 260 K/UL (ref 135–450)
PMV BLD AUTO: 10.6 FL (ref 5–10.5)
POTASSIUM SERPL-SCNC: 3 MMOL/L (ref 3.5–5.1)
RBC # BLD AUTO: 3.67 M/UL (ref 4–5.2)
SODIUM SERPL-SCNC: 137 MMOL/L (ref 136–145)
VAS LEFT ARM BP: 103 MMHG
VAS LEFT CCA DIST EDV: 12.4 CM/S
VAS LEFT CCA DIST PSV: 73.3 CM/S
VAS LEFT CCA MID EDV: 11.2 CM/S
VAS LEFT CCA MID PSV: 66.5 CM/S
VAS LEFT CCA PROX EDV: 11.8 CM/S
VAS LEFT CCA PROX PSV: 65.2 CM/S
VAS LEFT ECA EDV: 17.8 CM/S
VAS LEFT ECA PSV: 140 CM/S
VAS LEFT ICA DIST EDV: 14.6 CM/S
VAS LEFT ICA DIST PSV: 61.6 CM/S
VAS LEFT ICA MID EDV: 14.5 CM/S
VAS LEFT ICA MID PSV: 59.3 CM/S
VAS LEFT ICA PROX EDV: 12.6 CM/S
VAS LEFT ICA PROX PSV: 64.2 CM/S
VAS LEFT ICA/CCA PSV: 0.97
VAS LEFT SUBCLAVIAN PROX EDV: 0 CM/S
VAS LEFT SUBCLAVIAN PROX PSV: 111 CM/S
VAS LEFT VERTEBRAL EDV: 10.2 CM/S
VAS LEFT VERTEBRAL PSV: 52.9 CM/S
VAS RIGHT ARM BP: 112 MMHG
VAS RIGHT CCA DIST EDV: 13.7 CM/S
VAS RIGHT CCA DIST PSV: 67.7 CM/S
VAS RIGHT CCA MID EDV: 13 CM/S
VAS RIGHT CCA MID PSV: 85.7 CM/S
VAS RIGHT CCA PROX EDV: 16.8 CM/S
VAS RIGHT CCA PROX PSV: 96.9 CM/S
VAS RIGHT ECA EDV: 8.7 CM/S
VAS RIGHT ECA PSV: 76.4 CM/S
VAS RIGHT ICA DIST EDV: 13.7 CM/S
VAS RIGHT ICA DIST PSV: 62.7 CM/S
VAS RIGHT ICA MID EDV: 12.4 CM/S
VAS RIGHT ICA MID PSV: 69 CM/S
VAS RIGHT ICA PROX EDV: 16.8 CM/S
VAS RIGHT ICA PROX PSV: 83.2 CM/S
VAS RIGHT ICA/CCA PSV: 0.97
VAS RIGHT SUBCLAVIAN PROX EDV: 0 CM/S
VAS RIGHT SUBCLAVIAN PROX PSV: 128 CM/S
VAS RIGHT VERTEBRAL EDV: 13 CM/S
VAS RIGHT VERTEBRAL PSV: 67.1 CM/S
WBC # BLD AUTO: 14.9 K/UL (ref 4–11)

## 2024-07-19 PROCEDURE — 6370000000 HC RX 637 (ALT 250 FOR IP): Performed by: NURSE PRACTITIONER

## 2024-07-19 PROCEDURE — 80048 BASIC METABOLIC PNL TOTAL CA: CPT

## 2024-07-19 PROCEDURE — 6370000000 HC RX 637 (ALT 250 FOR IP)

## 2024-07-19 PROCEDURE — 1200000000 HC SEMI PRIVATE

## 2024-07-19 PROCEDURE — 85025 COMPLETE CBC W/AUTO DIFF WBC: CPT

## 2024-07-19 PROCEDURE — 94640 AIRWAY INHALATION TREATMENT: CPT

## 2024-07-19 PROCEDURE — 93880 EXTRACRANIAL BILAT STUDY: CPT

## 2024-07-19 PROCEDURE — 83735 ASSAY OF MAGNESIUM: CPT

## 2024-07-19 PROCEDURE — 93880 EXTRACRANIAL BILAT STUDY: CPT | Performed by: INTERNAL MEDICINE

## 2024-07-19 PROCEDURE — 6370000000 HC RX 637 (ALT 250 FOR IP): Performed by: STUDENT IN AN ORGANIZED HEALTH CARE EDUCATION/TRAINING PROGRAM

## 2024-07-19 PROCEDURE — 71250 CT THORAX DX C-: CPT

## 2024-07-19 PROCEDURE — 36415 COLL VENOUS BLD VENIPUNCTURE: CPT

## 2024-07-19 PROCEDURE — 6370000000 HC RX 637 (ALT 250 FOR IP): Performed by: INTERNAL MEDICINE

## 2024-07-19 RX ORDER — DIPHENHYDRAMINE HCL 25 MG
25 TABLET ORAL EVERY 6 HOURS PRN
Status: DISCONTINUED | OUTPATIENT
Start: 2024-07-19 | End: 2024-07-20 | Stop reason: HOSPADM

## 2024-07-19 RX ORDER — LANOLIN ALCOHOL/MO/W.PET/CERES
400 CREAM (GRAM) TOPICAL ONCE
Status: COMPLETED | OUTPATIENT
Start: 2024-07-19 | End: 2024-07-19

## 2024-07-19 RX ORDER — MAGNESIUM SULFATE IN WATER 40 MG/ML
2000 INJECTION, SOLUTION INTRAVENOUS ONCE
Status: DISCONTINUED | OUTPATIENT
Start: 2024-07-19 | End: 2024-07-19

## 2024-07-19 RX ORDER — LEVOFLOXACIN 750 MG/1
750 TABLET, FILM COATED ORAL EVERY 24 HOURS
Status: DISCONTINUED | OUTPATIENT
Start: 2024-07-19 | End: 2024-07-20 | Stop reason: HOSPADM

## 2024-07-19 RX ORDER — POTASSIUM CHLORIDE 20 MEQ/1
40 TABLET, EXTENDED RELEASE ORAL ONCE
Status: COMPLETED | OUTPATIENT
Start: 2024-07-19 | End: 2024-07-19

## 2024-07-19 RX ADMIN — ONDANSETRON 4 MG: 4 TABLET, ORALLY DISINTEGRATING ORAL at 18:31

## 2024-07-19 RX ADMIN — Medication 400 MG: at 19:33

## 2024-07-19 RX ADMIN — OXYBUTYNIN CHLORIDE 5 MG: 5 TABLET, EXTENDED RELEASE ORAL at 20:24

## 2024-07-19 RX ADMIN — PRAVASTATIN SODIUM 40 MG: 40 TABLET ORAL at 08:08

## 2024-07-19 RX ADMIN — DIPHENHYDRAMINE HYDROCHLORIDE 25 MG: 25 TABLET ORAL at 05:00

## 2024-07-19 RX ADMIN — MONTELUKAST 10 MG: 10 TABLET, FILM COATED ORAL at 20:24

## 2024-07-19 RX ADMIN — Medication: at 22:36

## 2024-07-19 RX ADMIN — POTASSIUM CHLORIDE 40 MEQ: 1500 TABLET, EXTENDED RELEASE ORAL at 12:00

## 2024-07-19 RX ADMIN — Medication 2 PUFF: at 08:16

## 2024-07-19 RX ADMIN — HYDROXYZINE HYDROCHLORIDE 25 MG: 25 TABLET ORAL at 20:24

## 2024-07-19 RX ADMIN — LEVOFLOXACIN 750 MG: 750 TABLET, FILM COATED ORAL at 19:15

## 2024-07-19 RX ADMIN — Medication 2 PUFF: at 20:11

## 2024-07-19 RX ADMIN — RIVAROXABAN 20 MG: 20 TABLET, FILM COATED ORAL at 08:08

## 2024-07-19 RX ADMIN — DIPHENHYDRAMINE HYDROCHLORIDE 25 MG: 25 TABLET ORAL at 16:47

## 2024-07-19 RX ADMIN — DIPHENHYDRAMINE HYDROCHLORIDE 25 MG: 25 TABLET ORAL at 22:38

## 2024-07-19 ASSESSMENT — PAIN SCALES - WONG BAKER
WONGBAKER_NUMERICALRESPONSE: NO HURT

## 2024-07-19 ASSESSMENT — PAIN SCALES - GENERAL
PAINLEVEL_OUTOF10: 0

## 2024-07-19 NOTE — PROGRESS NOTES
Patient alert and oriented with some confusion refuse telemetry , pulled off iv line and refused reinsertion, team aware reported of itching on benadryl ct done. She want to be discharge this morning.

## 2024-07-19 NOTE — PROGRESS NOTES
Comprehensive Nutrition Assessment    Type and Reason for Visit:  Initial, Positive Nutrition Screen    Nutrition Recommendations/Plan:   Continue regular diet and encourage PO intake   Continue ensure w/ meals, chocolate   RD to order new updated weight  Monitor nutrition adequacy, pertinent labs, bowel habits, wt changes, and clinical progress     Malnutrition Assessment:  Malnutrition Status:  At risk for malnutrition (Comment) (07/19/24 1206)    Context:  Acute Illness     Findings of the 6 clinical characteristics of malnutrition:  Energy Intake:  Mild decrease in energy intake (Comment)  Muscle Mass Loss:  Mild muscle mass loss Clavicles (pectoralis & deltoids), Temples (temporalis)    Nutrition Assessment:    Positive nutrition screen for wt loss and poor intake: 79y.o f w/ PMH of hyperlipidemia, COPD, open (chronic bronchitis/emphysema), history of GBS, and reflex sympathetic dystrophy admitted w/ chest pain, SOB, and recent syncope with collapse. On regular diet. Po intakes 51-76% of meals. Pt reports good appetite since admission. Endorses poor appetite and 20 lb wt loss PTA, LOCO wt changes d/t stated weight, RD to order new updated weight. Pt likes ensure, will continue w/ meals. Encouraged protein intake. Continue to encourage PO intake, will continue to monitor.    Nutrition Related Findings:    K+ 3.0, mg 1.7. No BM Wound Type: None       Current Nutrition Intake & Therapies:    Average Meal Intake: 51-75%  Average Supplements Intake: Unable to assess  ADULT DIET; Regular  ADULT ORAL NUTRITION SUPPLEMENT; Breakfast, Lunch, Dinner; Standard High Calorie/High Protein Oral Supplement    Anthropometric Measures:  Height: 165.1 cm (5' 5\")  Ideal Body Weight (IBW): 125 lbs (57 kg)       Current Body Weight: 56.7 kg (125 lb), 100 % IBW. Weight Source: Stated  Current BMI (kg/m2): 20.8        Weight Adjustment For: No Adjustment                 BMI Categories: Normal Weight (BMI 18.5-24.9)    Estimated Daily

## 2024-07-19 NOTE — CARE COORDINATION
Case Management Assessment  Initial Evaluation    Date/Time of Evaluation: 7/19/2024 3:17 PM  Assessment Completed by: JIMY PERALES RN    If patient is discharged prior to next notation, then this note serves as note for discharge by case management.    Patient Name: Isa Hernandez                   YOB: 1945  Diagnosis: Syncope and collapse [R55]  Parapneumonic effusion [J18.9, J91.8]  Pericardial effusion [I31.39]  Septicemia (HCC) [A41.9]  Acute pain of right shoulder [M25.511]  Pneumonia of right upper lobe due to infectious organism [J18.9]  Pneumonia, unspecified organism [J18.9]  Chest pain, unspecified type [R07.9]  Abdominal aortic aneurysm (AAA) without rupture, unspecified part (HCC) [I71.40]                   Date / Time: 7/18/2024 10:42 AM    Patient Admission Status: Inpatient   Readmission Risk (Low < 19, Mod (19-27), High > 27): Readmission Risk Score: 23.1    Current PCP: Jocelin Ellsworht MD  PCP verified by ? Yes    Chart Reviewed: Yes      History Provided by: Child/Family  Patient Orientation: Alert and Oriented    Patient Cognition: Other (see comment) (Int Confusion)    Hospitalization in the last 30 days (Readmission):  No    If yes, Readmission Assessment in  Navigator will be completed.    Advance Directives:      Code Status: Full Code   Patient's Primary Decision Maker is: Legal Next of Kin    Primary Decision Maker: Barney Hernandez - Child - [phone]    Primary Decision Maker (Active): Penny gautam - Child - [phone]    Discharge Planning:    Patient lives with: Alone Type of Home: Apartment  Primary Care Giver: Self  Patient Support Systems include: Children   Current Financial resources: Medicare  Current community resources: Other (Comment) ([facility])  Current services prior to admission: Other (Comment) ([facility])            Current DME:              Type of Home Care services:  PT, OT, Nursing Services, Aide  Services    ADLS  Prior functional level: Assistance with the following:, Housework, Shopping  Current functional level: Assistance with the following:, Cooking, Housework, Shopping    PT AM-PAC:   /24  OT AM-PAC:   /24    Family can provide assistance at DC: Other (comment) (at times)  Would you like Case Management to discuss the discharge plan with any other family members/significant others, and if so, who? Yes (children)  Plans to Return to Present Housing: Unknown at present  Other Identified Issues/Barriers to RETURNING to current housing: None at this time   Potential Assistance needed at discharge: Home Care            Potential DME:    Patient expects to discharge to: Apartment  Plan for transportation at discharge: Family    Financial    Payor: [email or web address] MEDICARE / Plan: [employer] ESSENTIAL/PLUS / Product Type: *No Product type* /     Does insurance require precert for SNF: Yes    Potential assistance Purchasing Medications: No  Meds-to-Beds request: Yes      [facility] - [address] - P [phone] - F [phone]  [address] [place] [address]  [address]  [facility] [id number]  Phone: [phone] Fax: [phone]      Notes:    Factors facilitating achievement of predicted outcomes: Family support    Barriers to discharge: Limited insight into deficits and Medication managment    Additional Case Management Notes: Pt IPTA in a apartment alone. Pt owns a cane, walker and scooter. Family provides transport. Son Barney silva. Nanny bravo in home to assist pt w/ verbal cues as needed per son. Active w/[facility] aide once a week for help w/housework. Referral placed to [facility] for PT/OT/SN/SW. Active PCP and Insurance. Will follow for needs as they arise.     The Plan for Transition of Care is related to the following treatment goals of Syncope and collapse [R55]  Parapneumonic effusion [J18.9, J91.8]  Pericardial effusion [I31.39]  Septicemia (HCC)

## 2024-07-19 NOTE — PROGRESS NOTES
spine was performed without the administration of intravenous contrast. Multiplanar reformatted images are provided for review. Automated exposure control, iterative reconstruction, and/or weight based adjustment of the mA/kV was utilized to reduce the radiation dose to as low as reasonably achievable. COMPARISON: None. HISTORY: ORDERING SYSTEM PROVIDED HISTORY: neck pain TECHNOLOGIST PROVIDED HISTORY: Reason for exam:->neck pain Decision Support Exception - unselect if not a suspected or confirmed emergency medical condition->Emergency Medical Condition (MA) Reason for Exam: fell , hit head, neck pain , rt shoulder pain FINDINGS: BONES/ALIGNMENT: There is no acute fracture or traumatic malalignment.  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 DEGENERATIVE CHANGES: There is moderate disc space narrowing and endplate osteophyte formation at the mid and lower cervical levels.  Uncinate spurring contributes to severe bilateral neural foraminal encroachment at the C5/6 and C6/7 levels. SOFT TISSUES: There is no prevertebral soft tissue swelling.  Dense consolidation is present within the right upper lobe.     No acute abnormality of the cervical spine.     XR CHEST PORTABLE    Result Date: 7/18/2024  EXAMINATION: ONE XRAY VIEW OF THE CHEST 7/18/2024 10:06 am COMPARISON: 05/26/2024. HISTORY: ORDERING SYSTEM PROVIDED HISTORY: Chest pain TECHNOLOGIST PROVIDED HISTORY: Reason for exam:->Chest pain Reason for Exam: chest pain FINDINGS: There is new airspace consolidation throughout the right upper lobe with mild volume loss.  A linear left basal opacity is unchanged.  There is no pleural effusion.  Borderline cardiomegaly appears stable.     1. New airspace consolidation throughout the right upper lobe and mild volume loss.  Airspace consolidation could represent atelectasis or pneumonia.  A centrally obstructing mass cannot be excluded.  Radiographic follow-up is recommended.  Contrast enhanced chest  CT could also be considered for further evaluation. 2. Otherwise stable appearance of the chest with atelectasis or scarring at the left lung base.       CBC:   Recent Labs     07/18/24  1105 07/19/24  0627   WBC 17.1* 14.9*   HGB 11.7* 10.6*    260     BMP:    Recent Labs     07/18/24  1105 07/19/24  0627   * 137   K 3.3* 3.0*   CL 99 103   CO2 24 25   BUN 5* 3*   CREATININE <0.5* <0.5*   GLUCOSE 93 99     Hepatic:   Recent Labs     07/18/24  1105   AST 12*   ALT 8*   BILITOT 0.8   ALKPHOS 96     Lipids:   Lab Results   Component Value Date/Time    CHOL 192 03/18/2024 06:07 AM    HDL 56 03/18/2024 06:07 AM    TRIG 63 03/18/2024 06:07 AM     Hemoglobin A1C: No results found for: \"LABA1C\"  TSH:   Lab Results   Component Value Date/Time    TSH 1.79 11/23/2011 11:33 AM     Troponin: No results found for: \"TROPONINT\"  Lactic Acid:   Recent Labs     07/18/24  1105   LACTA 1.7     BNP:   Recent Labs     07/18/24  1105   PROBNP 963*     UA:  Lab Results   Component Value Date/Time    NITRU Negative 07/18/2024 01:36 PM    COLORU Yellow 07/18/2024 01:36 PM    PHUR 6.0 07/18/2024 01:36 PM    PHUR 6.0 02/27/2024 07:14 AM    LABCAST 0-1 Fine 01/19/2020 12:15 AM    WBCUA 21-50 07/18/2024 01:36 PM    RBCUA 0-2 07/18/2024 01:36 PM    MUCUS 1+ 01/19/2020 12:15 AM    BACTERIA Rare 07/18/2024 01:36 PM    CLARITYU Clear 07/18/2024 01:36 PM    LEUKOCYTESUR MODERATE 07/18/2024 01:36 PM    UROBILINOGEN 0.2 07/18/2024 01:36 PM    BILIRUBINUR Negative 07/18/2024 01:36 PM    BLOODU TRACE-INTACT 07/18/2024 01:36 PM    GLUCOSEU Negative 07/18/2024 01:36 PM    KETUA TRACE 07/18/2024 01:36 PM    AMORPHOUS Rare 11/15/2023 05:51 PM     Urine Cultures:   Lab Results   Component Value Date/Time    LABURIN  07/18/2024 02:29 PM     >50,000 CFU/ml mixed skin/urogenital trish. No further workup     Blood Cultures:   Lab Results   Component Value Date/Time    BC No Growth after 4 days of incubation. 07/13/2023 12:29 PM     Lab Results

## 2024-07-19 NOTE — PROGRESS NOTES
Hospital Medicine Progress Note      Date of Admission: 7/18/2024  Hospital Day: 2    Chief Admission Complaint:  chest pain / SOB     Subjective:  Patient is in hospital bed awake and alert. No new issues or complaints today. Patient refusing multiple aspects of her care today. Patient is of sound mind and judgement.     Presenting Admission History:       Patient is an 79-year-old female with past medical history of hypertension, hyperlipidemia, COPD open (chronic bronchitis/emphysema), history of GBS, and reflex sympathetic dystrophy who presents for evaluation of chest pain, shortness of breath, and recent syncope with collapse. Patient notes that she has had progressively worsening cough, shortness of breath, chest pain over the course of the last 1 week. She notes that she was living at home by herself and walking in her kitchen today when she noted that her symptoms are becoming intolerable and fell against her refrigerator on her right shoulder before falling onto the floor and hitting her head. Patient does endorse losing consciousness for short period of time. She is able to recall the events of today. She denies any presyncopal feelings of lightheadedness or dizziness. Additionally, patient denies any fever, chills, heart palpitations, constipation, urinary frequency/urgency, or burning sensation with urination. She does endorse diarrhea over the last 2 days but describes it as loose stools and not watery diarrhea. Patient notes that she called her doctor's office who subsequently called EMS for the patient. Patient denied ASA and nitro and route per chart review for her chest pain. On arrival to [facility] ED, patient meeting SIRS/sepsis criteria with respiratory rate 23 and elevated WBC with suspected source of infection. WBC elevated at 17.1. Hemoglobin slightly decreased at 11.7. Potassium slightly low at 3.3. Troponin initially elevated at 15 but downtrending to 14 on repeat likely secondary to type II  surgery/procedure with associated risk factors:    ----------------------------------------------------------------------  C. Data (any 2)  [] Discussed management of the case with consultants as follows:    [x] Discussed the discharge plan in detail with case mgt including timing/barriers to discharge, need for support services and placement decision   [] Imaging personally reviewed and interpreted, includes:   [] Telemetry monitoring w/    [] Data Review (any 3)  [] Collateral history obtained from:    [] All available Consultant notes from yesterday/today were reviewed  [x] All current labs were reviewed and interpreted for clinical significance   [x] Appropriate follow-up labs were ordered      Medications:  Personally reviewed in detail in conjunction w/ labs as documented for evidence of drug toxicity.     Infusion Medications    sodium chloride       Scheduled Medications    magnesium sulfate  2,000 mg IntraVENous Once    hydrOXYzine HCl  25 mg Oral Nightly    montelukast  10 mg Oral Nightly    oxyBUTYnin  5 mg Oral Nightly    pravastatin  40 mg Oral Daily    rivaroxaban  20 mg Oral Daily with breakfast    budesonide-formoterol  2 puff Inhalation BID RT    sodium chloride flush  5-40 mL IntraVENous 2 times per day    levofloxacin  750 mg IntraVENous Q24H     PRN Meds: diphenhydrAMINE, fluticasone, baclofen, albuterol, sodium chloride flush, sodium chloride, ondansetron **OR** ondansetron, polyethylene glycol, acetaminophen **OR** acetaminophen, perflutren lipid microspheres     Labs:  Personally reviewed and interpreted for clinical significance.     Recent Labs     07/18/24  1105 07/19/24  0627   WBC 17.1* 14.9*   HGB 11.7* 10.6*   HCT 36.2 32.6*    260     Recent Labs     07/18/24  1105 07/19/24  0627   * 137   K 3.3* 3.0*   CL 99 103   CO2 24 25   BUN 5* 3*   CREATININE <0.5* <0.5*   CALCIUM 8.8 8.5   MG 1.80 1.70*     Recent Labs     07/18/24  1105 07/18/24  1246   PROBNP 963*  --    TROPHS

## 2024-07-19 NOTE — PLAN OF CARE
Problem: Discharge Planning  Goal: Discharge to home or other facility with appropriate resources  7/19/2024 0121 by Yadi Steiner, RN  Outcome: Progressing    Problem: Pain  Goal: Verbalizes/displays adequate comfort level or baseline comfort level  Outcome: Progressing     Problem: ABCDS Injury Assessment  Goal: Absence of physical injury  Outcome: Progressing     Problem: Safety - Adult  Goal: Free from fall injury  Outcome: Progressing

## 2024-07-19 NOTE — PROGRESS NOTES
Patient very eager to go home, states she would go down for carotid study if she can go home today. Provider notified

## 2024-07-19 NOTE — PROGRESS NOTES
Encouraged placement of tele monitor for patient. Patient refusing, states she is allergic. Noted red square boxes on patient chest from previous placement. Provider notified

## 2024-07-20 ENCOUNTER — APPOINTMENT (OUTPATIENT)
Age: 79
DRG: 871 | End: 2024-07-20
Payer: MEDICARE

## 2024-07-20 VITALS
WEIGHT: 125 LBS | SYSTOLIC BLOOD PRESSURE: 102 MMHG | DIASTOLIC BLOOD PRESSURE: 62 MMHG | RESPIRATION RATE: 20 BRPM | TEMPERATURE: 97.9 F | OXYGEN SATURATION: 90 % | BODY MASS INDEX: 20.83 KG/M2 | HEIGHT: 65 IN | HEART RATE: 71 BPM

## 2024-07-20 LAB
ANION GAP SERPL CALCULATED.3IONS-SCNC: 12 MMOL/L (ref 3–16)
BASOPHILS # BLD: 0.1 K/UL (ref 0–0.2)
BASOPHILS NFR BLD: 0.6 %
BUN SERPL-MCNC: 3 MG/DL (ref 7–20)
CALCIUM SERPL-MCNC: 8.5 MG/DL (ref 8.3–10.6)
CHLORIDE SERPL-SCNC: 100 MMOL/L (ref 99–110)
CO2 SERPL-SCNC: 23 MMOL/L (ref 21–32)
CREAT SERPL-MCNC: <0.5 MG/DL (ref 0.6–1.2)
DEPRECATED RDW RBC AUTO: 14.7 % (ref 12.4–15.4)
ECHO AO ASC DIAM: 2.5 CM
ECHO AO ASCENDING AORTA INDEX: 1.54 CM/M2
ECHO AO ROOT DIAM: 3.3 CM
ECHO AO ROOT INDEX: 2.04 CM/M2
ECHO AV AREA PEAK VELOCITY: 2.3 CM2
ECHO AV AREA VTI: 2.3 CM2
ECHO AV AREA/BSA PEAK VELOCITY: 1.4 CM2/M2
ECHO AV AREA/BSA VTI: 1.4 CM2/M2
ECHO AV MEAN GRADIENT: 6 MMHG
ECHO AV MEAN GRADIENT: 6 MMHG
ECHO AV MEAN VELOCITY: 1.1 M/S
ECHO AV PEAK GRADIENT: 11 MMHG
ECHO AV PEAK VELOCITY: 1.7 M/S
ECHO AV VELOCITY RATIO: 0.71
ECHO AV VTI: 30.8 CM
ECHO BSA: 1.61 M2
ECHO EST RA PRESSURE: 3 MMHG
ECHO LA AREA 2C: 13.6 CM2
ECHO LA AREA 4C: 14.2 CM2
ECHO LA DIAMETER INDEX: 1.67 CM/M2
ECHO LA DIAMETER: 2.7 CM
ECHO LA MAJOR AXIS: 5.3 CM
ECHO LA MINOR AXIS: 5.1 CM
ECHO LA TO AORTIC ROOT RATIO: 0.82
ECHO LA VOL BP: 30 ML (ref 22–52)
ECHO LA VOL MOD A2C: 30 ML (ref 22–52)
ECHO LA VOL MOD A4C: 31 ML (ref 22–52)
ECHO LA VOL/BSA BIPLANE: 19 ML/M2 (ref 16–34)
ECHO LA VOLUME INDEX MOD A2C: 19 ML/M2 (ref 16–34)
ECHO LA VOLUME INDEX MOD A4C: 19 ML/M2 (ref 16–34)
ECHO LV E' LATERAL VELOCITY: 7 CM/S
ECHO LV E' SEPTAL VELOCITY: 6 CM/S
ECHO LV FRACTIONAL SHORTENING: 23 % (ref 28–44)
ECHO LV GLOBAL LONGITUDINAL STRAIN (GLS): -13.5 %
ECHO LV INTERNAL DIMENSION DIASTOLE INDEX: 2.41 CM/M2
ECHO LV INTERNAL DIMENSION DIASTOLIC: 3.9 CM (ref 3.9–5.3)
ECHO LV INTERNAL DIMENSION SYSTOLIC INDEX: 1.85 CM/M2
ECHO LV INTERNAL DIMENSION SYSTOLIC: 3 CM
ECHO LV IVSD: 1.1 CM (ref 0.6–0.9)
ECHO LV MASS 2D: 122.1 G (ref 67–162)
ECHO LV MASS INDEX 2D: 75.4 G/M2 (ref 43–95)
ECHO LV POSTERIOR WALL DIASTOLIC: 0.9 CM (ref 0.6–0.9)
ECHO LV RELATIVE WALL THICKNESS RATIO: 0.46
ECHO LVOT AREA: 3.1 CM2
ECHO LVOT AV VTI INDEX: 0.75
ECHO LVOT DIAM: 2 CM
ECHO LVOT MEAN GRADIENT: 3 MMHG
ECHO LVOT PEAK GRADIENT: 6 MMHG
ECHO LVOT PEAK VELOCITY: 1.2 M/S
ECHO LVOT STROKE VOLUME INDEX: 44.6 ML/M2
ECHO LVOT SV: 72.2 ML
ECHO LVOT VTI: 23 CM
ECHO MV A VELOCITY: 0.96 M/S
ECHO MV E DECELERATION TIME (DT): 230 MS
ECHO MV E VELOCITY: 0.89 M/S
ECHO MV E/A RATIO: 0.93
ECHO MV E/E' LATERAL: 12.71
ECHO MV E/E' RATIO (AVERAGED): 13.77
ECHO MV E/E' SEPTAL: 14.83
ECHO RA AREA 4C: 9 CM2
ECHO RA END SYSTOLIC VOLUME APICAL 4 CHAMBER INDEX BSA: 9 ML/M2
ECHO RA VOLUME: 15 ML
ECHO RIGHT VENTRICULAR SYSTOLIC PRESSURE (RVSP): 45 MMHG
ECHO RV FREE WALL PEAK S': 17 CM/S
ECHO RV TAPSE: 2 CM (ref 1.7–?)
ECHO TV REGURGITANT MAX VELOCITY: 3.24 M/S
ECHO TV REGURGITANT PEAK GRADIENT: 42 MMHG
EOSINOPHIL # BLD: 0.4 K/UL (ref 0–0.6)
EOSINOPHIL NFR BLD: 3 %
GFR SERPLBLD CREATININE-BSD FMLA CKD-EPI: >90 ML/MIN/{1.73_M2}
GLUCOSE SERPL-MCNC: 101 MG/DL (ref 70–99)
HCT VFR BLD AUTO: 33.9 % (ref 36–48)
HGB BLD-MCNC: 10.8 G/DL (ref 12–16)
LYMPHOCYTES # BLD: 1.4 K/UL (ref 1–5.1)
LYMPHOCYTES NFR BLD: 10.3 %
MAGNESIUM SERPL-MCNC: 1.8 MG/DL (ref 1.8–2.4)
MCH RBC QN AUTO: 28.5 PG (ref 26–34)
MCHC RBC AUTO-ENTMCNC: 32 G/DL (ref 31–36)
MCV RBC AUTO: 89 FL (ref 80–100)
MONOCYTES # BLD: 1.3 K/UL (ref 0–1.3)
MONOCYTES NFR BLD: 9.4 %
NEUTROPHILS # BLD: 10.4 K/UL (ref 1.7–7.7)
NEUTROPHILS NFR BLD: 76.7 %
PLATELET # BLD AUTO: 259 K/UL (ref 135–450)
PMV BLD AUTO: 10.8 FL (ref 5–10.5)
POTASSIUM SERPL-SCNC: 3.5 MMOL/L (ref 3.5–5.1)
RBC # BLD AUTO: 3.8 M/UL (ref 4–5.2)
SODIUM SERPL-SCNC: 135 MMOL/L (ref 136–145)
WBC # BLD AUTO: 13.5 K/UL (ref 4–11)

## 2024-07-20 PROCEDURE — 93306 TTE W/DOPPLER COMPLETE: CPT

## 2024-07-20 PROCEDURE — 83735 ASSAY OF MAGNESIUM: CPT

## 2024-07-20 PROCEDURE — 6370000000 HC RX 637 (ALT 250 FOR IP): Performed by: NURSE PRACTITIONER

## 2024-07-20 PROCEDURE — 85025 COMPLETE CBC W/AUTO DIFF WBC: CPT

## 2024-07-20 PROCEDURE — 94640 AIRWAY INHALATION TREATMENT: CPT

## 2024-07-20 PROCEDURE — 6370000000 HC RX 637 (ALT 250 FOR IP): Performed by: STUDENT IN AN ORGANIZED HEALTH CARE EDUCATION/TRAINING PROGRAM

## 2024-07-20 PROCEDURE — 80048 BASIC METABOLIC PNL TOTAL CA: CPT

## 2024-07-20 PROCEDURE — 93306 TTE W/DOPPLER COMPLETE: CPT | Performed by: INTERNAL MEDICINE

## 2024-07-20 PROCEDURE — 93356 MYOCRD STRAIN IMG SPCKL TRCK: CPT | Performed by: INTERNAL MEDICINE

## 2024-07-20 RX ORDER — HYDROXYZINE HYDROCHLORIDE 25 MG/1
50 TABLET, FILM COATED ORAL ONCE
Status: COMPLETED | OUTPATIENT
Start: 2024-07-20 | End: 2024-07-20

## 2024-07-20 RX ORDER — LEVOFLOXACIN 750 MG/1
750 TABLET, FILM COATED ORAL EVERY 24 HOURS
Qty: 5 TABLET | Refills: 0 | Status: ON HOLD | OUTPATIENT
Start: 2024-07-20 | End: 2024-07-25

## 2024-07-20 RX ORDER — LEVOFLOXACIN 750 MG/1
750 TABLET, FILM COATED ORAL EVERY 24 HOURS
Qty: 5 TABLET | Refills: 0 | Status: SHIPPED | OUTPATIENT
Start: 2024-07-20 | End: 2024-07-20

## 2024-07-20 RX ADMIN — RIVAROXABAN 20 MG: 20 TABLET, FILM COATED ORAL at 08:24

## 2024-07-20 RX ADMIN — PRAVASTATIN SODIUM 40 MG: 40 TABLET ORAL at 08:24

## 2024-07-20 RX ADMIN — DIPHENHYDRAMINE HYDROCHLORIDE 25 MG: 25 TABLET ORAL at 10:43

## 2024-07-20 RX ADMIN — ACETAMINOPHEN 650 MG: 325 TABLET ORAL at 15:54

## 2024-07-20 RX ADMIN — Medication 2 PUFF: at 07:48

## 2024-07-20 RX ADMIN — HYDROXYZINE HYDROCHLORIDE 50 MG: 25 TABLET, FILM COATED ORAL at 02:23

## 2024-07-20 ASSESSMENT — PAIN SCALES - WONG BAKER
WONGBAKER_NUMERICALRESPONSE: NO HURT

## 2024-07-20 ASSESSMENT — PAIN DESCRIPTION - DESCRIPTORS: DESCRIPTORS: ACHING

## 2024-07-20 ASSESSMENT — PAIN SCALES - GENERAL: PAINLEVEL_OUTOF10: 7

## 2024-07-20 NOTE — DISCHARGE INSTR - DIET

## 2024-07-20 NOTE — PROGRESS NOTES
Echo results are available. Perfect Serve to Dr. Dominguez to see if patient can discharge today.     1600: New order noted for discharge.

## 2024-07-20 NOTE — PROGRESS NOTES
Patient requested writer to call son, Patrick, so he can pick her up. The number is non working. Patient then asked for son, Barney, to be called. Writer left message.

## 2024-07-20 NOTE — DISCHARGE INSTR - COC
hour   Intake 100 ml   Output --   Net 100 ml     I/O last 3 completed shifts:  In: 600 [P.O.:600]  Out: 500 [Urine:500]    Safety Concerns:     None    Impairments/Disabilities:      Wears glasses to read, dentures at home. No jewelry. Purse.     Nutrition Therapy:  Current Nutrition Therapy:   - Oral Diet:  General  - Oral Nutrition Supplement:  Standard  three times a day    Routes of Feeding: Oral  Liquids: Thin Liquids  Daily Fluid Restriction: no  Last Modified Barium Swallow with Video (Video Swallowing Test): not done    Treatments at the Time of Hospital Discharge:   Respiratory Treatments:     Oxygen Therapy:  is not on home oxygen therapy.  Ventilator:    - No ventilator support    Rehab Therapies: Physical Therapy and Occupational Therapy  Weight Bearing Status/Restrictions: No weight bearing restrictions  Other Medical Equipment (for information only, NOT a DME order):      Other Treatments:       Patient's personal belongings (please select all that are sent with patient):  Glasses    RN SIGNATURE:  Electronically signed by Mehnaz Dunham RN on 7/20/24 at 4:15 PM EDT    CASE MANAGEMENT/SOCIAL WORK SECTION    Inpatient Status Date: 7/18    Readmission Risk Assessment Score:  Readmission Risk              Risk of Unplanned Readmission:  26           Discharging to Facility/ Agency   Name: [facility]  Address:  Phone: [phone]  Fax:    Dialysis Facility (if applicable)   Name:  Address:  Dialysis Schedule:  Phone:  Fax:    / signature: Electronically signed by Debby Fletcher RN on 7/20/24 at 4:01 PM EDT    PHYSICIAN SECTION    Prognosis: Fair    Condition at Discharge: Stable    Rehab Potential (if transferring to Rehab): Fair    Recommended Labs or Other Treatments After Discharge: Follow up with Heme/Onc at discharge for RUL Mass.     Physician Certification: I certify the above information and transfer of Isa Hernandez  is necessary for the continuing  treatment of the diagnosis listed and that she requires Home Care for less 30 days.     Update Admission H&P: No change in H&P    PHYSICIAN SIGNATURE:  Electronically signed by Elías Elena DO on 7/20/24 at 3:13 PM EDT

## 2024-07-20 NOTE — DISCHARGE INSTRUCTIONS
[facility] [phone]       Discharge Instructions  You were admitted to [facility] with Pneumonia.     You are now ready to discharge and will be discharging to: Home.     Please take your medications as prescribed. Medication changes are listed below and a full list of medications is in this discharge packet. Please keep your follow-up appointments after the hospital for ongoing care. It has been a pleasure taking care of you, we wish you the best.     MEDICATION CHANGES:  -- Please continue to take Levofloxacin as noted.     FOLLOW-UP:  -- It is important to see your primary care provider (PCP) after being in the hospital. Your PCP is Jocelin Ellsworth MD and the phone number of their office is [phone].   -- Please follow up with [facility] for evaluation of Right Upper Lung Mass.     Discharging Physician:   Elías Elena DO

## 2024-07-20 NOTE — DISCHARGE SUMMARY
acute abnormality of the visualized skull or soft tissues.     No acute intracranial findings.     CT CERVICAL SPINE WO CONTRAST    Result Date: 7/18/2024  EXAMINATION: CT OF THE CERVICAL SPINE WITHOUT CONTRAST 7/18/2024 11:54 am TECHNIQUE: CT of the cervical spine was performed without the administration of intravenous contrast. Multiplanar reformatted images are provided for review. Automated exposure control, iterative reconstruction, and/or weight based adjustment of the mA/kV was utilized to reduce the radiation dose to as low as reasonably achievable. COMPARISON: None. HISTORY: ORDERING SYSTEM PROVIDED HISTORY: neck pain TECHNOLOGIST PROVIDED HISTORY: Reason for exam:->neck pain Decision Support Exception - unselect if not a suspected or confirmed emergency medical condition->Emergency Medical Condition (MA) Reason for Exam: fell , hit head, neck pain , rt shoulder pain FINDINGS: BONES/ALIGNMENT: There is no acute fracture or traumatic malalignment.  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 DEGENERATIVE CHANGES: There is moderate disc space narrowing and endplate osteophyte formation at the mid and lower cervical levels.  Uncinate spurring contributes to severe bilateral neural foraminal encroachment at the C5/6 and C6/7 levels. SOFT TISSUES: There is no prevertebral soft tissue swelling.  Dense consolidation is present within the right upper lobe.     No acute abnormality of the cervical spine.     XR CHEST PORTABLE    Result Date: 7/18/2024  EXAMINATION: ONE XRAY VIEW OF THE CHEST 7/18/2024 10:06 am COMPARISON: 05/26/2024. HISTORY: ORDERING SYSTEM PROVIDED HISTORY: Chest pain TECHNOLOGIST PROVIDED HISTORY: Reason for exam:->Chest pain Reason for Exam: chest pain FINDINGS: There is new airspace consolidation throughout the right upper lobe with mild volume loss.  A linear left basal opacity is unchanged.  There is no pleural effusion.  Borderline cardiomegaly appears stable.     1. New  AMORPHOUS Rare 11/15/2023 05:51 PM     Urine Cultures:   Lab Results   Component Value Date/Time    LABURIN  07/18/2024 02:29 PM     >50,000 CFU/ml mixed skin/urogenital trish. No further workup     Blood Cultures:   Lab Results   Component Value Date/Time    BC  07/18/2024 02:08 PM     No Growth to date.  Any change in status will be called.     Lab Results   Component Value Date/Time    BLOODCULT2  07/18/2024 03:38 PM     No Growth to date.  Any change in status will be called.     Organism:   Lab Results   Component Value Date/Time    ORG Leslie albicans 12/17/2023 09:13 PM       Time Spent Discharging patient 35 minutes    Electronically signed by Elías Elena DO on 7/20/2024 at 4:00 PM     Elías Elena DO  PGY-3, Family Medicine  Family and Community Medicine Residency Program

## 2024-07-20 NOTE — PLAN OF CARE
Problem: Discharge Planning  Goal: Discharge to home or other facility with appropriate resources  7/20/2024 0904 by Kely Miller RN  Outcome: Progressing  7/19/2024 2129 by Ramiro Hamilton RN  Outcome: Progressing     Problem: Pain  Goal: Verbalizes/displays adequate comfort level or baseline comfort level  7/20/2024 0904 by Kely Miller RN  Outcome: Progressing  7/19/2024 2129 by Ramiro Hamilton RN  Outcome: Progressing     Problem: ABCDS Injury Assessment  Goal: Absence of physical injury  7/20/2024 0904 by Kely Miller RN  Outcome: Progressing  7/19/2024 2129 by Ramiro Hamilton RN  Outcome: Progressing     Problem: Safety - Adult  Goal: Free from fall injury  7/20/2024 0904 by Kely Miller RN  Outcome: Progressing  7/19/2024 2129 by Ramiro Hamilton RN  Outcome: Progressing     Problem: Nutrition Deficit:  Goal: Optimize nutritional status  7/20/2024 0904 by Kely Miller RN  Outcome: Progressing  7/19/2024 2129 by Ramiro Hamilton RN  Outcome: Progressing

## 2024-07-20 NOTE — CONSULTS
Oncology Hematology Care    Consult Note      Requesting Physician:  Dr. Travis    CHIEF COMPLAINT:  cough and SOB      HISTORY OF PRESENT ILLNESS:    Ms. Hernandez  is a 79 y.o. female we are seeing in consultation for RUL lung mass. She has a PMHX of COPD, HLD, PAF, SSS s/p pacer who presented to [facility] ED for cough and SOB. She has been admitted for pneumonia and UTI.      Hem/onc consulted for enlarged RUL mass with possible obstruction. . Patient was seen by [facility] radiation oncology 08/2023 for lung nodules and recommended PET scan but patient never followed up with this and never returned to office. Previously that year she had lung nodule biopsy that was unremarkable and refused further biopsy.  She was seen inpatient for the same issues 2/2024 and PET and outpatient follow up was recommended but she never came for appointment. We are now reconsulted to address the same RUL mass.      ICD-10-CM    1. Pneumonia of right upper lobe due to infectious organism  J18.9       2. Parapneumonic effusion  J18.9     J91.8       3. Septicemia (HCC)  A41.9       4. Syncope and collapse  R55 Vascular duplex carotid bilateral     Vascular duplex carotid bilateral      5. Acute pain of right shoulder  M25.511       6. Chest pain, unspecified type  R07.9 Echo (TTE) complete (PRN contrast/bubble/strain/3D)     Echo (TTE) complete (PRN contrast/bubble/strain/3D)      7. Pericardial effusion  I31.39       8. 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)     Hyperlipidemia     Lock jaw     Pneumonia     Post-nasal drip     Primary hypertension  03/18/2024    Pulmonary nodule     bi lateral    Rash     itchy, bumps    Reflex sympathetic dystrophy     RSD (reflex sympathetic dystrophy)     TMJ (dislocation of temporomandibular joint)        Past Surgical History:  Past Surgical History:   Procedure Laterality Date    APPENDECTOMY      BACK SURGERY      BRONCHOSCOPY  2008    COLONOSCOPY  11/15/2012    1 snared polyp    CT NEEDLE BIOPSY LUNG PERCUTANEOUS  04/04/2023    CT NEEDLE BIOPSY LUNG PERCUTANEOUS 4/4/2023 [facility] [place]    HYSTERECTOMY (CERVIX STATUS UNKNOWN)      PACEMAKER INSERTION      TONSILLECTOMY      UPPER GASTROINTESTINAL ENDOSCOPY N/A 5/15/2024    ESOPHAGOGASTRODUODENOSCOPY BIOPSY performed by Kalyan Grullon MD at [facility] ENDOSCOPY       Current Medications:  Current Facility-Administered Medications   Medication Dose Route Frequency Provider Last Rate Last Admin    diphenhydrAMINE (BENADRYL) tablet 25 mg  25 mg Oral Q6H PRN Yari Ward APRN - CNP   25 mg at 07/19/24 2238    levoFLOXacin (LEVAQUIN) tablet 750 mg  750 mg Oral Q24H Neal Gandara MD   750 mg at 07/19/24 1915    diphenhydrAMINE-zinc acetate (BENADRYL) cream   Topical TID PRN Yari Ward APRN - CNP   Given at 07/19/24 2236    fluticasone (FLONASE) 50 MCG/ACT nasal spray 1 spray  1 spray Nasal BID PRN Elías Elena DO        hydrOXYzine HCl (ATARAX) tablet 25 mg  25 mg Oral Nightly Elías Elena DO   25 mg at 07/19/24 2024    montelukast (SINGULAIR) tablet 10 mg  10 mg Oral Nightly Elías Elena DO   10 mg at 07/19/24 2024    oxyBUTYnin (DITROPAN-XL) extended release tablet 5 mg  5 mg Oral Nightly Elías Elena DO   5 mg at 07/19/24 2024    pravastatin (PRAVACHOL) tablet 40 mg  40 mg Oral Daily Elías Elena DO   40 mg at 07/20/24 0824    rivaroxaban (XARELTO) tablet 20 mg  20 mg Oral Daily with breakfast Elías Elena DO   20 mg at 07/20/24 0824    baclofen (LIORESAL) tablet 5 mg  5 mg Oral Nightly PRN Elías Elena DO   5 mg

## 2024-07-20 NOTE — FLOWSHEET NOTE
07/20/24 8903   AVS Reviewed   AVS & discharge instructions reviewed with patient and/or representative? Yes   Reviewed instructions with Patient   Level of Understanding Questions answered;Verbalized understanding         Transported off unit in WC to Marc hernandezs, vehicle.

## 2024-07-20 NOTE — CARE COORDINATION
CASE MANAGEMENT DISCHARGE SUMMARY      Discharge to: Home with [facility]    Transportation:    Family/car: yes       Confirmed discharge plan with:     Patient: yes per RN     Family:  no per pt     RN, name: Mehnaz ROBLES    Note: Discharging nurse to complete ANDI, reconcile AVS, and place final copy with patient's discharge packet. RN to ensure that written prescriptions for  Level II medications are sent with patient to the facility as per protocol.

## 2024-07-22 LAB
BACTERIA BLD CULT ORG #2: NORMAL
BACTERIA BLD CULT: NORMAL

## 2024-07-25 ENCOUNTER — HOSPITAL ENCOUNTER (INPATIENT)
Age: 79
LOS: 11 days | Discharge: NURSING, REHAB OR LONG-TERM CARE | DRG: 181 | End: 2024-08-05
Attending: EMERGENCY MEDICINE | Admitting: INTERNAL MEDICINE
Payer: MEDICARE

## 2024-07-25 ENCOUNTER — APPOINTMENT (OUTPATIENT)
Dept: GENERAL RADIOLOGY | Age: 79
DRG: 181 | End: 2024-07-25
Payer: MEDICARE

## 2024-07-25 DIAGNOSIS — C34.91 ADENOCARCINOMA OF RIGHT LUNG (HCC): ICD-10-CM

## 2024-07-25 DIAGNOSIS — R06.02 SHORTNESS OF BREATH: ICD-10-CM

## 2024-07-25 DIAGNOSIS — R04.2 HEMOPTYSIS: Primary | ICD-10-CM

## 2024-07-25 LAB
ALBUMIN SERPL-MCNC: 3.7 G/DL (ref 3.4–5)
ALBUMIN/GLOB SERPL: 1 {RATIO} (ref 1.1–2.2)
ALP SERPL-CCNC: 102 U/L (ref 40–129)
ALT SERPL-CCNC: 8 U/L (ref 10–40)
ANION GAP SERPL CALCULATED.3IONS-SCNC: 11 MMOL/L (ref 3–16)
AST SERPL-CCNC: 12 U/L (ref 15–37)
BASE EXCESS BLDV CALC-SCNC: -1.6 MMOL/L (ref -3–3)
BASOPHILS # BLD: 0.1 K/UL (ref 0–0.2)
BASOPHILS NFR BLD: 0.7 %
BILIRUB SERPL-MCNC: 0.4 MG/DL (ref 0–1)
BUN SERPL-MCNC: 5 MG/DL (ref 7–20)
CALCIUM SERPL-MCNC: 9.2 MG/DL (ref 8.3–10.6)
CHLORIDE SERPL-SCNC: 97 MMOL/L (ref 99–110)
CO2 BLDV-SCNC: 25 MMOL/L
CO2 SERPL-SCNC: 25 MMOL/L (ref 21–32)
COHGB MFR BLDV: 2.2 % (ref 0–1.5)
CREAT SERPL-MCNC: <0.5 MG/DL (ref 0.6–1.2)
DEPRECATED RDW RBC AUTO: 14.9 % (ref 12.4–15.4)
EOSINOPHIL # BLD: 0.3 K/UL (ref 0–0.6)
EOSINOPHIL NFR BLD: 2 %
GFR SERPLBLD CREATININE-BSD FMLA CKD-EPI: >90 ML/MIN/{1.73_M2}
GLUCOSE SERPL-MCNC: 116 MG/DL (ref 70–99)
HCO3 BLDV-SCNC: 23.3 MMOL/L (ref 23–29)
HCT VFR BLD AUTO: 35.8 % (ref 36–48)
HGB BLD-MCNC: 11.6 G/DL (ref 12–16)
LACTATE BLDV-SCNC: 1.2 MMOL/L (ref 0.4–2)
LYMPHOCYTES # BLD: 1.3 K/UL (ref 1–5.1)
LYMPHOCYTES NFR BLD: 8.2 %
MAGNESIUM SERPL-MCNC: 1.9 MG/DL (ref 1.8–2.4)
MCH RBC QN AUTO: 28.4 PG (ref 26–34)
MCHC RBC AUTO-ENTMCNC: 32.3 G/DL (ref 31–36)
MCV RBC AUTO: 87.8 FL (ref 80–100)
METHGB MFR BLDV: 0.3 %
MONOCYTES # BLD: 1 K/UL (ref 0–1.3)
MONOCYTES NFR BLD: 6.4 %
NEUTROPHILS # BLD: 13.5 K/UL (ref 1.7–7.7)
NEUTROPHILS NFR BLD: 82.7 %
NT-PROBNP SERPL-MCNC: 1359 PG/ML (ref 0–449)
O2 THERAPY: ABNORMAL
PCO2 BLDV: 40.2 MMHG (ref 40–50)
PH BLDV: 7.38 [PH] (ref 7.35–7.45)
PLATELET # BLD AUTO: 353 K/UL (ref 135–450)
PMV BLD AUTO: 10.7 FL (ref 5–10.5)
PO2 BLDV: 29 MMHG (ref 25–40)
POTASSIUM SERPL-SCNC: 3.6 MMOL/L (ref 3.5–5.1)
PROT SERPL-MCNC: 7.3 G/DL (ref 6.4–8.2)
RBC # BLD AUTO: 4.08 M/UL (ref 4–5.2)
SAO2 % BLDV: 50 %
SODIUM SERPL-SCNC: 133 MMOL/L (ref 136–145)
TROPONIN, HIGH SENSITIVITY: 15 NG/L (ref 0–14)
TROPONIN, HIGH SENSITIVITY: 16 NG/L (ref 0–14)
WBC # BLD AUTO: 16.3 K/UL (ref 4–11)

## 2024-07-25 PROCEDURE — 99285 EMERGENCY DEPT VISIT HI MDM: CPT

## 2024-07-25 PROCEDURE — 36415 COLL VENOUS BLD VENIPUNCTURE: CPT

## 2024-07-25 PROCEDURE — 85025 COMPLETE CBC W/AUTO DIFF WBC: CPT

## 2024-07-25 PROCEDURE — 83735 ASSAY OF MAGNESIUM: CPT

## 2024-07-25 PROCEDURE — 71046 X-RAY EXAM CHEST 2 VIEWS: CPT

## 2024-07-25 PROCEDURE — 2580000003 HC RX 258: Performed by: INTERNAL MEDICINE

## 2024-07-25 PROCEDURE — 84484 ASSAY OF TROPONIN QUANT: CPT

## 2024-07-25 PROCEDURE — 6370000000 HC RX 637 (ALT 250 FOR IP): Performed by: INTERNAL MEDICINE

## 2024-07-25 PROCEDURE — 93005 ELECTROCARDIOGRAM TRACING: CPT | Performed by: EMERGENCY MEDICINE

## 2024-07-25 PROCEDURE — 94640 AIRWAY INHALATION TREATMENT: CPT

## 2024-07-25 PROCEDURE — 83880 ASSAY OF NATRIURETIC PEPTIDE: CPT

## 2024-07-25 PROCEDURE — 6360000002 HC RX W HCPCS: Performed by: INTERNAL MEDICINE

## 2024-07-25 PROCEDURE — 83605 ASSAY OF LACTIC ACID: CPT

## 2024-07-25 PROCEDURE — 80053 COMPREHEN METABOLIC PANEL: CPT

## 2024-07-25 PROCEDURE — 6370000000 HC RX 637 (ALT 250 FOR IP)

## 2024-07-25 PROCEDURE — 82803 BLOOD GASES ANY COMBINATION: CPT

## 2024-07-25 PROCEDURE — 1200000000 HC SEMI PRIVATE

## 2024-07-25 RX ORDER — MONTELUKAST SODIUM 10 MG/1
10 TABLET ORAL NIGHTLY
Status: DISCONTINUED | OUTPATIENT
Start: 2024-07-25 | End: 2024-08-05 | Stop reason: HOSPADM

## 2024-07-25 RX ORDER — TRAMADOL HYDROCHLORIDE 50 MG/1
50 TABLET ORAL EVERY 6 HOURS PRN
Status: DISCONTINUED | OUTPATIENT
Start: 2024-07-25 | End: 2024-07-30

## 2024-07-25 RX ORDER — LEVOFLOXACIN 5 MG/ML
750 INJECTION, SOLUTION INTRAVENOUS EVERY 24 HOURS
Status: COMPLETED | OUTPATIENT
Start: 2024-07-25 | End: 2024-07-27

## 2024-07-25 RX ORDER — PRAVASTATIN SODIUM 40 MG
40 TABLET ORAL DAILY
Status: DISCONTINUED | OUTPATIENT
Start: 2024-07-26 | End: 2024-08-05 | Stop reason: HOSPADM

## 2024-07-25 RX ORDER — ACETAMINOPHEN 325 MG/1
650 TABLET ORAL EVERY 6 HOURS PRN
Status: DISCONTINUED | OUTPATIENT
Start: 2024-07-25 | End: 2024-08-05 | Stop reason: HOSPADM

## 2024-07-25 RX ORDER — SODIUM CHLORIDE 9 MG/ML
INJECTION, SOLUTION INTRAVENOUS PRN
Status: DISCONTINUED | OUTPATIENT
Start: 2024-07-25 | End: 2024-08-05 | Stop reason: HOSPADM

## 2024-07-25 RX ORDER — ONDANSETRON 2 MG/ML
4 INJECTION INTRAMUSCULAR; INTRAVENOUS EVERY 6 HOURS PRN
Status: DISCONTINUED | OUTPATIENT
Start: 2024-07-25 | End: 2024-08-05 | Stop reason: HOSPADM

## 2024-07-25 RX ORDER — SODIUM CHLORIDE 0.9 % (FLUSH) 0.9 %
5-40 SYRINGE (ML) INJECTION EVERY 12 HOURS SCHEDULED
Status: DISCONTINUED | OUTPATIENT
Start: 2024-07-25 | End: 2024-08-05 | Stop reason: HOSPADM

## 2024-07-25 RX ORDER — POLYETHYLENE GLYCOL 3350 17 G/17G
17 POWDER, FOR SOLUTION ORAL DAILY PRN
Status: DISCONTINUED | OUTPATIENT
Start: 2024-07-25 | End: 2024-08-05 | Stop reason: HOSPADM

## 2024-07-25 RX ORDER — SODIUM CHLORIDE 0.9 % (FLUSH) 0.9 %
5-40 SYRINGE (ML) INJECTION PRN
Status: DISCONTINUED | OUTPATIENT
Start: 2024-07-25 | End: 2024-08-05 | Stop reason: HOSPADM

## 2024-07-25 RX ORDER — OXYBUTYNIN CHLORIDE 5 MG/1
5 TABLET, EXTENDED RELEASE ORAL NIGHTLY
Status: DISCONTINUED | OUTPATIENT
Start: 2024-07-25 | End: 2024-08-05 | Stop reason: HOSPADM

## 2024-07-25 RX ORDER — IPRATROPIUM BROMIDE AND ALBUTEROL SULFATE 2.5; .5 MG/3ML; MG/3ML
1 SOLUTION RESPIRATORY (INHALATION) ONCE
Status: COMPLETED | OUTPATIENT
Start: 2024-07-25 | End: 2024-07-25

## 2024-07-25 RX ORDER — BACLOFEN 10 MG/1
5 TABLET ORAL NIGHTLY
Status: DISCONTINUED | OUTPATIENT
Start: 2024-07-25 | End: 2024-08-05 | Stop reason: HOSPADM

## 2024-07-25 RX ORDER — ALBUTEROL SULFATE 90 UG/1
2 AEROSOL, METERED RESPIRATORY (INHALATION)
Status: DISCONTINUED | OUTPATIENT
Start: 2024-07-25 | End: 2024-07-29

## 2024-07-25 RX ORDER — FLUTICASONE PROPIONATE 50 MCG
1 SPRAY, SUSPENSION (ML) NASAL 2 TIMES DAILY PRN
Status: DISCONTINUED | OUTPATIENT
Start: 2024-07-25 | End: 2024-08-05 | Stop reason: HOSPADM

## 2024-07-25 RX ORDER — HYDROXYZINE HYDROCHLORIDE 25 MG/1
25 TABLET, FILM COATED ORAL NIGHTLY
Status: DISCONTINUED | OUTPATIENT
Start: 2024-07-25 | End: 2024-07-25

## 2024-07-25 RX ORDER — ONDANSETRON 4 MG/1
4 TABLET, ORALLY DISINTEGRATING ORAL EVERY 8 HOURS PRN
Status: DISCONTINUED | OUTPATIENT
Start: 2024-07-25 | End: 2024-08-05 | Stop reason: HOSPADM

## 2024-07-25 RX ORDER — ACETAMINOPHEN 650 MG/1
650 SUPPOSITORY RECTAL EVERY 6 HOURS PRN
Status: DISCONTINUED | OUTPATIENT
Start: 2024-07-25 | End: 2024-08-05 | Stop reason: HOSPADM

## 2024-07-25 RX ORDER — BUDESONIDE AND FORMOTEROL FUMARATE DIHYDRATE 160; 4.5 UG/1; UG/1
2 AEROSOL RESPIRATORY (INHALATION)
Status: DISCONTINUED | OUTPATIENT
Start: 2024-07-25 | End: 2024-08-05 | Stop reason: HOSPADM

## 2024-07-25 RX ADMIN — Medication 2 PUFF: at 22:00

## 2024-07-25 RX ADMIN — MONTELUKAST 10 MG: 10 TABLET, FILM COATED ORAL at 23:00

## 2024-07-25 RX ADMIN — BACLOFEN 5 MG: 10 TABLET ORAL at 23:00

## 2024-07-25 RX ADMIN — OXYBUTYNIN CHLORIDE 5 MG: 5 TABLET, EXTENDED RELEASE ORAL at 23:00

## 2024-07-25 RX ADMIN — SODIUM CHLORIDE, PRESERVATIVE FREE 10 ML: 5 INJECTION INTRAVENOUS at 21:40

## 2024-07-25 RX ADMIN — LEVOFLOXACIN 750 MG: 5 INJECTION, SOLUTION INTRAVENOUS at 22:59

## 2024-07-25 RX ADMIN — SODIUM CHLORIDE 25 ML: 9 INJECTION, SOLUTION INTRAVENOUS at 22:55

## 2024-07-25 RX ADMIN — IPRATROPIUM BROMIDE AND ALBUTEROL SULFATE 1 DOSE: 2.5; .5 SOLUTION RESPIRATORY (INHALATION) at 17:00

## 2024-07-25 ASSESSMENT — PAIN SCALES - GENERAL: PAINLEVEL_OUTOF10: 0

## 2024-07-25 ASSESSMENT — PAIN - FUNCTIONAL ASSESSMENT: PAIN_FUNCTIONAL_ASSESSMENT: 0-10

## 2024-07-25 NOTE — ED PROVIDER NOTES
Emergency Department Attending Provider Note  Location: [facility]  ED  7/25/2024     Patient Identification  Isa Hernandez is a 79 y.o. female      Isa Hernandez was evaluated in the Emergency Department for shortness of breath and hemoptysis.  Recent admission for pneumonia.  She has a lung mass..     HPI: as noted above    Physical Exam: Mild respiratory distress, O2 93% on room air, bilateral rhonchi, normal mental status      EKG Interpretation  Rhythm is sinus   Rate is 63  Axis is NL  No STEMI criteria  No ST changes      Patient seen and evaluated.  Relevant records reviewed.  Patient presents with symptoms noted above.   Patient is on Levaquin.  States her dyspnea is worsening.  WBC 16  Given patient's hemoptysis, on Xarelto we will plan for inpatient management/observation    Although initial history and physical exam information was obtained by BC/NPP/MD/ (who also dictated a record of this visit), I personally saw the patient and made/approved the management plan and take responsibility for patient management. I, Dr. Blair, am the primary clinician of record.       This chart was generated in part by using Dragon Dictation system and may contain errors related to that system including errors in grammar, punctuation, and spelling, as well as words and phrases that may be inappropriate. If there are any questions or concerns please feel free to contact the dictating provider for clarification.     Javed Blair MD   [facility]       Javed Blair MD  07/25/24 0484

## 2024-07-25 NOTE — H&P
Hospital Medicine History & Physical      Date of Admission: 7/25/2024    Date of Service:  Pt seen/examined on 7/25/2024     [x]Admitted to Inpatient with expected LOS greater than two midnights due to medical therapy.  []Placed in Observation status.    Chief Admission Complaint:  Hemoptysis    Presenting Admission History:      79 y.o. female who presented to [facility] with hemoptysis.  PMHx significant for HTN, COPD, lung mass, who was recently hospitalized here for Pneumonia. She was discharged on course of Levaquin, which has been completed. During recent admission, Oncology was consulted for enlarging lung mass and likely post-obstructive Pneumonia, to re-establish care (previously biopsy negative in 4/2023). However, patient declined further evaluation or Pulmonology consultation at that time.     She now reports 1-2 days of worsening hemoptysis, occasionally with large clots. Presented to the ED for evaluation. Last dose of Xarelto was 7/24. Hemoptysis seemed to be slowing since arrival in ED. Minimal SOB/hypoxia. CXR stable.     Assessment/Plan:      Hemoptysis in setting of recent Pneumonia and enlarging RUL Lung Mass:   During recent admission, Oncology was consulted for enlarging lung mass and likely post-obstructive Pneumonia, to re-establish care (previously biopsy negative in 4/2023). However, patient declined further evaluation or Pulmonology consultation at that time. Empiric Abx were completed. She now returns with mild to moderate hemoptysis. She is quite anxious about the bleeding and states she suspects its the lung cancer. I reviewed with her the documentation from last hospitalization regarding declining further investigation of the lung mass; she does not recall saying this (somewhat limited historian). However, she now wishes to have Pulmonology consulted and proceed with additional testing for the lung mass. Hold Xarelto.     Atrial Fibrillation   -Stable.  -Hold Xarelto as above.  MD   oxybutynin (DITROPAN-XL) 5 MG extended release tablet Take 1 tablet by mouth at bedtime 2/27/23   ProviderRobson MD   fluticasone (FLONASE) 50 MCG/ACT nasal spray SQUIRT 1 SPRAY IN EACH NOSTRIL TWICE DAILY  Patient taking differently: 1 spray by Nasal route 2 times daily as needed SQUIRT 1 SPRAY IN EACH NOSTRIL TWICE DAILY 12/21/22   Anastacio Hernandez MD   pravastatin (PRAVACHOL) 40 MG tablet Take 1 tablet by mouth daily 5/6/14   Provider, MD Robson       Labs: Personally reviewed and interpreted for clinical significance.   Recent Labs     07/25/24  1440   WBC 16.3*   HGB 11.6*   HCT 35.8*        Recent Labs     07/25/24  1440   *   K 3.6   CL 97*   CO2 25   BUN 5*   CREATININE <0.5*   CALCIUM 9.2   MG 1.90     Recent Labs     07/25/24  1440   PROBNP 1,359*   TROPHS 15*     No results for input(s): \"LABA1C\" in the last 72 hours.  Recent Labs     07/25/24  1440   AST 12*   ALT 8*   BILITOT 0.4   ALKPHOS 102     Recent Labs     07/25/24  1440   LACTA 1.2        Neal Gandara MD

## 2024-07-25 NOTE — ED NOTES
Patient Name: Isa Hernandez  :  1945  79 y.o.  MRN:  [id number]  Preferred Name    ED Room #:    Family/Caregiver Present no  Restraints no  Sitter no  Sepsis Risk Score      Situation  Code Status: Prior No additional code details.    Allergies: Latex, Iodine, Penicillins, Sulfa antibiotics, Tetanus toxoids, Cephalexin, Clindamycin, Clindamycin/lincomycin, Influenza vaccines, Iodinated contrast media, Polymyxin b, Tetanus immune globulin, Tetanus toxoid, Albuterol, Codeine, Doxycycline, and Tiotropium bromide monohydrate  Weight: Patient Vitals for the past 96 hrs (Last 3 readings):   Weight   24 1408 52.2 kg (115 lb)     Arrived from: home  Chief Complaint:   Chief Complaint   Patient presents with    Chest Pain     Chest pain started this am, coughing up bright red blood this am, lives alone,      Hospital Problem/Diagnosis:  Principal Problem:    Hemoptysis  Resolved Problems:    * No resolved hospital problems. *    Imaging:   XR CHEST (2 VW)   Final Result   No significant interval change in consolidation and volume loss involving the   right upper lobe, likely secondary to known right hilar mass.           Abnormal labs:   Abnormal Labs Reviewed   CBC WITH AUTO DIFFERENTIAL - Abnormal; Notable for the following components:       Result Value    WBC 16.3 (*)     Hemoglobin 11.6 (*)     Hematocrit 35.8 (*)     MPV 10.7 (*)     Neutrophils Absolute 13.5 (*)     All other components within normal limits   TROPONIN - Abnormal; Notable for the following components:    Troponin, High Sensitivity 15 (*)     All other components within normal limits   BRAIN NATRIURETIC PEPTIDE - Abnormal; Notable for the following components:    Pro-BNP 1,359 (*)     All other components within normal limits   BLOOD GAS, VENOUS - Abnormal; Notable for the following components:    Carboxyhemoglobin 2.2 (*)     All other components within normal limits   COMPREHENSIVE METABOLIC PANEL - Abnormal; Notable for the  following components:    Sodium 133 (*)     Chloride 97 (*)     Glucose 116 (*)     BUN 5 (*)     Creatinine <0.5 (*)     Albumin/Globulin Ratio 1.0 (*)     ALT 8 (*)     AST 12 (*)     All other components within normal limits   TROPONIN - Abnormal; Notable for the following components:    Troponin, High Sensitivity 16 (*)     All other components within normal limits     Critical values: no  Intervention for critical value(s):       Abnormal Assessment Findings: forgetfulness, hemoptysis, lungs are clear but diminished bilaterally    Background  History:   Past Medical History:   Diagnosis Date    Arthritis     Asthma     Bronchitis chronic     Colon polyp     COPD (chronic obstructive pulmonary disease) (HCC)     Emphysema of lung (HCC)     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)     Hyperlipidemia     Lock jaw     Pneumonia     Post-nasal drip     Primary hypertension 03/18/2024    Pulmonary nodule     bi lateral    Rash     itchy, bumps    Reflex sympathetic dystrophy     RSD (reflex sympathetic dystrophy)     TMJ (dislocation of temporomandibular joint)        Assessment    Vitals/MEWS: MEWS Score: 1  Level of Consciousness: Alert (0)   Vitals:    07/25/24 1702 07/25/24 1712 07/25/24 1742 07/25/24 1814   BP:  136/84 (!) 100/57 (!) 124/96   Pulse: 63 70 68 69   Resp: 19 15 14 13   Temp:       TempSrc:       SpO2: 94%  96% 97%   Weight:         FiO2 (%):   O2 Flow Rate: O2 Device: None (Room air)    Cardiac Rhythm:    Pain Assessment:  [ X] Verbal [ ] Carrera Keane Scale  Pain Scale: Pain Assessment  Pain Assessment: 0-10  Pain Level: 0  Patient's Stated Pain Goal: 0 - No pain  Last documented pain score (0-10 scale) Pain Level: 0  Last documented pain medication administered:   Mental Status: alert and oriented to self and place, disoriented to time, situation, forgetful  NIH Score:    C-SSRS: Risk of Suicide: No Risk  Bedside swallow:    Marlene Coma Scale (GCS): Marlene Coma Scale  Eye Opening: Spontaneous  Best Verbal Response:

## 2024-07-26 LAB
ANION GAP SERPL CALCULATED.3IONS-SCNC: 9 MMOL/L (ref 3–16)
BUN SERPL-MCNC: 3 MG/DL (ref 7–20)
CALCIUM SERPL-MCNC: 8.2 MG/DL (ref 8.3–10.6)
CHLORIDE SERPL-SCNC: 101 MMOL/L (ref 99–110)
CO2 SERPL-SCNC: 23 MMOL/L (ref 21–32)
CREAT SERPL-MCNC: <0.5 MG/DL (ref 0.6–1.2)
DEPRECATED RDW RBC AUTO: 14.4 % (ref 12.4–15.4)
EKG ATRIAL RATE: 63 BPM
EKG DIAGNOSIS: NORMAL
EKG P AXIS: 28 DEGREES
EKG P-R INTERVAL: 156 MS
EKG Q-T INTERVAL: 388 MS
EKG QRS DURATION: 78 MS
EKG QTC CALCULATION (BAZETT): 397 MS
EKG R AXIS: -24 DEGREES
EKG T AXIS: 53 DEGREES
EKG VENTRICULAR RATE: 63 BPM
GFR SERPLBLD CREATININE-BSD FMLA CKD-EPI: >90 ML/MIN/{1.73_M2}
GLUCOSE SERPL-MCNC: 116 MG/DL (ref 70–99)
HCT VFR BLD AUTO: 30.9 % (ref 36–48)
HGB BLD-MCNC: 10.2 G/DL (ref 12–16)
MCH RBC QN AUTO: 28.8 PG (ref 26–34)
MCHC RBC AUTO-ENTMCNC: 32.9 G/DL (ref 31–36)
MCV RBC AUTO: 87.5 FL (ref 80–100)
PLATELET # BLD AUTO: 313 K/UL (ref 135–450)
PMV BLD AUTO: 10.2 FL (ref 5–10.5)
POTASSIUM SERPL-SCNC: 3.8 MMOL/L (ref 3.5–5.1)
RBC # BLD AUTO: 3.53 M/UL (ref 4–5.2)
SODIUM SERPL-SCNC: 133 MMOL/L (ref 136–145)
WBC # BLD AUTO: 15 K/UL (ref 4–11)

## 2024-07-26 PROCEDURE — 36415 COLL VENOUS BLD VENIPUNCTURE: CPT

## 2024-07-26 PROCEDURE — 99222 1ST HOSP IP/OBS MODERATE 55: CPT | Performed by: STUDENT IN AN ORGANIZED HEALTH CARE EDUCATION/TRAINING PROGRAM

## 2024-07-26 PROCEDURE — 1200000000 HC SEMI PRIVATE

## 2024-07-26 PROCEDURE — 94640 AIRWAY INHALATION TREATMENT: CPT

## 2024-07-26 PROCEDURE — 80048 BASIC METABOLIC PNL TOTAL CA: CPT

## 2024-07-26 PROCEDURE — 85027 COMPLETE CBC AUTOMATED: CPT

## 2024-07-26 PROCEDURE — 2580000003 HC RX 258: Performed by: INTERNAL MEDICINE

## 2024-07-26 PROCEDURE — 2700000000 HC OXYGEN THERAPY PER DAY

## 2024-07-26 PROCEDURE — 6360000002 HC RX W HCPCS: Performed by: INTERNAL MEDICINE

## 2024-07-26 PROCEDURE — 94761 N-INVAS EAR/PLS OXIMETRY MLT: CPT

## 2024-07-26 PROCEDURE — 6370000000 HC RX 637 (ALT 250 FOR IP): Performed by: INTERNAL MEDICINE

## 2024-07-26 PROCEDURE — 93010 ELECTROCARDIOGRAM REPORT: CPT | Performed by: INTERNAL MEDICINE

## 2024-07-26 RX ADMIN — Medication 2 PUFF: at 15:22

## 2024-07-26 RX ADMIN — PRAVASTATIN SODIUM 40 MG: 40 TABLET ORAL at 08:55

## 2024-07-26 RX ADMIN — Medication 2 PUFF: at 11:34

## 2024-07-26 RX ADMIN — Medication 2 PUFF: at 07:49

## 2024-07-26 RX ADMIN — Medication 2 PUFF: at 07:48

## 2024-07-26 RX ADMIN — SODIUM CHLORIDE, PRESERVATIVE FREE 10 ML: 5 INJECTION INTRAVENOUS at 08:56

## 2024-07-26 RX ADMIN — OXYBUTYNIN CHLORIDE 5 MG: 5 TABLET, EXTENDED RELEASE ORAL at 20:09

## 2024-07-26 RX ADMIN — FLUTICASONE PROPIONATE 1 SPRAY: 50 SPRAY, METERED NASAL at 08:56

## 2024-07-26 RX ADMIN — MONTELUKAST 10 MG: 10 TABLET, FILM COATED ORAL at 20:09

## 2024-07-26 RX ADMIN — BACLOFEN 5 MG: 10 TABLET ORAL at 20:09

## 2024-07-26 RX ADMIN — LEVOFLOXACIN 750 MG: 5 INJECTION, SOLUTION INTRAVENOUS at 20:10

## 2024-07-26 RX ADMIN — TRAMADOL HYDROCHLORIDE 50 MG: 50 TABLET ORAL at 08:55

## 2024-07-26 RX ADMIN — SODIUM CHLORIDE, PRESERVATIVE FREE 10 ML: 5 INJECTION INTRAVENOUS at 20:12

## 2024-07-26 ASSESSMENT — PAIN DESCRIPTION - PAIN TYPE: TYPE: ACUTE PAIN

## 2024-07-26 ASSESSMENT — PAIN DESCRIPTION - FREQUENCY: FREQUENCY: CONTINUOUS

## 2024-07-26 ASSESSMENT — PAIN DESCRIPTION - LOCATION
LOCATION: CHEST
LOCATION: GENERALIZED
LOCATION: CHEST

## 2024-07-26 ASSESSMENT — PAIN DESCRIPTION - DESCRIPTORS
DESCRIPTORS: ACHING
DESCRIPTORS: ACHING

## 2024-07-26 ASSESSMENT — PAIN SCALES - WONG BAKER: WONGBAKER_NUMERICALRESPONSE: NO HURT

## 2024-07-26 ASSESSMENT — PAIN SCALES - GENERAL
PAINLEVEL_OUTOF10: 7
PAINLEVEL_OUTOF10: 3
PAINLEVEL_OUTOF10: 5

## 2024-07-26 ASSESSMENT — PAIN DESCRIPTION - ORIENTATION
ORIENTATION: MID
ORIENTATION: MID

## 2024-07-26 ASSESSMENT — PAIN DESCRIPTION - ONSET: ONSET: ON-GOING

## 2024-07-26 NOTE — PROGRESS NOTES
4 Eyes Skin Assessment     NAME:  Isa Hernandez  YOB: 1945  MEDICAL RECORD NUMBER:  [id number]    The patient is being assessed for  Admission    I agree that at least one RN has performed a thorough Head to Toe Skin Assessment on the patient. ALL assessment sites listed below have been assessed.      Areas assessed by both nurses:    Head, Face, Ears, Shoulders, Back, Chest, Arms, Elbows, Hands, Sacrum. Buttock, Coccyx, Ischium, and Legs. Feet and Heels        Does the Patient have a Wound? No noted wound(s) Pt does have scattered Ecchymosis        Jeison Prevention initiated by RN: Yes  Wound Care Orders initiated by RN: No    Pressure Injury (Stage 3,4, Unstageable, DTI, NWPT, and Complex wounds) if present, place Wound referral order by RN under : No    New Ostomies, if present place, Ostomy referral order under : No     Nurse 1 eSignature: Electronically signed by Julio Cesar Lloyd RN on 7/26/24 at 3:06 AM EDT    **SHARE this note so that the co-signing nurse can place an eSignature**    Nurse 2 eSignature: {Esignature:[id number]} eyes

## 2024-07-26 NOTE — CONSULTS
Pulmonary New Consultation       Patient Name:  Isa Hernandez    REASONFOR ADMISSION/CC: Coughing blood    PCP: Jocelin Ellsworth MD    HISTORY OF PRESENT ILLNESS:   79 y.o. year old female with significant past medical history of emphysema, lymphadenopathy, lung mass, former smoker that presents to [facility] with coughing up blood.  Patient says for the last week she has been coughing up around 2-3 times per day small amounts of bloody sputum.  She also complains of dizziness, chest pains, and shortness of breath.  She denies any fever, chills, nausea, vomiting, abdominal pain.    Patient was recently in the hospital for pneumonia and new lung mass.  It was noted that she did not want to pursue diagnosis of her lung mass.    Patient quit smoking around 5 to 10 years ago.  She was smoking 1 pack/day for 60 years.  She denies any illicit drug use, no alcohol use.  She denies any occupational or household exposures.    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)     Hyperlipidemia     Lock jaw     Pneumonia     Post-nasal drip     Primary hypertension 03/18/2024    Pulmonary nodule     bi lateral    Rash     itchy, bumps    Reflex sympathetic dystrophy     RSD (reflex sympathetic dystrophy)     TMJ (dislocation of temporomandibular joint)        Past Surgical History:   Procedure Laterality Date    APPENDECTOMY      BACK SURGERY      BRONCHOSCOPY  2008    COLONOSCOPY  11/15/2012    1 snared polyp    CT NEEDLE BIOPSY LUNG PERCUTANEOUS  04/04/2023    CT NEEDLE BIOPSY LUNG PERCUTANE[place] [facility] [place]    HYSTERECTOMY (CERVIX STATUS UNKNOWN)      PACEMAKER INSERTION      TONSILLECTOMY      UPPER GASTROINTESTINAL ENDOSCOPY N/A 5/15/2024    ESOPHAGOGASTRODUODENOSCOPY BIOPSY performed by Kalyan Grullon MD at [facility] ENDOSCOPY       family    Psychiatric/Behavioral:  Negative for agitation and behavioral problems.        I personally reviewed the patient's medication list, medical and surgical history, and updated as needed.     Objective:   EXAM:  /62   Pulse 70   Temp 97.6 °F (36.4 °C) (Oral)   Resp 17   Ht 1.651 m (5' 5\")   Wt 56.9 kg (125 lb 7.1 oz)   SpO2 94%   BMI 20.87 kg/m²      Physical Exam  Constitutional:       Appearance: She is ill-appearing.   HENT:      Head: Normocephalic and atraumatic.      Nose: Nose normal.      Mouth/Throat:      Pharynx: No oropharyngeal exudate.   Eyes:      General: No scleral icterus.        Right eye: No discharge.         Left eye: No discharge.   Cardiovascular:      Rate and Rhythm: Normal rate.      Heart sounds: No murmur heard.     No gallop.   Pulmonary:      Effort: Pulmonary effort is normal.      Breath sounds: No wheezing.      Comments: Diminished breath sounds on right  Abdominal:      General: Abdomen is flat. Bowel sounds are normal. There is no distension.      Tenderness: There is no abdominal tenderness.   Musculoskeletal:         General: No swelling.      Cervical back: Normal range of motion.   Skin:     General: Skin is warm and dry.   Neurological:      Mental Status: She is alert and oriented to person, place, and time.            Data Reviewed:   LABS:  :   Recent Labs     07/25/24  1440 07/26/24  0607   WBC 16.3* 15.0*   HGB 11.6* 10.2*   HCT 35.8* 30.9*   MCV 87.8 87.5    313     BMP:   Recent Labs     07/25/24  1440 07/26/24  0608   * 133*   K 3.6 3.8   CL 97* 101   CO2 25 23   BUN 5* 3*   CREATININE <0.5* <0.5*     PROFILE:   Recent Labs     07/25/24  1440   AST 12*   ALT 8*   BILITOT 0.4   ALKPHOS 102     PT/INR: No results for input(s): \"PROTIME\", \"INR\" in the last 72 hours.  APTT:No results for input(s): \"APTT\" in the last 72 hours.  :No results for input(s): \"NITRITE\", \"COLORU\", \"PHUR\", \"LABCAST\", \"WBCUA\", \"RBCUA\", \"MUCUS\", \"TRICHOMONAS\", \"YEAST\",

## 2024-07-26 NOTE — ED PROVIDER NOTES
[place]  - MED SURG/ORTHO  Emergency Department Encounter    Patient Name: Isa Hernandez  MRN: [id number]  YOB: 1945  Date of Evaluation: 7/25/2024  Provider: Jocelin Ellsworth MD  Note Started: 11:43 PM EDT 7/25/24    CHIEF COMPLAINT  Chest Pain (Chest pain started this am, coughing up bright red blood this am, lives alone, )    SHARED SERVICE VISIT   I have seen and evaluated this patient with my supervising physician Dr. Rodriguez.    HISTORY OF PRESENT ILLNESS  History From: Patient.    Limitations to history : None    Social Determinants Significantly Affecting Health : None    Isa Hernandez is a 79 y.o. female who presents to the ED for evaluation of hemoptysis onset today at approximately noon.  Patient states that she was recently admitted to the hospital for pneumonia.  Patient states that over the past several days she has been having increased coughing and shortness of breath.  Patient states that today she started coughing and coughed up several large blood clots.  Patient states that since that time she has continued to cough up bloody sputum.  Patient does admit to mild associated chest discomfort.  Patient denies fever, chills, abdominal pain, nausea, vomiting, changes in bowels, dysuria, hematuria, neck pain, back pain, and lightheadedness.    No other complaints, modifying factors or associated symptoms.     Nursing notes reviewed were all reviewed and agreed with or any disagreements were addressed in the HPI.    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    (Please note that portions of this note were completed with a voice recognition program.  Efforts were made to edit the dictations but occasionally words are mis-transcribed).         Jose Manuel Jaramillo PA-C  07/25/24 2004

## 2024-07-26 NOTE — PLAN OF CARE
Problem: Discharge Planning  Goal: Discharge to home or other facility with appropriate resources  7/26/2024 1129 by Sowmya Smith RN  Outcome: Progressing  Flowsheets (Taken 7/26/2024 0900)  Discharge to home or other facility with appropriate resources: Identify barriers to discharge with patient and caregiver       Problem: Pain  Goal: Verbalizes/displays adequate comfort level or baseline comfort level  7/26/2024 1129 by Sowmya Smith RN  Outcome: Progressing  Flowsheets (Taken 7/26/2024 0830)  Verbalizes/displays adequate comfort level or baseline comfort level: Encourage patient to monitor pain and request assistance       Problem: Skin/Tissue Integrity  Goal: Absence of new skin breakdown  Description: 1.  Monitor for areas of redness and/or skin breakdown  2.  Assess vascular access sites hourly  3.  Every 4-6 hours minimum:  Change oxygen saturation probe site  4.  Every 4-6 hours:  If on nasal continuous positive airway pressure, respiratory therapy assess nares and determine need for appliance change or resting period.  7/26/2024 1129 by Sowmya Smith RN  Outcome: Progressing

## 2024-07-26 NOTE — PROGRESS NOTES
07/26/24 1536   Encounter Summary   Encounter Overview/Reason Spiritual/Emotional Needs   Service Provided For Patient   Referral/Consult From Volunteer   Support System Children   Last Encounter  07/26/24  ( visited; provided pastoral support and prayer)   Assessment/Intervention/Outcome   Intervention Prayer (assurance of)/[place]

## 2024-07-26 NOTE — CARE COORDINATION
Case Management Assessment  Initial Evaluation    Date/Time of Evaluation: 7/26/2024 1:44 PM  Assessment Completed by: Elena Pearl RN    If patient is discharged prior to next notation, then this note serves as note for discharge by case management.    Patient Name: Isa Hernandez                   YOB: 1945  Diagnosis: Shortness of breath [R06.02]  Hemoptysis [R04.2]                   Date / Time: 7/25/2024  2:00 PM    Patient Admission Status: Inpatient   Readmission Risk (Low < 19, Mod (19-27), High > 27): Readmission Risk Score: 24.9    Current PCP: Jocelin Ellsworth MD  PCP verified by CM? Yes    Chart Reviewed: Yes      History Provided by: Patient, Medical Record  Patient Orientation: Alert and Oriented    Patient Cognition: Alert    Hospitalization in the last 30 days (Readmission):  Yes    If yes, Readmission Assessment in CM Navigator will be completed.    Advance Directives:      Code Status: Full Code   Patient's Primary Decision Maker is: Legal Next of Kin    Primary Decision Maker: Barney Hernandez - Child - [phone]    Primary Decision Maker (Active): [email or web address] muniz - Child - [phone]    Discharge Planning:    Patient lives with: Alone Type of Home: Apartment  Primary Care Giver: Self  Patient Support Systems include: Children   Current Financial resources: Medicare  Current community resources: None  Current services prior to admission: Other (Comment) (Laundry 1x week)            Current DME:              Type of Home Care services:  None    ADLS  Prior functional level: Assistance with the following:, Housework, Shopping  Current functional level: Assistance with the following:, Cooking, Housework, Shopping    PT AM-PAC:   /24  OT AM-PAC:   /24    Family can provide assistance at DC: Yes  Would you like Case Management to discuss the discharge plan with any other family members/significant others, and if so, who? Yes (children)  Plans to Return to Present Housing: Unknown at

## 2024-07-26 NOTE — PROGRESS NOTES
Hospital Medicine Progress Note      Date of Admission: 7/25/2024  Hospital Day: 2    Chief Admission Complaint:  Hemoptysis     Subjective:  Cough is productive of mostly mucous and only scant hemoptysis.     Telemetry (reviewed by me):  SR    Presenting Admission History:       79 y.o. female who presented to [facility] with hemoptysis.  PMHx significant for HTN, COPD, lung mass, who was recently hospitalized here for Pneumonia. She was discharged on course of Levaquin, which has been completed. During recent admission, Oncology was consulted for enlarging lung mass and likely post-obstructive Pneumonia, to re-establish care (previously biopsy negative in 4/2023). However, patient declined further evaluation or Pulmonology consultation at that time.      She now reports 1-2 days of worsening hemoptysis, occasionally with large clots. Presented to the ED for evaluation. Last dose of Xarelto was 7/24. Hemoptysis seemed to be slowing since arrival in ED. Minimal SOB/hypoxia. CXR stable.      Assessment/Plan:       Hemoptysis in setting of recent Pneumonia and enlarging RUL Lung Mass:   During recent admission, Oncology was consulted for enlarging lung mass and likely post-obstructive Pneumonia, to re-establish care (previously biopsy negative in 4/2023). However, patient declined further evaluation or Pulmonology consultation at that time. Empiric Abx were completed. She now returns with mild to moderate hemoptysis. She is quite anxious about the bleeding and states she suspects its the lung cancer. I reviewed with her the documentation from last hospitalization regarding declining further investigation of the lung mass; she does not recall saying this (somewhat limited historian). However, she now wishes to have Pulmonology consulted and proceed with additional testing for the lung mass. Holding Xarelto.  -Pulmonary planning for EGD/biopsy on 7/27.       Atrial Fibrillation   -Stable.  -Hold Xarelto as above.

## 2024-07-26 NOTE — PROGRESS NOTES
07/26/24 1315   Encounter Summary   Encounter Overview/Reason Initial Encounter;Volunteer Encounter   Service Provided For Patient   Last Encounter  07/26/24  (Pastoral Care Volunteer)   Assessment/Intervention/Outcome   Intervention Prayer (assurance of)/[place]

## 2024-07-26 NOTE — PROGRESS NOTES
Comprehensive Nutrition Assessment    Type and Reason for Visit:  Positive Nutrition Screen    Nutrition Recommendations/Plan:   Continue Regular diet.  Start Ensure BID.  Reweigh pt.     Malnutrition Assessment:  Malnutrition Status:  At risk for malnutrition (Comment) (07/26/24 4698)    Context:  Acute Illness     Findings of the 6 clinical characteristics of malnutrition:  Energy Intake:  Mild decrease in energy intake (Comment) (No intake since admission)  Weight Loss:  Unable to assess (possible wt loss once wt confirmed)     Body Fat Loss:  Unable to assess     Muscle Mass Loss:  Unable to assess    Fluid Accumulation:  No significant fluid accumulation     Strength:  Not Performed    Nutrition Assessment:    +nutrition screen. Pt with PMH of COPD, [place], HLD, and RUL mass who presented with SOB and hemoptysis. No intakes noted since admission. Pt wasa admitted 1 week ago and reports poor appetite and 20# wt loss . LOCO wt loss d/t most weights are \"stated\" weights. Will request nursing to obtain wt. Pt was favorable to drinking Ensure daily, will trial this admission.    Nutrition Related Findings:    BM 7/24; Na 133, Glu 116 Wound Type: None       Current Nutrition Intake & Therapies:    Average Meal Intake: 0%  Average Supplements Intake: None Ordered  ADULT DIET; Regular  Diet NPO    Anthropometric Measures:  Height: 165.1 cm (5' 5\")  Ideal Body Weight (IBW): 125 lbs (57 kg)       Current Body Weight: 56.9 kg (125 lb 7.1 oz), 100.4 % IBW. Weight Source: Stated  Current BMI (kg/m2): 20.9                          BMI Categories: Underweight (BMI less than 22) age over 65    Estimated Daily Nutrient Needs:  Energy Requirements Based On: Kcal/kg  Weight Used for Energy Requirements: Current  Energy (kcal/day): 1425- 1710 kcal (25-30 kcal/kg 57 kg CBW)  Weight Used for Protein Requirements: Current  Protein (g/day): 68 -80 gm (1.2-1.4 gm/kg 57 kg CBW)  Method Used for Fluid Requirements: 1

## 2024-07-27 ENCOUNTER — ANESTHESIA EVENT (OUTPATIENT)
Dept: ENDOSCOPY | Age: 79
End: 2024-07-27
Payer: MEDICARE

## 2024-07-27 ENCOUNTER — ANESTHESIA (OUTPATIENT)
Dept: ENDOSCOPY | Age: 79
End: 2024-07-27
Payer: MEDICARE

## 2024-07-27 LAB
ANION GAP SERPL CALCULATED.3IONS-SCNC: 11 MMOL/L (ref 3–16)
BUN SERPL-MCNC: <2 MG/DL (ref 7–20)
CALCIUM SERPL-MCNC: 8.1 MG/DL (ref 8.3–10.6)
CHLORIDE SERPL-SCNC: 98 MMOL/L (ref 99–110)
CO2 SERPL-SCNC: 23 MMOL/L (ref 21–32)
CREAT SERPL-MCNC: <0.5 MG/DL (ref 0.6–1.2)
DEPRECATED RDW RBC AUTO: 14.4 % (ref 12.4–15.4)
GFR SERPLBLD CREATININE-BSD FMLA CKD-EPI: >90 ML/MIN/{1.73_M2}
GLUCOSE SERPL-MCNC: 107 MG/DL (ref 70–99)
HCT VFR BLD AUTO: 31.6 % (ref 36–48)
HGB BLD-MCNC: 10.3 G/DL (ref 12–16)
MAGNESIUM SERPL-MCNC: 1.8 MG/DL (ref 1.8–2.4)
MCH RBC QN AUTO: 28.8 PG (ref 26–34)
MCHC RBC AUTO-ENTMCNC: 32.7 G/DL (ref 31–36)
MCV RBC AUTO: 88.1 FL (ref 80–100)
PLATELET # BLD AUTO: 323 K/UL (ref 135–450)
PMV BLD AUTO: 10.1 FL (ref 5–10.5)
POTASSIUM SERPL-SCNC: 3.5 MMOL/L (ref 3.5–5.1)
RBC # BLD AUTO: 3.59 M/UL (ref 4–5.2)
SODIUM SERPL-SCNC: 132 MMOL/L (ref 136–145)
WBC # BLD AUTO: 14.5 K/UL (ref 4–11)

## 2024-07-27 PROCEDURE — 6370000000 HC RX 637 (ALT 250 FOR IP): Performed by: INTERNAL MEDICINE

## 2024-07-27 PROCEDURE — 3603165200 HC BRNCHSC EBUS GUIDED SAMPL 1/2 NODE STATION/STRUX: Performed by: STUDENT IN AN ORGANIZED HEALTH CARE EDUCATION/TRAINING PROGRAM

## 2024-07-27 PROCEDURE — 88305 TISSUE EXAM BY PATHOLOGIST: CPT

## 2024-07-27 PROCEDURE — 88341 IMHCHEM/IMCYTCHM EA ADD ANTB: CPT

## 2024-07-27 PROCEDURE — 2720000010 HC SURG SUPPLY STERILE: Performed by: STUDENT IN AN ORGANIZED HEALTH CARE EDUCATION/TRAINING PROGRAM

## 2024-07-27 PROCEDURE — 2700000000 HC OXYGEN THERAPY PER DAY

## 2024-07-27 PROCEDURE — 3E0F8GC INTRODUCTION OF OTHER THERAPEUTIC SUBSTANCE INTO RESPIRATORY TRACT, VIA NATURAL OR ARTIFICIAL OPENING ENDOSCOPIC: ICD-10-PCS | Performed by: STUDENT IN AN ORGANIZED HEALTH CARE EDUCATION/TRAINING PROGRAM

## 2024-07-27 PROCEDURE — 88342 IMHCHEM/IMCYTCHM 1ST ANTB: CPT

## 2024-07-27 PROCEDURE — 2709999900 HC NON-CHARGEABLE SUPPLY: Performed by: STUDENT IN AN ORGANIZED HEALTH CARE EDUCATION/TRAINING PROGRAM

## 2024-07-27 PROCEDURE — 6360000002 HC RX W HCPCS: Performed by: STUDENT IN AN ORGANIZED HEALTH CARE EDUCATION/TRAINING PROGRAM

## 2024-07-27 PROCEDURE — 2580000003 HC RX 258: Performed by: INTERNAL MEDICINE

## 2024-07-27 PROCEDURE — 3700000000 HC ANESTHESIA ATTENDED CARE: Performed by: STUDENT IN AN ORGANIZED HEALTH CARE EDUCATION/TRAINING PROGRAM

## 2024-07-27 PROCEDURE — 1200000000 HC SEMI PRIVATE

## 2024-07-27 PROCEDURE — 7100000001 HC PACU RECOVERY - ADDTL 15 MIN: Performed by: STUDENT IN AN ORGANIZED HEALTH CARE EDUCATION/TRAINING PROGRAM

## 2024-07-27 PROCEDURE — 31652 BRONCH EBUS SAMPLNG 1/2 NODE: CPT | Performed by: STUDENT IN AN ORGANIZED HEALTH CARE EDUCATION/TRAINING PROGRAM

## 2024-07-27 PROCEDURE — 2500000003 HC RX 250 WO HCPCS: Performed by: ANESTHESIOLOGY

## 2024-07-27 PROCEDURE — 3609027000 HC BRONCHOSCOPY: Performed by: STUDENT IN AN ORGANIZED HEALTH CARE EDUCATION/TRAINING PROGRAM

## 2024-07-27 PROCEDURE — C1725 CATH, TRANSLUMIN NON-LASER: HCPCS | Performed by: STUDENT IN AN ORGANIZED HEALTH CARE EDUCATION/TRAINING PROGRAM

## 2024-07-27 PROCEDURE — 6360000002 HC RX W HCPCS: Performed by: INTERNAL MEDICINE

## 2024-07-27 PROCEDURE — 6360000002 HC RX W HCPCS: Performed by: ANESTHESIOLOGY

## 2024-07-27 PROCEDURE — 80048 BASIC METABOLIC PNL TOTAL CA: CPT

## 2024-07-27 PROCEDURE — 99232 SBSQ HOSP IP/OBS MODERATE 35: CPT | Performed by: STUDENT IN AN ORGANIZED HEALTH CARE EDUCATION/TRAINING PROGRAM

## 2024-07-27 PROCEDURE — 94761 N-INVAS EAR/PLS OXIMETRY MLT: CPT

## 2024-07-27 PROCEDURE — 85027 COMPLETE CBC AUTOMATED: CPT

## 2024-07-27 PROCEDURE — 83735 ASSAY OF MAGNESIUM: CPT

## 2024-07-27 PROCEDURE — 88173 CYTOPATH EVAL FNA REPORT: CPT

## 2024-07-27 PROCEDURE — 3700000001 HC ADD 15 MINUTES (ANESTHESIA): Performed by: STUDENT IN AN ORGANIZED HEALTH CARE EDUCATION/TRAINING PROGRAM

## 2024-07-27 PROCEDURE — 36415 COLL VENOUS BLD VENIPUNCTURE: CPT

## 2024-07-27 PROCEDURE — 07D78ZX EXTRACTION OF THORAX LYMPHATIC, VIA NATURAL OR ARTIFICIAL OPENING ENDOSCOPIC, DIAGNOSTIC: ICD-10-PCS | Performed by: STUDENT IN AN ORGANIZED HEALTH CARE EDUCATION/TRAINING PROGRAM

## 2024-07-27 PROCEDURE — 7100000000 HC PACU RECOVERY - FIRST 15 MIN: Performed by: STUDENT IN AN ORGANIZED HEALTH CARE EDUCATION/TRAINING PROGRAM

## 2024-07-27 PROCEDURE — 94640 AIRWAY INHALATION TREATMENT: CPT

## 2024-07-27 RX ORDER — SODIUM CHLORIDE 0.9 % (FLUSH) 0.9 %
5-40 SYRINGE (ML) INJECTION PRN
Status: DISCONTINUED | OUTPATIENT
Start: 2024-07-27 | End: 2024-07-27 | Stop reason: HOSPADM

## 2024-07-27 RX ORDER — ONDANSETRON 2 MG/ML
4 INJECTION INTRAMUSCULAR; INTRAVENOUS
Status: COMPLETED | OUTPATIENT
Start: 2024-07-27 | End: 2024-07-27

## 2024-07-27 RX ORDER — ROCURONIUM BROMIDE 10 MG/ML
INJECTION, SOLUTION INTRAVENOUS PRN
Status: DISCONTINUED | OUTPATIENT
Start: 2024-07-27 | End: 2024-07-27 | Stop reason: SDUPTHER

## 2024-07-27 RX ORDER — LORAZEPAM 2 MG/ML
0.5 INJECTION INTRAMUSCULAR
Status: DISCONTINUED | OUTPATIENT
Start: 2024-07-27 | End: 2024-07-27 | Stop reason: HOSPADM

## 2024-07-27 RX ORDER — OXYCODONE HYDROCHLORIDE 5 MG/1
5 TABLET ORAL
Status: DISCONTINUED | OUTPATIENT
Start: 2024-07-27 | End: 2024-07-27 | Stop reason: HOSPADM

## 2024-07-27 RX ORDER — DEXAMETHASONE SODIUM PHOSPHATE 4 MG/ML
INJECTION, SOLUTION INTRA-ARTICULAR; INTRALESIONAL; INTRAMUSCULAR; INTRAVENOUS; SOFT TISSUE PRN
Status: DISCONTINUED | OUTPATIENT
Start: 2024-07-27 | End: 2024-07-27 | Stop reason: SDUPTHER

## 2024-07-27 RX ORDER — DIPHENHYDRAMINE HYDROCHLORIDE 50 MG/ML
12.5 INJECTION INTRAMUSCULAR; INTRAVENOUS
Status: DISCONTINUED | OUTPATIENT
Start: 2024-07-27 | End: 2024-07-27 | Stop reason: HOSPADM

## 2024-07-27 RX ORDER — MEPERIDINE HYDROCHLORIDE 50 MG/ML
12.5 INJECTION INTRAMUSCULAR; INTRAVENOUS; SUBCUTANEOUS EVERY 5 MIN PRN
Status: DISCONTINUED | OUTPATIENT
Start: 2024-07-27 | End: 2024-07-27 | Stop reason: HOSPADM

## 2024-07-27 RX ORDER — PROCHLORPERAZINE EDISYLATE 5 MG/ML
5 INJECTION INTRAMUSCULAR; INTRAVENOUS
Status: DISCONTINUED | OUTPATIENT
Start: 2024-07-27 | End: 2024-07-27 | Stop reason: HOSPADM

## 2024-07-27 RX ORDER — NALOXONE HYDROCHLORIDE 0.4 MG/ML
INJECTION, SOLUTION INTRAMUSCULAR; INTRAVENOUS; SUBCUTANEOUS PRN
Status: DISCONTINUED | OUTPATIENT
Start: 2024-07-27 | End: 2024-07-27 | Stop reason: HOSPADM

## 2024-07-27 RX ORDER — ONDANSETRON 2 MG/ML
INJECTION INTRAMUSCULAR; INTRAVENOUS PRN
Status: DISCONTINUED | OUTPATIENT
Start: 2024-07-27 | End: 2024-07-27 | Stop reason: SDUPTHER

## 2024-07-27 RX ORDER — EPINEPHRINE 1 MG/ML
INJECTION, SOLUTION, CONCENTRATE INTRAVENOUS PRN
Status: DISCONTINUED | OUTPATIENT
Start: 2024-07-27 | End: 2024-07-27 | Stop reason: ALTCHOICE

## 2024-07-27 RX ORDER — LABETALOL HYDROCHLORIDE 5 MG/ML
10 INJECTION, SOLUTION INTRAVENOUS
Status: DISCONTINUED | OUTPATIENT
Start: 2024-07-27 | End: 2024-07-27 | Stop reason: HOSPADM

## 2024-07-27 RX ORDER — SODIUM CHLORIDE 9 MG/ML
INJECTION, SOLUTION INTRAVENOUS PRN
Status: DISCONTINUED | OUTPATIENT
Start: 2024-07-27 | End: 2024-07-27 | Stop reason: HOSPADM

## 2024-07-27 RX ORDER — PROPOFOL 10 MG/ML
INJECTION, EMULSION INTRAVENOUS PRN
Status: DISCONTINUED | OUTPATIENT
Start: 2024-07-27 | End: 2024-07-27 | Stop reason: SDUPTHER

## 2024-07-27 RX ORDER — SODIUM CHLORIDE 0.9 % (FLUSH) 0.9 %
5-40 SYRINGE (ML) INJECTION EVERY 12 HOURS SCHEDULED
Status: DISCONTINUED | OUTPATIENT
Start: 2024-07-27 | End: 2024-07-27 | Stop reason: HOSPADM

## 2024-07-27 RX ADMIN — ONDANSETRON 4 MG: 2 INJECTION INTRAMUSCULAR; INTRAVENOUS at 11:50

## 2024-07-27 RX ADMIN — Medication 2 PUFF: at 19:03

## 2024-07-27 RX ADMIN — TRAMADOL HYDROCHLORIDE 50 MG: 50 TABLET ORAL at 21:27

## 2024-07-27 RX ADMIN — PRAVASTATIN SODIUM 40 MG: 40 TABLET ORAL at 12:48

## 2024-07-27 RX ADMIN — BACLOFEN 5 MG: 10 TABLET ORAL at 21:25

## 2024-07-27 RX ADMIN — ROCURONIUM BROMIDE 25 MG: 50 INJECTION, SOLUTION INTRAVENOUS at 10:09

## 2024-07-27 RX ADMIN — ONDANSETRON 4 MG: 2 INJECTION INTRAMUSCULAR; INTRAVENOUS at 11:11

## 2024-07-27 RX ADMIN — MONTELUKAST 10 MG: 10 TABLET, FILM COATED ORAL at 21:26

## 2024-07-27 RX ADMIN — Medication 2 PUFF: at 19:02

## 2024-07-27 RX ADMIN — LEVOFLOXACIN 750 MG: 5 INJECTION, SOLUTION INTRAVENOUS at 21:30

## 2024-07-27 RX ADMIN — Medication 2 PUFF: at 15:24

## 2024-07-27 RX ADMIN — SUGAMMADEX 200 MG: 100 INJECTION, SOLUTION INTRAVENOUS at 11:11

## 2024-07-27 RX ADMIN — OXYBUTYNIN CHLORIDE 5 MG: 5 TABLET, EXTENDED RELEASE ORAL at 21:26

## 2024-07-27 RX ADMIN — HYDROMORPHONE HYDROCHLORIDE 0.25 MG: 1 INJECTION, SOLUTION INTRAMUSCULAR; INTRAVENOUS; SUBCUTANEOUS at 11:31

## 2024-07-27 RX ADMIN — DEXAMETHASONE SODIUM PHOSPHATE 8 MG: 4 INJECTION, SOLUTION INTRAMUSCULAR; INTRAVENOUS at 10:09

## 2024-07-27 RX ADMIN — PROPOFOL 50 MG: 10 INJECTION, EMULSION INTRAVENOUS at 10:09

## 2024-07-27 RX ADMIN — SODIUM CHLORIDE, PRESERVATIVE FREE 10 ML: 5 INJECTION INTRAVENOUS at 21:54

## 2024-07-27 ASSESSMENT — PAIN SCALES - GENERAL
PAINLEVEL_OUTOF10: 7
PAINLEVEL_OUTOF10: 6
PAINLEVEL_OUTOF10: 7
PAINLEVEL_OUTOF10: 0
PAINLEVEL_OUTOF10: 1

## 2024-07-27 ASSESSMENT — PAIN DESCRIPTION - ORIENTATION
ORIENTATION: MID;RIGHT;LEFT
ORIENTATION: MID

## 2024-07-27 ASSESSMENT — PAIN DESCRIPTION - DESCRIPTORS
DESCRIPTORS: ACHING;SORE
DESCRIPTORS: ACHING
DESCRIPTORS: SORE

## 2024-07-27 ASSESSMENT — PAIN DESCRIPTION - LOCATION
LOCATION: CHEST;SHOULDER;BACK
LOCATION: CHEST
LOCATION: CHEST

## 2024-07-27 NOTE — PLAN OF CARE
Problem: Safety - Adult  Goal: Free from fall injury  Outcome: Progressing   Patient has remained free of falls. 2/4 bed rails up, bed locked and in lowest position, call light within reach. Patient instructed on use of call light and uses appropriately. Bed alarm on. Non-skid footwear and fall band on.       Problem: Pain  Goal: Verbalizes/displays adequate comfort level or baseline comfort level  7/27/2024 0100 by Xiao Beck RN  Outcome: Progressing  Patient denies pain at this time.

## 2024-07-27 NOTE — PROGRESS NOTES
Patient is alert and oriented x4, VSS. Denies pain. Tolerating diet. Voiding adequately. Tolerating ambulation well. Lung sounds are diminished, shortness of breath only with ambulation and mild. Cough minimal, no sputum. Satting well on room air. NPO at midnight. Fall precautions in place.

## 2024-07-27 NOTE — PROGRESS NOTES
Patient: Isa Hernandez MRN: [id number]  Date of  Admission: 7/25/2024   YOB: 1945  Age: 79 y.o.  Sex: female    Unit: [place] [address] MED SURG/ORTHO  Room/Bed: [facility]/0541- Admitting Physician: RAVINDER VELASCO    Attending Physician:  Grant Denis MD         Pulmonary Service Note      SUBJECTIVE:    Pt still coughing up blood but she is unsure how much. She is NPO for procedure today.         OBJECTIVE    Medications    Continuous Infusions:   sodium chloride 25 mL (07/25/24 9319)       Scheduled Meds:   albuterol sulfate HFA  2 puff Inhalation 4x Daily RT    baclofen  5 mg Oral Nightly    montelukast  10 mg Oral Nightly    oxyBUTYnin  5 mg Oral Nightly    pravastatin  40 mg Oral Daily    budesonide-formoterol  2 puff Inhalation BID RT    sodium chloride flush  5-40 mL IntraVENous 2 times per day    levofloxacin  750 mg IntraVENous Q24H       PRN Meds:  fluticasone, sodium chloride flush, sodium chloride, ondansetron **OR** ondansetron, polyethylene glycol, acetaminophen **OR** acetaminophen, traMADol    Physical    Patient Vitals for the past 24 hrs:   BP Temp Temp src Pulse Resp SpO2 Height   07/27/24 0924 116/70 98.1 °F (36.7 °C) Temporal 66 16 93 % --   07/27/24 0745 114/69 97.2 °F (36.2 °C) Oral 67 16 92 % --   07/27/24 0430 127/69 97.8 °F (36.6 °C) Oral 69 16 94 % --   07/27/24 0008 104/64 97.6 °F (36.4 °C) Oral 69 16 94 % --   07/26/24 2004 112/66 98.1 °F (36.7 °C) Oral 81 16 93 % --   07/26/24 1602 108/66 97.9 °F (36.6 °C) Oral 72 18 92 % --   07/26/24 1552 -- -- -- -- -- -- 1.651 m (5' 5\")   07/26/24 1526 -- -- -- 69 18 93 % --   07/26/24 1211 136/74 97.4 °F (36.3 °C) Oral 78 18 95 % --   07/26/24 1138 -- -- -- 68 16 92 % --        Physical Exam  Constitutional:       General: She is not in acute distress.     Appearance: She is not toxic-appearing.   HENT:      Head: Normocephalic and atraumatic.      Nose: Nose normal.      Mouth/Throat:      Pharynx: No oropharyngeal exudate.   Eyes:

## 2024-07-27 NOTE — ANESTHESIA POSTPROCEDURE EVALUATION
Department of Anesthesiology  Postprocedure Note    Patient: Isa Hernandez  MRN: [id number]  YOB: 1945  Date of evaluation: 7/27/2024    Procedure Summary       Date: 07/27/24 Room / Location: [facility] VIRTUAL ROOM / [facility]    Anesthesia Start: 1005 Anesthesia Stop: 1127    Procedures:       BRONCHOSCOPY ENDOBRONCHIAL ULTRASOUND      BRONCHOSCOPY Diagnosis:       Lung mass      Hemoptysis      (Lung mass [R91.8])      (Hemoptysis [R04.2])    Surgeons: Salvador Colunga MD Responsible Provider: Axel Delaney MD    Anesthesia Type: general ASA Status: 3 - Emergent            Anesthesia Type: No value filed.    Jessy Phase I: Jessy Score: 9    Jessy Phase II:      Anesthesia Post Evaluation    Patient location during evaluation: PACU  Patient participation: complete - patient participated  Level of consciousness: awake and alert  Airway patency: patent  Nausea & Vomiting: no vomiting and no nausea  Cardiovascular status: hemodynamically stable and blood pressure returned to baseline  Respiratory status: acceptable  Hydration status: euvolemic  Comments: --------------------            07/27/24               1200     --------------------   BP:       93/61      Pulse:      73       Resp:       26       Temp:                SpO2:      95%      --------------------    Pain management: adequate    No notable events documented.

## 2024-07-27 NOTE — OP NOTE
Operative Note      Patient: Isa Hernandez  YOB: 1945  MRN: [id number]    Date of Procedure: 7/27/2024    Pre-Op Diagnosis Codes:     * Lung mass [R91.8]     * Hemoptysis [R04.2]    Post-Op Diagnosis: Same       Procedure(s):  BRONCHOSCOPY ENDOBRONCHIAL ULTRASOUND  BRONCHOSCOPY    Surgeon(s):  Salvador Colunga MD    Assistant:   * No surgical staff found *    Anesthesia: General    Estimated Blood Loss (mL): less than 50     Complications: None    Specimens:   Station 4R - 5 passes    Implants:  * No implants in log *      Drains: * No LDAs found *    Findings:  Infection Present At Time Of Surgery (PATOS) (choose all levels that have infection present):  No infection present  Other Findings: Active bleeding in RLL, obliteration of RUL, paratracheal lymphadenopathy, friable mucosa    DESCRIPTION OF PROCEDURE: Informed consent was obtained from the patient after explaining the risks and benefits. A time out was taken. General or total intravenous anesthesia was kindly provided by the anesthetist.     The scope was passed with ease via the ETT. Direct visualization of the lymph nodes was obtained using endobronchial ultrasound (EBUS) guidance. A complete ultrasound lymph node exam was performed for lymph node stations 2, 4, 7, 10 and 11.  The following lymph nodes were subjected to EBUS guided biopsy using standard technique and in the following order:    1.  Station 4R - 5 passes    Subsequently, a standard bronchoscope was used to perform a complete airway inspection.     Pt had active bleeding in the RLL. 2 total cc's of epi were given because of active bleeding including cold saline. There was no oozing from RLL and clotted blood. There was also obliteration of the RUL due to like consolidation/lung mass. Decision was to not pursue brushings or biopsies because of active bleeding and friable mucosa.     The patient tolerated the procedure well. EBL< 50 cc's. Recovery will be per the anesthesia  team.      FOLLOW UP:  I will call the patient with final results.     Electronically signed by Salvador Colunga MD on 7/27/2024 at 11:47 AM

## 2024-07-27 NOTE — PLAN OF CARE
Problem: Discharge Planning  Goal: Discharge to home or other facility with appropriate resources  Outcome: Progressing  Flowsheets (Taken 7/27/2024 7605)  Discharge to home or other facility with appropriate resources: Identify barriers to discharge with patient and caregiver     Problem: Pain  Goal: Verbalizes/displays adequate comfort level or baseline comfort level  Heat applied  Outcome: Progressing     Problem: Safety - Adult  Goal: Free from fall injury    Outcome: Progressing

## 2024-07-27 NOTE — ANESTHESIA PRE PROCEDURE
07/27/24 116/70   07/20/24 102/62   05/28/24 118/69       NPO Status: Time of last liquid consumption: 2300                        Time of last solid consumption: 1900                        Date of last liquid consumption: 07/26/24                        Date of last solid food consumption: 07/26/24    BMI:   Wt Readings from Last 3 Encounters:   07/25/24 56.9 kg (125 lb 7.1 oz)   07/20/24 56.7 kg (125 lb)   05/28/24 61.2 kg (135 lb)     Body mass index is 20.87 kg/m².    CBC:   Lab Results   Component Value Date/Time    WBC 14.5 07/27/2024 05:45 AM    RBC 3.59 07/27/2024 05:45 AM    HGB 10.3 07/27/2024 05:45 AM    HCT 31.6 07/27/2024 05:45 AM    MCV 88.1 07/27/2024 05:45 AM    RDW 14.4 07/27/2024 05:45 AM     07/27/2024 05:45 AM       CMP:   Lab Results   Component Value Date/Time     07/27/2024 05:45 AM    K 3.5 07/27/2024 05:45 AM    CL 98 07/27/2024 05:45 AM    CO2 23 07/27/2024 05:45 AM    BUN <2 07/27/2024 05:45 AM    CREATININE <0.5 07/27/2024 05:45 AM    GFRAA >60 06/21/2022 08:55 PM    AGRATIO 1.0 07/25/2024 02:40 PM    LABGLOM >90 07/27/2024 05:45 AM    LABGLOM >60 03/18/2024 06:07 AM    GLUCOSE 107 07/27/2024 05:45 AM    CALCIUM 8.1 07/27/2024 05:45 AM    BILITOT 0.4 07/25/2024 02:40 PM    ALKPHOS 102 07/25/2024 02:40 PM    AST 12 07/25/2024 02:40 PM    ALT 8 07/25/2024 02:40 PM       POC Tests: No results for input(s): \"POCGLU\", \"POCNA\", \"POCK\", \"POCCL\", \"POCBUN\", \"POCHEMO\", \"POCHCT\" in the last 72 hours.    Coags:   Lab Results   Component Value Date/Time    PROTIME 13.5 05/07/2024 04:52 PM    INR 1.01 05/07/2024 04:52 PM    APTT 29.4 11/30/2017 02:55 PM       HCG (If Applicable): No results found for: \"PREGTESTUR\", \"PREGSERUM\", \"HCG\", \"HCGQUANT\"     ABGs: No results found for: \"PHART\", \"PO2ART\", \"HCG1ZKO\", \"RHO1YFV\", \"BEART\", \"M4SFWHSQ\"     Type & Screen (If Applicable):  No results found for: \"LABABO\"    Drug/Infectious Status (If Applicable):  No results found for: \"HIV\",

## 2024-07-27 NOTE — PROGRESS NOTES
Pt brought to PACU. Report obtained from OR RN and anesthesia. Pt placed on monitor NSR and O2 at 92% on 3L NC.

## 2024-07-27 NOTE — PROGRESS NOTES
Patient taken back to room 541 by transporter in stable condition.    Vitals:    07/27/24 1210   BP: (!) 92/46   Pulse: 71   Resp: 30   Temp: 97.6 °F (36.4 °C)   SpO2: 95%

## 2024-07-28 LAB
ANION GAP SERPL CALCULATED.3IONS-SCNC: 9 MMOL/L (ref 3–16)
BUN SERPL-MCNC: 3 MG/DL (ref 7–20)
CALCIUM SERPL-MCNC: 8.2 MG/DL (ref 8.3–10.6)
CHLORIDE SERPL-SCNC: 98 MMOL/L (ref 99–110)
CO2 SERPL-SCNC: 25 MMOL/L (ref 21–32)
CREAT SERPL-MCNC: <0.5 MG/DL (ref 0.6–1.2)
DEPRECATED RDW RBC AUTO: 15 % (ref 12.4–15.4)
GFR SERPLBLD CREATININE-BSD FMLA CKD-EPI: >90 ML/MIN/{1.73_M2}
GLUCOSE SERPL-MCNC: 111 MG/DL (ref 70–99)
HCT VFR BLD AUTO: 30.9 % (ref 36–48)
HGB BLD-MCNC: 9.9 G/DL (ref 12–16)
MAGNESIUM SERPL-MCNC: 1.9 MG/DL (ref 1.8–2.4)
MCH RBC QN AUTO: 28 PG (ref 26–34)
MCHC RBC AUTO-ENTMCNC: 31.9 G/DL (ref 31–36)
MCV RBC AUTO: 87.7 FL (ref 80–100)
PLATELET # BLD AUTO: 305 K/UL (ref 135–450)
PMV BLD AUTO: 10.6 FL (ref 5–10.5)
POTASSIUM SERPL-SCNC: 3.4 MMOL/L (ref 3.5–5.1)
RBC # BLD AUTO: 3.52 M/UL (ref 4–5.2)
SODIUM SERPL-SCNC: 132 MMOL/L (ref 136–145)
WBC # BLD AUTO: 13.1 K/UL (ref 4–11)

## 2024-07-28 PROCEDURE — 94761 N-INVAS EAR/PLS OXIMETRY MLT: CPT

## 2024-07-28 PROCEDURE — 94640 AIRWAY INHALATION TREATMENT: CPT

## 2024-07-28 PROCEDURE — 2580000003 HC RX 258: Performed by: INTERNAL MEDICINE

## 2024-07-28 PROCEDURE — 80048 BASIC METABOLIC PNL TOTAL CA: CPT

## 2024-07-28 PROCEDURE — 6360000002 HC RX W HCPCS: Performed by: INTERNAL MEDICINE

## 2024-07-28 PROCEDURE — 83735 ASSAY OF MAGNESIUM: CPT

## 2024-07-28 PROCEDURE — 36415 COLL VENOUS BLD VENIPUNCTURE: CPT

## 2024-07-28 PROCEDURE — 6370000000 HC RX 637 (ALT 250 FOR IP): Performed by: INTERNAL MEDICINE

## 2024-07-28 PROCEDURE — 1200000000 HC SEMI PRIVATE

## 2024-07-28 PROCEDURE — 2700000000 HC OXYGEN THERAPY PER DAY

## 2024-07-28 PROCEDURE — 99232 SBSQ HOSP IP/OBS MODERATE 35: CPT | Performed by: STUDENT IN AN ORGANIZED HEALTH CARE EDUCATION/TRAINING PROGRAM

## 2024-07-28 PROCEDURE — 85027 COMPLETE CBC AUTOMATED: CPT

## 2024-07-28 RX ADMIN — SODIUM CHLORIDE, PRESERVATIVE FREE 10 ML: 5 INJECTION INTRAVENOUS at 20:29

## 2024-07-28 RX ADMIN — OXYBUTYNIN CHLORIDE 5 MG: 5 TABLET, EXTENDED RELEASE ORAL at 20:26

## 2024-07-28 RX ADMIN — TRAMADOL HYDROCHLORIDE 50 MG: 50 TABLET ORAL at 05:38

## 2024-07-28 RX ADMIN — ONDANSETRON 4 MG: 2 INJECTION INTRAMUSCULAR; INTRAVENOUS at 10:39

## 2024-07-28 RX ADMIN — ACETAMINOPHEN 650 MG: 325 TABLET ORAL at 00:23

## 2024-07-28 RX ADMIN — TRAMADOL HYDROCHLORIDE 50 MG: 50 TABLET ORAL at 14:25

## 2024-07-28 RX ADMIN — TRAMADOL HYDROCHLORIDE 50 MG: 50 TABLET ORAL at 20:26

## 2024-07-28 RX ADMIN — SODIUM CHLORIDE, PRESERVATIVE FREE 10 ML: 5 INJECTION INTRAVENOUS at 07:26

## 2024-07-28 RX ADMIN — Medication 2 PUFF: at 08:25

## 2024-07-28 RX ADMIN — BACLOFEN 5 MG: 10 TABLET ORAL at 20:26

## 2024-07-28 RX ADMIN — Medication 2 PUFF: at 11:26

## 2024-07-28 RX ADMIN — Medication 2 PUFF: at 19:09

## 2024-07-28 RX ADMIN — Medication 2 PUFF: at 19:04

## 2024-07-28 RX ADMIN — MONTELUKAST 10 MG: 10 TABLET, FILM COATED ORAL at 20:26

## 2024-07-28 RX ADMIN — PRAVASTATIN SODIUM 40 MG: 40 TABLET ORAL at 08:36

## 2024-07-28 RX ADMIN — Medication 2 PUFF: at 15:44

## 2024-07-28 RX ADMIN — ACETAMINOPHEN 650 MG: 325 TABLET ORAL at 07:25

## 2024-07-28 ASSESSMENT — PAIN DESCRIPTION - ORIENTATION
ORIENTATION: MID

## 2024-07-28 ASSESSMENT — PAIN DESCRIPTION - DESCRIPTORS
DESCRIPTORS: ACHING;DISCOMFORT
DESCRIPTORS: ACHING;BURNING
DESCRIPTORS: STABBING
DESCRIPTORS: ACHING
DESCRIPTORS: ACHING;CRAMPING

## 2024-07-28 ASSESSMENT — PAIN SCALES - GENERAL
PAINLEVEL_OUTOF10: 7
PAINLEVEL_OUTOF10: 5
PAINLEVEL_OUTOF10: 7
PAINLEVEL_OUTOF10: 4
PAINLEVEL_OUTOF10: 3
PAINLEVEL_OUTOF10: 5

## 2024-07-28 ASSESSMENT — PAIN DESCRIPTION - LOCATION
LOCATION: BUTTOCKS
LOCATION: OTHER (COMMENT)
LOCATION: GENERALIZED
LOCATION: GENERALIZED
LOCATION: CHEST;ARM

## 2024-07-28 NOTE — PROGRESS NOTES
V2.0    [facility] [place], [place] Progress Note      Name:  Isa Hernandez /Age/Sex: 1945  (79 y.o. female)   MRN & CSN:  [id number] & [id number] Encounter Date/Time: 2024 11:53 AM EDT   Location:  [facility] [place][note 3]/0541-01 PCP: Jocelin Ellsworth MD     Attending:Grant Denis MD       Hospital Day: 4    Assessment and Recommendations   79 y.o. female who presented to [facility] with hemoptysis.  PMHx significant for HTN, COPD, lung mass, who was recently hospitalized here for Pneumonia. She was discharged on course of Levaquin, which has been completed. During recent admission, Oncology was consulted for enlarging lung mass and likely post-obstructive Pneumonia, to re-establish care (previously biopsy negative in 2023). However, patient declined further evaluation or Pulmonology consultation at that time. She now reports 1-2 days of worsening hemoptysis, occasionally with large clots. Presented to the ED for evaluation. Last dose of Xarelto was .  Hemoptysis improved after bronchoscopy.        Hemoptysis in setting of recent Pneumonia and enlarging RUL Lung Mass:   During recent admission, Oncology was consulted for enlarging lung mass and likely post-obstructive Pneumonia, to re-establish care (previously biopsy negative in 2023). However, patient declined further evaluation or Pulmonology consultation at that time. Empiric Abx were completed. She now returns with mild to moderate hemoptysis. She is quite anxious about the bleeding and states she suspects its the lung cancer. I reviewed with her the documentation from last hospitalization regarding declining further investigation of the lung mass; she does not recall saying this (somewhat limited historian). However, she now wishes to have Pulmonology consulted and proceed with additional testing for the lung mass. Continue to hold  Xarelto.  -s/p Bronch: bleeding from RLL  s/p epic//cold saline instilled with hemostatis. Cytology pending. . Pulm following.

## 2024-07-28 NOTE — PROGRESS NOTES
Patient: Isa Hernandez MRN: [id number]  Date of  Admission: 7/25/2024   YOB: 1945  Age: 79 y.o.  Sex: female    Unit: [place] [address] MED SURG/ORTHO  Room/Bed: [facility]/0541- Admitting Physician: RAVINDER VELASCO    Attending Physician:  Grant Denis MD         Pulmonary Service Note      SUBJECTIVE:    Pt says that she feels nauseated this morning and breathing is shallow. She is also still coughing up small amount of blood but same frequency as yesterday.         OBJECTIVE    Medications    Continuous Infusions:   sodium chloride Stopped (07/27/24 1243)       Scheduled Meds:   albuterol sulfate HFA  2 puff Inhalation 4x Daily RT    baclofen  5 mg Oral Nightly    montelukast  10 mg Oral Nightly    oxyBUTYnin  5 mg Oral Nightly    pravastatin  40 mg Oral Daily    budesonide-formoterol  2 puff Inhalation BID RT    sodium chloride flush  5-40 mL IntraVENous 2 times per day       PRN Meds:  fluticasone, sodium chloride flush, sodium chloride, ondansetron **OR** ondansetron, polyethylene glycol, acetaminophen **OR** acetaminophen, traMADol    Physical    Patient Vitals for the past 24 hrs:   BP Temp Temp src Pulse Resp SpO2   07/28/24 0826 -- -- -- 78 18 92 %   07/28/24 0825 -- -- -- 78 18 92 %   07/28/24 0715 (!) 94/56 97.4 °F (36.3 °C) Oral 64 16 92 %   07/28/24 0538 -- -- -- -- 16 --   07/27/24 2321 (!) 91/55 97.9 °F (36.6 °C) Oral 83 18 93 %   07/27/24 2127 -- -- -- -- 16 --   07/27/24 2004 103/61 97.5 °F (36.4 °C) Axillary 74 22 93 %   07/27/24 1907 -- -- -- -- -- 96 %   07/27/24 1600 100/60 97.5 °F (36.4 °C) Oral 67 22 92 %   07/27/24 1527 -- -- -- 66 -- 96 %   07/27/24 1215 (!) 94/58 97.8 °F (36.6 °C) Oral 77 22 94 %   07/27/24 1210 (!) 92/46 97.6 °F (36.4 °C) Temporal 71 30 95 %   07/27/24 1205 (!) 101/59 -- -- 71 26 96 %   07/27/24 1200 93/61 -- -- 73 26 95 %   07/27/24 1155 (!) 90/59 -- -- 69 14 93 %   07/27/24 1150 (!) 97/50 97.6 °F (36.4 °C) Temporal 77 17 95 %   07/27/24 1145 (!) 98/50 -- -- 78 15

## 2024-07-28 NOTE — PROGRESS NOTES
07/28/24 1846   RT Protocol   History Pulmonary Disease 2   Respiratory pattern 0   Breath sounds 2   Cough 0   Indications for Bronchodilator Therapy On home bronchodilators   Bronchodilator Assessment Score 4

## 2024-07-28 NOTE — PLAN OF CARE
Problem: Discharge Planning  Goal: Discharge to home or other facility with appropriate resources  7/27/2024 2217 by Linda Carlson RN  Outcome: Progressing   Problem: Pain  Goal: Verbalizes/displays adequate comfort level or baseline comfort level  Outcome: Progressing     Problem: Skin/Tissue Integrity  Goal: Absence of new skin breakdown  Description: 1.  Monitor for areas of redness and/or skin breakdown  2.  Assess vascular access sites hourly  3.  Every 4-6 hours minimum:  Change oxygen saturation probe site  4.  Every 4-6 hours:  If on nasal continuous positive airway pressure, respiratory therapy assess nares and determine need for appliance change or resting period.  Outcome: Progressing     Problem: Safety - Adult  Goal: Free from fall injury  Outcome: Progressing     Problem: ABCDS Injury Assessment  Goal: Absence of physical injury  Outcome: Progressing

## 2024-07-28 NOTE — PROGRESS NOTES
AFTER IV ABX ADMINISTRATION. PT STATED THAT HER IV WAS BOTHERING HER AND TO REMOVE THE IV.. IV FLUSHED W/O DIFFICULTY AND NOTED WITH A FLASH OF BLOOD RETURN. PT CONTINUE TO REQUEST THAT SHE NO LONGER WANT TO THE IV DESPITE EDUCATION AND IMPORTANCEOF IV ABX.

## 2024-07-28 NOTE — PROGRESS NOTES
07/28/24 1510   Encounter Summary   Encounter Overview/Reason Spiritual/Emotional Needs;Grief, Loss, and Adjustments   Service Provided For Patient   Referral/Consult From Nurse   Support System Children   Last Encounter  07/28/24  (CH offered emotional support, empathic listening, Pt requested CH to notify her dtrs that Pt is in the hospital, Pt anxious about medical care after discharge, CH reasured Pt that CM will follow up tomorrow to discuss options)   Complexity of Encounter High   Begin Time 1350   End Time  1430   Total Time Calculated 40 min   Spiritual/Emotional needs   Type Spiritual Support;Emotional Distress   Grief, Loss, and Adjustments   Type Life Adjustments;Adjustment to illness   Assessment/Intervention/Outcome   Assessment Anxious;Concerns with suffering   Intervention Active listening;Explored/Affirmed feelings, thoughts, concerns;Explored Coping Skills/Resources;Nurtured Hope;Sustaining Presence/Ministry of presence   Outcome Encouraged;Expressed feelings, needs, and concerns;New perspective/awareness   Plan and Referrals   Plan/Referrals Continue to visit, (comment)

## 2024-07-28 NOTE — PLAN OF CARE
Problem: Discharge Planning  Goal: Discharge to home or other facility with appropriate resources  7/28/2024 0943 by Sowmya Smith RN  Outcome: Progressing  Flowsheets (Taken 7/28/2024 0939)  Discharge to home or other facility with appropriate resources: Identify barriers to discharge with patient and caregiver       Problem: Pain  Goal: Verbalizes/displays adequate comfort level or baseline comfort level  7/28/2024 0943 by Sowmya Smith RN  Outcome: Progressing  Flowsheets (Taken 7/28/2024 0715)  Verbalizes/displays adequate comfort level or baseline comfort level: Encourage patient to monitor pain and request assistance       Problem: Skin/Tissue Integrity  Goal: Absence of new skin breakdown  Description: 1.  Monitor for areas of redness and/or skin breakdown  2.  Assess vascular access sites hourly  3.  Every 4-6 hours minimum:  Change oxygen saturation probe site  4.  Every 4-6 hours:  If on nasal continuous positive airway pressure, respiratory therapy assess nares and determine need for appliance change or resting period.  7/28/2024 0943 by Sowmya Smith RN  Outcome: Progressing    Problem: Safety - Adult  Goal: Free from fall injury    Outcome: Progressing

## 2024-07-29 ENCOUNTER — APPOINTMENT (OUTPATIENT)
Dept: GENERAL RADIOLOGY | Age: 79
DRG: 181 | End: 2024-07-29
Payer: MEDICARE

## 2024-07-29 PROBLEM — J43.2 CENTRILOBULAR EMPHYSEMA (HCC): Status: ACTIVE | Noted: 2024-07-29

## 2024-07-29 PROBLEM — I25.10 CORONARY ARTERY CALCIFICATION SEEN ON CT SCAN: Status: ACTIVE | Noted: 2024-07-29

## 2024-07-29 PROBLEM — R59.0 MEDIASTINAL ADENOPATHY: Status: ACTIVE | Noted: 2024-07-29

## 2024-07-29 PROBLEM — I27.20 PULMONARY HYPERTENSION (HCC): Status: ACTIVE | Noted: 2024-07-29

## 2024-07-29 LAB
ANION GAP SERPL CALCULATED.3IONS-SCNC: 9 MMOL/L (ref 3–16)
BUN SERPL-MCNC: 3 MG/DL (ref 7–20)
CALCIUM SERPL-MCNC: 8.5 MG/DL (ref 8.3–10.6)
CHLORIDE SERPL-SCNC: 95 MMOL/L (ref 99–110)
CO2 SERPL-SCNC: 27 MMOL/L (ref 21–32)
CREAT SERPL-MCNC: <0.5 MG/DL (ref 0.6–1.2)
GFR SERPLBLD CREATININE-BSD FMLA CKD-EPI: >90 ML/MIN/{1.73_M2}
GLUCOSE SERPL-MCNC: 123 MG/DL (ref 70–99)
MAGNESIUM SERPL-MCNC: 1.8 MG/DL (ref 1.8–2.4)
POTASSIUM SERPL-SCNC: 3.6 MMOL/L (ref 3.5–5.1)
SODIUM SERPL-SCNC: 131 MMOL/L (ref 136–145)

## 2024-07-29 PROCEDURE — 2580000003 HC RX 258: Performed by: INTERNAL MEDICINE

## 2024-07-29 PROCEDURE — 94640 AIRWAY INHALATION TREATMENT: CPT

## 2024-07-29 PROCEDURE — 83735 ASSAY OF MAGNESIUM: CPT

## 2024-07-29 PROCEDURE — 80048 BASIC METABOLIC PNL TOTAL CA: CPT

## 2024-07-29 PROCEDURE — 36415 COLL VENOUS BLD VENIPUNCTURE: CPT

## 2024-07-29 PROCEDURE — 6370000000 HC RX 637 (ALT 250 FOR IP): Performed by: INTERNAL MEDICINE

## 2024-07-29 PROCEDURE — 2700000000 HC OXYGEN THERAPY PER DAY

## 2024-07-29 PROCEDURE — 6370000000 HC RX 637 (ALT 250 FOR IP): Performed by: NURSE PRACTITIONER

## 2024-07-29 PROCEDURE — 6360000002 HC RX W HCPCS: Performed by: INTERNAL MEDICINE

## 2024-07-29 PROCEDURE — 94761 N-INVAS EAR/PLS OXIMETRY MLT: CPT

## 2024-07-29 PROCEDURE — 71045 X-RAY EXAM CHEST 1 VIEW: CPT

## 2024-07-29 PROCEDURE — 1200000000 HC SEMI PRIVATE

## 2024-07-29 PROCEDURE — 99232 SBSQ HOSP IP/OBS MODERATE 35: CPT | Performed by: INTERNAL MEDICINE

## 2024-07-29 RX ORDER — ALBUTEROL SULFATE 90 UG/1
2 AEROSOL, METERED RESPIRATORY (INHALATION) EVERY 6 HOURS PRN
Status: DISCONTINUED | OUTPATIENT
Start: 2024-07-29 | End: 2024-07-29

## 2024-07-29 RX ORDER — ALBUTEROL SULFATE 90 UG/1
2 AEROSOL, METERED RESPIRATORY (INHALATION) EVERY 6 HOURS PRN
Status: DISCONTINUED | OUTPATIENT
Start: 2024-07-29 | End: 2024-08-05 | Stop reason: HOSPADM

## 2024-07-29 RX ORDER — FUROSEMIDE 10 MG/ML
40 INJECTION INTRAMUSCULAR; INTRAVENOUS ONCE
Status: COMPLETED | OUTPATIENT
Start: 2024-07-30 | End: 2024-07-30

## 2024-07-29 RX ORDER — QUETIAPINE FUMARATE 50 MG/1
50 TABLET, EXTENDED RELEASE ORAL ONCE
Status: COMPLETED | OUTPATIENT
Start: 2024-07-29 | End: 2024-07-29

## 2024-07-29 RX ORDER — 0.9 % SODIUM CHLORIDE 0.9 %
250 INTRAVENOUS SOLUTION INTRAVENOUS ONCE
Status: COMPLETED | OUTPATIENT
Start: 2024-07-29 | End: 2024-07-29

## 2024-07-29 RX ADMIN — Medication: at 16:49

## 2024-07-29 RX ADMIN — OXYBUTYNIN CHLORIDE 5 MG: 5 TABLET, EXTENDED RELEASE ORAL at 19:57

## 2024-07-29 RX ADMIN — SODIUM CHLORIDE, PRESERVATIVE FREE 10 ML: 5 INJECTION INTRAVENOUS at 08:00

## 2024-07-29 RX ADMIN — Medication 2 PUFF: at 19:22

## 2024-07-29 RX ADMIN — PRAVASTATIN SODIUM 40 MG: 40 TABLET ORAL at 08:00

## 2024-07-29 RX ADMIN — WATER 40 MG: 1 INJECTION INTRAMUSCULAR; INTRAVENOUS; SUBCUTANEOUS at 19:58

## 2024-07-29 RX ADMIN — SODIUM CHLORIDE 250 ML: 9 INJECTION, SOLUTION INTRAVENOUS at 15:37

## 2024-07-29 RX ADMIN — QUETIAPINE FUMARATE 50 MG: 50 TABLET, EXTENDED RELEASE ORAL at 23:40

## 2024-07-29 RX ADMIN — BACLOFEN 5 MG: 10 TABLET ORAL at 19:57

## 2024-07-29 RX ADMIN — Medication 2 PUFF: at 07:37

## 2024-07-29 RX ADMIN — Medication 2 PUFF: at 19:21

## 2024-07-29 RX ADMIN — TRAMADOL HYDROCHLORIDE 50 MG: 50 TABLET ORAL at 06:53

## 2024-07-29 RX ADMIN — Medication 2 PUFF: at 11:27

## 2024-07-29 RX ADMIN — SODIUM CHLORIDE, PRESERVATIVE FREE 10 ML: 5 INJECTION INTRAVENOUS at 19:58

## 2024-07-29 RX ADMIN — Medication 2 PUFF: at 16:09

## 2024-07-29 RX ADMIN — MONTELUKAST 10 MG: 10 TABLET, FILM COATED ORAL at 19:57

## 2024-07-29 RX ADMIN — TRAMADOL HYDROCHLORIDE 50 MG: 50 TABLET ORAL at 14:12

## 2024-07-29 ASSESSMENT — PAIN SCALES - GENERAL
PAINLEVEL_OUTOF10: 8
PAINLEVEL_OUTOF10: 7
PAINLEVEL_OUTOF10: 8
PAINLEVEL_OUTOF10: 0
PAINLEVEL_OUTOF10: 0

## 2024-07-29 ASSESSMENT — PAIN DESCRIPTION - ORIENTATION
ORIENTATION: MID

## 2024-07-29 ASSESSMENT — PAIN DESCRIPTION - LOCATION
LOCATION: BUTTOCKS
LOCATION: BACK
LOCATION: BACK

## 2024-07-29 ASSESSMENT — PAIN DESCRIPTION - DESCRIPTORS
DESCRIPTORS: ACHING

## 2024-07-29 ASSESSMENT — PAIN SCALES - WONG BAKER
WONGBAKER_NUMERICALRESPONSE: NO HURT
WONGBAKER_NUMERICALRESPONSE: NO HURT

## 2024-07-29 ASSESSMENT — PAIN - FUNCTIONAL ASSESSMENT: PAIN_FUNCTIONAL_ASSESSMENT: ACTIVITIES ARE NOT PREVENTED

## 2024-07-29 ASSESSMENT — PAIN DESCRIPTION - PAIN TYPE
TYPE: ACUTE PAIN
TYPE: ACUTE PAIN

## 2024-07-29 NOTE — PLAN OF CARE
Problem: Discharge Planning  Goal: Discharge to home or other facility with appropriate resources  7/29/2024 1140 by Jamia Gandhi RN  Outcome: Progressing  7/28/2024 2248 by Bonnie Rowley RN  Outcome: Progressing  Flowsheets (Taken 7/28/2024 2026)  Discharge to home or other facility with appropriate resources:   Identify barriers to discharge with patient and caregiver   Arrange for needed discharge resources and transportation as appropriate   Identify discharge learning needs (meds, wound care, etc)   Refer to discharge planning if patient needs post-hospital services based on physician order or complex needs related to functional status, cognitive ability or social support system     Problem: Pain  Goal: Verbalizes/displays adequate comfort level or baseline comfort level  7/29/2024 1140 by Jamia Gandhi RN  Outcome: Progressing  7/28/2024 2248 by Bonnie Rowley RN  Outcome: Progressing  Flowsheets (Taken 7/28/2024 2025)  Verbalizes/displays adequate comfort level or baseline comfort level:   Encourage patient to monitor pain and request assistance   Assess pain using appropriate pain scale   Administer analgesics based on type and severity of pain and evaluate response   Implement non-pharmacological measures as appropriate and evaluate response   Consider cultural and social influences on pain and pain management   Notify Licensed Independent Practitioner if interventions unsuccessful or patient reports new pain     Problem: Skin/Tissue Integrity  Goal: Absence of new skin breakdown  Description: 1.  Monitor for areas of redness and/or skin breakdown  2.  Assess vascular access sites hourly  3.  Every 4-6 hours minimum:  Change oxygen saturation probe site  4.  Every 4-6 hours:  If on nasal continuous positive airway pressure, respiratory therapy assess nares and determine need for appliance change or resting period.  7/29/2024 1140 by Jamia Gandhi RN  Outcome: Progressing  7/28/2024 2248 by Amrik  TRAVIS Nicole  Outcome: Progressing     Problem: Safety - Adult  Goal: Free from fall injury  7/29/2024 1140 by Jamia Gandhi RN  Outcome: Progressing  7/28/2024 2248 by Bonnie Rowley RN  Outcome: Progressing  Flowsheets (Taken 7/28/2024 2248)  Free From Fall Injury:   Instruct family/caregiver on patient safety   Based on caregiver fall risk screen, instruct family/caregiver to ask for assistance with transferring infant if caregiver noted to have fall risk factors     Problem: ABCDS Injury Assessment  Goal: Absence of physical injury  7/29/2024 1140 by Jamia Gandhi RN  Outcome: Progressing  7/28/2024 2248 by Bonnie Rowley RN  Outcome: Progressing  Flowsheets (Taken 7/28/2024 2248)  Absence of Physical Injury: Implement safety measures based on patient assessment

## 2024-07-29 NOTE — PROGRESS NOTES
INPATIENT PULMONARY CRITICAL CARE PROGRESS NOTE      Reason for visit    Hemoptysis    SUBJECTIVE: Patient underwent bronchoscopy with EBUS over the weekend, patient when seen this morning was not feeling well, patient was still having some cough with congestion along with shortness of breath, patient does not have any chest pain, patient was afebrile and had borderline blood pressure when seen, patient was on 2-1/2 L of nasal oxygen with sats 95% when seen, patient was afebrile and patient has sinus rhythm on the monitor ranging from sinus bradycardia to normal sinus rhythm, patient's urine output has not been recorded in the epic for review, patient glycemic control was acceptable, no other pertinent review of system of concern         Physical Exam:  Blood pressure 125/69, pulse 86, temperature 97.7 °F (36.5 °C), temperature source Oral, resp. rate 24, height 1.651 m (5' 5\"), weight 56.9 kg (125 lb 7.1 oz), SpO2 92 %, not currently breastfeeding.'   Constitutional:  No acute distress.   HENT:  Oropharynx is clear and moist. No thyromegaly.  Eyes:  Conjunctivae are normal. Pupils equal, round, and reactive to light. No scleral icterus.   Neck: . No tracheal deviation present. No obvious thyroid mass.   Cardiovascular: Sinus bradycardia, normal heart sounds.  No right ventricular heave. No lower extremity edema.  Pulmonary/Chest: No wheezes.  Scattered rales.  Chest wall is not dull to percussion.  No accessory muscle usage or stridor.  Decreased breath sound intensity with prolonged expiration  Abdominal: Soft. Bowel sounds present. No distension or hernia. No tenderness.    Musculoskeletal: No cyanosis. No clubbing. No obvious joint deformity.   Lymphadenopathy: No cervical or supraclavicular adenopathy.   Skin: Skin is warm and dry. No rash or nodules on the exposed extremities.  Psychiatric: Normal mood and affect. Behavior is normal.  No anxiety.   Neurologic: Alert, awake and oriented. PERRL.  Speech  with no significant stenosis. Mild tricuspid valve regurgitation. Estimated RVSP of 45 mmHg, suggesting mild pulmonary HTN. No pericardial effusion.      CT CHEST WO CONTRAST    Result Date: 7/19/2024  EXAMINATION: CT OF THE CHEST WITHOUT CONTRAST 7/19/2024 3:05 am TECHNIQUE: CT of the chest was performed without the administration of intravenous contrast. Multiplanar reformatted images are provided for review. Automated exposure control, iterative reconstruction, and/or weight based adjustment of the mA/kV was utilized to reduce the radiation dose to as low as reasonably achievable. COMPARISON: 02/27/2024 HISTORY: ORDERING SYSTEM PROVIDED HISTORY: [facility] PNA TECHNOLOGIST PROVIDED HISTORY: Reason for exam:->RU PNA Decision Support Exception - unselect if not a suspected or confirmed emergency medical condition->Emergency Medical Condition (MA) Reason for Exam: RUL PNA FINDINGS: Mediastinum: Noncontrast evaluation mediastinum demonstrates normal caliber thoracic aorta with diffuse atherosclerotic calcification also involving coronary arteries.  Normal heart size.  Trace pericardial effusion. Extensive mediastinal lymphadenopathy and right hilar mass not well characterized due to lack of IV contrast.  No definite left hilar lymphadenopathy.  No axillary lymphadenopathy.  Pacing device left chest. Lungs/pleura: Ill-defined right hilar mass with postobstructive atelectasis/pneumonitis of the right upper lobe.  Small right pleural effusion.  Emphysema.  No pneumothorax. Upper Abdomen: Noncontrast evaluation upper abdomen without acute abnormality. Soft Tissues/Bones: Chronic appearing fracture involving the T3 vertebral body.  Chronic compression renal vein L1.     Exam limited due lack of IV contrast. Ill-defined right hilar mass with postobstructive atelectasis/pneumonitis of the right upper lobe.  Small right pleural effusion. Extensive associated mediastinal lymphadenopathy. Overall findings compatible with primary

## 2024-07-29 NOTE — PROGRESS NOTES
Upon checking pt O2 pt at 85% on 2 L O2. RN instructed to cough up sputum and spit it up. Then take deep breathes pt now on 2.5 L O2 at 93 %.

## 2024-07-29 NOTE — PLAN OF CARE
Problem: Discharge Planning  Goal: Discharge to home or other facility with appropriate resources  7/28/2024 2248 by Bonnie Rowley, RN  Outcome: Progressing  Flowsheets (Taken 7/28/2024 2026)  Discharge to home or other facility with appropriate resources:   Identify barriers to discharge with patient and caregiver   Arrange for needed discharge resources and transportation as appropriate   Identify discharge learning needs (meds, wound care, etc)   Refer to discharge planning if patient needs post-hospital services based on physician order or complex needs related to functional status, cognitive ability or social support system     Problem: Pain  Goal: Verbalizes/displays adequate comfort level or baseline comfort level  7/28/2024 2248 by Bonnie Rowley, RN  Outcome: Progressing  Flowsheets (Taken 7/28/2024 2025)  Verbalizes/displays adequate comfort level or baseline comfort level:   Encourage patient to monitor pain and request assistance   Assess pain using appropriate pain scale   Administer analgesics based on type and severity of pain and evaluate response   Implement non-pharmacological measures as appropriate and evaluate response   Consider cultural and social influences on pain and pain management   Notify Licensed Independent Practitioner if interventions unsuccessful or patient reports new pain     Problem: Skin/Tissue Integrity  Goal: Absence of new skin breakdown  Description: 1.  Monitor for areas of redness and/or skin breakdown  2.  Assess vascular access sites hourly  3.  Every 4-6 hours minimum:  Change oxygen saturation probe site  4.  Every 4-6 hours:  If on nasal continuous positive airway pressure, respiratory therapy assess nares and determine need for appliance change or resting period.  7/28/2024 2248 by Bonnie Rowley, RN  Outcome: Progressing     Problem: Safety - Adult  Goal: Free from fall injury  Outcome: Progressing  Flowsheets (Taken 7/28/2024 2248)  Free From Fall Injury:   Instruct

## 2024-07-29 NOTE — PROGRESS NOTES
Hospital Medicine Progress Note      Date of Admission: 7/25/2024  Hospital Day: 5    Chief Admission Complaint:  hemoptysis     Subjective:  resting in bed, on 2L(room air at home), made aware of pending bx results    Presenting Admission History:         79 y.o. female who presented to [facility] with hemoptysis.  PMHx significant for HTN, COPD, lung mass, who was recently hospitalized here for Pneumonia. She was discharged on course of Levaquin, which has been completed. During recent admission, Oncology was consulted for enlarging lung mass and likely post-obstructive Pneumonia, to re-establish care (previously biopsy negative in 4/2023). However, patient declined further evaluation or Pulmonology consultation at that time.      She now reports 1-2 days of worsening hemoptysis, occasionally with large clots. Presented to the ED for evaluation. Last dose of Xarelto was 7/24. Hemoptysis seemed to be slowing since arrival in ED. Minimal SOB/hypoxia. CXR stable.     Assessment/Plan:      Current Principal Problem:  Hemoptysis      Hemoptysis in setting of recent Pneumonia and enlarging RUL Lung Mass:   During recent admission, Oncology was consulted for enlarging lung mass and likely post-obstructive Pneumonia, to re-establish care (previously biopsy negative in 4/2023). However, patient declined further evaluation or Pulmonology consultation at that time. Empiric Abx were completed. She now returns with mild to moderate hemoptysis. She is quite anxious about the bleeding and states she suspects its the lung cancer. I reviewed with her the documentation from last hospitalization regarding declining further investigation of the lung mass; she does not recall saying this (somewhat limited historian). However, she now wishes to have Pulmonology consulted and proceed with additional testing for the lung mass. Continue to hold  Xarelto.  -s/p Bronch: bleeding from RLL  s/p epic//cold saline instilled with

## 2024-07-29 NOTE — CARE COORDINATION
Writer reviewed chart, patient had Bronch and biopsy, waiting biopsy results. Patient currently on 2 L O2. IPTA from apartment alone, and plans to return.     Elena Pearl RN

## 2024-07-30 PROBLEM — C34.91 ADENOCARCINOMA OF RIGHT LUNG (HCC): Status: ACTIVE | Noted: 2024-07-30

## 2024-07-30 LAB
BASOPHILS # BLD: 0 K/UL (ref 0–0.2)
BASOPHILS NFR BLD: 0.2 %
DEPRECATED RDW RBC AUTO: 14.2 % (ref 12.4–15.4)
EOSINOPHIL # BLD: 0 K/UL (ref 0–0.6)
EOSINOPHIL NFR BLD: 0 %
GLUCOSE BLD-MCNC: 166 MG/DL (ref 70–99)
HCT VFR BLD AUTO: 34.7 % (ref 36–48)
HGB BLD-MCNC: 11.2 G/DL (ref 12–16)
LYMPHOCYTES # BLD: 0.4 K/UL (ref 1–5.1)
LYMPHOCYTES NFR BLD: 2.6 %
MCH RBC QN AUTO: 27.8 PG (ref 26–34)
MCHC RBC AUTO-ENTMCNC: 32.3 G/DL (ref 31–36)
MCV RBC AUTO: 86.3 FL (ref 80–100)
MONOCYTES # BLD: 0.4 K/UL (ref 0–1.3)
MONOCYTES NFR BLD: 2.4 %
NEUTROPHILS # BLD: 15.9 K/UL (ref 1.7–7.7)
NEUTROPHILS NFR BLD: 94.8 %
PERFORMED ON: ABNORMAL
PLATELET # BLD AUTO: 348 K/UL (ref 135–450)
PMV BLD AUTO: 9.7 FL (ref 5–10.5)
RBC # BLD AUTO: 4.02 M/UL (ref 4–5.2)
WBC # BLD AUTO: 16.8 K/UL (ref 4–11)

## 2024-07-30 PROCEDURE — 85025 COMPLETE CBC W/AUTO DIFF WBC: CPT

## 2024-07-30 PROCEDURE — 94761 N-INVAS EAR/PLS OXIMETRY MLT: CPT

## 2024-07-30 PROCEDURE — 2580000003 HC RX 258: Performed by: INTERNAL MEDICINE

## 2024-07-30 PROCEDURE — 94640 AIRWAY INHALATION TREATMENT: CPT

## 2024-07-30 PROCEDURE — 2700000000 HC OXYGEN THERAPY PER DAY

## 2024-07-30 PROCEDURE — 99232 SBSQ HOSP IP/OBS MODERATE 35: CPT | Performed by: INTERNAL MEDICINE

## 2024-07-30 PROCEDURE — 6370000000 HC RX 637 (ALT 250 FOR IP): Performed by: NURSE PRACTITIONER

## 2024-07-30 PROCEDURE — 1200000000 HC SEMI PRIVATE

## 2024-07-30 PROCEDURE — 36415 COLL VENOUS BLD VENIPUNCTURE: CPT

## 2024-07-30 PROCEDURE — 6360000002 HC RX W HCPCS: Performed by: INTERNAL MEDICINE

## 2024-07-30 PROCEDURE — 6370000000 HC RX 637 (ALT 250 FOR IP): Performed by: INTERNAL MEDICINE

## 2024-07-30 RX ORDER — TRAMADOL HYDROCHLORIDE 50 MG/1
50 TABLET ORAL EVERY 6 HOURS PRN
Status: DISCONTINUED | OUTPATIENT
Start: 2024-07-30 | End: 2024-08-05 | Stop reason: HOSPADM

## 2024-07-30 RX ORDER — QUETIAPINE FUMARATE 50 MG/1
50 TABLET, EXTENDED RELEASE ORAL ONCE
Status: COMPLETED | OUTPATIENT
Start: 2024-07-30 | End: 2024-07-30

## 2024-07-30 RX ORDER — FUROSEMIDE 10 MG/ML
40 INJECTION INTRAMUSCULAR; INTRAVENOUS ONCE
Status: COMPLETED | OUTPATIENT
Start: 2024-07-30 | End: 2024-07-30

## 2024-07-30 RX ORDER — TRAMADOL HYDROCHLORIDE 50 MG/1
100 TABLET ORAL EVERY 6 HOURS PRN
Status: DISCONTINUED | OUTPATIENT
Start: 2024-07-30 | End: 2024-08-05 | Stop reason: HOSPADM

## 2024-07-30 RX ADMIN — Medication 2 PUFF: at 20:33

## 2024-07-30 RX ADMIN — WATER 40 MG: 1 INJECTION INTRAMUSCULAR; INTRAVENOUS; SUBCUTANEOUS at 08:43

## 2024-07-30 RX ADMIN — MONTELUKAST 10 MG: 10 TABLET, FILM COATED ORAL at 21:41

## 2024-07-30 RX ADMIN — Medication 2 PUFF: at 20:34

## 2024-07-30 RX ADMIN — TRAMADOL HYDROCHLORIDE 50 MG: 50 TABLET ORAL at 08:43

## 2024-07-30 RX ADMIN — Medication: at 14:54

## 2024-07-30 RX ADMIN — FUROSEMIDE 40 MG: 10 INJECTION, SOLUTION INTRAMUSCULAR; INTRAVENOUS at 06:09

## 2024-07-30 RX ADMIN — SODIUM CHLORIDE, PRESERVATIVE FREE 10 ML: 5 INJECTION INTRAVENOUS at 08:43

## 2024-07-30 RX ADMIN — ACETAMINOPHEN 650 MG: 325 TABLET ORAL at 00:44

## 2024-07-30 RX ADMIN — SODIUM CHLORIDE, PRESERVATIVE FREE 10 ML: 5 INJECTION INTRAVENOUS at 21:44

## 2024-07-30 RX ADMIN — FUROSEMIDE 40 MG: 10 INJECTION, SOLUTION INTRAMUSCULAR; INTRAVENOUS at 14:47

## 2024-07-30 RX ADMIN — QUETIAPINE FUMARATE 50 MG: 50 TABLET, EXTENDED RELEASE ORAL at 21:35

## 2024-07-30 RX ADMIN — BACLOFEN 5 MG: 10 TABLET ORAL at 21:42

## 2024-07-30 RX ADMIN — TRAMADOL HYDROCHLORIDE 50 MG: 50 TABLET ORAL at 23:52

## 2024-07-30 RX ADMIN — Medication 2 PUFF: at 07:39

## 2024-07-30 RX ADMIN — Medication 2 PUFF: at 01:16

## 2024-07-30 RX ADMIN — PRAVASTATIN SODIUM 40 MG: 40 TABLET ORAL at 08:43

## 2024-07-30 RX ADMIN — TRAMADOL HYDROCHLORIDE 50 MG: 50 TABLET ORAL at 21:03

## 2024-07-30 RX ADMIN — OXYBUTYNIN CHLORIDE 5 MG: 5 TABLET, EXTENDED RELEASE ORAL at 21:41

## 2024-07-30 ASSESSMENT — PAIN DESCRIPTION - PAIN TYPE: TYPE: ACUTE PAIN

## 2024-07-30 ASSESSMENT — PAIN DESCRIPTION - ORIENTATION
ORIENTATION: MID
ORIENTATION: LEFT
ORIENTATION: MID
ORIENTATION: LEFT
ORIENTATION: LEFT

## 2024-07-30 ASSESSMENT — PAIN DESCRIPTION - DESCRIPTORS
DESCRIPTORS: STABBING
DESCRIPTORS: ACHING
DESCRIPTORS: ACHING
DESCRIPTORS: THROBBING

## 2024-07-30 ASSESSMENT — PAIN DESCRIPTION - LOCATION
LOCATION: CHEST
LOCATION: BACK
LOCATION: ABDOMEN
LOCATION: CHEST
LOCATION: ARM

## 2024-07-30 ASSESSMENT — PAIN SCALES - GENERAL
PAINLEVEL_OUTOF10: 7
PAINLEVEL_OUTOF10: 6
PAINLEVEL_OUTOF10: 8
PAINLEVEL_OUTOF10: 10
PAINLEVEL_OUTOF10: 8
PAINLEVEL_OUTOF10: 8

## 2024-07-30 NOTE — PLAN OF CARE
Problem: Discharge Planning  Goal: Discharge to home or other facility with appropriate resources  7/30/2024 1025 by Jamia Gandhi RN  Outcome: Progressing  7/29/2024 2153 by Dm Sahni RN  Outcome: Progressing     Problem: Pain  Goal: Verbalizes/displays adequate comfort level or baseline comfort level  7/30/2024 1025 by Jamia Gandhi RN  Outcome: Progressing  7/29/2024 2153 by Dm Sahni RN  Outcome: Progressing     Problem: Skin/Tissue Integrity  Goal: Absence of new skin breakdown  Description: 1.  Monitor for areas of redness and/or skin breakdown  2.  Assess vascular access sites hourly  3.  Every 4-6 hours minimum:  Change oxygen saturation probe site  4.  Every 4-6 hours:  If on nasal continuous positive airway pressure, respiratory therapy assess nares and determine need for appliance change or resting period.  7/30/2024 1025 by Jamia Gandhi RN  Outcome: Progressing  7/29/2024 2153 by Dm Sahni RN  Outcome: Progressing     Problem: Safety - Adult  Goal: Free from fall injury  7/30/2024 1025 by Jamia Gandhi RN  Outcome: Progressing  7/29/2024 2153 by Dm Sahni RN  Outcome: Progressing     Problem: ABCDS Injury Assessment  Goal: Absence of physical injury  7/30/2024 1025 by Jamia Gandhi RN  Outcome: Progressing  7/29/2024 2153 by Dm Sahni RN  Outcome: Progressing

## 2024-07-30 NOTE — PROGRESS NOTES
Hospital Medicine Progress Note      Date of Admission: 7/25/2024  Hospital Day: 6      Chief Admission Complaint:  hemoptysis     Subjective:  resting in bed, on 4L(room air at home) which is up from 2L yesterday, notes inc'd malaise today, made aware of pending bx results, seroquel x 1 given overnight     Presenting Admission History:          79 y.o. female who presented to [facility] with hemoptysis.  PMHx significant for HTN, COPD, lung mass, who was recently hospitalized here for Pneumonia. She was discharged on course of Levaquin, which has been completed. During recent admission, Oncology was consulted for enlarging lung mass and likely post-obstructive Pneumonia, to re-establish care (previously biopsy negative in 4/2023). However, patient declined further evaluation or Pulmonology consultation at that time.      She now reports 1-2 days of worsening hemoptysis, occasionally with large clots. Presented to the ED for evaluation. Last dose of Xarelto was 7/24. Hemoptysis seemed to be slowing since arrival in ED. Minimal SOB/hypoxia. CXR stable.      Assessment/Plan:       Current Principal Problem:  Hemoptysis       Hemoptysis in setting of recent Pneumonia and enlarging RUL Lung Mass:   During recent admission, Oncology was consulted for enlarging lung mass and likely post-obstructive Pneumonia, to re-establish care (previously biopsy negative in 4/2023). However, patient declined further evaluation or Pulmonology consultation at that time. Empiric Abx were completed. She now returns with mild to moderate hemoptysis. She is quite anxious about the bleeding and states she suspects its the lung cancer. I reviewed with her the documentation from last hospitalization regarding declining further investigation of the lung mass; she does not recall saying this (somewhat limited historian). However, she now wishes to have Pulmonology consulted and proceed with additional testing for the lung mass. Continue to

## 2024-07-30 NOTE — PROGRESS NOTES
Comprehensive Nutrition Assessment    Type and Reason for Visit:  Reassess    Nutrition Recommendations/Plan:   Continue regular diet and encourage PO intake   Continue ensure w/ meals, likes all flavors   RD to order new updated weight  Monitor nutrition adequacy, pertinent labs, bowel habits, wt changes, and clinical progress     Malnutrition Assessment:  Malnutrition Status:  At risk for malnutrition (Comment) (07/30/24 1301)    Context:  Acute Illness     Findings of the 6 clinical characteristics of malnutrition:  Energy Intake:  Mild decrease in energy intake (Comment)  Weight Loss:  Unable to assess (possible wt loss once wt confirmed)     Muscle Mass Loss:  Mild muscle mass loss Clavicles (pectoralis & deltoids), Temples (temporalis)    Nutrition Assessment:    Follow up: Pt s/p Bronch, cxr 7/29 w/ mod RUL opacity. Pt continues on regular diet. PO intakes 51-76%, 0% of meals in EMR. Pt eating chicken tenders at time of visit. Reports improvement in PO intakes, eating around 50% of meals. Reports good acceptance of ensure when she is able to open them, assisted w/ opening ensure for pt. Encouraged pt to ask for help to open ensure when needed. Reports some constipation but trying to eat more fiber to improve. Continue to encourage PO intake, will continue to monitor.    Nutrition Related Findings:    No updated labs. + BM 7/28. Wound Type: None       Current Nutrition Intake & Therapies:    Average Meal Intake: 51-75%  Average Supplements Intake: None Ordered  ADULT DIET; Regular  ADULT ORAL NUTRITION SUPPLEMENT; Breakfast, Lunch, Dinner; Standard High Calorie/High Protein Oral Supplement    Anthropometric Measures:  Height: 165.1 cm (5' 5\")  Ideal Body Weight (IBW): 125 lbs (57 kg)       Current Body Weight: 56.7 kg (125 lb), 100 % IBW. Weight Source: Stated  Current BMI (kg/m2): 20.8        Weight Adjustment For: No Adjustment                 BMI Categories: Normal Weight (BMI 18.5-24.9)    Estimated Daily

## 2024-07-30 NOTE — PLAN OF CARE
Problem: Discharge Planning  Goal: Discharge to home or other facility with appropriate resources  7/29/2024 2153 by Dm Sahni RN  Outcome: Progressing  7/29/2024 1140 by Jamia Gandhi RN  Outcome: Progressing     Problem: Pain  Goal: Verbalizes/displays adequate comfort level or baseline comfort level  7/29/2024 2153 by Dm Sahni RN  Outcome: Progressing  7/29/2024 1140 by Jamia Gandhi RN  Outcome: Progressing     Problem: Skin/Tissue Integrity  Goal: Absence of new skin breakdown  Description: 1.  Monitor for areas of redness and/or skin breakdown  2.  Assess vascular access sites hourly  3.  Every 4-6 hours minimum:  Change oxygen saturation probe site  4.  Every 4-6 hours:  If on nasal continuous positive airway pressure, respiratory therapy assess nares and determine need for appliance change or resting period.  7/29/2024 2153 by Dm Sahni RN  Outcome: Progressing  7/29/2024 1140 by Jamia Gandhi RN  Outcome: Progressing     Problem: Safety - Adult  Goal: Free from fall injury  7/29/2024 2153 by Dm Sahni RN  Outcome: Progressing  7/29/2024 1140 by Jamia Gandhi RN  Outcome: Progressing     Problem: ABCDS Injury Assessment  Goal: Absence of physical injury  7/29/2024 2153 by Dm Sahni RN  Outcome: Progressing  7/29/2024 1140 by Jamia Gandhi RN  Outcome: Progressing

## 2024-07-30 NOTE — PROGRESS NOTES
07/30/24 1151   Encounter Summary   Encounter Overview/Reason Spiritual/Emotional Needs;Loneliness/Social Isolation   Service Provided For Patient   Referral/Consult From Nurse   Support System Children   Last Encounter  07/30/24  ( visited and prayed with pt)   Complexity of Encounter High   Begin Time 1120   End Time  1145   Total Time Calculated 25 min   Spiritual/Emotional needs   Type Emotional Distress;Difficult news received;Spiritual Support   Assessment/Intervention/Outcome   Assessment Complicated grieving;Interrupted family processes;Loneliness;Powerlessness;Sad;Tearful   Intervention Active listening;Discussed illness injury and it’s impact;Explored/Affirmed feelings, thoughts, concerns;Prayer (assurance of)/[place]   Outcome Coping;Expressed feelings, needs, and concerns

## 2024-07-30 NOTE — PROGRESS NOTES
1111H - patient complaining that she is allergic with the tele stickers, even with the small or non latext stickers she's complaining, asked Yari Ward NP if we can D/C the tele monitoring instead, she said it's okay to D/C it. Also, informed her that patient has been complaining of SOB, patient has been restless and anxious. Orders received for X-ray and medicine (Seroquel).

## 2024-07-30 NOTE — PROGRESS NOTES
Dr. Maldonado at bedside with RN to give pt pathology results. MD attempted to call JERRY Francis per pt with no answer.    [facility]- Magi called RN back and was directed to call back Dr. Maldonado to obtain results.

## 2024-07-30 NOTE — CONSULTS
PALLIATIVE MEDICINE CONSULTATION     Patient name:Isa Hernandez   MRN:[id number]    :1945  Room/Bed:0541/0541-01   LOS: 5 days         Date of consult:2024    Inpatient consult to Palliative Care  Consult performed by: Penny Arnett APRN - CNP  Consult ordered by: Herbie Maldonado MD              ASSESSMENT/RECOMMENDATIONS     79 y.o. female with lung mass concerning for malignancy.          Goals of Care- Undetermined. She admits to being completely overwhelmed about making decisions around GOC.   Patient wants her son Barney with her when given path results if possible. She believes she would want to try to get cancer treatment.  Patient states she would do home PT/OT, but would not go to SNF.  She hopes to stay in her current home until her death.  Full code at this time. Will continue to follow for supportive discussions when path results back and specialist recommendations known.  also consulted.       Patient/Family Goals of Care :    24    Spoke with patient at the bedside.  Patient is alert and oriented and appears decisional.  No family present at bedside.  Explained role of Palliative Care.  Patient agreeable to discussion with me today.     Spent time getting to know patient.  She lives alone in a seniors apartment and has lived there for 10 years.  She hopes to spend the rest of her life there.  She has 4 kids, but feels sad that many have not been involved with her care.  Since she does not drive, her son Barney helps her with most things.    We discussed surrogate decision makers.  She does not have any advanced directives.  Explained the [place] law in [place] that would require her collective kids to work together on decision making absent an appointed proxy.  Encouraged her to consider naming a proxy.  She is concerned about friction that might cause in the family.  After Barney, she would trust Patrick with decision making.  She would also trust her neighbor,  comfort and being at home, or would she want to focus on getting to medical treatments in attempt to gain as many days of life as possible?  She says she just does not know.  She feels like its a very hard decision.     Asked if I could call Barney just to let him know about our discussion today and ensure that she had as much support as possible at this time.  She was in agreement with this.  She asked me not to call Penny or Babs.    Attempted to call Barney several times this morning but call will never ring through.  Patrick's number is out of service.  Patient does not expect either son to be here today.  Will attempt to find phone number for her friend Diana     Total ACP discussion time: 65 minutes    Disposition/Discharge Plan:   - pending path results and oncology recommendations  - dimitris consult for support  - will follow for further supportive discussion on determining her GOC and code status       Advance Directives:    None    The patient has appointed the following active healthcare agents:    Primary Decision Maker: MaryBarney - Child - [phone]    Primary Decision Maker (Active): Penny gautam - Child - [phone]    The Patient has the following current code status:    Code Status: Full Code      Interactive exchange regarding medications,tests and procedures with: patient, floor RN, case management, hospitalist,      Thank you for allowing us to participate in the care of this patient.      HISTORY     CC: hemoptysis  HPI: The patient is a 79 y.o. female with PMHx significant for HTN, COPD, lung mass, who was recently hospitalized here for Pneumonia. She was discharged on course of Levaquin, which has been completed. During recent admission, Oncology was consulted for enlarging lung mass and likely post-obstructive Pneumonia, to re-establish care (previously biopsy negative in 4/2023). However, patient declined further evaluation or Pulmonology consultation at that time.      She now reports

## 2024-07-30 NOTE — CARE COORDINATION
Writer reviewed chart and spoke with RN,and MD palliative was consulted, patient is getting IV steroids, and lasix. From apt. Alone, patient is on 4L O2 currently.     Elena Pearl RN

## 2024-07-30 NOTE — PROGRESS NOTES
INPATIENT PULMONARY CRITICAL CARE PROGRESS NOTE      Reason for visit    Hemoptysis    SUBJECTIVE: Patient seen this morning was sleeping in the bed without any increased work of breathing, no apparent congestion, patient oxygen requirements have gone up overnight, patient was on 5 L of nasal oxygen saturating 90% when seen this morning, patient was afebrile and hemodynamically, patient blood sugars are slightly on the high side, patient urine output has not been recorded in the epic for review, no other pertinent review of system of concern       Physical Exam:  Blood pressure (!) 122/57, pulse 89, temperature 97.5 °F (36.4 °C), resp. rate 20, height 1.651 m (5' 5\"), weight 56.9 kg (125 lb 7.1 oz), SpO2 98 %, not currently breastfeeding.'   Constitutional:  No acute distress.   HENT:  Oropharynx is clear and moist. No thyromegaly.  Eyes:  Conjunctivae are normal. Pupils equal, round, and reactive to light. No scleral icterus.   Neck: . No tracheal deviation present. No obvious thyroid mass.   Cardiovascular: Sinus bradycardia, normal heart sounds.  No right ventricular heave. No lower extremity edema.  Pulmonary/Chest: No wheezes.  Scattered rales.  Chest wall is not dull to percussion.  No accessory muscle usage or stridor.  Decreased breath sound intensity with prolonged expiration  Abdominal: Soft. Bowel sounds present. No distension or hernia. No tenderness.    Musculoskeletal: No cyanosis. No clubbing. No obvious joint deformity.   Lymphadenopathy: No cervical or supraclavicular adenopathy.   Skin: Skin is warm and dry. No rash or nodules on the exposed extremities.  Neurologic: Sleeping        Results:  CBC:   Recent Labs     07/28/24  0632 07/30/24  1158   WBC 13.1* 16.8*   HGB 9.9* 11.2*   HCT 30.9* 34.7*   MCV 87.7 86.3    348     BMP:   Recent Labs     07/28/24  0632 07/29/24  1313   * 131*   K 3.4* 3.6   CL 98* 95*   CO2 25 27   BUN 3* 3*   CREATININE <0.5* <0.5*       Imaging:  I have

## 2024-07-31 LAB
BASOPHILS # BLD: 0 K/UL (ref 0–0.2)
BASOPHILS NFR BLD: 0.1 %
DEPRECATED RDW RBC AUTO: 14.4 % (ref 12.4–15.4)
EOSINOPHIL # BLD: 0 K/UL (ref 0–0.6)
EOSINOPHIL NFR BLD: 0.1 %
HCT VFR BLD AUTO: 31 % (ref 36–48)
HGB BLD-MCNC: 10.1 G/DL (ref 12–16)
LYMPHOCYTES # BLD: 1 K/UL (ref 1–5.1)
LYMPHOCYTES NFR BLD: 4.7 %
MCH RBC QN AUTO: 28.2 PG (ref 26–34)
MCHC RBC AUTO-ENTMCNC: 32.7 G/DL (ref 31–36)
MCV RBC AUTO: 86.2 FL (ref 80–100)
MONOCYTES # BLD: 1.3 K/UL (ref 0–1.3)
MONOCYTES NFR BLD: 6.2 %
NEUTROPHILS # BLD: 18.7 K/UL (ref 1.7–7.7)
NEUTROPHILS NFR BLD: 88.9 %
PLATELET # BLD AUTO: 345 K/UL (ref 135–450)
PMV BLD AUTO: 10 FL (ref 5–10.5)
RBC # BLD AUTO: 3.59 M/UL (ref 4–5.2)
WBC # BLD AUTO: 21 K/UL (ref 4–11)

## 2024-07-31 PROCEDURE — 99232 SBSQ HOSP IP/OBS MODERATE 35: CPT | Performed by: INTERNAL MEDICINE

## 2024-07-31 PROCEDURE — 94761 N-INVAS EAR/PLS OXIMETRY MLT: CPT

## 2024-07-31 PROCEDURE — 6360000002 HC RX W HCPCS: Performed by: STUDENT IN AN ORGANIZED HEALTH CARE EDUCATION/TRAINING PROGRAM

## 2024-07-31 PROCEDURE — 6370000000 HC RX 637 (ALT 250 FOR IP): Performed by: NURSE PRACTITIONER

## 2024-07-31 PROCEDURE — 6370000000 HC RX 637 (ALT 250 FOR IP): Performed by: INTERNAL MEDICINE

## 2024-07-31 PROCEDURE — 2700000000 HC OXYGEN THERAPY PER DAY

## 2024-07-31 PROCEDURE — 94640 AIRWAY INHALATION TREATMENT: CPT

## 2024-07-31 PROCEDURE — 85025 COMPLETE CBC W/AUTO DIFF WBC: CPT

## 2024-07-31 PROCEDURE — 2580000003 HC RX 258: Performed by: INTERNAL MEDICINE

## 2024-07-31 PROCEDURE — 36415 COLL VENOUS BLD VENIPUNCTURE: CPT

## 2024-07-31 PROCEDURE — 1200000000 HC SEMI PRIVATE

## 2024-07-31 PROCEDURE — 6360000002 HC RX W HCPCS: Performed by: INTERNAL MEDICINE

## 2024-07-31 RX ORDER — ALBUTEROL SULFATE 2.5 MG/3ML
2.5 SOLUTION RESPIRATORY (INHALATION) EVERY 4 HOURS PRN
Status: DISCONTINUED | OUTPATIENT
Start: 2024-07-31 | End: 2024-08-03

## 2024-07-31 RX ORDER — QUETIAPINE FUMARATE 50 MG/1
50 TABLET, EXTENDED RELEASE ORAL NIGHTLY
Status: DISCONTINUED | OUTPATIENT
Start: 2024-07-31 | End: 2024-08-05 | Stop reason: HOSPADM

## 2024-07-31 RX ORDER — LEVOFLOXACIN 5 MG/ML
500 INJECTION, SOLUTION INTRAVENOUS EVERY 24 HOURS
Status: COMPLETED | OUTPATIENT
Start: 2024-07-31 | End: 2024-08-04

## 2024-07-31 RX ORDER — HYDROXYZINE HYDROCHLORIDE 10 MG/1
10 TABLET, FILM COATED ORAL 2 TIMES DAILY PRN
Status: DISCONTINUED | OUTPATIENT
Start: 2024-07-31 | End: 2024-08-05 | Stop reason: HOSPADM

## 2024-07-31 RX ADMIN — Medication 2 PUFF: at 20:27

## 2024-07-31 RX ADMIN — QUETIAPINE FUMARATE 50 MG: 50 TABLET, EXTENDED RELEASE ORAL at 21:34

## 2024-07-31 RX ADMIN — MONTELUKAST 10 MG: 10 TABLET, FILM COATED ORAL at 21:03

## 2024-07-31 RX ADMIN — Medication 2 PUFF: at 08:06

## 2024-07-31 RX ADMIN — PRAVASTATIN SODIUM 40 MG: 40 TABLET ORAL at 10:05

## 2024-07-31 RX ADMIN — ALBUTEROL SULFATE 2.5 MG: 2.5 SOLUTION RESPIRATORY (INHALATION) at 20:30

## 2024-07-31 RX ADMIN — LEVOFLOXACIN 500 MG: 5 INJECTION, SOLUTION INTRAVENOUS at 20:59

## 2024-07-31 RX ADMIN — TRAMADOL HYDROCHLORIDE 100 MG: 50 TABLET ORAL at 10:04

## 2024-07-31 RX ADMIN — OXYBUTYNIN CHLORIDE 5 MG: 5 TABLET, EXTENDED RELEASE ORAL at 21:03

## 2024-07-31 RX ADMIN — SODIUM CHLORIDE, PRESERVATIVE FREE 10 ML: 5 INJECTION INTRAVENOUS at 10:06

## 2024-07-31 RX ADMIN — BACLOFEN 5 MG: 10 TABLET ORAL at 21:03

## 2024-07-31 RX ADMIN — HYDROXYZINE HYDROCHLORIDE 10 MG: 10 TABLET ORAL at 12:02

## 2024-07-31 RX ADMIN — WATER 40 MG: 1 INJECTION INTRAMUSCULAR; INTRAVENOUS; SUBCUTANEOUS at 10:03

## 2024-07-31 RX ADMIN — TRAMADOL HYDROCHLORIDE 100 MG: 50 TABLET ORAL at 16:50

## 2024-07-31 ASSESSMENT — PAIN DESCRIPTION - ORIENTATION: ORIENTATION: MID

## 2024-07-31 ASSESSMENT — PAIN DESCRIPTION - DESCRIPTORS
DESCRIPTORS: ACHING
DESCRIPTORS: ACHING

## 2024-07-31 ASSESSMENT — PAIN DESCRIPTION - LOCATION
LOCATION: CHEST
LOCATION: CHEST

## 2024-07-31 ASSESSMENT — PAIN SCALES - GENERAL
PAINLEVEL_OUTOF10: 8
PAINLEVEL_OUTOF10: 8
PAINLEVEL_OUTOF10: 0

## 2024-07-31 NOTE — PROGRESS NOTES
Pt Aox4, VSS, 93-94% on 5L. PRN pain medication given for c/o right chest pain. Pt c/o feeling SOB, SPO2 sats maintained on 5L, had prn respiratory tx this am. Dr. Maldonado at bedside and notified. No new order at this time. Pt sitting up in bed, denies needs. Call light in reach, bed alarm on for safety.

## 2024-07-31 NOTE — PLAN OF CARE
Problem: Discharge Planning  Goal: Discharge to home or other facility with appropriate resources  7/31/2024 1308 by Deepa Awad RN  Outcome: Progressing  Flowsheets (Taken 7/31/2024 1000)  Discharge to home or other facility with appropriate resources: Identify barriers to discharge with patient and caregiver     Problem: Pain  Goal: Verbalizes/displays adequate comfort level or baseline comfort level  7/31/2024 1308 by Deepa Awad RN  Outcome: Progressing     Problem: Skin/Tissue Integrity  Goal: Absence of new skin breakdown  Description: 1.  Monitor for areas of redness and/or skin breakdown  2.  Assess vascular access sites hourly  3.  Every 4-6 hours minimum:  Change oxygen saturation probe site  4.  Every 4-6 hours:  If on nasal continuous positive airway pressure, respiratory therapy assess nares and determine need for appliance change or resting period.  7/31/2024 1308 by Deepa Awad RN  Outcome: Progressing     Problem: Safety - Adult  Goal: Free from fall injury  7/31/2024 1308 by Deepa Awad RN  Outcome: Progressing     Problem: ABCDS Injury Assessment  Goal: Absence of physical injury  7/31/2024 1308 by Deepa Awad RN  Outcome: Progressing  Flowsheets (Taken 7/31/2024 1036)  Absence of Physical Injury: Implement safety measures based on patient assessment     Problem: Nutrition Deficit:  Goal: Optimize nutritional status  7/31/2024 1308 by Deepa Awad RN  Outcome: Progressing

## 2024-07-31 NOTE — CONSULTS
Oncology Hematology Care    Consult Note      Requesting Physician:  Dr. Musa Ly    CHIEF COMPLAINT:  adenocarcinoma mass in right lung      HISTORY OF PRESENT ILLNESS:  Ms. Hernandez  is a 79 y.o. female with a PMH of COPD, afib, lung mass and recurrent pneumonia who was admitted on 7/26 for hemoptysis.   Recurrent admission for pneumonia and SOB over the last 6+ months. Patient has refused further workup of lung mass on multiple prior admissions. Non complaint with outpatient follow up at [facility].     Hem/onc consulted for adenocarcinoma of the lung proven on biopsy this admission. History outlined in assessment and plan below.    Ms. Hernandez  is a 79 y.o. female we are seeing in consultation for     ICD-10-CM    1. Hemoptysis  R04.2       2. 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)     Hyperlipidemia     Lock jaw     Pneumonia     Post-nasal drip     Primary hypertension 03/18/2024    Pulmonary nodule     bi lateral    Rash     itchy, bumps    Reflex sympathetic dystrophy     RSD (reflex sympathetic dystrophy)     TMJ (dislocation of temporomandibular joint)        Past Surgical History:  Past Surgical History:   Procedure Laterality Date    APPENDECTOMY      BACK SURGERY      BRONCHOSCOPY  2008    BRONCHOSCOPY N/A 7/27/2024    BRONCHOSCOPY ENDOBRONCHIAL ULTRASOUND performed by Salvador Colunga MD at [facility] ENDOSCOPY    BRONCHOSCOPY N/A 7/27/2024    BRONCHOSCOPY performed by Salvador Colunga MD at [facility] ENDOSCOPY    COLONOSCOPY  11/15/2012    1 snared polyp    CT NEEDLE BIOPSY LUNG PERCUTANEOUS

## 2024-07-31 NOTE — PROGRESS NOTES
Hospital Medicine Progress Note      Date of Admission: 7/25/2024  Hospital Day: 7    Chief Admission Complaint:  hemoptysis     Subjective:  resting in bed, tearful about dx of lung ca, on 5L(room air at home) which is up from 2L 2 days ago, notes  malaise today,        Presenting Admission History:          79 y.o. female who presented to [facility] with hemoptysis.  PMHx significant for HTN, COPD, lung mass, who was recently hospitalized here for Pneumonia. She was discharged on course of Levaquin, which has been completed. During recent admission, Oncology was consulted for enlarging lung mass and likely post-obstructive Pneumonia, to re-establish care (previously biopsy negative in 4/2023). However, patient declined further evaluation or Pulmonology consultation at that time.      She now reports 1-2 days of worsening hemoptysis, occasionally with large clots. Presented to the ED for evaluation. Last dose of Xarelto was 7/24. Hemoptysis seemed to be slowing since arrival in ED. Minimal SOB/hypoxia. CXR stable.      Assessment/Plan:       Current Principal Problem:  Hemoptysis       Hemoptysis in setting of recent Pneumonia and enlarging RUL Lung Mass:   During recent admission, Oncology was consulted for enlarging lung mass and likely post-obstructive Pneumonia, to re-establish care (previously biopsy negative in 4/2023). However, patient declined further evaluation or Pulmonology consultation at that time. Empiric Abx were completed. She now returns with mild to moderate hemoptysis. She is quite anxious about the bleeding and states she suspects its the lung cancer. I reviewed with her the documentation from last hospitalization regarding declining further investigation of the lung mass; she does not recall saying this (somewhat limited historian). However, she now wishes to have Pulmonology consulted and proceed with additional testing for the lung mass. Continue to hold  Xarelto.  -s/p Bronch: bleeding

## 2024-07-31 NOTE — PROGRESS NOTES
PALLIATIVE MEDICINE PROGRESS NOTE     Patient name:Isa Hernandez    MRN:[id number] :1945  Room/Bed:0541/0541-01    LOS: 6 days        ASSESSMENT/RECOMMENDATIONS     79 y.o. female with newly diagnosed RUL cancer recurrent pneumonia         1) Goals of care:  Undetermined. Uncertain as to patient's ability to make a decision.   Surrogate decision makers (collective 4 children) would like patient to enroll with hospice care in a LTC facility.  Uncertain if patient will go along with this plan (if decisional) and there are several complicating social factors (finances/family dynamics) affecting the disposition at this time.  Will continue to work with patient and family on resolution. Patient still unable to make a decision about code status wishes.   1700 update - had additional conversation this evening with patient and I still question patient's insight/capacity.  She says she is agreeable to hospice, but only in her own home.  She refuses to spend any of her own money on supplemental bedside care.           Patient/Family Goals of Care :    24    Spoke with patient at the bedside.  Patient is alert and oriented.  She remembers talking with me yesterday and she says she is feeling much worse today.  She says she has not talked with any family and no one is aware of her diagnosis.  She remembers that oncology talked to her today, but she does not remember what was discussed.  Asked her if she would like me to call her friend Diana for her, and she said \"who is Diana?\".    Reminded her of the discussion we had yesterday.  She then stated she did not know she had cancer and admitted she has a hard time remembering things.  Once she remembered who Diana was, she said she would like for me to call her.    Called Barney, left voice mail.  Called Patrick, phone is still out of service.    Called her friend, Diana.  Diana had no idea who patient was.  Diana is elderly and forgetful herself.  Eventually we  HOWIE Arnett - CNP on 7/31/2024 at 3:02 PM   Palliative Medicine   [phone]    July 31, 2024

## 2024-07-31 NOTE — CARE COORDINATION
Writer reviewed chart, and spoke with hem-oc, patient is not a candidate for treatment for mass. Palliative is consulted. Patient lives alone in an apartment, currently on 5L O2 that is new for her.     Elena Pearl RN

## 2024-07-31 NOTE — PROGRESS NOTES
INPATIENT PULMONARY CRITICAL CARE PROGRESS NOTE      Reason for visit    Hemoptysis    SUBJECTIVE: Patient seen this morning was having increased shortness of breath, no cough or congestion, patient does not have some chest pain, patient was afebrile and hemodynamically maintained, patient was not 4 L nasal oxygen satting 93% when seen, patient states that she does not want to die and wants to go to the chemotherapy if possible, no other pertinent review of system of concern  Physical Exam:  Blood pressure 117/77, pulse 60, temperature 97.9 °F (36.6 °C), temperature source Oral, resp. rate 16, height 1.651 m (5' 5\"), weight 56.9 kg (125 lb 7.1 oz), SpO2 92 %, not currently breastfeeding.'   Constitutional:  No acute distress.   HENT:  Oropharynx is clear and moist. No thyromegaly.  Eyes:  Conjunctivae are normal. Pupils equal, round, and reactive to light. No scleral icterus.   Neck: . No tracheal deviation present. No obvious thyroid mass.   Cardiovascular: Sinus rhythm, normal heart sounds.  No right ventricular heave. No lower extremity edema.  Pulmonary/Chest: No wheezes.  Scattered rales.  Chest wall is not dull to percussion.  No accessory muscle usage or stridor.  Decreased breath sound intensity with prolonged expiration  Abdominal: Soft. Bowel sounds present. No distension or hernia. No tenderness.    Musculoskeletal: No cyanosis. No clubbing. No obvious joint deformity.   Lymphadenopathy: No cervical or supraclavicular adenopathy.   Skin: Skin is warm and dry. No rash or nodules on the exposed extremities.  Neurologic: Alert and communicative and was anxious,        Results:  CBC:   Recent Labs     07/30/24  1158 07/31/24  0523   WBC 16.8* 21.0*   HGB 11.2* 10.1*   HCT 34.7* 31.0*   MCV 86.3 86.2    345       BMP:   Recent Labs     07/29/24  1313   *   K 3.6   CL 95*   CO2 27   BUN 3*   CREATININE <0.5*         Imaging:  I have reviewed radiology images personally.    XR CHEST PORTABLE   Final  electrolytes and whenever necessary basis  PUD and DVT prophylaxis as per primary team      Further management depending on patient clinical status patient's decision about course of care    Case discussed with patient and nursing          Electronically signed by:  BELEN TORRES MD    7/31/2024    8:04 AM.

## 2024-08-01 LAB
BASOPHILS # BLD: 0 K/UL (ref 0–0.2)
BASOPHILS NFR BLD: 0.1 %
DEPRECATED RDW RBC AUTO: 14.3 % (ref 12.4–15.4)
EOSINOPHIL # BLD: 0 K/UL (ref 0–0.6)
EOSINOPHIL NFR BLD: 0.1 %
HCT VFR BLD AUTO: 30.8 % (ref 36–48)
HGB BLD-MCNC: 10.2 G/DL (ref 12–16)
LYMPHOCYTES # BLD: 1.2 K/UL (ref 1–5.1)
LYMPHOCYTES NFR BLD: 5.5 %
MCH RBC QN AUTO: 28.5 PG (ref 26–34)
MCHC RBC AUTO-ENTMCNC: 33.1 G/DL (ref 31–36)
MCV RBC AUTO: 86.2 FL (ref 80–100)
MONOCYTES # BLD: 1.3 K/UL (ref 0–1.3)
MONOCYTES NFR BLD: 6 %
NEUTROPHILS # BLD: 19.4 K/UL (ref 1.7–7.7)
NEUTROPHILS NFR BLD: 88.3 %
PLATELET # BLD AUTO: 348 K/UL (ref 135–450)
PMV BLD AUTO: 10 FL (ref 5–10.5)
RBC # BLD AUTO: 3.58 M/UL (ref 4–5.2)
WBC # BLD AUTO: 22 K/UL (ref 4–11)

## 2024-08-01 PROCEDURE — 97530 THERAPEUTIC ACTIVITIES: CPT

## 2024-08-01 PROCEDURE — 92523 SPEECH SOUND LANG COMPREHEN: CPT

## 2024-08-01 PROCEDURE — 97162 PT EVAL MOD COMPLEX 30 MIN: CPT

## 2024-08-01 PROCEDURE — 6370000000 HC RX 637 (ALT 250 FOR IP): Performed by: INTERNAL MEDICINE

## 2024-08-01 PROCEDURE — 97166 OT EVAL MOD COMPLEX 45 MIN: CPT

## 2024-08-01 PROCEDURE — 97535 SELF CARE MNGMENT TRAINING: CPT

## 2024-08-01 PROCEDURE — 2580000003 HC RX 258: Performed by: INTERNAL MEDICINE

## 2024-08-01 PROCEDURE — 85025 COMPLETE CBC W/AUTO DIFF WBC: CPT

## 2024-08-01 PROCEDURE — 94761 N-INVAS EAR/PLS OXIMETRY MLT: CPT

## 2024-08-01 PROCEDURE — 2700000000 HC OXYGEN THERAPY PER DAY

## 2024-08-01 PROCEDURE — 99232 SBSQ HOSP IP/OBS MODERATE 35: CPT | Performed by: INTERNAL MEDICINE

## 2024-08-01 PROCEDURE — 6360000002 HC RX W HCPCS: Performed by: INTERNAL MEDICINE

## 2024-08-01 PROCEDURE — 36415 COLL VENOUS BLD VENIPUNCTURE: CPT

## 2024-08-01 PROCEDURE — 6370000000 HC RX 637 (ALT 250 FOR IP): Performed by: NURSE PRACTITIONER

## 2024-08-01 PROCEDURE — 1200000000 HC SEMI PRIVATE

## 2024-08-01 PROCEDURE — 97116 GAIT TRAINING THERAPY: CPT

## 2024-08-01 PROCEDURE — 94640 AIRWAY INHALATION TREATMENT: CPT

## 2024-08-01 RX ADMIN — TRAMADOL HYDROCHLORIDE 100 MG: 50 TABLET ORAL at 00:11

## 2024-08-01 RX ADMIN — BACLOFEN 5 MG: 10 TABLET ORAL at 21:24

## 2024-08-01 RX ADMIN — LEVOFLOXACIN 500 MG: 5 INJECTION, SOLUTION INTRAVENOUS at 21:27

## 2024-08-01 RX ADMIN — Medication 2 PUFF: at 07:30

## 2024-08-01 RX ADMIN — HYDROXYZINE HYDROCHLORIDE 10 MG: 10 TABLET ORAL at 21:26

## 2024-08-01 RX ADMIN — SODIUM CHLORIDE, PRESERVATIVE FREE 10 ML: 5 INJECTION INTRAVENOUS at 10:25

## 2024-08-01 RX ADMIN — MONTELUKAST 10 MG: 10 TABLET, FILM COATED ORAL at 21:24

## 2024-08-01 RX ADMIN — QUETIAPINE FUMARATE 50 MG: 50 TABLET, EXTENDED RELEASE ORAL at 21:24

## 2024-08-01 RX ADMIN — OXYBUTYNIN CHLORIDE 5 MG: 5 TABLET, EXTENDED RELEASE ORAL at 21:24

## 2024-08-01 RX ADMIN — HYDROXYZINE HYDROCHLORIDE 10 MG: 10 TABLET ORAL at 00:11

## 2024-08-01 RX ADMIN — Medication 2 PUFF: at 13:44

## 2024-08-01 RX ADMIN — WATER 40 MG: 1 INJECTION INTRAMUSCULAR; INTRAVENOUS; SUBCUTANEOUS at 10:25

## 2024-08-01 RX ADMIN — PRAVASTATIN SODIUM 40 MG: 40 TABLET ORAL at 10:25

## 2024-08-01 RX ADMIN — SODIUM CHLORIDE, PRESERVATIVE FREE 10 ML: 5 INJECTION INTRAVENOUS at 21:26

## 2024-08-01 ASSESSMENT — PAIN SCALES - GENERAL
PAINLEVEL_OUTOF10: 8
PAINLEVEL_OUTOF10: 7

## 2024-08-01 ASSESSMENT — PAIN - FUNCTIONAL ASSESSMENT: PAIN_FUNCTIONAL_ASSESSMENT: ACTIVITIES ARE NOT PREVENTED

## 2024-08-01 ASSESSMENT — PAIN DESCRIPTION - DESCRIPTORS: DESCRIPTORS: ACHING;DULL;DISCOMFORT;TENDER

## 2024-08-01 ASSESSMENT — PAIN DESCRIPTION - LOCATION: LOCATION: CHEST;BACK

## 2024-08-01 NOTE — PROGRESS NOTES
Occupational Therapy  Facility/Department: [facility] C5 - MED SURG/ORTHO  Occupational Therapy Initial Assessment and Treatment    Name: Isa Hernandez  : 1945  MRN: [id number]  Date of Service: 2024    Discharge Recommendations:  24 hour supervision or assist  OT Equipment Recommendations  Equipment Needed: No       Patient Diagnosis(es): The primary encounter diagnosis was Hemoptysis. A diagnosis of Shortness of breath was also pertinent to this visit.  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  Past Surgical History:  has a past surgical history that includes Hysterectomy; Appendectomy; Tonsillectomy; bronchoscopy (); Colonoscopy (11/15/2012); back surgery; CT NEEDLE BIOPSY LUNG PERCUTANEOUS (2023); Pacemaker insertion; Upper gastrointestinal endoscopy (N/A, 5/15/2024); bronchoscopy (N/A, 2024); and bronchoscopy (N/A, 2024).           Assessment   Performance deficits / Impairments: Decreased ADL status;Decreased strength;Decreased safe awareness;Decreased endurance;Decreased functional mobility   Assessment: Pt supine in bed at start of session. Pt tolerates OT evaluation/ treatment session well. Pt is a(n) 79 year old female who presents to [facility] with SOB. Pt reports being independent with ADLs and mobility at baseline. Pt completes bed mobility with SBA and functional mobility and transfers with SBA and RW. Pt on 5LO2 during session and SpO2 remains >90%. Pt completes UB dressing and standing grooming tasks with supervision and LB dressing and toileting needs with  Provided Comments: Pt educated on importance of OOB mobility, prevention of complications of bedrest, and general safety during hospitalization  Education Method: Demonstration;Verbal  Barriers to Learning: None  Education Outcome: Verbalized understanding;Continued education needed                      AM-PAC - ADL  AM-PAC Daily Activity - Inpatient   How much help is needed for putting on and taking off regular lower body clothing?: A Little  How much help is needed for bathing (which includes washing, rinsing, drying)?: A Little  How much help is needed for toileting (which includes using toilet, bedpan, or urinal)?: A Little  How much help is needed for putting on and taking off regular upper body clothing?: A Little  How much help is needed for taking care of personal grooming?: A Little  How much help for eating meals?: None  AM-PAC Inpatient Daily Activity Raw Score: 19  AM-PAC Inpatient ADL T-Scale Score : 40.22  ADL Inpatient CMS 0-100% Score: 42.8  ADL Inpatient CMS G-Code Modifier : CK    Goals  Short Term Goals  Time Frame for Short Term Goals: 1 week by 8/08/24 unless otherwise stated  Short Term Goal 1: Perform functional transfer with supervision  Short Term Goal 2: Perform toilet transfer with supervision  Short Term Goal 3: Perform LB dressing independently  Short Term Goal 4: Perform standing grooming task independently by 8/6/24  Patient Goals   Patient goals : to go home       Therapy Time   Individual Concurrent Group Co-treatment   Time In 1332         Time Out 1408         Minutes 36         Timed Code Treatment Minutes: 26 Minutes (10 min eval)       If pt is unable to be seen after this session, please let this note serve as discharge summary.  Please see case management note for discharge disposition.  Thank you.    Juli Mcdowell OT

## 2024-08-01 NOTE — ONCOLOGY
ONCOLOGY HEMATOLOGY CARE PROGRESS NOTE      SUBJECTIVE:    Afebrile and on 5 LPM by NC.    She is sleeping soundly.      No family present.    ROS:     Constitutional:  No weight loss, No fever, No chills, No night sweats.  Energy level good.  Eyes:  No impairment or change in vision  ENT / Mouth:  No pain, abnormal ulceration, bleeding, nasal drip or change in voice or hearing  Cardiovascular:  No chest pain, palpitations, new edema, or calf discomfort  Respiratory:  No pain, hemoptysis, change to breathing  Breast:  No pain, discharge, change in appearance or texture  Gastrointestinal:  No pain, cramping, jaundice, change to eating and bowel habits  Urinary:  No pain, bleeding or change in continence  Genitalia: No pain, bleeding or discharge  Musculoskeletal:  No redness, pain, edema or weakness  Skin:  No pruritus, rash, change to nodules or lesions  Neurologic:  No discomfort, change in mental status, speech, sensory or motor activity  Psychiatric:  No change in concentration or change to affect or mood  Endocrine:  No hot flashes, increased thirst, or change to urine production  Hematologic: No petechiae, ecchymosis or bleeding  Lymphatic:  No lymphadenopathy or lymphedema  Allergy / Immunologic:  No eczema, hives, frequent or recurrent infections    OBJECTIVE        Physical    VITALS:  Patient Vitals for the past 24 hrs:   BP Temp Temp src Pulse Resp SpO2   08/01/24 0011 -- -- -- -- 16 --   07/31/24 2027 -- -- -- -- -- 95 %   07/31/24 2014 139/79 98 °F (36.7 °C) Oral 91 16 96 %   07/31/24 1045 115/60 98 °F (36.7 °C) Oral 71 16 93 %   07/31/24 1034 -- -- -- -- 14 --   07/31/24 0730 117/77 97.9 °F (36.6 °C) Oral 60 16 92 %       24HR INTAKE/OUTPUT:    Intake/Output Summary (Last 24 hours) at 8/1/2024 0547  Last data filed at 7/31/2024 0924  Gross per 24 hour   Intake 240 ml   Output --   Net 240 ml       CONSTITUTIONAL: awake, alert, cooperative, no apparent distress    EYES: pupils equal, round and reactive to light, sclera clear and conjunctiva normal  ENT: Normocephalic, without obvious abnormality, atraumatic  NECK: supple, symmetrical, no jugular venous distension and no carotid bruits   HEMATOLOGIC/LYMPHATIC: no cervical, supraclavicular or axillary lymphadenopathy   LUNGS: no increased work of breathing and clear to auscultation   CARDIOVASCULAR: regular rate and rhythm, normal S1 and S2, no murmur noted  ABDOMEN: normal bowel sounds x 4, soft, non-distended, non-tender, no masses palpated, no hepatosplenomgaly   MUSCULOSKELETAL: full range of motion noted, tone is normal  NEUROLOGIC: awake, alert, oriented to name, place and time. Motor skills grossly intact.   SKIN: Normal skin color, texture, turgor and no jaundice. appears intact   EXTREMITIES: no LE edema     DATA:  CBC:    Recent Labs     08/01/24  0444 07/31/24  0523 07/30/24  1158 07/28/24  0632 07/26/24  0607 07/25/24  1440   WBC 22.0* 21.0* 16.8* 13.1*   < > 16.3*   NEUTROABS 19.4* 18.7* 15.9*  --   --  13.5*   LYMPHOPCT 5.5 4.7 2.6  --   --  8.2   RBC 3.58* 3.59* 4.02 3.52*   < > 4.08   HGB 10.2* 10.1* 11.2* 9.9*   < > 11.6*   HCT 30.8* 31.0* 34.7* 30.9*   < > 35.8*   MCV 86.2 86.2 86.3 87.7   < > 87.8   MCH 28.5 28.2 27.8 28.0   < > 28.4   MCHC 33.1 32.7 32.3 31.9   < > 32.3   RDW 14.3 14.4 14.2 15.0   < > 14.9    345 348 305   < > 353    < > = values in this interval not displayed.       PT/INR:  No results for input(s): \"INR\" in the last 720 hours.    Invalid input(s): \"PROT\"  PTT:  No results for input(s): \"APTT\" in the last 720 hours.    CMP:    Recent Labs     07/29/24  1313 07/28/24  0632 07/27/24  0545 07/26/24  0608 07/25/24  1440 07/19/24  0627 07/18/24  1105   * 132* 132* 133* 133*   < > 135*   K 3.6 3.4* 3.5 3.8 3.6   < > 3.3*   CL 95* 98* 98* 101 97*   < > 99   CO2 27 25 23 23 25   < > 24   GLUCOSE 123* 111* 107* 116* 116*   < > 93   BUN 3* 3* <2* 3* 5*   < > 5*   CREATININE <0.5* <0.5*

## 2024-08-01 NOTE — CONSULTS
[facility] [address] [place] [address]                              CONSULTATION      PATIENT NAME: CLIVE ROQUE           : 1945  MED REC NO: [id number]                      ROOM: 0542  ACCOUNT NO: [id number]                       ADMIT DATE: 2024  PROVIDER: Dmitri Fernando MD    PSYCHIATRY CONSULTATION    CONSULT DATE: 2024    REFERRING PHYSICIAN:  Musa Ly MD      HISTORY OF PRESENT ILLNESS:  The patient is an 79-year-old, who was referred for admission after she developed hemoptysis and in working up her lung mass, the medical team was questioning the patient's ability to make informed consent decisions.    The patient was interviewed and attending her were 2 of her children and they stayed for the consultation.  The patient was able to tell me that initially she was not sure why she came to the hospital, but when questioned, she remembered that she was coughing up blood and got scared and called 911.  Initially, she also reports knowing that she had lung cancer for the last week, but then when reminded of previous hospital stays, the patient acknowledged that she was told she had a lung mass more than a year ago, but at that time, decided not to pursue further diagnostic workup or treatment consideration.  She is on the fence with where to go with her treatment indicating that she has been seen by Hospice and was somewhat concerned about why she could not be a full code and still go to Hospice, although we did have that conversation and she asked appropriate questions.    PAST PSYCHIATRIC HISTORY:  I have actually attempted to see this patient 3 times in the past unsuccessfully, so this was the first time I was seeing her.  She does not have any history of inpatient psychiatric admissions and no history of suicide.    FAMILY PSYCHIATRIC HISTORY:  Unknown.    SOCIAL HISTORY:  The patient had been living in a

## 2024-08-01 NOTE — PROGRESS NOTES
maxillary, and mandibular facial sensation- WFL  CN VII (facial): WFL  CN IX/X (glossopharyngeal/vagus): MPT: LOCO; pitch range: LOCO; vocal quality: WFL; cough: Strong-perceptually and Productive  CN XII (hypoglossal): WFL      Oral motor exam   WFL    Motor Speech: WFL    Comprehension:   Auditory Comprehension: WFL    Reading Comprehension: To be assessed      Expression:   Primary Mode of Expression: Verbal  Verbal Expression: WFL    Written Expression: WFL; Dominant Hand: Right     Pragmatics/Social Functioning: WFL      Cognition:  Overall Orientation : .Mild   Deficits: Disoriented to time    Attention: Mild   Deficits: Alternating Attention and Divided Attention    Memory: Moderate   Deficits: Long-term Memory, Short-term Memory, Immediate/Working Memory, and Prospective Memory    Problem Solving: Moderate   Deficits: Complex Functional Tasks, Managing Finances, and Managing Medications    Numeric Reasoning: Mild   Deficits: Calculations, Money Management, and Time    Abstract Reasoning: Mild   Deficits: Convergent Thinking and Divergent Thinking    Safety/Judgement: WFL    Voice:   Voice: WFL     Plan  Prognosis:  Speech Therapy Prognosis  Prognosis: Good  Prognosis Considerations: Age, Motivation, Co-morbidities , Family involvement / support, and Participation Level  Individuals consulted and agree with results and recommendations: Patient , RN, and Family Member  Safety Devices in place: Yes  Type of Devices: All fall risk precautions in place     Recommendations:   Requires SLP Intervention: yes  Duration / Frequency of Treatment: 1-3x/week for LOS    Therapeutic Interventions:  Compensatory strategy training and carryover, recall/STM, problem solving, reasoning, exec function, thought organization, attention.      Education:  Patient education: Role of SLP, Rationale of eval, Results of eval, Goals, and POC  Patient Education Responses: pt verbalized understanding, family verbalized understanding, and  would benefit from ongoing education        Total Treatment Time / Charges       Time in Time out Total Time / units   Speech / Lang / Cog Eval:  1200 1222 22 min / 1 unit (SpE)     Signature:  Cary Menendez MA, CCC-SLP  Speech Pathologist  [id number]

## 2024-08-01 NOTE — CARE COORDINATION
Writer reviewed call from Bonnie @ [facility], she is seeing patient this day.    Elena Pearl RN

## 2024-08-01 NOTE — PROGRESS NOTES
Physical Therapy  Facility/Department: [facility] [place] [address] - MED SURG/ORTHO  Physical Therapy Initial Assessment    Name: Isa Hernandez  : 1945  MRN: [id number]  Date of Service: 2024    Discharge Recommendations:  24 hour supervision or assist, Home with Home health PT   PT Equipment Recommendations  Equipment Needed: No  Other: owns rollator      Patient Diagnosis(es): The primary encounter diagnosis was Hemoptysis. A diagnosis of Shortness of breath was also pertinent to this visit.  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  Past Surgical History:  has a past surgical history that includes Hysterectomy; Appendectomy; Tonsillectomy; bronchoscopy (); Colonoscopy (11/15/2012); back surgery; CT NEEDLE BIOPSY LUNG PERCUTANEOUS (2023); Pacemaker insertion; Upper gastrointestinal endoscopy (N/A, 5/15/2024); bronchoscopy (N/A, 2024); and bronchoscopy (N/A, 2024).    Assessment   Body Structures, Functions, Activity Limitations Requiring Skilled Therapeutic Intervention: Decreased functional mobility ;Decreased endurance  Assessment: Pt is an 80 y/o female who presents with hemoptysis. Pt was independent prior to admit living alone in senior apartment. Pt currently requires SBA for transfers and ambulation with RW. Pt limited by endurance. Pt would benefit from continued skilled PT to address these limitations. Recommend home with 24hr supervision and home PT.  Treatment Diagnosis: impaired functional mobility  Therapy Prognosis: Good  Decision Making: Medium Complexity  Requires

## 2024-08-01 NOTE — CARE COORDINATION
Writer faxed HOC consult, will wait for HOC to speak with patient. Option could be SNF to LTC if patient is agreeable. Patient is currently on 5L O2.     Elena Pearl RN

## 2024-08-01 NOTE — PROGRESS NOTES
INPATIENT PULMONARY CRITICAL CARE PROGRESS NOTE      Reason for visit    Hemoptysis    SUBJECTIVE: Patient continues to complain of shortness of breath, patient does not have any chest pain, patient was eval by hematology oncology and patient was told that she is not a good candidate for any chemoradiation therapy which has been told to the patient, patient was having saturation of 95% on 5 L of nasal oxygen when seen, patient was afebrile and he medically maintained, patient had sinus rhythm on the monitor, patient's urine output has not been recorded in the epic, no other pertinent review of system of concern      Physical Exam:  Blood pressure 139/79, pulse 91, temperature 98 °F (36.7 °C), temperature source Oral, resp. rate 16, height 1.651 m (5' 5\"), weight 56.9 kg (125 lb 7.1 oz), SpO2 92 %, not currently breastfeeding.'   Constitutional:  No acute distress.   HENT:  Oropharynx is clear and moist. No thyromegaly.  Eyes:  Conjunctivae are normal. Pupils equal, round, and reactive to light. No scleral icterus.   Neck: . No tracheal deviation present. No obvious thyroid mass.   Cardiovascular: Sinus rhythm, normal heart sounds.  No right ventricular heave. No lower extremity edema.  Pulmonary/Chest: No wheezes.  Scattered rales.  Chest wall is not dull to percussion.  No accessory muscle usage or stridor.  Decreased breath sound intensity with prolonged expiration  Abdominal: Soft. Bowel sounds present. No distension or hernia. No tenderness.    Musculoskeletal: No cyanosis. No clubbing. No obvious joint deformity.   Lymphadenopathy: No cervical or supraclavicular adenopathy.   Skin: Skin is warm and dry. No rash or nodules on the exposed extremities.  Neurologic: Alert and communicative and was anxious,        Results:  CBC:   Recent Labs     07/30/24  1158 07/31/24  0523 08/01/24  0444   WBC 16.8* 21.0* 22.0*   HGB 11.2* 10.1* 10.2*   HCT 34.7* 31.0* 30.8*   MCV 86.3 86.2 86.2    345 348       BMP:

## 2024-08-01 NOTE — PLAN OF CARE
Problem: Discharge Planning  Goal: Discharge to home or other facility with appropriate resources  7/31/2024 2207 by Ramiro Hamilton RN  Outcome: Progressing  7/31/2024 1308 by Deepa Awad RN  Outcome: Progressing  Flowsheets (Taken 7/31/2024 1000)  Discharge to home or other facility with appropriate resources: Identify barriers to discharge with patient and caregiver     Problem: Pain  Goal: Verbalizes/displays adequate comfort level or baseline comfort level  7/31/2024 2207 by Ramiro Hamilton RN  Outcome: Progressing  7/31/2024 1308 by Deepa Awad RN  Outcome: Progressing     Problem: Skin/Tissue Integrity  Goal: Absence of new skin breakdown  Description: 1.  Monitor for areas of redness and/or skin breakdown  2.  Assess vascular access sites hourly  3.  Every 4-6 hours minimum:  Change oxygen saturation probe site  4.  Every 4-6 hours:  If on nasal continuous positive airway pressure, respiratory therapy assess nares and determine need for appliance change or resting period.  7/31/2024 2207 by Ramiro Hamilton RN  Outcome: Progressing  7/31/2024 1308 by Deepa Awad RN  Outcome: Progressing     Problem: Safety - Adult  Goal: Free from fall injury  7/31/2024 2207 by Ramiro Hamilton RN  Outcome: Progressing  7/31/2024 1308 by Deepa Awad RN  Outcome: Progressing     Problem: ABCDS Injury Assessment  Goal: Absence of physical injury  7/31/2024 2207 by Ramiro Hamilton RN  Outcome: Progressing  7/31/2024 1308 by Deepa Awad RN  Outcome: Progressing  Flowsheets (Taken 7/31/2024 1036)  Absence of Physical Injury: Implement safety measures based on patient assessment     Problem: Nutrition Deficit:  Goal: Optimize nutritional status  7/31/2024 2207 by Ramiro Hamilton RN  Outcome: Progressing  7/31/2024 1308 by Deepa Awad RN  Outcome: Progressing

## 2024-08-01 NOTE — PROGRESS NOTES
PULMONOLOGY  IP CONSULT TO SPIRITUAL SERVICES  IP CONSULT TO PALLIATIVE CARE  IP CONSULT TO SPIRITUAL SERVICES  IP CONSULT TO HEM/ONC  IP CONSULT TO HOSPICE  IP CONSULT TO PSYCHIATRY      ------------------------------------------------------------------------------------------------------------------------------------------------------------------------    MDM      [x] High (any 2)    A. Problems (any 1)  [x] Acute/Chronic Illness/injury posing threat to life or bodily function:    [] Severe exacerbation of chronic illness:    ---------------------------------------------------------------------  B. Risk of Treatment (any 1)   [] Drugs/treatments that require intensive monitoring for toxicity include:    [] IV ABX requiring serial renal monitoring for nephrotoxicity:     [] IV Narcotic analgesia for adverse drug reaction  [] IV diuresis requiring serial monitoring for renal impairment and electrolyte derangements  [] Critical electrolyte abnormalities requiring IV replacement and close serial monitoring  [] Insulin - monitoring serial FSBS for Hypoglycemic adverse drug reaction  [] Anticoagulation requiring serial monitoring of coagulation factors  [] Other -   [] Change in code status:    [] Decision to escalate care:    [] Major surgery/procedure with associated risk factors:    ----------------------------------------------------------------------  C. Data (any 2)  [x] Discussed current management and discharge planning options with Case Management.  [] Discussed management of the case with:    [] Telemetry personally reviewed and interpreted as documented above    [] Imaging personally reviewed and interpreted, includes:    [x] Data Review (any 3)  [x] All available Consultant notes from yesterday/today were reviewed  [x] All current labs were reviewed and interpreted for clinical significance   [x] Appropriate follow-up labs were ordered  [] Collateral history obtained from:        Medications:  Personally

## 2024-08-01 NOTE — PROGRESS NOTES
PALLIATIVE MEDICINE PROGRESS NOTE     Patient name:Isa Hernandez    MRN:[id number] :1945  Room/Bed:0542/0542-01    LOS: 7 days        ASSESSMENT/RECOMMENDATIONS     79 y.o. female with newly diagnosed RUL cancer recurrent pneumonia         1) Goals of care:  Comfort care.  Patient's kids would like patient to go with hospice care and patient is agreeable to hospice care.  Disposition for hospice services is not yet clear and family will benefit from social work assistance (likely from hospice agency) to iron these matters out.  Case Management has sent referral to HOC.  Will continue to follow          Patient/Family Goals of Care :    24    Spoke with patient at the bedside.  Patient is alert and oriented.  She remembers talking with me yesterday and she says she is feeling much worse today.  She says she has not talked with any family and no one is aware of her diagnosis.  She remembers that oncology talked to her today, but she does not remember what was discussed.  Asked her if she would like me to call her friend Diana for her, and she said \"who is Diana?\".    Reminded her of the discussion we had yesterday.  She then stated she did not know she had cancer and admitted she has a hard time remembering things.  Once she remembered who Diana was, she said she would like for me to call her.    Called Barney, left voice mail.  Called Patrick, phone is still out of service.    Called her friend, Diana.  Diana had no idea who patient was.  Diana is elderly and forgetful herself.  Eventually we figured out that patient goes by the name \"Brii\" where she lives in senior living at [facility].  Diana does not know anything much about patient other than she was worried she had lung cancer and she has been praying for her.  She does not have any other information to help us and recommended we call the [facility] business office to get any phone numbers we might need for the kids.  She wants us to tell

## 2024-08-01 NOTE — CONSULTS
Full note dictated and met with patient and 2 of her children    Dx:  adjustment disorder with disturbance of mood and behavior    Rec:  at this time she retains the ability to make informed consent decisions, which of course may be good ones or bad ones.  Discussed with each of them that changes in her medical condition could change my opinion, but as of now she is able to say what her diagnosis is, what the treatment plan  would look like in broad terms and what she wants.      Dmitri Fernando MD

## 2024-08-01 NOTE — CARE COORDINATION
Writer spoke with patient at bedside and discussed the preference lists for SNF, patient is reviewing lists and will update DCP. Will need a cert with [facility].     Elena Pearl RN

## 2024-08-01 NOTE — PLAN OF CARE
SLP Evaluation Completed. All note are found in EMR    Cary Menendez MA, CCC-SLP  Speech Pathologist  [id number]

## 2024-08-01 NOTE — PROGRESS NOTES
[facility] [place]    Met with patient and also spoke with both daughter when they came to visit  to discuss hospice philosophy and services.  Explained routine vs. continuous care, as well as support team services. Discussed covered vs noncovered equipment, services, and medications and patient/family right to request a review of requested/noncovered items. Time spent listening to events of illness, answering questions, and providing emotional support.Plan is to have patient go to rehab to get stronger to try to go home. Family plans to apply for Medicaid in case she needs LTC. Family encourage to call hospice if needed one home/LTC. Updated Elena RNKYLAH and  floor nurse.Estrellita.

## 2024-08-02 LAB
BASOPHILS # BLD: 0 K/UL (ref 0–0.2)
BASOPHILS NFR BLD: 0.2 %
DEPRECATED RDW RBC AUTO: 14.4 % (ref 12.4–15.4)
EOSINOPHIL # BLD: 0 K/UL (ref 0–0.6)
EOSINOPHIL NFR BLD: 0 %
HCT VFR BLD AUTO: 33 % (ref 36–48)
HGB BLD-MCNC: 10.5 G/DL (ref 12–16)
LYMPHOCYTES # BLD: 1.2 K/UL (ref 1–5.1)
LYMPHOCYTES NFR BLD: 5.3 %
MCH RBC QN AUTO: 27.8 PG (ref 26–34)
MCHC RBC AUTO-ENTMCNC: 32 G/DL (ref 31–36)
MCV RBC AUTO: 87 FL (ref 80–100)
MONOCYTES # BLD: 1.5 K/UL (ref 0–1.3)
MONOCYTES NFR BLD: 6.5 %
NEUTROPHILS # BLD: 19.7 K/UL (ref 1.7–7.7)
NEUTROPHILS NFR BLD: 88 %
PLATELET # BLD AUTO: 360 K/UL (ref 135–450)
PMV BLD AUTO: 9.9 FL (ref 5–10.5)
RBC # BLD AUTO: 3.79 M/UL (ref 4–5.2)
WBC # BLD AUTO: 22.4 K/UL (ref 4–11)

## 2024-08-02 PROCEDURE — 36415 COLL VENOUS BLD VENIPUNCTURE: CPT

## 2024-08-02 PROCEDURE — 2580000003 HC RX 258: Performed by: INTERNAL MEDICINE

## 2024-08-02 PROCEDURE — 94640 AIRWAY INHALATION TREATMENT: CPT

## 2024-08-02 PROCEDURE — 97535 SELF CARE MNGMENT TRAINING: CPT

## 2024-08-02 PROCEDURE — 94761 N-INVAS EAR/PLS OXIMETRY MLT: CPT

## 2024-08-02 PROCEDURE — 2700000000 HC OXYGEN THERAPY PER DAY

## 2024-08-02 PROCEDURE — 85025 COMPLETE CBC W/AUTO DIFF WBC: CPT

## 2024-08-02 PROCEDURE — 1200000000 HC SEMI PRIVATE

## 2024-08-02 PROCEDURE — 99231 SBSQ HOSP IP/OBS SF/LOW 25: CPT | Performed by: INTERNAL MEDICINE

## 2024-08-02 PROCEDURE — 6370000000 HC RX 637 (ALT 250 FOR IP): Performed by: NURSE PRACTITIONER

## 2024-08-02 PROCEDURE — 6360000002 HC RX W HCPCS: Performed by: INTERNAL MEDICINE

## 2024-08-02 PROCEDURE — 6370000000 HC RX 637 (ALT 250 FOR IP): Performed by: INTERNAL MEDICINE

## 2024-08-02 RX ADMIN — OXYBUTYNIN CHLORIDE 5 MG: 5 TABLET, EXTENDED RELEASE ORAL at 21:30

## 2024-08-02 RX ADMIN — SODIUM CHLORIDE, PRESERVATIVE FREE 10 ML: 5 INJECTION INTRAVENOUS at 21:32

## 2024-08-02 RX ADMIN — TRAMADOL HYDROCHLORIDE 100 MG: 50 TABLET ORAL at 08:57

## 2024-08-02 RX ADMIN — HYDROXYZINE HYDROCHLORIDE 10 MG: 10 TABLET ORAL at 21:30

## 2024-08-02 RX ADMIN — Medication 2 PUFF: at 20:54

## 2024-08-02 RX ADMIN — MONTELUKAST 10 MG: 10 TABLET, FILM COATED ORAL at 21:30

## 2024-08-02 RX ADMIN — LEVOFLOXACIN 500 MG: 5 INJECTION, SOLUTION INTRAVENOUS at 21:32

## 2024-08-02 RX ADMIN — WATER 40 MG: 1 INJECTION INTRAMUSCULAR; INTRAVENOUS; SUBCUTANEOUS at 08:58

## 2024-08-02 RX ADMIN — HYDROXYZINE HYDROCHLORIDE 10 MG: 10 TABLET ORAL at 08:57

## 2024-08-02 RX ADMIN — Medication 2 PUFF: at 08:16

## 2024-08-02 RX ADMIN — BACLOFEN 5 MG: 10 TABLET ORAL at 21:30

## 2024-08-02 RX ADMIN — TRAMADOL HYDROCHLORIDE 100 MG: 50 TABLET ORAL at 21:31

## 2024-08-02 RX ADMIN — QUETIAPINE FUMARATE 50 MG: 50 TABLET, EXTENDED RELEASE ORAL at 21:30

## 2024-08-02 RX ADMIN — PRAVASTATIN SODIUM 40 MG: 40 TABLET ORAL at 08:57

## 2024-08-02 RX ADMIN — SODIUM CHLORIDE, PRESERVATIVE FREE 10 ML: 5 INJECTION INTRAVENOUS at 08:58

## 2024-08-02 ASSESSMENT — PAIN SCALES - GENERAL
PAINLEVEL_OUTOF10: 9
PAINLEVEL_OUTOF10: 6
PAINLEVEL_OUTOF10: 6
PAINLEVEL_OUTOF10: 0

## 2024-08-02 ASSESSMENT — PAIN SCALES - WONG BAKER: WONGBAKER_NUMERICALRESPONSE: NO HURT

## 2024-08-02 ASSESSMENT — PAIN DESCRIPTION - LOCATION
LOCATION: CHEST
LOCATION: GENERALIZED
LOCATION: CHEST

## 2024-08-02 ASSESSMENT — PAIN DESCRIPTION - DESCRIPTORS
DESCRIPTORS: ACHING
DESCRIPTORS: ACHING;DISCOMFORT;SORE

## 2024-08-02 ASSESSMENT — PAIN DESCRIPTION - ORIENTATION: ORIENTATION: RIGHT;LEFT;MID

## 2024-08-02 ASSESSMENT — PAIN - FUNCTIONAL ASSESSMENT: PAIN_FUNCTIONAL_ASSESSMENT: ACTIVITIES ARE NOT PREVENTED

## 2024-08-02 NOTE — FLOWSHEET NOTE
Pt slept well after midnight. Prior to midnight, pt was on call light every few minutes for various needs. Pt would call RN in for something and then put call light right back PCA to help for other needs. Up with assist and use of walker. All fall precautions in place. Call light in reach.

## 2024-08-02 NOTE — PLAN OF CARE
Problem: Discharge Planning  Goal: Discharge to home or other facility with appropriate resources  8/2/2024 1226 by Amilcar Sanchez RN  Outcome: Progressing  8/2/2024 0423 by Alexandra Latham RN  Outcome: Progressing  Flowsheets (Taken 8/1/2024 2119)  Discharge to home or other facility with appropriate resources: Identify barriers to discharge with patient and caregiver     Problem: Pain  Goal: Verbalizes/displays adequate comfort level or baseline comfort level  8/2/2024 1226 by Amilcar Sanchez RN  Outcome: Progressing  8/2/2024 0423 by Alexandra Latham RN  Outcome: Progressing     Problem: Skin/Tissue Integrity  Goal: Absence of new skin breakdown  Description: 1.  Monitor for areas of redness and/or skin breakdown  2.  Assess vascular access sites hourly  3.  Every 4-6 hours minimum:  Change oxygen saturation probe site  4.  Every 4-6 hours:  If on nasal continuous positive airway pressure, respiratory therapy assess nares and determine need for appliance change or resting period.  8/2/2024 1226 by Amilcar Sanchez RN  Outcome: Progressing  8/2/2024 0423 by Alexandra Latham RN  Outcome: Progressing     Problem: Safety - Adult  Goal: Free from fall injury  8/2/2024 1226 by Amilcar Sanchez RN  Outcome: Progressing  Flowsheets (Taken 8/2/2024 0428 by Alexandra Latham RN)  Free From Fall Injury: Instruct family/caregiver on patient safety  8/2/2024 0423 by Alexandra Latham RN  Outcome: Progressing     Problem: ABCDS Injury Assessment  Goal: Absence of physical injury  8/2/2024 1226 by Amilcar Sanchez RN  Outcome: Progressing  Flowsheets (Taken 8/2/2024 0428 by Alexandra Latham, RN)  Absence of Physical Injury: Implement safety measures based on patient assessment  8/2/2024 0423 by Alexandra Latham RN  Outcome: Progressing     Problem: Nutrition Deficit:  Goal: Optimize nutritional status  8/2/2024 1226 by Amilcar Sanchez RN  Outcome: Progressing  8/2/2024 0423 by Alexandra Latham RN  Outcome: Progressing

## 2024-08-02 NOTE — PROGRESS NOTES
ONCOLOGY HEMATOLOGY CARE PROGRESS NOTE      SUBJECTIVE:    Isa reports feeling better this morning. Denies specific concerns.   Afebrile  On o2 via NC  at 5L    ROS:     Constitutional:  No weight loss, No fever, No chills, No night sweats.  Energy level good.  Eyes:  No impairment or change in vision  ENT / Mouth:  No pain, abnormal ulceration, bleeding, nasal drip or change in voice or hearing  Cardiovascular:  No chest pain, palpitations, new edema, or calf discomfort  Respiratory:  No pain, hemoptysis, change to breathing  Breast:  No pain, discharge, change in appearance or texture  Gastrointestinal:  No pain, cramping, jaundice, change to eating and bowel habits  Urinary:  No pain, bleeding or change in continence  Genitalia: No pain, bleeding or discharge  Musculoskeletal:  No redness, pain, edema or weakness  Skin:  No pruritus, rash, change to nodules or lesions  Neurologic:  No discomfort, change in mental status, speech, sensory or motor activity  Psychiatric:  No change in concentration or change to affect or mood  Endocrine:  No hot flashes, increased thirst, or change to urine production  Hematologic: No petechiae, ecchymosis or bleeding  Lymphatic:  No lymphadenopathy or lymphedema  Allergy / Immunologic:  No eczema, hives, frequent or recurrent infections    OBJECTIVE        Physical    VITALS:  Patient Vitals for the past 24 hrs:   BP Temp Temp src Pulse Resp SpO2   08/01/24 2119 (!) 148/87 97.7 °F (36.5 °C) Oral 75 18 95 %   08/01/24 1346 -- -- -- -- -- 92 %   08/01/24 1340 120/73 -- -- (!) 106 -- 94 %   08/01/24 1326 121/64 -- -- 97 20 93 %   08/01/24 1022 138/77 97.6 °F (36.4 °C) Oral 74 20 95 %       24HR INTAKE/OUTPUT:    Intake/Output Summary (Last 24 hours) at 8/2/2024 0807  Last data filed at 8/1/2024 2006  Gross per 24 hour   Intake 120 ml   Output --   Net 120 ml       CONSTITUTIONAL: awake, alert, cooperative, no apparent distress   EYES: pupils

## 2024-08-02 NOTE — PROGRESS NOTES
Hospital Medicine Progress Note      Date of Admission: 7/25/2024  Hospital Day: 9      Chief Admission Complaint:  hemoptysis     Subjective:  resting in chair, family not currently at bedside, aware of needing hospice/SNF , better spirits today     Presenting Admission History:          79 y.o. female who presented to [facility] with hemoptysis.  PMHx significant for HTN, COPD, lung mass, who was recently hospitalized here for Pneumonia. She was discharged on course of Levaquin, which has been completed. During recent admission, Oncology was consulted for enlarging lung mass and likely post-obstructive Pneumonia, to re-establish care (previously biopsy negative in 4/2023). However, patient declined further evaluation or Pulmonology consultation at that time.      She now reports 1-2 days of worsening hemoptysis, occasionally with large clots. Presented to the ED for evaluation. Last dose of Xarelto was 7/24. Hemoptysis seemed to be slowing since arrival in ED. Minimal SOB/hypoxia. CXR stable.      Assessment/Plan:       Current Principal Problem:  Hemoptysis       Hemoptysis in setting of recent Pneumonia and enlarging RUL Lung Mass:   During recent admission, Oncology was consulted for enlarging lung mass and likely post-obstructive Pneumonia, to re-establish care (previously biopsy negative in 4/2023). However, patient declined further evaluation or Pulmonology consultation at that time. Empiric Abx were completed. She now returns with mild to moderate hemoptysis. She is quite anxious about the bleeding and states she suspects its the lung cancer. I reviewed with her the documentation from last hospitalization regarding declining further investigation of the lung mass; she does not recall saying this (somewhat limited historian). However, she now wishes to have Pulmonology consulted and proceed with additional testing for the lung mass. Continue to hold  Xarelto.  -s/p Bronch: bleeding from RLL  s/p

## 2024-08-02 NOTE — PROGRESS NOTES
RN spoke to pt's daughter, Penny, per patient request. Penny stated she is unable to come in to see patient today, but her brother Patrick should be by today to visit. RN gave Penny patient's room number so she can speak to her on the phone.

## 2024-08-02 NOTE — PLAN OF CARE
Problem: Discharge Planning  Goal: Discharge to home or other facility with appropriate resources  Outcome: Progressing   Continuing to work with patient and health care team on discharge plan. Discharge instructions and medication management will be reviewed prior to discharge.    Problem: Pain  Goal: Verbalizes/displays adequate comfort level or baseline comfort level  Outcome: Progressing   Pt able to express presence/absence of pain and rate pain appropriately using numerical scale. Pain/discomfort being managed with PRN analgesics per MD orders (see MAR). Pain assessed every shift and after interventions.    Problem: Skin/Tissue Integrity  Goal: Absence of new skin breakdown  Description: 1.  Monitor for areas of redness and/or skin breakdown  2.  Assess vascular access sites hourly  3.  Every 4-6 hours minimum:  Change oxygen saturation probe site  4.  Every 4-6 hours:  If on nasal continuous positive airway pressure, respiratory therapy assess nares and determine need for appliance change or resting period.  Outcome: Progressing   Skin assessment performed each shift per protocol.  Patient turned and repositioned every two hours and prn with pillow support. Patient checked for incontence every two hours.     Problem: Safety - Adult  Goal: Free from fall injury  Outcome: Progressing   Pt free from falls this shift. Fall precautions in place at all times. Call light always within reach. Pt able and agreeable to contact for safety appropriately.    Problem: ABCDS Injury Assessment  Goal: Absence of physical injury  Outcome: Progressing     Problem: Nutrition Deficit:  Goal: Optimize nutritional status  Outcome: Progressing

## 2024-08-02 NOTE — CARE COORDINATION
Writer spoke with daughter Penny about the preference lists that were discussed with patient, they would like a referral sent to [facility], referral sent this day, will need a caren cert when approved.     ADDENDUM-   Cert is pending in [facility]    Elena Pearl RN

## 2024-08-02 NOTE — PROGRESS NOTES
Occupational Therapy  Facility/Department: [facility] C5 - MED SURG/ORTHO  Daily Treatment Note  NAME: Isa Hernandez  : 1945  MRN: [id number]    Date of Service: 2024    Discharge Recommendations:  24 hour supervision or assist  OT Equipment Recommendations  Equipment Needed: No      Patient Diagnosis(es): The primary encounter diagnosis was Hemoptysis. A diagnosis of Shortness of breath was also pertinent to this visit.     Assessment    Assessment: Pt supine in bed at start of session. Pt tolerates OT session well. Pt completes bed mobility with supervision and functional transfers and multiple sit to stands from various surfaces with SBA and RW. Pt completes toileting needs, LB dressing, and standing grooming tasks with supervision. Pt on 5L O2 throughout session and SpO2 ranging 89%-96% - cues for PLB and SpO2 recovers >90% quickly. Pt is limited by endurance - OT recommends home with 24 hour assist. Pt will continue to benefit from continued skilled OT services at this time.   Activity Tolerance: Patient tolerated treatment well  Discharge Recommendations: 24 hour supervision or assist  Equipment Needed: No      Plan   Occupational Therapy Plan  Times Per Week: 2-3x  Current Treatment Recommendations: Balance training;Functional mobility training;Endurance training;Safety education & training;Self-Care / ADL;Strengthening     Restrictions  Restrictions/Precautions  Restrictions/Precautions: Fall Risk  Position Activity Restriction  Other position/activity restrictions: O2    Subjective   Subjective  Subjective: Pt supine in bed at start of session. Agreeable to OT tx  Orientation  Overall Orientation Status: Within Normal Limits  Orientation Level: Oriented X4  Pain: Reports pain R hip pain           Objective    Vitals  Vitals  Pulse: 64  SpO2: 93 %  O2 Device: Nasal cannula  BP: (!) 145/87  MAP (Calculated): 106  BP Location: Left upper arm  BP Method: Automatic  Patient Position: Semi fowlers  Bed  hospitalization  Education Method: Demonstration;Verbal  Barriers to Learning: None  Education Outcome: Verbalized understanding;Continued education needed    Goals  Short Term Goals  Time Frame for Short Term Goals: 1 week by 8/08/24 unless otherwise stated  Short Term Goal 1: Perform functional transfer with supervision  Short Term Goal 2: Perform toilet transfer with supervision  Short Term Goal 3: Perform LB dressing independently  Short Term Goal 4: Perform standing grooming task independently by 8/6/24  Patient Goals   Patient goals : to go home    AM-PAC - ADL  AM-PAC Daily Activity - Inpatient   How much help is needed for putting on and taking off regular lower body clothing?: A Little  How much help is needed for bathing (which includes washing, rinsing, drying)?: A Little  How much help is needed for toileting (which includes using toilet, bedpan, or urinal)?: A Little  How much help is needed for putting on and taking off regular upper body clothing?: A Little  How much help is needed for taking care of personal grooming?: A Little  How much help for eating meals?: None  AM-PAC Inpatient Daily Activity Raw Score: 19  AM-PAC Inpatient ADL T-Scale Score : 40.22  ADL Inpatient CMS 0-100% Score: 42.8  ADL Inpatient CMS G-Code Modifier : CK    Therapy Time   Individual Concurrent Group Co-treatment   Time In 1045         Time Out 1127         Minutes 42         Timed Code Treatment Minutes: 42 Minutes       If pt is unable to be seen after this session, please let this note serve as discharge summary.  Please see case management note for discharge disposition.  Thank you.      Juli Mcdowell OT

## 2024-08-02 NOTE — PROGRESS NOTES
INPATIENT PULMONARY CRITICAL CARE PROGRESS NOTE      Reason for visit    Hemoptysis    SUBJECTIVE: Patient when seen this morning was lying in complaining of pain, patient states that she is dying and she is in a lot of pain, patient was afebrile and dynamically, patient was on 5 L of nasal oxygen saturating 93%, patient urine output has not been recorded in the epic,      Physical Exam:  Blood pressure (!) 148/87, pulse 75, temperature 97.7 °F (36.5 °C), temperature source Oral, resp. rate 18, height 1.651 m (5' 5\"), weight 56.9 kg (125 lb 7.1 oz), SpO2 95 %, not currently breastfeeding.'   Constitutional:  No acute distress.   HENT:  Oropharynx is clear and moist. No thyromegaly.  Eyes:  Conjunctivae are normal. Pupils equal, round, and reactive to light. No scleral icterus.   Neck: . No tracheal deviation present. No obvious thyroid mass.   Cardiovascular: Sinus rhythm, normal heart sounds.  No right ventricular heave. No lower extremity edema.  Pulmonary/Chest: No wheezes.  Scattered rales.  Chest wall is not dull to percussion.  No accessory muscle usage or stridor.  Decreased breath sound intensity with prolonged expiration  Abdominal: Soft. Bowel sounds present. No distension or hernia. No tenderness.    Musculoskeletal: No cyanosis. No clubbing. No obvious joint deformity.   Lymphadenopathy: No cervical or supraclavicular adenopathy.   Skin: Skin is warm and dry. No rash or nodules on the exposed extremities.  Neurologic: Cry full      Results:  CBC:   Recent Labs     07/31/24  0523 08/01/24  0444 08/02/24  0529   WBC 21.0* 22.0* 22.4*   HGB 10.1* 10.2* 10.5*   HCT 31.0* 30.8* 33.0*   MCV 86.2 86.2 87.0    348 360             Imaging:  I have reviewed radiology images personally.    XR CHEST PORTABLE   Final Result   Moderate right upper lobe and apical opacity extending inferiorly into the   perihilar region which is more prominent and could represent a known   enlarging mass and/or worsening  is more prominent and a small right pleural  effusion which is more apparent.     Stable mild cardiomegaly    Lymph Node, Lymph Node 4R, Endobronchial Ultrasound Guided, Fine Needle   Aspiration:   - Positive for malignancy.   - Adenocarcinoma of the lung.     Assessment:  Principal Problem:    Hemoptysis  Active Problems:    Lung mass    Chronic obstructive pulmonary disease (HCC)    Mediastinal adenopathy    Centrilobular emphysema (HCC)    Coronary artery calcification seen on CT scan    Pulmonary hypertension (HCC)    Adenocarcinoma of right lung (HCC)  Resolved Problems:    * No resolved hospital problems. *          Plan:   Oxygen supplementation to keep saturating 90 to 94% only  Please titrate the oxygen as per the above parameters  Patient was still on 5 L of nasal oxygen  Pulmonary toilet  Patient underwent a bronchoscopy with EBUS and the results are suggestive of adenocarcinoma  Oncology consult and recommendations reviewed  Patient is continuing to be on empiric IV Levaquin  Patient still has leukocytosis  Bronchodilators  Continue IV Solu-Medrol  Patient does have hyponatremia with pulmonary hypertension  Status post Lasix  Strict input output charting-orders placed  Monitor input output and BMP  Correct electrolytes and whenever necessary basis  Patient wants pain medication and also something for anxiety-to decide upon that  PUD and DVT prophylaxis as per primary team      Further management depending on patient clinical status patient's decision about course of care    Case discussed with patient and nursing    ?Hospice consult       No other recommendations from pulmonary/critical care standpoint               Electronically signed by:  BELEN TORRES MD    8/2/2024    8:02 AM.

## 2024-08-03 PROCEDURE — 6370000000 HC RX 637 (ALT 250 FOR IP): Performed by: NURSE PRACTITIONER

## 2024-08-03 PROCEDURE — 6360000002 HC RX W HCPCS: Performed by: INTERNAL MEDICINE

## 2024-08-03 PROCEDURE — 2580000003 HC RX 258: Performed by: INTERNAL MEDICINE

## 2024-08-03 PROCEDURE — 6370000000 HC RX 637 (ALT 250 FOR IP): Performed by: INTERNAL MEDICINE

## 2024-08-03 PROCEDURE — 2700000000 HC OXYGEN THERAPY PER DAY

## 2024-08-03 PROCEDURE — 1200000000 HC SEMI PRIVATE

## 2024-08-03 PROCEDURE — 6360000002 HC RX W HCPCS: Performed by: STUDENT IN AN ORGANIZED HEALTH CARE EDUCATION/TRAINING PROGRAM

## 2024-08-03 PROCEDURE — 94640 AIRWAY INHALATION TREATMENT: CPT

## 2024-08-03 PROCEDURE — 94761 N-INVAS EAR/PLS OXIMETRY MLT: CPT

## 2024-08-03 RX ORDER — ALBUTEROL SULFATE 2.5 MG/3ML
2.5 SOLUTION RESPIRATORY (INHALATION)
Status: DISCONTINUED | OUTPATIENT
Start: 2024-08-03 | End: 2024-08-04

## 2024-08-03 RX ADMIN — SODIUM CHLORIDE, PRESERVATIVE FREE 10 ML: 5 INJECTION INTRAVENOUS at 20:56

## 2024-08-03 RX ADMIN — QUETIAPINE FUMARATE 50 MG: 50 TABLET, EXTENDED RELEASE ORAL at 20:55

## 2024-08-03 RX ADMIN — TRAMADOL HYDROCHLORIDE 100 MG: 50 TABLET ORAL at 20:56

## 2024-08-03 RX ADMIN — BACLOFEN 5 MG: 10 TABLET ORAL at 20:55

## 2024-08-03 RX ADMIN — SODIUM CHLORIDE, PRESERVATIVE FREE 10 ML: 5 INJECTION INTRAVENOUS at 09:54

## 2024-08-03 RX ADMIN — TRAMADOL HYDROCHLORIDE 100 MG: 50 TABLET ORAL at 09:49

## 2024-08-03 RX ADMIN — ALBUTEROL SULFATE 2.5 MG: 2.5 SOLUTION RESPIRATORY (INHALATION) at 10:12

## 2024-08-03 RX ADMIN — ALBUTEROL SULFATE 2.5 MG: 2.5 SOLUTION RESPIRATORY (INHALATION) at 16:12

## 2024-08-03 RX ADMIN — MONTELUKAST 10 MG: 10 TABLET, FILM COATED ORAL at 20:55

## 2024-08-03 RX ADMIN — Medication 2 PUFF: at 08:22

## 2024-08-03 RX ADMIN — HYDROXYZINE HYDROCHLORIDE 10 MG: 10 TABLET ORAL at 09:49

## 2024-08-03 RX ADMIN — LEVOFLOXACIN 500 MG: 5 INJECTION, SOLUTION INTRAVENOUS at 20:59

## 2024-08-03 RX ADMIN — Medication 2 PUFF: at 20:00

## 2024-08-03 RX ADMIN — ALBUTEROL SULFATE 2.5 MG: 2.5 SOLUTION RESPIRATORY (INHALATION) at 20:00

## 2024-08-03 RX ADMIN — WATER 40 MG: 1 INJECTION INTRAMUSCULAR; INTRAVENOUS; SUBCUTANEOUS at 09:52

## 2024-08-03 RX ADMIN — HYDROXYZINE HYDROCHLORIDE 10 MG: 10 TABLET ORAL at 20:55

## 2024-08-03 RX ADMIN — PRAVASTATIN SODIUM 40 MG: 40 TABLET ORAL at 09:49

## 2024-08-03 RX ADMIN — OXYBUTYNIN CHLORIDE 5 MG: 5 TABLET, EXTENDED RELEASE ORAL at 21:02

## 2024-08-03 RX ADMIN — ALBUTEROL SULFATE 2.5 MG: 2.5 SOLUTION RESPIRATORY (INHALATION) at 12:07

## 2024-08-03 ASSESSMENT — PAIN SCALES - GENERAL
PAINLEVEL_OUTOF10: 10
PAINLEVEL_OUTOF10: 0
PAINLEVEL_OUTOF10: 10
PAINLEVEL_OUTOF10: 10

## 2024-08-03 ASSESSMENT — PAIN DESCRIPTION - LOCATION
LOCATION: ARM
LOCATION: CHEST

## 2024-08-03 ASSESSMENT — PAIN DESCRIPTION - ORIENTATION
ORIENTATION: RIGHT
ORIENTATION: RIGHT

## 2024-08-03 ASSESSMENT — PAIN DESCRIPTION - DESCRIPTORS
DESCRIPTORS: ACHING;DISCOMFORT
DESCRIPTORS: ACHING;DISCOMFORT

## 2024-08-03 ASSESSMENT — PAIN SCALES - WONG BAKER: WONGBAKER_NUMERICALRESPONSE: NO HURT

## 2024-08-03 ASSESSMENT — PAIN - FUNCTIONAL ASSESSMENT: PAIN_FUNCTIONAL_ASSESSMENT: ACTIVITIES ARE NOT PREVENTED

## 2024-08-03 NOTE — PLAN OF CARE
Problem: Discharge Planning  Goal: Discharge to home or other facility with appropriate resources  8/3/2024 0428 by Alexandra Latham RN  Outcome: Progressing  8/3/2024 0000 by Alexandra Latham RN  Outcome: Progressing  Flowsheets (Taken 8/2/2024 2124)  Discharge to home or other facility with appropriate resources: Identify barriers to discharge with patient and caregiver   Continuing to work with patient and health care team on discharge plan. Discharge instructions and medication management will be reviewed prior to discharge.    Problem: Pain  Goal: Verbalizes/displays adequate comfort level or baseline comfort level  8/3/2024 0428 by Alexandra Latham RN  Outcome: Progressing  8/3/2024 0000 by Alexandra Latham RN  Outcome: Progressing  Flowsheets (Taken 8/2/2024 2126)  Verbalizes/displays adequate comfort level or baseline comfort level: Encourage patient to monitor pain and request assistance   Pt able to express presence/absence of pain and rate pain appropriately using numerical scale. Pain/discomfort being managed with PRN analgesics per MD orders (see MAR). Pain assessed every shift and after interventions.    Problem: Skin/Tissue Integrity  Goal: Absence of new skin breakdown  Description: 1.  Monitor for areas of redness and/or skin breakdown  2.  Assess vascular access sites hourly  3.  Every 4-6 hours minimum:  Change oxygen saturation probe site  4.  Every 4-6 hours:  If on nasal continuous positive airway pressure, respiratory therapy assess nares and determine need for appliance change or resting period.  8/3/2024 0428 by Alexandra Latham RN  Outcome: Progressing  8/3/2024 0000 by Alexandra Latham RN  Outcome: Progressing   Skin assessment performed each shift per protocol.  Patient turned and repositioned every two hours and prn with pillow support. Patient checked for incontence every two hours.   '  Problem: Safety - Adult  Goal: Free from fall injury  8/3/2024 0428 by Alexandra Latham  RN  Outcome: Progressing  Flowsheets (Taken 8/3/2024 0019)  Free From Fall Injury: Instruct family/caregiver on patient safety  8/3/2024 0000 by Alexandra Latham RN  Outcome: Progressing   Pt free from falls this shift. Fall precautions in place at all times. Call light always within reach. Pt able and agreeable to contact for safety appropriately.   Problem: ABCDS Injury Assessment  Goal: Absence of physical injury  8/3/2024 0428 by Alexandra Latham, RN  Outcome: Progressing  Flowsheets (Taken 8/3/2024 0019)  Absence of Physical Injury: Implement safety measures based on patient assessment  8/3/2024 0000 by Alexandra Latham, RN  Outcome: Progressing     Problem: Nutrition Deficit:  Goal: Optimize nutritional status  8/3/2024 0428 by Alexandra Latham, RN  Outcome: Progressing  8/3/2024 0000 by Alexandra Latham, RN  Outcome: Progressing

## 2024-08-03 NOTE — PROGRESS NOTES
08/03/24 1013   Treatment   Treatment Type HHN   $Treatment Type $Inhaled Therapy/Meds   Medications Albuterol   Pre-Tx Pulse 80   Pre-Tx Resps 28   Breath Sounds Pre-Tx ZABRINA Coarse crackles;End expiratory wheezes   Breath Sounds Pre-Tx LLL Coarse crackles;End expiratory wheezes   Breath Sounds Pre-Tx RUL Coarse crackles;End Expiratory wheezes   Breath Sounds Pre-Tx RML Coarse crackles;End expiratory wheezes   Breath Sounds Pre-Tx RLL Coarse crackles;Inspiratory wheezes   Breath Sounds Post-Tx ZABRINA Coarse crackles;Inspiratory wheezes   Breath Sounds Post-Tx LLL Coarse crackles;Inspiratory wheezes   Breath Sounds Post-Tx RUL Coarse crackles;Inspiratory wheezes   Breath Sounds Post-Tx RML Coarse crackles;Inspiratory wheezes   Breath Sounds Post-Tx RLL Coarse crackles;Inspiratory wheezes   Post-Tx Pulse 76   Post-Tx Resps 22   Delivery Source Air   Position Semi-Venita's   Treatment Tolerance Well   Oxygen Therapy/Pulse Ox   O2 Therapy Oxygen   O2 Flow Rate (L/min) 5 L/min   Pulse 86   SpO2 94 %

## 2024-08-03 NOTE — PLAN OF CARE
Problem: Discharge Planning  Goal: Discharge to home or other facility with appropriate resources  8/3/2024 1814 by Radha Goldsmith RN  Outcome: Progressing  8/3/2024 0428 by Alexandra Latham RN  Outcome: Progressing     Problem: Pain  Goal: Verbalizes/displays adequate comfort level or baseline comfort level  8/3/2024 1814 by Radha Goldsmith RN  Outcome: Progressing  8/3/2024 0428 by Alexandra Latham RN  Outcome: Progressing     Problem: Skin/Tissue Integrity  Goal: Absence of new skin breakdown  Description: 1.  Monitor for areas of redness and/or skin breakdown  2.  Assess vascular access sites hourly  3.  Every 4-6 hours minimum:  Change oxygen saturation probe site  4.  Every 4-6 hours:  If on nasal continuous positive airway pressure, respiratory therapy assess nares and determine need for appliance change or resting period.  8/3/2024 1814 by Radha Goldsmith RN  Outcome: Progressing  8/3/2024 0428 by Alexandra Latham RN  Outcome: Progressing     Problem: Safety - Adult  Goal: Free from fall injury  8/3/2024 1814 by Radha Goldsmith RN  Outcome: Progressing  8/3/2024 0428 by Alexandra Latham RN  Outcome: Progressing  Flowsheets (Taken 8/3/2024 0019)  Free From Fall Injury: Instruct family/caregiver on patient safety     Problem: ABCDS Injury Assessment  Goal: Absence of physical injury  8/3/2024 1814 by Radha Goldsmith RN  Outcome: Progressing  8/3/2024 0428 by Alxeandra Latham RN  Outcome: Progressing  Flowsheets (Taken 8/3/2024 0019)  Absence of Physical Injury: Implement safety measures based on patient assessment     Problem: Nutrition Deficit:  Goal: Optimize nutritional status  8/3/2024 1814 by Radha Goldsmith RN  Outcome: Progressing  8/3/2024 0428 by Alexandra Latham RN  Outcome: Progressing

## 2024-08-03 NOTE — PLAN OF CARE
Problem: Discharge Planning  Goal: Discharge to home or other facility with appropriate resources  8/3/2024 0000 by Alexandra Latham RN  Outcome: Progressing  8/2/2024 1226 by Amilcar Sanchez RN  Outcome: Progressing   ,Continuing to work with patient and health care team on discharge plan. Discharge instructions and medication management will be reviewed prior to discharge.    Problem: Pain  Goal: Verbalizes/displays adequate comfort level or baseline comfort level  8/3/2024 0000 by Alexandra Latham RN  Outcome: Progressing  Flowsheets (Taken 8/2/2024 2126)  Verbalizes/displays adequate comfort level or baseline comfort level: Encourage patient to monitor pain and request assistance  8/2/2024 1226 by Amilcar Sanchez RN  Outcome: Progressing   Pt able to express presence/absence of pain and rate pain appropriately using numerical scale. Pain/discomfort being managed with PRN analgesics per MD orders (see MAR). Pain assessed every shift and after interventions.   Problem: Skin/Tissue Integrity  Goal: Absence of new skin breakdown  Description: 1.  Monitor for areas of redness and/or skin breakdown  2.  Assess vascular access sites hourly  3.  Every 4-6 hours minimum:  Change oxygen saturation probe site  4.  Every 4-6 hours:  If on nasal continuous positive airway pressure, respiratory therapy assess nares and determine need for appliance change or resting period.  8/3/2024 0000 by Alexandra Latham RN  Outcome: Progressing  8/2/2024 1226 by Amilcar Sanchez RN  Outcome: Progressing   Skin assessment performed each shift per protocol.  Patient turned and repositioned every two hours and prn with pillow support. Patient checked for incontence every two hours.    Problem: Safety - Adult  Goal: Free from fall injury  8/3/2024 0000 by Alexandra Latham, RN  Outcome: Progressing  8/2/2024 1226 by Amilcar Sanchez RN  Outcome: Progressing  Flowsheets (Taken 8/2/2024 0428 by Alexandra Latham RN)  Free From Fall Injury: Instruct  family/caregiver on patient safety   Pt free from falls this shift. Fall precautions in place at all times. Call light always within reach. Pt able and agreeable to contact for safety appropriately.   Problem: ABCDS Injury Assessment  Goal: Absence of physical injury  8/3/2024 0000 by Alexandra Latham, RN  Outcome: Progressing  8/2/2024 1226 by Amilcar Sanchez, RN  Outcome: Progressing  Flowsheets (Taken 8/2/2024 0428 by Alexandra Latham, RN)  Absence of Physical Injury: Implement safety measures based on patient assessment     Problem: Nutrition Deficit:  Goal: Optimize nutritional status  8/3/2024 0000 by Alexandra Latham, RN  Outcome: Progressing  8/2/2024 1226 by Amilcar Sanchez, RN  Outcome: Progressing

## 2024-08-03 NOTE — FLOWSHEET NOTE
Pt slept very well overnight. Denies pain or distress. Coughing up phlegm with no blood observable. Up with SBA and use of walker and does very well. Remains on 5L O2. All fall precautions in place. Bed alarm on. Call light in reach.

## 2024-08-03 NOTE — RT PROTOCOL NOTE
RT Inhaler-Nebulizer Bronchodilator Protocol Note    There is a bronchodilator order in the chart from a provider indicating to follow the RT Bronchodilator Protocol and there is an “Initiate RT Inhaler-Nebulizer Bronchodilator Protocol” order as well (see protocol at bottom of note).    CXR Findings:  No results found.    The findings from the last RT Protocol Assessment were as follows:   History Pulmonary Disease: Chronic pulmonary disease (Dx of lung CA)  Respiratory Pattern: Use of accessory muscles, prolonged exhalation, or RR 26-30 bpm  Breath Sounds: Severe inspiratory and expiratory wheezing or severely diminished  Cough: Strong, spontaneous, non-productive  Indication for Bronchodilator Therapy: On home bronchodilators, Unable to speak in sentences  Bronchodilator Assessment Score: 16    After conversation with Erica letting him know patient wants nebs scheduled. Also educated patient that nebs would be only q4 at the most due to possible cardiac side effects that could worsen breathing. Patient with Adenocarcinoma of the lung and is very sob and also coarse and wheezy.    Aerosolized bronchodilator medication orders have been revised according to the RT Inhaler-Nebulizer Bronchodilator Protocol below.    Respiratory Therapist to perform RT Therapy Protocol Assessment initially then follow the protocol.  Repeat RT Therapy Protocol Assessment PRN for score 0-3 or on second treatment, BID, and PRN for scores above 3.    No Indications - adjust the frequency to every 6 hours PRN wheezing or bronchospasm, if no treatments needed after 48 hours then discontinue using Per Protocol order mode.     If indication present, adjust the RT bronchodilator orders based on the Bronchodilator Assessment Score as indicated below.  Use Inhaler orders unless patient has one or more of the following: on home nebulizer, not able to hold breath for 10 seconds, is not alert and oriented, cannot activate and use MDI correctly, or

## 2024-08-03 NOTE — PROGRESS NOTES
Hospital Medicine Progress Note      Date of Admission: 7/25/2024  Hospital Day: 10        Chief Admission Complaint:  hemoptysis     Subjective:  resting in  bed, family not currently at bedside, aware of needing hospice/SNF ,  notes abd pain (however nurse just gave pain meds)     Presenting Admission History:          79 y.o. female who presented to [facility] with hemoptysis.  PMHx significant for HTN, COPD, lung mass, who was recently hospitalized here for Pneumonia. She was discharged on course of Levaquin, which has been completed. During recent admission, Oncology was consulted for enlarging lung mass and likely post-obstructive Pneumonia, to re-establish care (previously biopsy negative in 4/2023). However, patient declined further evaluation or Pulmonology consultation at that time.      She now reports 1-2 days of worsening hemoptysis, occasionally with large clots. Presented to the ED for evaluation. Last dose of Xarelto was 7/24. Hemoptysis seemed to be slowing since arrival in ED. Minimal SOB/hypoxia. CXR stable.      Assessment/Plan:       Current Principal Problem:  Hemoptysis       Hemoptysis in setting of recent Pneumonia and enlarging RUL Lung Mass:   During recent admission, Oncology was consulted for enlarging lung mass and likely post-obstructive Pneumonia, to re-establish care (previously biopsy negative in 4/2023). However, patient declined further evaluation or Pulmonology consultation at that time. Empiric Abx were completed. She now returns with mild to moderate hemoptysis. She is quite anxious about the bleeding and states she suspects its the lung cancer. I reviewed with her the documentation from last hospitalization regarding declining further investigation of the lung mass; she does not recall saying this (somewhat limited historian). However, she now wishes to have Pulmonology consulted and proceed with additional testing for the lung mass. Continue to hold  Xarelto.  -s/p    Component Value Date/Time    ORG Leslie albicans 12/17/2023 09:13 PM         Musa Ly MD

## 2024-08-04 ENCOUNTER — APPOINTMENT (OUTPATIENT)
Dept: GENERAL RADIOLOGY | Age: 79
DRG: 181 | End: 2024-08-04
Payer: MEDICARE

## 2024-08-04 LAB
ALBUMIN SERPL-MCNC: 3 G/DL (ref 3.4–5)
ANION GAP SERPL CALCULATED.3IONS-SCNC: 11 MMOL/L (ref 3–16)
BASOPHILS # BLD: 0 K/UL (ref 0–0.2)
BASOPHILS NFR BLD: 0.1 %
BUN SERPL-MCNC: 12 MG/DL (ref 7–20)
CALCIUM SERPL-MCNC: 8.7 MG/DL (ref 8.3–10.6)
CHLORIDE SERPL-SCNC: 94 MMOL/L (ref 99–110)
CO2 SERPL-SCNC: 27 MMOL/L (ref 21–32)
CREAT SERPL-MCNC: <0.5 MG/DL (ref 0.6–1.2)
DEPRECATED RDW RBC AUTO: 14.9 % (ref 12.4–15.4)
EOSINOPHIL # BLD: 0 K/UL (ref 0–0.6)
EOSINOPHIL NFR BLD: 0.1 %
GFR SERPLBLD CREATININE-BSD FMLA CKD-EPI: >90 ML/MIN/{1.73_M2}
GLUCOSE SERPL-MCNC: 119 MG/DL (ref 70–99)
HCT VFR BLD AUTO: 32.2 % (ref 36–48)
HGB BLD-MCNC: 10.3 G/DL (ref 12–16)
LYMPHOCYTES # BLD: 1.4 K/UL (ref 1–5.1)
LYMPHOCYTES NFR BLD: 5.1 %
MAGNESIUM SERPL-MCNC: 2.1 MG/DL (ref 1.8–2.4)
MCH RBC QN AUTO: 27.6 PG (ref 26–34)
MCHC RBC AUTO-ENTMCNC: 32 G/DL (ref 31–36)
MCV RBC AUTO: 86.3 FL (ref 80–100)
MONOCYTES # BLD: 1.3 K/UL (ref 0–1.3)
MONOCYTES NFR BLD: 4.7 %
NEUTROPHILS # BLD: 25.3 K/UL (ref 1.7–7.7)
NEUTROPHILS NFR BLD: 90 %
PHOSPHATE SERPL-MCNC: 3.8 MG/DL (ref 2.5–4.9)
PLATELET # BLD AUTO: 318 K/UL (ref 135–450)
PMV BLD AUTO: 9.6 FL (ref 5–10.5)
POTASSIUM SERPL-SCNC: 3.5 MMOL/L (ref 3.5–5.1)
RBC # BLD AUTO: 3.73 M/UL (ref 4–5.2)
SODIUM SERPL-SCNC: 132 MMOL/L (ref 136–145)
WBC # BLD AUTO: 28.1 K/UL (ref 4–11)

## 2024-08-04 PROCEDURE — 94761 N-INVAS EAR/PLS OXIMETRY MLT: CPT

## 2024-08-04 PROCEDURE — 94640 AIRWAY INHALATION TREATMENT: CPT

## 2024-08-04 PROCEDURE — 6370000000 HC RX 637 (ALT 250 FOR IP): Performed by: INTERNAL MEDICINE

## 2024-08-04 PROCEDURE — 85025 COMPLETE CBC W/AUTO DIFF WBC: CPT

## 2024-08-04 PROCEDURE — 2580000003 HC RX 258: Performed by: INTERNAL MEDICINE

## 2024-08-04 PROCEDURE — 6360000002 HC RX W HCPCS: Performed by: INTERNAL MEDICINE

## 2024-08-04 PROCEDURE — 83735 ASSAY OF MAGNESIUM: CPT

## 2024-08-04 PROCEDURE — 71045 X-RAY EXAM CHEST 1 VIEW: CPT

## 2024-08-04 PROCEDURE — 1200000000 HC SEMI PRIVATE

## 2024-08-04 PROCEDURE — 6370000000 HC RX 637 (ALT 250 FOR IP): Performed by: NURSE PRACTITIONER

## 2024-08-04 PROCEDURE — 80069 RENAL FUNCTION PANEL: CPT

## 2024-08-04 PROCEDURE — 36415 COLL VENOUS BLD VENIPUNCTURE: CPT

## 2024-08-04 PROCEDURE — 2700000000 HC OXYGEN THERAPY PER DAY

## 2024-08-04 RX ORDER — ALBUTEROL SULFATE 2.5 MG/3ML
2.5 SOLUTION RESPIRATORY (INHALATION) EVERY 4 HOURS
Status: DISCONTINUED | OUTPATIENT
Start: 2024-08-04 | End: 2024-08-05 | Stop reason: HOSPADM

## 2024-08-04 RX ORDER — QUETIAPINE FUMARATE 50 MG/1
50 TABLET, EXTENDED RELEASE ORAL NIGHTLY
Qty: 30 TABLET | Refills: 0 | Status: ON HOLD
Start: 2024-08-04 | End: 2024-08-10

## 2024-08-04 RX ORDER — TRAMADOL HYDROCHLORIDE 50 MG/1
50 TABLET ORAL EVERY 6 HOURS PRN
Qty: 9 TABLET | Refills: 0 | Status: SHIPPED | OUTPATIENT
Start: 2024-08-04 | End: 2024-08-07

## 2024-08-04 RX ORDER — HYDROXYZINE HYDROCHLORIDE 10 MG/1
10 TABLET, FILM COATED ORAL 2 TIMES DAILY PRN
Qty: 10 TABLET | Refills: 0
Start: 2024-08-04 | End: 2024-08-09

## 2024-08-04 RX ORDER — LEVOFLOXACIN 250 MG/1
500 TABLET, FILM COATED ORAL DAILY
Qty: 6 TABLET | Refills: 0
Start: 2024-08-04 | End: 2024-08-07

## 2024-08-04 RX ORDER — SACCHAROMYCES BOULARDII 250 MG
250 CAPSULE ORAL 2 TIMES DAILY
Qty: 14 CAPSULE | Refills: 0
Start: 2024-08-04 | End: 2024-08-11

## 2024-08-04 RX ADMIN — BACLOFEN 5 MG: 10 TABLET ORAL at 19:50

## 2024-08-04 RX ADMIN — QUETIAPINE FUMARATE 50 MG: 50 TABLET, EXTENDED RELEASE ORAL at 19:50

## 2024-08-04 RX ADMIN — ALBUTEROL SULFATE 2.5 MG: 2.5 SOLUTION RESPIRATORY (INHALATION) at 02:03

## 2024-08-04 RX ADMIN — ALBUTEROL SULFATE 2.5 MG: 2.5 SOLUTION RESPIRATORY (INHALATION) at 16:21

## 2024-08-04 RX ADMIN — Medication 2 PUFF: at 08:02

## 2024-08-04 RX ADMIN — ALBUTEROL SULFATE 2.5 MG: 2.5 SOLUTION RESPIRATORY (INHALATION) at 11:28

## 2024-08-04 RX ADMIN — Medication 2 PUFF: at 20:16

## 2024-08-04 RX ADMIN — PRAVASTATIN SODIUM 40 MG: 40 TABLET ORAL at 09:35

## 2024-08-04 RX ADMIN — SODIUM CHLORIDE, PRESERVATIVE FREE 10 ML: 5 INJECTION INTRAVENOUS at 09:36

## 2024-08-04 RX ADMIN — WATER 40 MG: 1 INJECTION INTRAMUSCULAR; INTRAVENOUS; SUBCUTANEOUS at 09:36

## 2024-08-04 RX ADMIN — HYDROXYZINE HYDROCHLORIDE 10 MG: 10 TABLET ORAL at 09:35

## 2024-08-04 RX ADMIN — LEVOFLOXACIN 500 MG: 5 INJECTION, SOLUTION INTRAVENOUS at 19:52

## 2024-08-04 RX ADMIN — ALBUTEROL SULFATE 2.5 MG: 2.5 SOLUTION RESPIRATORY (INHALATION) at 04:23

## 2024-08-04 RX ADMIN — ALBUTEROL SULFATE 2.5 MG: 2.5 SOLUTION RESPIRATORY (INHALATION) at 20:15

## 2024-08-04 RX ADMIN — MONTELUKAST 10 MG: 10 TABLET, FILM COATED ORAL at 19:50

## 2024-08-04 RX ADMIN — ALBUTEROL SULFATE 2.5 MG: 2.5 SOLUTION RESPIRATORY (INHALATION) at 08:02

## 2024-08-04 RX ADMIN — HYDROXYZINE HYDROCHLORIDE 10 MG: 10 TABLET ORAL at 19:50

## 2024-08-04 RX ADMIN — OXYBUTYNIN CHLORIDE 5 MG: 5 TABLET, EXTENDED RELEASE ORAL at 19:50

## 2024-08-04 ASSESSMENT — PAIN SCALES - WONG BAKER
WONGBAKER_NUMERICALRESPONSE: NO HURT

## 2024-08-04 ASSESSMENT — PAIN SCALES - GENERAL
PAINLEVEL_OUTOF10: 0
PAINLEVEL_OUTOF10: 0

## 2024-08-04 NOTE — FLOWSHEET NOTE
Pt having increased confusion. Pt asking how she got here, why is she here, who brought her and what is wrong with her. Pt was told several times, but just does not remember. Paper was written for pt with the date she came in and why she is here to help remember. Easily agitated. On call light numerous times. Trying to call people in her address book. Staff going into pt's room every few minutes for various needs.

## 2024-08-04 NOTE — PROGRESS NOTES
Patient acting like she does not know where she is, what is going on, says she has no idea that anything medically is wrong with her. Wants to speak with her children, they will not answer her. Told her where she was, why she was here, etc, but she did not like our answers, and she was going to call the police.     Rns left room, and notified that pt called 911. Security aware. Went to speak to patient 20 min after first encounter, still saying she does not know what's going on, O2 was out and O2 was in low 80s. Placed back on 6L High flow with stats returning to 90%.     Patient continuously calling out multiple times, being extremely rude to staff. She is trying to call her home phone number and family, and multiple other people in her address book.     Patient has been A/Ox4 all day, but this bout of confusion seems to be a pattern for her (same instance overnight). Calling family with update at this time.     1530 - attempted to call daughter, no answer.

## 2024-08-04 NOTE — DISCHARGE INSTR - COC
Continuity of Care Form    Patient Name: Isa Hernandez   :  1945  MRN:  [id number]    Admit date:  2024  Discharge date:  24    Code Status Order: Full Code   Advance Directives:   Advance Care Flowsheet Documentation       Date/Time Healthcare Directive Type of Healthcare Directive Copy in Chart Healthcare Agent Appointed Healthcare Agent's Name Healthcare Agent's Phone Number    24 0932 No, patient does not have an advance directive for healthcare treatment -- -- -- -- --            Admitting Physician:  Neal Gandara MD  PCP: Jocelin Ellsworth MD    Discharging Nurse: Estrellita Rahmna RN  Discharging Hospital Unit/Room#: 0542/0542-01  Discharging Unit Phone Number: [phone]    Emergency Contact:   Extended Emergency Contact Information  Primary Emergency Contact: Barney Hernandez   [facility]  Home Phone: [phone]  Mobile Phone: [phone]  Relation: Child  Secondary Emergency Contact: Patrick Hernandez  Home Phone: [phone]  Relation: Child    Past Surgical History:  Past Surgical History:   Procedure Laterality Date    APPENDECTOMY      BACK SURGERY      BRONCHOSCOPY      BRONCHOSCOPY N/A 2024    BRONCHOSCOPY ENDOBRONCHIAL ULTRASOUND performed by Salvador Colunga MD at [facility] ENDOSCOPY    BRONCHOSCOPY N/A 2024    BRONCHOSCOPY performed by Salvador Colunga MD at [facility] ENDOSCOPY    COLONOSCOPY  11/15/2012    1 snared polyp    CT NEEDLE BIOPSY LUNG PERCUTANEOUS  2023    CT NEEDLE BIOPSY LUNG PERCUTANEOUS 2023 [facility] [place] SCAN    HYSTERECTOMY (CERVIX STATUS UNKNOWN)      PACEMAKER INSERTION      TONSILLECTOMY      UPPER GASTROINTESTINAL ENDOSCOPY N/A 5/15/2024    ESOPHAGOGASTRODUODENOSCOPY BIOPSY performed by Kalyan Grullon MD at [facility] ENDOSCOPY       Immunization History:   Immunization History   Administered Date(s) Administered    COVID-19, MODERNA Booster BLUE border, (age 18y+), IM, 50mcg/0.25mL 2021    COVID-19, PFIZER

## 2024-08-04 NOTE — PLAN OF CARE
Problem: Discharge Planning  Goal: Discharge to home or other facility with appropriate resources  8/3/2024 2258 by Alexandra Latham RN  Outcome: Progressing  8/3/2024 1814 by Radha Goldsmith RN  Outcome: Progressing   Continuing to work with patient and health care team on discharge plan. Discharge instructions and medication management will be reviewed prior to discharge.    Problem: Pain  Goal: Verbalizes/displays adequate comfort level or baseline comfort level  8/3/2024 2258 by Alexandra Latham RN  Outcome: Progressing  Flowsheets (Taken 8/3/2024 2049)  Verbalizes/displays adequate comfort level or baseline comfort level: Encourage patient to monitor pain and request assistance  8/3/2024 1814 by Radha Goldsmith RN  Outcome: Progressing   Pt able to express presence/absence of pain and rate pain appropriately using numerical scale. Pain/discomfort being managed with PRN analgesics per MD orders (see MAR). Pain assessed every shift and after interventions.    Problem: Skin/Tissue Integrity  Goal: Absence of new skin breakdown  Description: 1.  Monitor for areas of redness and/or skin breakdown  2.  Assess vascular access sites hourly  3.  Every 4-6 hours minimum:  Change oxygen saturation probe site  4.  Every 4-6 hours:  If on nasal continuous positive airway pressure, respiratory therapy assess nares and determine need for appliance change or resting period.  8/3/2024 2258 by Alexandra Latham RN  Outcome: Progressing  8/3/2024 1814 by Radha Goldsmith RN  Outcome: Progressing   Skin assessment performed each shift per protocol.  Patient turned and repositioned every two hours and prn with pillow support. Patient checked for incontence every two hours.     Problem: Safety - Adult  Goal: Free from fall injury  8/3/2024 2258 by Alexandra Latham RN  Outcome: Progressing  8/3/2024 1814 by Radha Goldsmith RN  Outcome: Progressing   Pt free from falls this shift. Fall precautions in place at all times. Call light always

## 2024-08-04 NOTE — PROGRESS NOTES
Cousins at bedside, requesting help with advance directives. Spiritual service consult placed for Monday.

## 2024-08-04 NOTE — PLAN OF CARE
Problem: Discharge Planning  Goal: Discharge to home or other facility with appropriate resources  8/4/2024 1030 by Radha Goldsmith RN  Outcome: Progressing  Flowsheets (Taken 8/4/2024 0930)  Discharge to home or other facility with appropriate resources: Identify barriers to discharge with patient and caregiver  8/3/2024 2258 by Alexandra Latham RN  Outcome: Progressing  Flowsheets (Taken 8/3/2024 2049)  Discharge to home or other facility with appropriate resources: Identify barriers to discharge with patient and caregiver     Problem: Pain  Goal: Verbalizes/displays adequate comfort level or baseline comfort level  8/4/2024 1030 by Radha Goldsmith RN  Outcome: Progressing  8/3/2024 2258 by Alexandra Latham RN  Outcome: Progressing  Flowsheets (Taken 8/3/2024 2049)  Verbalizes/displays adequate comfort level or baseline comfort level: Encourage patient to monitor pain and request assistance     Problem: Skin/Tissue Integrity  Goal: Absence of new skin breakdown  Description: 1.  Monitor for areas of redness and/or skin breakdown  2.  Assess vascular access sites hourly  3.  Every 4-6 hours minimum:  Change oxygen saturation probe site  4.  Every 4-6 hours:  If on nasal continuous positive airway pressure, respiratory therapy assess nares and determine need for appliance change or resting period.  8/4/2024 1030 by Radha Goldsmith RN  Outcome: Progressing  8/3/2024 2258 by Alexandra Latham RN  Outcome: Progressing     Problem: Safety - Adult  Goal: Free from fall injury  8/4/2024 1030 by Radha Goldsmith RN  Outcome: Progressing  Flowsheets (Taken 8/3/2024 2309 by Alexandra Latham RN)  Free From Fall Injury: Instruct family/caregiver on patient safety  8/3/2024 2258 by Alexandra Latham RN  Outcome: Progressing     Problem: ABCDS Injury Assessment  Goal: Absence of physical injury  8/4/2024 1030 by Radha Goldsmith RN  Outcome: Progressing  Flowsheets (Taken 8/3/2024 2309 by Alexandra Latham RN)  Absence of Physical  Injury: Implement safety measures based on patient assessment  8/3/2024 2258 by Alexandra Latham, RN  Outcome: Progressing     Problem: Nutrition Deficit:  Goal: Optimize nutritional status  8/4/2024 1030 by Radha Goldsmith, RN  Outcome: Progressing  8/3/2024 2258 by Alexandra Latham, RN  Outcome: Progressing

## 2024-08-04 NOTE — PROGRESS NOTES
Case management notified that pt is still on 6L high flow NC, coughing up bloody sputum, IV steroids and IV abx. SNF can't take patient unless on 5L or less. Currently at 90% on 6L. Will wean as able. Spoke to Dr. Ly, he cancelled DC for today, pulmonology has signed off case per Dr. Maldonado 8/4.     Plan is [facility] at discharge?

## 2024-08-04 NOTE — PROGRESS NOTES
Hospital Medicine Progress Note      Date of Admission: 7/25/2024  Hospital Day: 11      Chief Admission Complaint:  hemoptysis     Subjective:  resting in  bed, family not currently at bedside, aware of needing hospice/SNF ,  noted to have some coughing up blood and on 6L HFNC now per pt's nurse     Presenting Admission History:          79 y.o. female who presented to [facility] with hemoptysis.  PMHx significant for HTN, COPD, lung mass, who was recently hospitalized here for Pneumonia. She was discharged on course of Levaquin, which has been completed. During recent admission, Oncology was consulted for enlarging lung mass and likely post-obstructive Pneumonia, to re-establish care (previously biopsy negative in 4/2023). However, patient declined further evaluation or Pulmonology consultation at that time.      She now reports 1-2 days of worsening hemoptysis, occasionally with large clots. Presented to the ED for evaluation. Last dose of Xarelto was 7/24. Hemoptysis seemed to be slowing since arrival in ED. Minimal SOB/hypoxia. CXR stable.      Assessment/Plan:       Current Principal Problem:  Hemoptysis       Hemoptysis in setting of recent Pneumonia and enlarging RUL Lung Mass:   During recent admission, Oncology was consulted for enlarging lung mass and likely post-obstructive Pneumonia, to re-establish care (previously biopsy negative in 4/2023). However, patient declined further evaluation or Pulmonology consultation at that time. Empiric Abx were completed. She now returns with mild to moderate hemoptysis. She is quite anxious about the bleeding and states she suspects its the lung cancer. I reviewed with her the documentation from last hospitalization regarding declining further investigation of the lung mass; she does not recall saying this (somewhat limited historian). However, she now wishes to have Pulmonology consulted and proceed with additional testing for the lung mass. Continue to hold

## 2024-08-04 NOTE — FLOWSHEET NOTE
Pt's son, Barney, was called and advised of pt being confused and wanting family to stay overnight. Pt states family is tired and unable to come in tonight. Pt advised family is tired and she needs to try to rest too.  All needs met at this time. Call light in reach.

## 2024-08-05 VITALS
RESPIRATION RATE: 18 BRPM | TEMPERATURE: 97.5 F | DIASTOLIC BLOOD PRESSURE: 80 MMHG | WEIGHT: 125.44 LBS | BODY MASS INDEX: 20.9 KG/M2 | HEART RATE: 76 BPM | HEIGHT: 65 IN | SYSTOLIC BLOOD PRESSURE: 176 MMHG | OXYGEN SATURATION: 93 %

## 2024-08-05 LAB
EKG ATRIAL RATE: 84 BPM
EKG DIAGNOSIS: NORMAL
EKG P AXIS: 74 DEGREES
EKG P-R INTERVAL: 140 MS
EKG Q-T INTERVAL: 312 MS
EKG QRS DURATION: 88 MS
EKG QTC CALCULATION (BAZETT): 368 MS
EKG R AXIS: -25 DEGREES
EKG T AXIS: 69 DEGREES
EKG VENTRICULAR RATE: 84 BPM

## 2024-08-05 PROCEDURE — 93005 ELECTROCARDIOGRAM TRACING: CPT | Performed by: STUDENT IN AN ORGANIZED HEALTH CARE EDUCATION/TRAINING PROGRAM

## 2024-08-05 PROCEDURE — 97116 GAIT TRAINING THERAPY: CPT

## 2024-08-05 PROCEDURE — 6360000002 HC RX W HCPCS: Performed by: INTERNAL MEDICINE

## 2024-08-05 PROCEDURE — 93010 ELECTROCARDIOGRAM REPORT: CPT | Performed by: INTERNAL MEDICINE

## 2024-08-05 PROCEDURE — 2700000000 HC OXYGEN THERAPY PER DAY

## 2024-08-05 PROCEDURE — 2580000003 HC RX 258: Performed by: INTERNAL MEDICINE

## 2024-08-05 PROCEDURE — 97110 THERAPEUTIC EXERCISES: CPT

## 2024-08-05 PROCEDURE — 94761 N-INVAS EAR/PLS OXIMETRY MLT: CPT

## 2024-08-05 PROCEDURE — 94640 AIRWAY INHALATION TREATMENT: CPT

## 2024-08-05 PROCEDURE — 6370000000 HC RX 637 (ALT 250 FOR IP): Performed by: INTERNAL MEDICINE

## 2024-08-05 RX ADMIN — ALBUTEROL SULFATE 2.5 MG: 2.5 SOLUTION RESPIRATORY (INHALATION) at 11:20

## 2024-08-05 RX ADMIN — WATER 40 MG: 1 INJECTION INTRAMUSCULAR; INTRAVENOUS; SUBCUTANEOUS at 10:57

## 2024-08-05 RX ADMIN — ALBUTEROL SULFATE 2.5 MG: 2.5 SOLUTION RESPIRATORY (INHALATION) at 08:42

## 2024-08-05 RX ADMIN — PRAVASTATIN SODIUM 40 MG: 40 TABLET ORAL at 10:55

## 2024-08-05 RX ADMIN — ALBUTEROL SULFATE 2.5 MG: 2.5 SOLUTION RESPIRATORY (INHALATION) at 00:14

## 2024-08-05 RX ADMIN — SODIUM CHLORIDE, PRESERVATIVE FREE 10 ML: 5 INJECTION INTRAVENOUS at 11:00

## 2024-08-05 RX ADMIN — Medication 2 PUFF: at 08:42

## 2024-08-05 ASSESSMENT — PAIN SCALES - GENERAL
PAINLEVEL_OUTOF10: 5
PAINLEVEL_OUTOF10: 0

## 2024-08-05 ASSESSMENT — PAIN SCALES - WONG BAKER
WONGBAKER_NUMERICALRESPONSE: NO HURT

## 2024-08-05 ASSESSMENT — PAIN DESCRIPTION - LOCATION: LOCATION: CHEST

## 2024-08-05 NOTE — PROGRESS NOTES
Physical Therapy  Facility/Department: [place] [id number] - MED SURG/ORTHO  Daily Treatment Note  NAME: Isa Hernandez  : 1945  MRN: [id number]    Date of Service: 2024    Discharge Recommendations:  24 hour supervision or assist, Home with assist PRN (snf in  no available)   PT Equipment Recommendations  Equipment Needed: No    Patient Diagnosis(es): The primary encounter diagnosis was Hemoptysis. Diagnoses of Shortness of breath and Adenocarcinoma of right lung (HCC) were also pertinent to this visit.    Assessment   Assessment: Pt was independent prior to admit living alone in senior apartment. Pt currently requires SBA  for transfers and ambulation with RW. Pt limited by endurance. Pt would benefit from continued skilled PT to address these limitations. Recommend home with 24hr supervision and home PT.  Activity Tolerance: Patient tolerated treatment well;Patient limited by pain;Patient limited by endurance  Equipment Needed: No     Plan    Physical Therapy Plan  General Plan: 3-5 times per week  Current Treatment Recommendations: Strengthening;Balance training;Functional mobility training;Transfer training;Gait training;Home exercise program;Safety education & training;Therapeutic activities;Patient/Caregiver education & training;Endurance training     Restrictions  Restrictions/Precautions  Restrictions/Precautions: Fall Risk  Position Activity Restriction  Other position/activity restrictions: O2     Subjective    Subjective  Subjective: Pt agreeable with encouragement  Pain: Reports pain R hip pain, unrated  Orientation  Overall Orientation Status: Within Normal Limits     Objective   Vitals  Vitals:    24 1344   BP:    Pulse: 76   Resp:    Temp:    SpO2: 93%     Pulse: 76  SpO2: 93 %  O2 Device: Nasal cannula (3L)  Bed Mobility Training  Bed Mobility Training: Yes  Rolling: Stand-by assistance  Supine to Sit: Stand-by assistance  Scooting: Stand-by assistance  Balance  Sitting:

## 2024-08-05 NOTE — PLAN OF CARE
Problem: Discharge Planning  Goal: Discharge to home or other facility with appropriate resources  8/4/2024 2017 by Ricki Meehan LPN  Outcome: Progressing  8/4/2024 1030 by Radha Goldsmith RN  Outcome: Progressing  Flowsheets (Taken 8/4/2024 0930)  Discharge to home or other facility with appropriate resources: Identify barriers to discharge with patient and caregiver     Problem: Pain  Goal: Verbalizes/displays adequate comfort level or baseline comfort level  8/4/2024 2017 by Ricki Meehan LPN  Outcome: Progressing  Flowsheets (Taken 8/4/2024 1920)  Verbalizes/displays adequate comfort level or baseline comfort level:   Encourage patient to monitor pain and request assistance   Assess pain using appropriate pain scale   Administer analgesics based on type and severity of pain and evaluate response   Implement non-pharmacological measures as appropriate and evaluate response  8/4/2024 1030 by Radha Goldsmith RN  Outcome: Progressing     Problem: Skin/Tissue Integrity  Goal: Absence of new skin breakdown  Description: 1.  Monitor for areas of redness and/or skin breakdown  2.  Assess vascular access sites hourly  3.  Every 4-6 hours minimum:  Change oxygen saturation probe site  4.  Every 4-6 hours:  If on nasal continuous positive airway pressure, respiratory therapy assess nares and determine need for appliance change or resting period.  8/4/2024 2017 by Ricki Meehan LPN  Outcome: Progressing  8/4/2024 1030 by Radha Goldsmith RN  Outcome: Progressing     Problem: Safety - Adult  Goal: Free from fall injury  8/4/2024 2017 by Ricki Meehan LPN  Outcome: Progressing  8/4/2024 1030 by Radha Goldsmith, RN  Outcome: Progressing  Flowsheets (Taken 8/3/2024 2309 by Alexandra Latham, RN)  Free From Fall Injury: Instruct family/caregiver on patient safety     Problem: ABCDS Injury Assessment  Goal: Absence of physical injury  8/4/2024 2017 by Ricki Meehan LPN  Outcome: Progressing  8/4/2024 1030 by Agus

## 2024-08-05 NOTE — PROGRESS NOTES
Pt called me to room stating that she is having chest pain. When I came in to the room I asked about her chest pain and she says \" I want coffee\". I asked her if she was having chest pain or was she wanting coffee. She said coffee. I asked if she was having chest pain and she says yes but I want coffee and have to say something to get someone in here. I told her I would need to let the doctor know and she responded that she just spoke to the doctor and he knows. I sent the doctor a message and he states he has not seen the patient today. EKG was ordered.

## 2024-08-05 NOTE — PROGRESS NOTES
Patient IV removed, no complications. All discharge instructions sent with transport at time of departure. All belongings sent with patient and transport at time of discharge. Report called to facility, and all questions answered.

## 2024-08-05 NOTE — PLAN OF CARE
Problem: Discharge Planning  Goal: Discharge to home or other facility with appropriate resources  8/5/2024 1339 by MARJ ALEMAN  Outcome: Adequate for Discharge  8/5/2024 1317 by MARJ ALEMAN  Outcome: Progressing     Problem: Pain  Goal: Verbalizes/displays adequate comfort level or baseline comfort level  8/5/2024 1339 by MARJ ALEMAN  Outcome: Adequate for Discharge  8/5/2024 1317 by MARJ ALEMAN  Outcome: Progressing     Problem: Skin/Tissue Integrity  Goal: Absence of new skin breakdown  Description: 1.  Monitor for areas of redness and/or skin breakdown  2.  Assess vascular access sites hourly  3.  Every 4-6 hours minimum:  Change oxygen saturation probe site  4.  Every 4-6 hours:  If on nasal continuous positive airway pressure, respiratory therapy assess nares and determine need for appliance change or resting period.  8/5/2024 1339 by MARJ ALEMAN  Outcome: Adequate for Discharge  8/5/2024 1317 by MARJ ALEMAN  Outcome: Progressing     Problem: Safety - Adult  Goal: Free from fall injury  8/5/2024 1339 by MARJ ALEMAN  Outcome: Adequate for Discharge  8/5/2024 1317 by MARJ ALEMAN  Outcome: Progressing     Problem: ABCDS Injury Assessment  Goal: Absence of physical injury  8/5/2024 1339 by MARJ ALEMAN  Outcome: Adequate for Discharge  8/5/2024 1317 by MARJ ALEMAN  Outcome: Progressing     Problem: Nutrition Deficit:  Goal: Optimize nutritional status  8/5/2024 1339 by MARJ ALEMAN  Outcome: Adequate for Discharge  8/5/2024 1317 by MARJ ALEMAN  Outcome: Progressing

## 2024-08-05 NOTE — CARE COORDINATION
Follow-Up copy of Important Message from Medicare (IMM2) has been explained to patient and/or designated healthcare decision maker (HCDM). Pt and/or HCDM aware that patient is permitted to stay an additional 4 hours prior to discharge to consider an appeal if they feel as though they are being discharged too soon. Patient may discharge as planned if chooses to do so.  Patient/HCDM voice no other concerns or questions regarding this process.      Elena Pearl RN

## 2024-08-05 NOTE — CARE COORDINATION
CASE MANAGEMENT DISCHARGE SUMMARY      Discharge to: [facility]    Precertification completed: Yes  Hospital Exemption Notification (HENS) completed: Yes    IMM given: (date) 8/5/24    New Durable Medical Equipment ordered/agency: Deferred    Transportation: [facility]     Medical Transport explained to pt/family. Pt/family voice no agency preference.    Agency used: [facility]   time: 1400   Ambulance form completed: Yes    Confirmed discharge plan with:     Patient: yes     Family:  yes    Name: Penny Contact number: [phone]     Facility/Agency, name: [facility]     Phone number for report to facility: [phone]     RN, name: Estrellita    Note: Discharging nurse to complete ANDI, reconcile AVS, and place final copy with patient's discharge packet. RN to ensure that written prescriptions for  Level II medications are sent with patient to the facility as per protocol.    Elena Pearl, RN

## 2024-08-05 NOTE — DISCHARGE SUMMARY
Hospital Medicine Discharge Summary    Patient: Isa Hernandez   : 1945     Admit Date: 2024   Discharge Date: 2024    Disposition:  []Home   []HHC  [x]SNF  []ECF  []Acute Rehab  []LTAC  []Hospice  Code status:  [x]Full  []DNR/CCA  []Limited (DNR/CCA with Do Not Intubate)  []DNRCC  Condition at Discharge: Stable  Primary Care Provider: Jocelin Ellsworth MD    Admitting Provider: Neal Gandara MD  Discharge Provider: Gasper Beckford DO     Discharge Diagnoses:      Active Hospital Problems    Diagnosis     Adenocarcinoma of right lung (HCC) [C34.91]     Mediastinal adenopathy [R59.0]     Centrilobular emphysema (HCC) [J43.2]     Coronary artery calcification seen on CT scan [I25.10]     Pulmonary hypertension (HCC) [I27.20]     Hemoptysis [R04.2]     Chronic obstructive pulmonary disease (HCC) [J44.9]     Lung mass [R91.8]        Presenting Admission History:      79 y.o. female who presented to [facility] with hemoptysis.  PMHx significant for HTN, COPD, lung mass, who was recently hospitalized here for Pneumonia. She was discharged on course of Levaquin, which has been completed. During recent admission, Oncology was consulted for enlarging lung mass and likely post-obstructive Pneumonia, to re-establish care (previously biopsy negative in 2023). However, patient declined further evaluation or Pulmonology consultation at that time.      She now reports 1-2 days of worsening hemoptysis, occasionally with large clots. Presented to the ED for evaluation. Last dose of Xarelto was . Hemoptysis seemed to be slowing since arrival in ED. Minimal SOB/hypoxia. CXR stable.      Assessment/Plan:      Patient was admitted and treated for hemoptysis and was found to have adenocarcinoma of the lung per biopsy.  Patient had had a known mass in the past for which she had not followed up with pulmonology and oncology.  During his hospitalization patient wanted aggressive diagnostic evaluation  disc space narrowing and endplate osteophyte formation at the mid and lower cervical levels.  Uncinate spurring contributes to severe bilateral neural foraminal encroachment at the C5/6 and C6/7 levels. SOFT TISSUES: There is no prevertebral soft tissue swelling.  Dense consolidation is present within the right upper lobe.     No acute abnormality of the cervical spine.     XR CHEST PORTABLE    Result Date: 7/18/2024  EXAMINATION: ONE XRAY VIEW OF THE CHEST 7/18/2024 10:06 am COMPARISON: 05/26/2024. HISTORY: ORDERING SYSTEM PROVIDED HISTORY: Chest pain TECHNOLOGIST PROVIDED HISTORY: Reason for exam:->Chest pain Reason for Exam: chest pain FINDINGS: There is new airspace consolidation throughout the right upper lobe with mild volume loss.  A linear left basal opacity is unchanged.  There is no pleural effusion.  Borderline cardiomegaly appears stable.     1. New airspace consolidation throughout the right upper lobe and mild volume loss.  Airspace consolidation could represent atelectasis or pneumonia.  A centrally obstructing mass cannot be excluded.  Radiographic follow-up is recommended.  Contrast enhanced chest CT could also be considered for further evaluation. 2. Otherwise stable appearance of the chest with atelectasis or scarring at the left lung base.       Consults:     IP CONSULT TO PULMONOLOGY  IP CONSULT TO SPIRITUAL SERVICES  IP CONSULT TO PALLIATIVE CARE  IP CONSULT TO SPIRITUAL SERVICES  IP CONSULT TO HEM/ONC  IP CONSULT TO HOSPICE  IP CONSULT TO PSYCHIATRY  IP CONSULT TO SPIRITUAL SERVICES    Labs:     Recent Labs     08/04/24  0647   WBC 28.1*   HGB 10.3*   HCT 32.2*        Recent Labs     08/04/24  0647   *   K 3.5   CL 94*   CO2 27   BUN 12   CREATININE <0.5*   CALCIUM 8.7   MG 2.10   PHOS 3.8     No results for input(s): \"PROBNP\", \"TROPHS\" in the last 72 hours.  No results for input(s): \"LABA1C\" in the last 72 hours.  No results for input(s): \"AST\", \"ALT\", \"BILIDIR\", \"BILITOT\",

## 2024-08-05 NOTE — PROGRESS NOTES
08/05/24 1124   Encounter Summary   Encounter Overview/Reason Advance Care Planning;Interdisciplinary rounds   Service Provided For Patient   Referral/Consult From Nursing Supervisor/Manager   Support System Children   Last Encounter  08/05/24  (Pra'd w/pt. ACP will be shared with children.)   Complexity of Encounter High   Begin Time 1045   End Time  1125   Total Time Calculated 40 min   Spiritual/Emotional needs   Type Spiritual Support;Spiritual Distress;Emotional Distress  (Pt. has concerns about lfie after death.)   Advance Care Planning   Type ACP conversation   Assessment/Intervention/Outcome   Assessment Anxious;Complicated grieving;Concerns with suffering;Decisional conflict;Despair;Fearful;Moral distress;Questioning jett  (Pt was concerned tasks of past occupation.)   Intervention Active listening;Confronted/Challenged;Discussed belief system/Spiritism practices/jett;Discussed death, afterlife;Discussed illness injury and it’s impact;Discussed meaning/purpose;Discussed relationship with God;Empowerment;End of Life Care;Explored/Affirmed feelings, thoughts, concerns;Explored Coping Skills/Resources;Facilitated forgiveness;Prayer (assurance of)/[place];Read/Provided Scripture;Sustaining Presence/Ministry of presence   Outcome Deescalated;Engaged in conversation;Expressed feelings, needs, and concerns;Expressed regrets

## 2024-08-07 ENCOUNTER — HOSPITAL ENCOUNTER (INPATIENT)
Age: 79
LOS: 3 days | Discharge: NURSING, REHAB OR LONG-TERM CARE | End: 2024-08-10
Attending: STUDENT IN AN ORGANIZED HEALTH CARE EDUCATION/TRAINING PROGRAM | Admitting: INTERNAL MEDICINE
Payer: MEDICARE

## 2024-08-07 ENCOUNTER — APPOINTMENT (OUTPATIENT)
Dept: GENERAL RADIOLOGY | Age: 79
End: 2024-08-07
Payer: MEDICARE

## 2024-08-07 ENCOUNTER — APPOINTMENT (OUTPATIENT)
Dept: CT IMAGING | Age: 79
End: 2024-08-07
Payer: MEDICARE

## 2024-08-07 ENCOUNTER — TELEPHONE (OUTPATIENT)
Dept: PULMONOLOGY | Age: 79
End: 2024-08-07

## 2024-08-07 DIAGNOSIS — J96.21 ACUTE ON CHRONIC RESPIRATORY FAILURE WITH HYPOXEMIA (HCC): ICD-10-CM

## 2024-08-07 DIAGNOSIS — I48.91 ATRIAL FIBRILLATION WITH RVR (HCC): Primary | ICD-10-CM

## 2024-08-07 LAB
ALBUMIN SERPL-MCNC: 3.3 G/DL (ref 3.4–5)
ALBUMIN/GLOB SERPL: 1 {RATIO} (ref 1.1–2.2)
ALP SERPL-CCNC: 111 U/L (ref 40–129)
ALT SERPL-CCNC: 13 U/L (ref 10–40)
ANION GAP SERPL CALCULATED.3IONS-SCNC: 14 MMOL/L (ref 3–16)
AST SERPL-CCNC: 16 U/L (ref 15–37)
BASE EXCESS BLDV CALC-SCNC: 2.4 MMOL/L (ref -3–3)
BASOPHILS # BLD: 0.1 K/UL (ref 0–0.2)
BASOPHILS NFR BLD: 0.3 %
BILIRUB SERPL-MCNC: 0.6 MG/DL (ref 0–1)
BUN SERPL-MCNC: 9 MG/DL (ref 7–20)
CALCIUM SERPL-MCNC: 9.1 MG/DL (ref 8.3–10.6)
CHLORIDE SERPL-SCNC: 93 MMOL/L (ref 99–110)
CO2 BLDV-SCNC: 26 MMOL/L
CO2 SERPL-SCNC: 24 MMOL/L (ref 21–32)
COHGB MFR BLDV: 3.9 % (ref 0–1.5)
CREAT SERPL-MCNC: <0.5 MG/DL (ref 0.6–1.2)
DEPRECATED RDW RBC AUTO: 15.2 % (ref 12.4–15.4)
EOSINOPHIL # BLD: 0.6 K/UL (ref 0–0.6)
EOSINOPHIL NFR BLD: 2.1 %
FLUAV RNA RESP QL NAA+PROBE: NOT DETECTED
FLUBV RNA RESP QL NAA+PROBE: NOT DETECTED
GFR SERPLBLD CREATININE-BSD FMLA CKD-EPI: >90 ML/MIN/{1.73_M2}
GLUCOSE SERPL-MCNC: 102 MG/DL (ref 70–99)
HCO3 BLDV-SCNC: 25.3 MMOL/L (ref 23–29)
HCT VFR BLD AUTO: 38.9 % (ref 36–48)
HGB BLD-MCNC: 12.6 G/DL (ref 12–16)
INR PPP: 1.74 (ref 0.85–1.15)
LYMPHOCYTES # BLD: 2.1 K/UL (ref 1–5.1)
LYMPHOCYTES NFR BLD: 7.4 %
MAGNESIUM SERPL-MCNC: 2.1 MG/DL (ref 1.8–2.4)
MCH RBC QN AUTO: 27.9 PG (ref 26–34)
MCHC RBC AUTO-ENTMCNC: 32.4 G/DL (ref 31–36)
MCV RBC AUTO: 86.2 FL (ref 80–100)
METHGB MFR BLDV: 0.1 %
MONOCYTES # BLD: 1.8 K/UL (ref 0–1.3)
MONOCYTES NFR BLD: 6.5 %
NEUTROPHILS # BLD: 23.2 K/UL (ref 1.7–7.7)
NEUTROPHILS NFR BLD: 83.7 %
NT-PROBNP SERPL-MCNC: 3125 PG/ML (ref 0–449)
O2 THERAPY: ABNORMAL
PCO2 BLDV: 33.9 MMHG (ref 40–50)
PH BLDV: 7.49 [PH] (ref 7.35–7.45)
PLATELET # BLD AUTO: 288 K/UL (ref 135–450)
PMV BLD AUTO: 9.9 FL (ref 5–10.5)
PO2 BLDV: 58.1 MMHG (ref 25–40)
POTASSIUM SERPL-SCNC: 4.4 MMOL/L (ref 3.5–5.1)
PROT SERPL-MCNC: 6.5 G/DL (ref 6.4–8.2)
PROTHROMBIN TIME: 20.5 SEC (ref 11.9–14.9)
RBC # BLD AUTO: 4.51 M/UL (ref 4–5.2)
SAO2 % BLDV: 92 %
SARS-COV-2 RNA RESP QL NAA+PROBE: NOT DETECTED
SODIUM SERPL-SCNC: 131 MMOL/L (ref 136–145)
TROPONIN, HIGH SENSITIVITY: 25 NG/L (ref 0–14)
TROPONIN, HIGH SENSITIVITY: 33 NG/L (ref 0–14)
WBC # BLD AUTO: 27.8 K/UL (ref 4–11)

## 2024-08-07 PROCEDURE — 93005 ELECTROCARDIOGRAM TRACING: CPT | Performed by: STUDENT IN AN ORGANIZED HEALTH CARE EDUCATION/TRAINING PROGRAM

## 2024-08-07 PROCEDURE — 2700000000 HC OXYGEN THERAPY PER DAY

## 2024-08-07 PROCEDURE — 99285 EMERGENCY DEPT VISIT HI MDM: CPT

## 2024-08-07 PROCEDURE — 96374 THER/PROPH/DIAG INJ IV PUSH: CPT

## 2024-08-07 PROCEDURE — 6370000000 HC RX 637 (ALT 250 FOR IP)

## 2024-08-07 PROCEDURE — 80053 COMPREHEN METABOLIC PANEL: CPT

## 2024-08-07 PROCEDURE — 2500000003 HC RX 250 WO HCPCS

## 2024-08-07 PROCEDURE — 83735 ASSAY OF MAGNESIUM: CPT

## 2024-08-07 PROCEDURE — 6360000002 HC RX W HCPCS

## 2024-08-07 PROCEDURE — 87636 SARSCOV2 & INF A&B AMP PRB: CPT

## 2024-08-07 PROCEDURE — 83880 ASSAY OF NATRIURETIC PEPTIDE: CPT

## 2024-08-07 PROCEDURE — 96375 TX/PRO/DX INJ NEW DRUG ADDON: CPT

## 2024-08-07 PROCEDURE — 2060000000 HC ICU INTERMEDIATE R&B

## 2024-08-07 PROCEDURE — 85025 COMPLETE CBC W/AUTO DIFF WBC: CPT

## 2024-08-07 PROCEDURE — 96365 THER/PROPH/DIAG IV INF INIT: CPT

## 2024-08-07 PROCEDURE — 36415 COLL VENOUS BLD VENIPUNCTURE: CPT

## 2024-08-07 PROCEDURE — 71045 X-RAY EXAM CHEST 1 VIEW: CPT

## 2024-08-07 PROCEDURE — 85610 PROTHROMBIN TIME: CPT

## 2024-08-07 PROCEDURE — 2580000003 HC RX 258

## 2024-08-07 PROCEDURE — 84484 ASSAY OF TROPONIN QUANT: CPT

## 2024-08-07 PROCEDURE — 71250 CT THORAX DX C-: CPT

## 2024-08-07 PROCEDURE — 82803 BLOOD GASES ANY COMBINATION: CPT

## 2024-08-07 PROCEDURE — 94761 N-INVAS EAR/PLS OXIMETRY MLT: CPT

## 2024-08-07 PROCEDURE — 6360000002 HC RX W HCPCS: Performed by: INTERNAL MEDICINE

## 2024-08-07 RX ORDER — SODIUM CHLORIDE 0.9 % (FLUSH) 0.9 %
5-40 SYRINGE (ML) INJECTION PRN
Status: DISCONTINUED | OUTPATIENT
Start: 2024-08-07 | End: 2024-08-10 | Stop reason: HOSPADM

## 2024-08-07 RX ORDER — MORPHINE SULFATE 2 MG/ML
2 INJECTION, SOLUTION INTRAMUSCULAR; INTRAVENOUS ONCE
Status: COMPLETED | OUTPATIENT
Start: 2024-08-07 | End: 2024-08-08

## 2024-08-07 RX ORDER — ONDANSETRON 2 MG/ML
4 INJECTION INTRAMUSCULAR; INTRAVENOUS EVERY 6 HOURS PRN
Status: DISCONTINUED | OUTPATIENT
Start: 2024-08-07 | End: 2024-08-10 | Stop reason: HOSPADM

## 2024-08-07 RX ORDER — DIGOXIN 0.25 MG/ML
250 INJECTION INTRAMUSCULAR; INTRAVENOUS ONCE
Status: COMPLETED | OUTPATIENT
Start: 2024-08-07 | End: 2024-08-07

## 2024-08-07 RX ORDER — SODIUM CHLORIDE 9 MG/ML
INJECTION, SOLUTION INTRAVENOUS PRN
Status: DISCONTINUED | OUTPATIENT
Start: 2024-08-07 | End: 2024-08-10 | Stop reason: HOSPADM

## 2024-08-07 RX ORDER — ACETAMINOPHEN 650 MG/1
650 SUPPOSITORY RECTAL EVERY 6 HOURS PRN
Status: DISCONTINUED | OUTPATIENT
Start: 2024-08-07 | End: 2024-08-10 | Stop reason: HOSPADM

## 2024-08-07 RX ORDER — DILTIAZEM HYDROCHLORIDE 5 MG/ML
10 INJECTION INTRAVENOUS ONCE
Status: COMPLETED | OUTPATIENT
Start: 2024-08-07 | End: 2024-08-07

## 2024-08-07 RX ORDER — ASCORBIC ACID 500 MG
500 TABLET ORAL DAILY
Status: DISCONTINUED | OUTPATIENT
Start: 2024-08-08 | End: 2024-08-10 | Stop reason: HOSPADM

## 2024-08-07 RX ORDER — SACCHAROMYCES BOULARDII 250 MG
250 CAPSULE ORAL 2 TIMES DAILY
Status: DISCONTINUED | OUTPATIENT
Start: 2024-08-08 | End: 2024-08-10 | Stop reason: HOSPADM

## 2024-08-07 RX ORDER — TRAMADOL HYDROCHLORIDE 50 MG/1
50 TABLET ORAL EVERY 6 HOURS PRN
Status: DISCONTINUED | OUTPATIENT
Start: 2024-08-08 | End: 2024-08-10 | Stop reason: HOSPADM

## 2024-08-07 RX ORDER — BUDESONIDE AND FORMOTEROL FUMARATE DIHYDRATE 80; 4.5 UG/1; UG/1
2 AEROSOL RESPIRATORY (INHALATION)
Status: DISCONTINUED | OUTPATIENT
Start: 2024-08-07 | End: 2024-08-10 | Stop reason: HOSPADM

## 2024-08-07 RX ORDER — PRAVASTATIN SODIUM 40 MG
40 TABLET ORAL DAILY
Status: DISCONTINUED | OUTPATIENT
Start: 2024-08-08 | End: 2024-08-10 | Stop reason: HOSPADM

## 2024-08-07 RX ORDER — ONDANSETRON 4 MG/1
4 TABLET, ORALLY DISINTEGRATING ORAL EVERY 8 HOURS PRN
Status: DISCONTINUED | OUTPATIENT
Start: 2024-08-07 | End: 2024-08-10 | Stop reason: HOSPADM

## 2024-08-07 RX ORDER — MONTELUKAST SODIUM 10 MG/1
10 TABLET ORAL NIGHTLY
Status: DISCONTINUED | OUTPATIENT
Start: 2024-08-08 | End: 2024-08-10 | Stop reason: HOSPADM

## 2024-08-07 RX ORDER — POTASSIUM CHLORIDE 20 MEQ/1
20 TABLET, EXTENDED RELEASE ORAL DAILY
Status: DISCONTINUED | OUTPATIENT
Start: 2024-08-08 | End: 2024-08-10 | Stop reason: HOSPADM

## 2024-08-07 RX ORDER — HYDROXYZINE HYDROCHLORIDE 10 MG/1
10 TABLET, FILM COATED ORAL 2 TIMES DAILY PRN
Status: DISCONTINUED | OUTPATIENT
Start: 2024-08-08 | End: 2024-08-10 | Stop reason: HOSPADM

## 2024-08-07 RX ORDER — ACETAMINOPHEN 325 MG/1
650 TABLET ORAL EVERY 6 HOURS PRN
Status: DISCONTINUED | OUTPATIENT
Start: 2024-08-07 | End: 2024-08-10 | Stop reason: HOSPADM

## 2024-08-07 RX ORDER — OXYBUTYNIN CHLORIDE 5 MG/1
5 TABLET, EXTENDED RELEASE ORAL NIGHTLY
Status: DISCONTINUED | OUTPATIENT
Start: 2024-08-08 | End: 2024-08-10 | Stop reason: HOSPADM

## 2024-08-07 RX ORDER — BACLOFEN 10 MG/1
5 TABLET ORAL
Status: DISCONTINUED | OUTPATIENT
Start: 2024-08-08 | End: 2024-08-10 | Stop reason: HOSPADM

## 2024-08-07 RX ORDER — POLYETHYLENE GLYCOL 3350 17 G/17G
17 POWDER, FOR SOLUTION ORAL DAILY PRN
Status: DISCONTINUED | OUTPATIENT
Start: 2024-08-07 | End: 2024-08-10 | Stop reason: HOSPADM

## 2024-08-07 RX ORDER — 0.9 % SODIUM CHLORIDE 0.9 %
1000 INTRAVENOUS SOLUTION INTRAVENOUS ONCE
Status: COMPLETED | OUTPATIENT
Start: 2024-08-07 | End: 2024-08-08

## 2024-08-07 RX ORDER — MAGNESIUM SULFATE 1 G/100ML
1000 INJECTION INTRAVENOUS ONCE
Status: COMPLETED | OUTPATIENT
Start: 2024-08-07 | End: 2024-08-08

## 2024-08-07 RX ORDER — SODIUM CHLORIDE 0.9 % (FLUSH) 0.9 %
5-40 SYRINGE (ML) INJECTION EVERY 12 HOURS SCHEDULED
Status: DISCONTINUED | OUTPATIENT
Start: 2024-08-07 | End: 2024-08-10 | Stop reason: HOSPADM

## 2024-08-07 RX ADMIN — DILTIAZEM HYDROCHLORIDE 10 MG: 5 INJECTION, SOLUTION INTRAVENOUS at 22:15

## 2024-08-07 RX ADMIN — SODIUM CHLORIDE 1000 ML: 9 INJECTION, SOLUTION INTRAVENOUS at 22:29

## 2024-08-07 RX ADMIN — MAGNESIUM SULFATE HEPTAHYDRATE 1000 MG: 1 INJECTION, SOLUTION INTRAVENOUS at 22:42

## 2024-08-07 RX ADMIN — DIGOXIN 250 MCG: 0.25 INJECTION INTRAMUSCULAR; INTRAVENOUS at 23:24

## 2024-08-07 ASSESSMENT — PAIN SCALES - GENERAL: PAINLEVEL_OUTOF10: 9

## 2024-08-07 ASSESSMENT — PAIN - FUNCTIONAL ASSESSMENT: PAIN_FUNCTIONAL_ASSESSMENT: 0-10

## 2024-08-07 NOTE — TELEPHONE ENCOUNTER
Patient did not show for FOLLOW UP  with ATAYA on 8/7/24.    Reason:  NA    This is patient's first no show.  Patient was ano show on: NA.      Patient did not reschedule.  Reschedule date:  NA    NO ANSWER

## 2024-08-08 PROCEDURE — 2700000000 HC OXYGEN THERAPY PER DAY

## 2024-08-08 PROCEDURE — 97116 GAIT TRAINING THERAPY: CPT

## 2024-08-08 PROCEDURE — 97166 OT EVAL MOD COMPLEX 45 MIN: CPT

## 2024-08-08 PROCEDURE — 97530 THERAPEUTIC ACTIVITIES: CPT

## 2024-08-08 PROCEDURE — 5A0945A ASSISTANCE WITH RESPIRATORY VENTILATION, 24-96 CONSECUTIVE HOURS, HIGH NASAL FLOW/VELOCITY: ICD-10-PCS | Performed by: INTERNAL MEDICINE

## 2024-08-08 PROCEDURE — 6360000002 HC RX W HCPCS: Performed by: INTERNAL MEDICINE

## 2024-08-08 PROCEDURE — 2580000003 HC RX 258

## 2024-08-08 PROCEDURE — 2060000000 HC ICU INTERMEDIATE R&B

## 2024-08-08 PROCEDURE — 94761 N-INVAS EAR/PLS OXIMETRY MLT: CPT

## 2024-08-08 PROCEDURE — 94640 AIRWAY INHALATION TREATMENT: CPT

## 2024-08-08 PROCEDURE — 6370000000 HC RX 637 (ALT 250 FOR IP)

## 2024-08-08 PROCEDURE — 97535 SELF CARE MNGMENT TRAINING: CPT

## 2024-08-08 PROCEDURE — 97162 PT EVAL MOD COMPLEX 30 MIN: CPT

## 2024-08-08 RX ADMIN — POTASSIUM CHLORIDE 20 MEQ: 1500 TABLET, EXTENDED RELEASE ORAL at 07:57

## 2024-08-08 RX ADMIN — BACLOFEN 5 MG: 10 TABLET ORAL at 18:40

## 2024-08-08 RX ADMIN — TRAMADOL HYDROCHLORIDE 50 MG: 50 TABLET ORAL at 07:57

## 2024-08-08 RX ADMIN — SODIUM CHLORIDE, PRESERVATIVE FREE 10 ML: 5 INJECTION INTRAVENOUS at 00:38

## 2024-08-08 RX ADMIN — Medication 250 MG: at 07:57

## 2024-08-08 RX ADMIN — MONTELUKAST 10 MG: 10 TABLET, FILM COATED ORAL at 19:29

## 2024-08-08 RX ADMIN — Medication 2 PUFF: at 08:02

## 2024-08-08 RX ADMIN — ACETAMINOPHEN 650 MG: 325 TABLET ORAL at 16:35

## 2024-08-08 RX ADMIN — TRAMADOL HYDROCHLORIDE 50 MG: 50 TABLET ORAL at 21:07

## 2024-08-08 RX ADMIN — ACETAMINOPHEN 650 MG: 325 TABLET ORAL at 23:28

## 2024-08-08 RX ADMIN — PRAVASTATIN SODIUM 40 MG: 40 TABLET ORAL at 07:56

## 2024-08-08 RX ADMIN — HYDROXYZINE HYDROCHLORIDE 10 MG: 10 TABLET ORAL at 23:28

## 2024-08-08 RX ADMIN — MORPHINE SULFATE 2 MG: 2 INJECTION, SOLUTION INTRAMUSCULAR; INTRAVENOUS at 00:36

## 2024-08-08 RX ADMIN — OXYBUTYNIN CHLORIDE 5 MG: 5 TABLET, EXTENDED RELEASE ORAL at 19:29

## 2024-08-08 RX ADMIN — SODIUM CHLORIDE, PRESERVATIVE FREE 10 ML: 5 INJECTION INTRAVENOUS at 19:29

## 2024-08-08 RX ADMIN — OXYCODONE HYDROCHLORIDE AND ACETAMINOPHEN 500 MG: 500 TABLET ORAL at 07:57

## 2024-08-08 RX ADMIN — ACETAMINOPHEN 650 MG: 325 TABLET ORAL at 09:50

## 2024-08-08 RX ADMIN — TIOTROPIUM BROMIDE INHALATION SPRAY 2 PUFF: 3.12 SPRAY, METERED RESPIRATORY (INHALATION) at 08:02

## 2024-08-08 RX ADMIN — Medication 2 PUFF: at 20:00

## 2024-08-08 RX ADMIN — Medication 250 MG: at 19:29

## 2024-08-08 RX ADMIN — SODIUM CHLORIDE, PRESERVATIVE FREE 10 ML: 5 INJECTION INTRAVENOUS at 08:01

## 2024-08-08 ASSESSMENT — PAIN DESCRIPTION - LOCATION
LOCATION: BACK
LOCATION: BACK
LOCATION: RIB CAGE
LOCATION: FOOT
LOCATION: FOOT;BUTTOCKS

## 2024-08-08 ASSESSMENT — PAIN SCALES - GENERAL
PAINLEVEL_OUTOF10: 9
PAINLEVEL_OUTOF10: 5
PAINLEVEL_OUTOF10: 8
PAINLEVEL_OUTOF10: 10
PAINLEVEL_OUTOF10: 6
PAINLEVEL_OUTOF10: 4
PAINLEVEL_OUTOF10: 4
PAINLEVEL_OUTOF10: 6
PAINLEVEL_OUTOF10: 5
PAINLEVEL_OUTOF10: 10

## 2024-08-08 ASSESSMENT — PAIN DESCRIPTION - ORIENTATION: ORIENTATION: LOWER

## 2024-08-08 ASSESSMENT — PAIN DESCRIPTION - DESCRIPTORS
DESCRIPTORS: ACHING;DISCOMFORT
DESCRIPTORS: ACHING;DISCOMFORT

## 2024-08-08 NOTE — CARE COORDINATION
Case Management Assessment  Initial Evaluation    Date/Time of Evaluation: 8/8/2024 10:26 AM  Assessment Completed by: Jody Rodrigues RN    If patient is discharged prior to next notation, then this note serves as note for discharge by case management.    Patient Name: Isa Hernandez                   YOB: 1945  Diagnosis: Atrial fibrillation with RVR (HCC) [I48.91]  Acute on chronic respiratory failure with hypoxemia (HCC) [J96.21]                   Date / Time: 8/7/2024  9:02 PM    Patient Admission Status: Inpatient   Readmission Risk (Low < 19, Mod (19-27), High > 27): Readmission Risk Score: 27.8    Current PCP: Jocelin Ellsworth MD  PCP verified by CM? (P) No (was at SNF prior to admit)    Chart Reviewed: Yes      History Provided by: (P) Patient, Medical Record  Patient Orientation: (P) Person, Place, Other (see comment) (Mood disturbance disorder)    Patient Cognition: (P) Other (see comment) (Mood disturbance disorder)    Hospitalization in the last 30 days (Readmission):  Yes    If yes, Readmission Assessment in  Navigator will be completed.    Advance Directives:      Code Status: DNR-CC   Patient's Primary Decision Maker is: (P) Legal Next of Kin    Primary Decision Maker: MaryBarney hernandez - Child - [phone]    Primary Decision Maker (Active): Penny Uriostegui - Child - [phone]    Discharge Planning:    Patient lives with: (P) Alone Type of Home: (P) Skilled Nursing Facility  Primary Care Giver: (P) Other (Comment) (was at skilled rehab at [facility] with intent to transition to LTC or assisted living)  Patient Support Systems include: (P) Other (Comment), Children (skilled nursing staff)   Current Financial resources:    Current community resources: (P) None  Current services prior to admission: (P) Skilled Nursing Facility            Current DME: (P) [facility]            Type of Home Care services:  (P) None    ADLS  Prior functional level: (P) Assistance with the following:, Mobility, Housework,  Shopping  Current functional level: (P) Mobility, Shopping, Housework, Assistance with the following:    PT AM-PAC:   /24  OT AM-PAC:   /24    Family can provide assistance at DC: (P) No  Would you like Case Management to discuss the discharge plan with any other family members/significant others, and if so, who? (P) Yes (Daughter Penny)  Plans to Return to Present Housing: (P) Unknown at present  Other Identified Issues/Barriers to RETURNING to current housing: can return to [facility]  Potential Assistance needed at discharge: (P) Skilled Nursing Facility            Potential DME:    Patient expects to discharge to:    Plan for transportation at discharge:      Financial    Payor: [employer] MEDICARE / Plan: ANTHBOBBY MEDIBLUE ESSENTIAL/PLUS / Product Type: *No Product type* /     Does insurance require precert for SNF: Yes    Potential assistance Purchasing Medications: (P) No  Meds-to-Beds request: Yes      [facility] - [place], [place] - [facility] - P [phone] - F [phone]  [address] [place] [address]  [address]  [facility] [address]  Phone: [phone] Fax: [phone]      Notes:    Factors facilitating achievement of predicted outcomes: Family support    Barriers to discharge: Limited family support, Cognitive deficit, Limited safety awareness, Limited insight into deficits, and Unrealistic expectations    Additional Case Management Notes: Patient readmitted from [facility]. She was just sent there on 8/5/2024. Patient called 911 twice while at facility. The first time the EMS did not take her to hospital and the second time they did and she was admitted. Patient is on 9 liters high flow. She had requested to be a DNR-CC and code status was amended. Palliative care is following and hospice has been consulted. Per Elena in admissions at [facility] they were working with family to get patient Medicaid and daughter Tianna has assisted and has access to her financial information. Elena states the facility prefers to try to bring

## 2024-08-08 NOTE — PROGRESS NOTES
Physical Therapy  Facility/Department: [facility] C4 PCU  Physical Therapy Initial Assessment/Treatment     Name: Isa Hernandez  : 1945  MRN: [id number]  Date of Service: 2024    Discharge Recommendations:  Subacute/Skilled Nursing Facility   PT Equipment Recommendations  Equipment Needed: No  Other: Defer      Patient Diagnosis(es): The primary encounter diagnosis was Atrial fibrillation with RVR (HCC). A diagnosis of Acute on chronic respiratory failure with hypoxemia (HCC) was also pertinent to this visit.  Past Medical History:  has a past medical history of Arthritis, Asthma, Bronchitis chronic, Colon polyp, COPD (chronic obstructive pulmonary disease) (HCC), Coronary artery calcification seen on CT scan, Emphysema of lung (HCC), Guillain-Boydton (HCC), Hyperlipidemia, Lock jaw, Pneumonia, Post-nasal drip, Primary hypertension, Pulmonary nodule, Rash, Reflex sympathetic dystrophy, RSD (reflex sympathetic dystrophy), and TMJ (dislocation of temporomandibular joint).  Past Surgical History:  has a past surgical history that includes Hysterectomy; Appendectomy; Tonsillectomy; bronchoscopy (); Colonoscopy (11/15/2012); back surgery; CT NEEDLE BIOPSY LUNG PERCUTANEOUS (2023); Pacemaker insertion; Upper gastrointestinal endoscopy (N/A, 5/15/2024); bronchoscopy (N/A, 2024); and bronchoscopy (N/A, 2024).    Assessment   Body Structures, Functions, Activity Limitations Requiring Skilled Therapeutic Intervention: Decreased functional mobility ;Decreased cognition;Decreased safe awareness;Decreased strength;Decreased endurance;Decreased posture;Decreased ADL status  Assessment: Pt to [facility] with diagnosis of A-fib with RVR. Pt admitted from [facility] SNF, prior to that pt reports she is from a senior housing aparmtent although she is a questionable historian. Per CM note plan is to try and return to [facility] SNF and transition to LTC. Pt currently completes bed mobility with SBA, transfers with CGA, and

## 2024-08-08 NOTE — CONSULTS
(Axillary)   Resp 17   Ht 1.651 m (5' 5\")   Wt 59.5 kg (131 lb 2.8 oz)   SpO2 95%   BMI 21.83 kg/m²   No intake/output data recorded.  No intake/output data recorded.      Palliative Medicine Interventions:    patient/family support  Goals of Care discussions with patient/surrogate  Spiritual Interventions: none identified          DATA:  Current labs in the epic chart reviewed as of 8/8/2024   Review of previous notes, admits, labs, radiology and testing relevant to this consult done in this chart today 8/8/2024  Review of advance directives (if any) in chart    Medical Decision Making:  The following items were considered in medical decision making:  Review of prior external note(s) from each unique source relevant to today's visit: Hospitalist, Case management,    Discussion of management or test with external physician/qualified health care professional: Hospitalist, Case management, hospice  Unique test results reviewed: CBC and BMP, Nutrition labs, Hepatic studies, cardiac labs, CXR, echo        Risk of Complications/Morbidity: High   Illness(es)/ Infection present that pose threat to bodily function.   There is potential for severe exacerbation of condition/side effects of treatment.                         Signed By: Electronically signed by HOWIE Garay CNP on 8/8/2024 at 9:15 AM  Palliative Medicine   [phone]    August 8, 2024

## 2024-08-08 NOTE — ED NOTES
Patient Name: Isa Hernandez  :  1945  79 y.o.  MRN:  [id number]  Preferred Name    ED Room #:    Family/Caregiver Present no  Restraints no  Sitter no  Sepsis Risk Score      Situation  Code Status: Full Code     Allergies: Latex, Iodine, Penicillins, Sulfa antibiotics, Tetanus toxoids, Cephalexin, Clindamycin, Clindamycin/lincomycin, Influenza vaccines, Iodinated contrast media, Polymyxin b, Tetanus immune globulin, Tetanus toxoid, Albuterol, Codeine, Doxycycline, and Tiotropium bromide monohydrate  Weight: Patient Vitals for the past 96 hrs (Last 3 readings):   Weight   24 2111 57.2 kg (126 lb)     Arrived from: nursing home  Chief Complaint:   Chief Complaint   Patient presents with    Shortness of Breath     Per EMS pt lives at [facility]. Pt called for shortness of breath. Pt has Hx of Dementia.  Pt is Allergic to Albuterol, EMS administered Albuterol.       Hospital Problem/Diagnosis:  Principal Problem:    Atrial fibrillation with RVR ([facility])  Resolved Problems:    * No resolved hospital problems. *    Imaging:   XR CHEST PORTABLE   Final Result   Persistent opacity of the right upper lobe, stable.      Atelectasis and mild pleural effusion at the left lung base.         CT CHEST WO CONTRAST    (Results Pending)     Abnormal labs:   Abnormal Labs Reviewed   CBC WITH AUTO DIFFERENTIAL - Abnormal; Notable for the following components:       Result Value    WBC 27.8 (*)     Neutrophils Absolute 23.2 (*)     Monocytes Absolute 1.8 (*)     All other components within normal limits   COMPREHENSIVE METABOLIC PANEL W/ REFLEX TO MG FOR LOW K - Abnormal; Notable for the following components:    Sodium 131 (*)     Chloride 93 (*)     Glucose 102 (*)     Creatinine <0.5 (*)     Albumin 3.3 (*)     Albumin/Globulin Ratio 1.0 (*)     All other components within normal limits   TROPONIN - Abnormal; Notable for the following components:    Troponin, High Sensitivity 33 (*)     All other  components within normal limits   BLOOD GAS, VENOUS - Abnormal; Notable for the following components:    pH, Gerardo 7.490 (*)     pCO2, Gerardo 33.9 (*)     PO2, Gerardo 58.1 (*)     Carboxyhemoglobin 3.9 (*)     All other components within normal limits   PROTIME-INR - Abnormal; Notable for the following components:    Protime 20.5 (*)     INR 1.74 (*)     All other components within normal limits   BRAIN NATRIURETIC PEPTIDE - Abnormal; Notable for the following components:    Pro-BNP 3,125 (*)     All other components within normal limits   TROPONIN - Abnormal; Notable for the following components:    Troponin, High Sensitivity 25 (*)     All other components within normal limits     Critical values: no  Intervention for critical value(s):       Abnormal Assessment Findings: Afib RVR, 10 beats of Vtach    Background  History:   Past Medical History:   Diagnosis Date    Arthritis     Asthma     Bronchitis chronic     Colon polyp     COPD (chronic obstructive pulmonary disease) (HCC)     Coronary artery calcification seen on CT scan 7/29/2024    Emphysema of lung (HCC)     Guillain-[place]ney (HCC)     Hyperlipidemia     Lock jaw     Pneumonia     Post-nasal drip     Primary hypertension 03/18/2024    Pulmonary nodule     bi lateral    Rash     itchy, bumps    Reflex sympathetic dystrophy     RSD (reflex sympathetic dystrophy)     TMJ (dislocation of temporomandibular joint)        Assessment    Vitals/MEWS:    Level of Consciousness: Alert (0)   Vitals:    08/07/24 2238 08/07/24 2252 08/07/24 2300 08/07/24 2318   BP: 101/73 114/82 112/77 120/74   Pulse: (!) 125 (!) 111 (!) 127 (!) 124   Resp: 19 26 26 22   Temp:       TempSrc:       SpO2: 90% 90% 94% 92%   Weight:       Height:         FiO2 (%): Nasal Cannula 6L  O2 Flow Rate: O2 Device: Nasal cannula O2 Flow Rate (L/min): 6 L/min  Cardiac Rhythm: Cardiac Rhythm: Sinus tachy  Pain Assessment: [ ] Verbal [ ] Carrera Keane Scale  Pain Scale: Pain Assessment  Pain Assessment: 0-10  Pain

## 2024-08-08 NOTE — CODE DOCUMENTATION
Resuscitation/Code Status Note on Isa Hernandez (YOB: 1945)    At 0245 on August 8, 2024, I was called to the bedside by the nursing staff as the patient expressed a desire to change her code status. Upon evaluation, the patient was found to be alert and oriented, with stable vital signs. I discussed the different code statuses with the patient, including Full Code, DNR-CCA, and DNR-CC. The patient expressed a clear understanding of the options and decided to opt for DNR-CC (Do Not Resuscitate - Comfort Care). The patient wishes to focus solely on comfort measures and palliative care, preferring not to undergo any life-prolonging treatments or interventions. The patient wants to avoid aggressive procedures such as CPR, intubation, and mechanical ventilation.  I reviewed the implications and outcomes of choosing DNR-CC with the patient, ensuring she understood that all efforts will be made to manage symptoms and provide comfort, but no resuscitative efforts will be undertaken in the event of cardiac or respiratory arrest. The patient understood and agreed to the change in code status to DNR-CC. Documentation of this decision has been completed and communicated to the healthcare team.    The code status was made DNR-CC      Electronically signed by Low Penaloza MD on 8/8/24 at 2:50 AM EDT

## 2024-08-08 NOTE — PLAN OF CARE
Problem: Discharge Planning  Goal: Discharge to home or other facility with appropriate resources  8/8/2024 1203 by Yumiko Goldberg RN  Outcome: Progressing  8/8/2024 0242 by Lupe Cruz RN  Outcome: Progressing     Problem: Pain  Goal: Verbalizes/displays adequate comfort level or baseline comfort level  8/8/2024 1203 by Yumiko Goldberg RN  Outcome: Progressing  8/8/2024 0242 by Lupe Cruz RN  Outcome: Progressing     Problem: Safety - Adult  Goal: Free from fall injury  8/8/2024 1203 by Yumiko Goldberg RN  Outcome: Progressing  8/8/2024 0242 by Lupe Cruz RN  Outcome: Progressing     Problem: Skin/Tissue Integrity  Goal: Absence of new skin breakdown  Description: 1.  Monitor for areas of redness and/or skin breakdown  2.  Assess vascular access sites hourly  3.  Every 4-6 hours minimum:  Change oxygen saturation probe site  4.  Every 4-6 hours:  If on nasal continuous positive airway pressure, respiratory therapy assess nares and determine need for appliance change or resting period.  Outcome: Progressing

## 2024-08-08 NOTE — PROGRESS NOTES
4 Eyes Skin Assessment     NAME:  Isa Hernandez  YOB: 1945  MEDICAL RECORD NUMBER:  [id number]    The patient is being assessed for  Admission    I agree that at least one RN has performed a thorough Head to Toe Skin Assessment on the patient. ALL assessment sites listed below have been assessed.      Areas assessed by both nurses:    Head, Face, Ears, Shoulders, Back, Chest, Arms, Elbows, Hands, Sacrum. Buttock, Coccyx, Ischium, Legs. Feet and Heels, and Under Medical Devices         Does the Patient have a Wound? No noted wound(s)       Jeison Prevention initiated by RN: No  Wound Care Orders initiated by RN: No    Pressure Injury (Stage 3,4, Unstageable, DTI, NWPT, and Complex wounds) if present, place Wound referral order by RN under : No    New Ostomies, if present place, Ostomy referral order under : Yes     Nurse 1 eSignature:   Electronically signed by Anthony Andrade RN on 8/8/24 at 5:43 AM EDT  **SHARE this note so that the co-signing nurse can place an eSignature**    Nurse 2 eSignature: Electronically signed by Juli Alonso RN on 8/8/24 at 5:58 AM EDT

## 2024-08-08 NOTE — PLAN OF CARE
8/8/24 8:11 AM   [phone] Hospital or Facility: [facility] From: Yumiko Goldberg RE: CLIVE ROQUE 1945 RM: [id number] FYI: Pt has removed tele monitor and refusing to allow placement. States it \"makes her itch,\" and she's \"here to die anyway.\" Need Callback: NO CALLBACK REQ C4 PROGRESSIVE CARE FYI

## 2024-08-08 NOTE — ED PROVIDER NOTES
Attending Supervisory Note/Shared Visit       I personally saw the patient and made/approved the management plan and take responsibility for the patient management.      History: 80 yo F with PMHx of Afib, lung CA, GI bleed, RUL post-obstructive PNA who p/w SOB worse than baseline, found to have Afib with RVR on the w/u initiated by the BC    Exam: Alert, confused.  Tachycardic, irregularly irregular rhythm.  Diminished breath sounds on the right with asymmetric wheezing present in the RLL.  L lung clear to auscultation.  No edema.  Abd soft, nttp.    MDM: 80 yo F with SOB and tachycardia.  Found to be in Afib with RVR, rate 150s - 160s.  No c/o chest pain.  As afebrile, without overt radiographic changes regarding the RUL, thought to be primary issue as opposed to compensation for infection.  Rate control strategy settled on with diltiazem initially.      She became significantly hypotensive on the diltiazem, HR did improve from 150s to 120s, discontinued the diltiazem.     BP improved with discontinuation of diltiazem and administration of IVFs.     Remains without respiratory distress or change in neurologic status though O2 requirement is 6L NC. Initially thought to be  anticoagulated but this is confirmed to not be the case after speaking to SNF staff and review of recent records.     D/w hospitalist, agreed with digoxin load which was given. Cardiology to see as inpt.    The Ekg interpreted by me shows  Rhythm atrial fibrillation with rapid ventricular response, occasional PVC versus aberrantly conducted beat  Rate of 152 bpm  Axis is left axis deviation  Intervals and durations mildly prolonged QT.  ST Segments: Nonspecific ST abnormalities.  Compared to prior EKG dated August 5, 2024, patient now in A-fib.      CRITICAL CARE TIME   I personally saw the patient and independently provided 39 minutes of non-concurrent critical care out of the total shared critical care time provided, excluding separately

## 2024-08-08 NOTE — PLAN OF CARE
Pt removed heel protectors and refuses to put them back on. Will elevate heels on pillow to prevent pressure on heels bilaterally  Pt refusing gripper socks. Pt educated about fall risks and it has been explained that care team will not ambulate her to bsc without socks and pt will need to use bedpan for voiding.

## 2024-08-08 NOTE — PROGRESS NOTES
Occupational Therapy  [address]  Occupational Therapy Initial Assessment/ Treatment Note    Name: Isa Hernandez  : 1945  MRN: [id number]  Date of Service: 2024    Discharge Recommendations:  Subacute/Skilled Nursing Facility  OT Equipment Recommendations  Equipment Needed: No    Therapy discharge recommendations are subject to collaboration from the patient’s interdisciplinary healthcare team, including MD and case management recommendations.    Barriers to Home Discharge:   [] Steps to access home entry or bed/bath:   [x] Unable to transfer, ambulate, or propel wheelchair household distances without assist   [x] Limited available assist at home upon discharge    [] Patient or family requests d/c to post-acute facility    [x] Poor cognition/safety awareness for d/c to home alone    [] Unable to maintain ordered weight bearing status    [] Patient with salient signs of long-standing immobility   [x] Decreased independence with ADLs   [x] Increased risk for falls   [] Other:    If pt is unable to be seen after this session, please let this note serve as discharge summary.  Please see case management note for discharge disposition.  Thank you.     Patient Diagnosis(es): The primary encounter diagnosis was Atrial fibrillation with RVR ([facility]). A diagnosis of Acute on chronic respiratory failure with hypoxemia ([facility]) was also pertinent to this visit.  Past Medical History:  has a past medical history of Arthritis, Asthma, Bronchitis chronic, Colon polyp, COPD (chronic obstructive pulmonary disease) ([facility]), Coronary artery calcification seen on CT scan, Emphysema of lung ([facility]), Guillain-[place] ([facility]), Hyperlipidemia, Lock jaw, Pneumonia, Post-nasal drip, Primary hypertension, Pulmonary nodule, Rash, Reflex sympathetic dystrophy, RSD (reflex sympathetic dystrophy), and TMJ (dislocation of temporomandibular joint).  Past Surgical History:  has a past surgical history that includes

## 2024-08-08 NOTE — H&P
V2.0  History and Physical      Name:  Isa Hernandez /Age/Sex: 1945  (79 y.o. female)   MRN & CSN:  [id number] & [id number] Encounter Date/Time: 2024 10:39 PM EDT   Location:   PCP: Jocelin Ellsworth MD       Hospital Day: 1    Assessment and Plan:   Isa Hernandez is a 79 y.o. female with a pmh of hypertension, COPD, lung mass, atrial fibrillation, and dementia who presents with <principal problem not specified>      Plan:    Atrial Fibrillation with Rapid Ventricular Response (RVR):  - Initiated diltiazem bolus and drip for rate control. Patient became hypotensive. Switched to Digoxin, tolerating, BP stable  - Per ED notes, assisted facility reported that patient Xarelto was held due to her developing a GI bleed.  - Rhythm Control: Depending on the response to rate control, consider further evaluation for potential rhythm control strategies, such as electrical cardioversion, if the patient remains symptomatic or if there are signs of inadequate rate control.  - Consult placed to cardiology in the ED prior to patient changing code status from full code to DNRCC    Acute on Chronic Respiratory Failure with Hypoxemia:  - Symptoms of acute respiratory distress on a background of chronic respiratory issues.  - Laboratory findings include elevated BNP (3125), mildly elevated troponins, and mild hyponatremia, indicative of possible fluid overload and heart failure. The patient’s chest X-ray shows persistent opacity in the right upper lobe and mild pleural effusion, consistent with ongoing respiratory compromise. The patient’s hypoxemia is likely exacerbated by her underlying COPD and current pneumonia.  - Currently on 6L NC Sats > 90%  - Unable to verify how much oxygen she was on at the SNF  - Continue to monitor oxygen saturation closely and ensure adequate oxygenation.  - Given the elevated BNP and potential for fluid overload, fluid management will be adjusted carefully.    Lung Mass with  Worried About Running Out of Food in the Last Year: Sometimes true     Ran Out of Food in the Last Year: Sometimes true   Transportation Needs: Unmet Transportation Needs (7/25/2024)    PRAPARE - Transportation     Lack of Transportation (Medical): Yes     Lack of Transportation (Non-Medical): Yes   Housing Stability: Low Risk  (7/25/2024)    Housing Stability Vital Sign     Unable to Pay for Housing in the Last Year: No     Number of Places Lived in the Last Year: 1     Unstable Housing in the Last Year: No       Medications:   Medications:    sodium chloride  1,000 mL IntraVENous Once    magnesium sulfate  1,000 mg IntraVENous Once      Infusions:    dilTIAZem 100 mg in sodium chloride 0.9 % 100 mL infusion (ADD-[place]) 5 mg/hr (08/07/24 2227)     PRN Meds:      Labs      CBC:   Recent Labs     08/07/24 2143   WBC 27.8*   HGB 12.6        BMP:    Recent Labs     08/07/24 2143   *   K 4.4   CL 93*   CO2 24   BUN 9   CREATININE <0.5*   GLUCOSE 102*     Hepatic:   Recent Labs     08/07/24 2143   AST 16   ALT 13   BILITOT 0.6   ALKPHOS 111     Lipids:   Lab Results   Component Value Date/Time    CHOL 192 03/18/2024 06:07 AM    HDL 56 03/18/2024 06:07 AM    TRIG 63 03/18/2024 06:07 AM     Hemoglobin A1C: No results found for: \"LABA1C\"  TSH:   Lab Results   Component Value Date/Time    TSH 1.79 11/23/2011 11:33 AM     Troponin: No results found for: \"TROPONINT\"  Lactic Acid: No results for input(s): \"LACTA\" in the last 72 hours.  BNP: No results for input(s): \"PROBNP\" in the last 72 hours.  UA:  Lab Results   Component Value Date/Time    NITRU Negative 07/18/2024 01:36 PM    COLORU Yellow 07/18/2024 01:36 PM    PHUR 6.0 07/18/2024 01:36 PM    PHUR 6.0 02/27/2024 07:14 AM    LABCAST 0-1 Fine 01/19/2020 12:15 AM    WBCUA 21-50 07/18/2024 01:36 PM    RBCUA 0-2 07/18/2024 01:36 PM    MUCUS 1+ 01/19/2020 12:15 AM    BACTERIA Rare 07/18/2024 01:36 PM    CLARITYU Clear 07/18/2024 01:36 PM    LEUKOCYTESUR

## 2024-08-08 NOTE — ED PROVIDER NOTES
Underlying COPD.  No significant skeletal finding.     1. Improving aeration in the right upper lobe with persistent opacity in the right upper lobe and right lower lung zone. 2. Underlying COPD.       No results found.    PROCEDURES   Unless otherwise noted below, none     Procedures    CRITICAL CARE TIME (.cctime)   Due to the immediate potential for life-threatening deterioration due to atrial fibrillation which became unstable due to hypotension after rate control, I spent 41 minutes providing critical care.  This time is excluding time spent performing procedures.      PAST MEDICAL HISTORY      has a past medical history of Arthritis, Asthma, Bronchitis chronic, Colon polyp, COPD (chronic obstructive pulmonary disease) (HCC), Coronary artery calcification seen on CT scan (7/29/2024), Emphysema of lung (HCC), Guillain-[place] (HCC), Hyperlipidemia, Lock jaw, Pneumonia, Post-nasal drip, Primary hypertension (03/18/2024), Pulmonary nodule, Rash, Reflex sympathetic dystrophy, RSD (reflex sympathetic dystrophy), and TMJ (dislocation of temporomandibular joint).     Chronic Conditions affecting Care: Dementia, history of CAD, atrial fibrillation, unknown anticoagulation status    EMERGENCY DEPARTMENT COURSE and DIFFERENTIAL DIAGNOSIS/MDM:   Vitals:    Vitals:    08/07/24 2238 08/07/24 2252 08/07/24 2300 08/07/24 2318   BP: 101/73 114/82 112/77 120/74   Pulse: (!) 125 (!) 111 (!) 127 (!) 124   Resp: 19 26 26 22   Temp:       TempSrc:       SpO2: 90% 90% 94% 92%   Weight:       Height:           Patient was given the following medications:  Medications   magnesium sulfate 1000 mg in dextrose 5% 100 mL IVPB (1,000 mg IntraVENous New Bag 8/7/24 2242)   morphine (PF) injection 2 mg (has no administration in time range)   dilTIAZem injection 10 mg (10 mg IntraVENous Given 8/7/24 2215)   sodium chloride 0.9 % bolus 1,000 mL (1,000 mLs IntraVENous New Bag 8/7/24 2229)   digoxin (LANOXIN) injection 250 mcg (250 mcg IntraVENous  Clinician of Record.    FINAL IMPRESSION      1. Atrial fibrillation with RVR (HCC)    2. Acute on chronic respiratory failure with hypoxemia (HCC)          DISPOSITION/PLAN     DISPOSITION Admitted    PATIENT REFERRED TO:  No follow-up provider specified.    DISCHARGE MEDICATIONS:  Current Discharge Medication List          DISCONTINUED MEDICATIONS:  Current Discharge Medication List                 (Please note that portions of this note were completed with a voice recognition program.  Efforts were made to edit the dictations but occasionally words are mis-transcribed.)    ELOY Krause Jr (electronically signed)            Puma Mo Jr., PA  08/07/24 1682

## 2024-08-08 NOTE — PROGRESS NOTES
08/08/24 1558   Encounter Summary   Encounter Overview/Reason Spiritual/Emotional Needs   Service Provided For Patient   Support System Children   Last Encounter  08/08/24  ( visited with patient)   Assessment/Intervention/Outcome   Assessment Other (Comment)

## 2024-08-08 NOTE — PROGRESS NOTES
Hospital Medicine Progress Note      Date of Admission: 8/7/2024  Hospital Day: 2    Chief Admission Complaint: Confusion     Subjective: Patient slightly confused.  Patient sitting up in chair.  She was participating with therapy.  Patient with poor recall.    Presenting Admission History:       Isa Hernandez is a 79 y.o. female with pmh of  hypertension, COPD, lung mass, atrial fibrillation, and dementia, presenting from her assisted living facility with complaints of shortness of breath starting today. The patient has dementia and is a poor historian but reports concern that her oxygen was taken away. She denies chest pain and has no other significant complaints. She was recently hospitalized multiple times, most recently on 8/5 for hemoptysis and previously on 7/20 for pneumonia, syncope, and chest pain. She has a known history of atrial fibrillation and was previously on Xarelto, with her last dose taken on 7/24.The patient has a complicated recent medical history:     8/5: Discharged after presenting with hemoptysis, was found to have adenocarcinoma of the lung. Completed a course of Levaquin for pneumonia. Oncology consultation noted likely post-obstructive pneumonia; patient declined further evaluation or pulmonology consultation.  7/20: Discharged after presenting with chest pain, shortness of breath, and syncope. Diagnosed with pneumonia, managed with Levofloxacin. Known lung mass seen on imaging but patient declined further follow-up.  5/15: Hospitalized for a GI bleed, found to have Graf's esophagus on EGD. On Xarelto, which was resumed post-hospitalization.     In the ED, she was found to be in atrial fibrillation with rapid ventricular response (Afib with RVR), as evidenced by an irregularly irregular rhythm and tachycardia on EKG. Immediate treatment included the initiation of a diltiazem bolus (10 mg) followed by a diltiazem infusion (100 mg in 100 mL of 0.9% sodium chloride) for rate

## 2024-08-09 LAB
BILIRUB UR QL STRIP.AUTO: NEGATIVE
CLARITY UR: CLEAR
COLOR UR: ABNORMAL
EKG DIAGNOSIS: NORMAL
EKG Q-T INTERVAL: 306 MS
EKG QRS DURATION: 80 MS
EKG QTC CALCULATION (BAZETT): 486 MS
EKG R AXIS: -41 DEGREES
EKG T AXIS: 95 DEGREES
EKG VENTRICULAR RATE: 152 BPM
EPI CELLS #/AREA URNS HPF: NORMAL /HPF (ref 0–5)
GLUCOSE UR STRIP.AUTO-MCNC: NEGATIVE MG/DL
HGB UR QL STRIP.AUTO: ABNORMAL
KETONES UR STRIP.AUTO-MCNC: NEGATIVE MG/DL
LEUKOCYTE ESTERASE UR QL STRIP.AUTO: NEGATIVE
NITRITE UR QL STRIP.AUTO: NEGATIVE
PH UR STRIP.AUTO: 6.5 [PH] (ref 5–8)
PROT UR STRIP.AUTO-MCNC: NEGATIVE MG/DL
RBC #/AREA URNS HPF: NORMAL /HPF (ref 0–4)
SP GR UR STRIP.AUTO: 1.01 (ref 1–1.03)
UA COMPLETE W REFLEX CULTURE PNL UR: ABNORMAL
UA DIPSTICK W REFLEX MICRO PNL UR: YES
URN SPEC COLLECT METH UR: ABNORMAL
UROBILINOGEN UR STRIP-ACNC: 0.2 E.U./DL
WBC #/AREA URNS HPF: NORMAL /HPF (ref 0–5)

## 2024-08-09 PROCEDURE — 2580000003 HC RX 258: Performed by: NURSE PRACTITIONER

## 2024-08-09 PROCEDURE — 6360000002 HC RX W HCPCS: Performed by: NURSE PRACTITIONER

## 2024-08-09 PROCEDURE — 94761 N-INVAS EAR/PLS OXIMETRY MLT: CPT

## 2024-08-09 PROCEDURE — 51798 US URINE CAPACITY MEASURE: CPT

## 2024-08-09 PROCEDURE — 2580000003 HC RX 258

## 2024-08-09 PROCEDURE — 81001 URINALYSIS AUTO W/SCOPE: CPT

## 2024-08-09 PROCEDURE — 51701 INSERT BLADDER CATHETER: CPT

## 2024-08-09 PROCEDURE — 2700000000 HC OXYGEN THERAPY PER DAY

## 2024-08-09 PROCEDURE — 2060000000 HC ICU INTERMEDIATE R&B

## 2024-08-09 PROCEDURE — 93010 ELECTROCARDIOGRAM REPORT: CPT | Performed by: INTERNAL MEDICINE

## 2024-08-09 PROCEDURE — 6370000000 HC RX 637 (ALT 250 FOR IP)

## 2024-08-09 PROCEDURE — 94640 AIRWAY INHALATION TREATMENT: CPT

## 2024-08-09 RX ORDER — LORAZEPAM 2 MG/ML
0.5 INJECTION INTRAMUSCULAR ONCE
Status: COMPLETED | OUTPATIENT
Start: 2024-08-09 | End: 2024-08-09

## 2024-08-09 RX ORDER — ZIPRASIDONE MESYLATE 20 MG/ML
10 INJECTION, POWDER, LYOPHILIZED, FOR SOLUTION INTRAMUSCULAR ONCE
Status: COMPLETED | OUTPATIENT
Start: 2024-08-09 | End: 2024-08-09

## 2024-08-09 RX ADMIN — POTASSIUM CHLORIDE 20 MEQ: 1500 TABLET, EXTENDED RELEASE ORAL at 09:01

## 2024-08-09 RX ADMIN — HYDROXYZINE HYDROCHLORIDE 10 MG: 10 TABLET ORAL at 22:22

## 2024-08-09 RX ADMIN — BACLOFEN 5 MG: 10 TABLET ORAL at 17:32

## 2024-08-09 RX ADMIN — Medication 2 PUFF: at 08:35

## 2024-08-09 RX ADMIN — Medication 2 PUFF: at 19:54

## 2024-08-09 RX ADMIN — TIOTROPIUM BROMIDE INHALATION SPRAY 2 PUFF: 3.12 SPRAY, METERED RESPIRATORY (INHALATION) at 08:35

## 2024-08-09 RX ADMIN — Medication 250 MG: at 20:17

## 2024-08-09 RX ADMIN — TRAMADOL HYDROCHLORIDE 50 MG: 50 TABLET ORAL at 18:57

## 2024-08-09 RX ADMIN — LORAZEPAM 0.5 MG: 2 INJECTION INTRAMUSCULAR; INTRAVENOUS at 22:41

## 2024-08-09 RX ADMIN — ZIPRASIDONE MESYLATE 10 MG: 20 INJECTION, POWDER, LYOPHILIZED, FOR SOLUTION INTRAMUSCULAR at 22:44

## 2024-08-09 RX ADMIN — METHYLPREDNISOLONE SODIUM SUCCINATE 80 MG: 125 INJECTION INTRAMUSCULAR; INTRAVENOUS at 11:25

## 2024-08-09 RX ADMIN — PRAVASTATIN SODIUM 40 MG: 40 TABLET ORAL at 09:01

## 2024-08-09 RX ADMIN — Medication 250 MG: at 09:01

## 2024-08-09 RX ADMIN — MONTELUKAST 10 MG: 10 TABLET, FILM COATED ORAL at 20:17

## 2024-08-09 RX ADMIN — ACETAMINOPHEN 650 MG: 325 TABLET ORAL at 15:11

## 2024-08-09 RX ADMIN — SODIUM CHLORIDE, PRESERVATIVE FREE 10 ML: 5 INJECTION INTRAVENOUS at 20:17

## 2024-08-09 RX ADMIN — OXYCODONE HYDROCHLORIDE AND ACETAMINOPHEN 500 MG: 500 TABLET ORAL at 09:01

## 2024-08-09 RX ADMIN — OXYBUTYNIN CHLORIDE 5 MG: 5 TABLET, EXTENDED RELEASE ORAL at 20:17

## 2024-08-09 ASSESSMENT — PAIN DESCRIPTION - ORIENTATION: ORIENTATION: LOWER;MID

## 2024-08-09 ASSESSMENT — PAIN SCALES - GENERAL
PAINLEVEL_OUTOF10: 5
PAINLEVEL_OUTOF10: 6
PAINLEVEL_OUTOF10: 6
PAINLEVEL_OUTOF10: 9
PAINLEVEL_OUTOF10: 5

## 2024-08-09 ASSESSMENT — PAIN DESCRIPTION - LOCATION
LOCATION: BACK
LOCATION: BACK

## 2024-08-09 ASSESSMENT — PAIN DESCRIPTION - DESCRIPTORS: DESCRIPTORS: ACHING

## 2024-08-09 NOTE — PLAN OF CARE
Pt SPO2 91-92% on 7L high flow nc. Tried 6 L on nasal cannula and pt's SPO2 dropped to 81. High flow nc placed and O2 at 7L again.

## 2024-08-09 NOTE — PROGRESS NOTES
Hospital Medicine Progress Note      Date of Admission: 8/7/2024  Hospital Day: 3    Chief Admission Complaint:   Shortness of Breath (Per EMS pt lives at [facility]. Pt called for shortness of breath. Pt has Hx of Dementia.  Pt is Allergic to Albuterol, EMS administered Albuterol.  )        Subjective:     Patient in bed watching television. She is a very poor historian. She can not tell me why she was admitted.    Presenting Admission History:       Isa Hernandez is a 79 y.o. female with pmh of  hypertension, COPD, lung mass, atrial fibrillation, and dementia, presenting from her assisted living facility with complaints of shortness of breath starting today. The patient has dementia and is a poor historian but reports concern that her oxygen was taken away. She denies chest pain and has no other significant complaints. She was recently hospitalized multiple times, most recently on 8/5 for hemoptysis and previously on 7/20 for pneumonia, syncope, and chest pain. She has a known history of atrial fibrillation and was previously on Xarelto, with her last dose taken on 7/24.The patient has a complicated recent medical history:     8/5: Discharged after presenting with hemoptysis, was found to have adenocarcinoma of the lung. Completed a course of Levaquin for pneumonia. Oncology consultation noted likely post-obstructive pneumonia; patient declined further evaluation or pulmonology consultation.  7/20: Discharged after presenting with chest pain, shortness of breath, and syncope. Diagnosed with pneumonia, managed with Levofloxacin. Known lung mass seen on imaging but patient declined further follow-up.  5/15: Hospitalized for a GI bleed, found to have Graf's esophagus on EGD. On Xarelto, which was resumed post-hospitalization.     In the ED, she was found to be in atrial fibrillation with rapid ventricular response (Afib with RVR), as evidenced by an irregularly irregular rhythm and tachycardia  Data (any 2)  [x] Discussed current management and discharge planning options with Case Management.  [] Discussed management of the case with:    [] Telemetry personally reviewed and interpreted as documented above    [x] Imaging personally reviewed and interpreted, includes:    [x] Data Review (any 3)  [x] All available Consultant notes from yesterday/today were reviewed  [x] All current labs were reviewed and interpreted for clinical significance   [x] Appropriate follow-up labs were ordered  [x] Collateral history obtained from:  EMR     Patient with 1 or more chronic illnesses with severe exacerbation or side effects of treatment and/or 1 acute or chronic illness that poses threat to life or bodily function.    Decision regarding hospitalization or escalation  Patient on drug therapy that requires intensive monitoring.     Medications:  Personally reviewed in detail in conjunction w/ labs as documented for evidence of drug toxicity.     Infusion Medications    sodium chloride       Scheduled Medications    sodium chloride flush  5-40 mL IntraVENous 2 times per day    baclofen  5 mg Oral Dinner    montelukast  10 mg Oral Nightly    oxyBUTYnin  5 mg Oral Nightly    pravastatin  40 mg Oral Daily    potassium chloride  20 mEq Oral Daily    saccharomyces boulardii  250 mg Oral BID    budesonide-formoterol  2 puff Inhalation BID RT    And    tiotropium  2 puff Inhalation Daily RT    vitamin C  500 mg Oral Daily     PRN Meds: sodium chloride flush, sodium chloride, ondansetron **OR** ondansetron, polyethylene glycol, acetaminophen **OR** acetaminophen, hydrOXYzine HCl, traMADol     Labs:  Personally reviewed and interpreted for clinical significance.     Recent Labs     08/07/24  2143   WBC 27.8*   HGB 12.6   HCT 38.9          Recent Labs     08/07/24  2143   *   K 4.4   CL 93*   CO2 24   BUN 9   CREATININE <0.5*   CALCIUM 9.1   MG 2.10       Recent Labs     08/07/24  2143 08/07/24  2313   PROBNP 3,125*

## 2024-08-09 NOTE — CARE COORDINATION
Writer initiated precert via [facility] portal, still pending at this point, expected discharge tomorrow.  TIFFANIE Huffman

## 2024-08-09 NOTE — PLAN OF CARE
Problem: Discharge Planning  Goal: Discharge to home or other facility with appropriate resources  8/9/2024 1203 by Yumiko Goldberg RN  Outcome: Progressing  8/8/2024 2235 by Lupe Cruz RN  Outcome: Progressing     Problem: Pain  Goal: Verbalizes/displays adequate comfort level or baseline comfort level  8/9/2024 1203 by Yumiko Goldberg RN  Outcome: Progressing  8/8/2024 2235 by Lupe Cruz RN  Outcome: Progressing     Problem: Safety - Adult  Goal: Free from fall injury  8/9/2024 1203 by Yumiko Goldberg RN  Outcome: Progressing  8/8/2024 2235 by Lupe Cruz RN  Outcome: Progressing     Problem: Skin/Tissue Integrity  Goal: Absence of new skin breakdown  Description: 1.  Monitor for areas of redness and/or skin breakdown  2.  Assess vascular access sites hourly  3.  Every 4-6 hours minimum:  Change oxygen saturation probe site  4.  Every 4-6 hours:  If on nasal continuous positive airway pressure, respiratory therapy assess nares and determine need for appliance change or resting period.  8/9/2024 1203 by Yumiko Goldberg RN  Outcome: Progressing  8/8/2024 2235 by Lupe Cruz RN  Outcome: Progressing

## 2024-08-09 NOTE — CARE COORDINATION
[facility] pre-cert was approved for return to [facility] skilled. Not medically ready today. Still on 7 liters. Per attending can likely return to SNF over weekend once O2 weaned to 5 liters or below.Pre-cert good until 8/13.

## 2024-08-09 NOTE — PLAN OF CARE
Problem: Pain  Goal: Verbalizes/displays adequate comfort level or baseline comfort level  8/8/2024 2235 by Lupe Cruz RN  Outcome: Progressing  8/8/2024 1203 by Yumiko Goldberg RN  Outcome: Progressing     Problem: Safety - Adult  Goal: Free from fall injury  8/8/2024 2235 by uLpe Cruz RN  Outcome: Progressing  8/8/2024 1203 by Yumiko Goldberg RN  Outcome: Progressing     Problem: Skin/Tissue Integrity  Goal: Absence of new skin breakdown  Description: 1.  Monitor for areas of redness and/or skin breakdown  2.  Assess vascular access sites hourly  3.  Every 4-6 hours minimum:  Change oxygen saturation probe site  4.  Every 4-6 hours:  If on nasal continuous positive airway pressure, respiratory therapy assess nares and determine need for appliance change or resting period.  8/8/2024 2235 by Lupe Cruz RN  Outcome: Progressing  8/8/2024 1203 by Yumiko Goldberg RN  Outcome: Progressing     Problem: Discharge Planning  Goal: Discharge to home or other facility with appropriate resources  8/8/2024 2235 by Lupe Cruz RN  Outcome: Progressing  8/8/2024 1203 by Yumiko Goldberg RN  Outcome: Progressing

## 2024-08-09 NOTE — PROGRESS NOTES
Pt retaining urine. Bladder scan read 742 ml. Pt encouraged to urinate; she denies the sensation to urinate. Rn straight cath patient and got out 550 ml.

## 2024-08-10 VITALS
HEART RATE: 69 BPM | DIASTOLIC BLOOD PRESSURE: 88 MMHG | WEIGHT: 127.87 LBS | HEIGHT: 65 IN | TEMPERATURE: 98 F | SYSTOLIC BLOOD PRESSURE: 139 MMHG | BODY MASS INDEX: 21.3 KG/M2 | RESPIRATION RATE: 18 BRPM | OXYGEN SATURATION: 94 %

## 2024-08-10 LAB
ALBUMIN SERPL-MCNC: 3.3 G/DL (ref 3.4–5)
ALBUMIN/GLOB SERPL: 1.1 {RATIO} (ref 1.1–2.2)
ALP SERPL-CCNC: 104 U/L (ref 40–129)
ALT SERPL-CCNC: 9 U/L (ref 10–40)
ANION GAP SERPL CALCULATED.3IONS-SCNC: 14 MMOL/L (ref 3–16)
AST SERPL-CCNC: 11 U/L (ref 15–37)
BASOPHILS # BLD: 0.1 K/UL (ref 0–0.2)
BASOPHILS NFR BLD: 0.4 %
BILIRUB SERPL-MCNC: 0.6 MG/DL (ref 0–1)
BUN SERPL-MCNC: 4 MG/DL (ref 7–20)
CALCIUM SERPL-MCNC: 9 MG/DL (ref 8.3–10.6)
CHLORIDE SERPL-SCNC: 100 MMOL/L (ref 99–110)
CO2 SERPL-SCNC: 22 MMOL/L (ref 21–32)
CREAT SERPL-MCNC: <0.5 MG/DL (ref 0.6–1.2)
DEPRECATED RDW RBC AUTO: 15.1 % (ref 12.4–15.4)
EOSINOPHIL # BLD: 0.3 K/UL (ref 0–0.6)
EOSINOPHIL NFR BLD: 1.2 %
GFR SERPLBLD CREATININE-BSD FMLA CKD-EPI: >90 ML/MIN/{1.73_M2}
GLUCOSE SERPL-MCNC: 108 MG/DL (ref 70–99)
HCT VFR BLD AUTO: 39.6 % (ref 36–48)
HGB BLD-MCNC: 12.8 G/DL (ref 12–16)
LYMPHOCYTES # BLD: 1.7 K/UL (ref 1–5.1)
LYMPHOCYTES NFR BLD: 7.6 %
MCH RBC QN AUTO: 28.3 PG (ref 26–34)
MCHC RBC AUTO-ENTMCNC: 32.4 G/DL (ref 31–36)
MCV RBC AUTO: 87.5 FL (ref 80–100)
MONOCYTES # BLD: 1.2 K/UL (ref 0–1.3)
MONOCYTES NFR BLD: 5.4 %
NEUTROPHILS # BLD: 18.8 K/UL (ref 1.7–7.7)
NEUTROPHILS NFR BLD: 85.4 %
PLATELET # BLD AUTO: 204 K/UL (ref 135–450)
PMV BLD AUTO: 10 FL (ref 5–10.5)
POTASSIUM SERPL-SCNC: 4 MMOL/L (ref 3.5–5.1)
PROT SERPL-MCNC: 6.3 G/DL (ref 6.4–8.2)
RBC # BLD AUTO: 4.52 M/UL (ref 4–5.2)
SODIUM SERPL-SCNC: 136 MMOL/L (ref 136–145)
WBC # BLD AUTO: 21.9 K/UL (ref 4–11)

## 2024-08-10 PROCEDURE — 36415 COLL VENOUS BLD VENIPUNCTURE: CPT

## 2024-08-10 PROCEDURE — 2700000000 HC OXYGEN THERAPY PER DAY

## 2024-08-10 PROCEDURE — 2580000003 HC RX 258

## 2024-08-10 PROCEDURE — 80053 COMPREHEN METABOLIC PANEL: CPT

## 2024-08-10 PROCEDURE — 94761 N-INVAS EAR/PLS OXIMETRY MLT: CPT

## 2024-08-10 PROCEDURE — 85025 COMPLETE CBC W/AUTO DIFF WBC: CPT

## 2024-08-10 PROCEDURE — 6370000000 HC RX 637 (ALT 250 FOR IP)

## 2024-08-10 PROCEDURE — 94640 AIRWAY INHALATION TREATMENT: CPT

## 2024-08-10 RX ORDER — QUETIAPINE FUMARATE 50 MG/1
50 TABLET, EXTENDED RELEASE ORAL NIGHTLY
Status: ON HOLD | DISCHARGE
Start: 2024-08-10 | End: 2024-08-16 | Stop reason: HOSPADM

## 2024-08-10 RX ORDER — HYDROXYZINE HYDROCHLORIDE 10 MG/1
10 TABLET, FILM COATED ORAL 2 TIMES DAILY PRN
Status: ON HOLD | DISCHARGE
Start: 2024-08-10 | End: 2024-08-16 | Stop reason: HOSPADM

## 2024-08-10 RX ORDER — PREDNISONE 20 MG/1
40 TABLET ORAL DAILY
DISCHARGE
Start: 2024-08-10 | End: 2024-08-14

## 2024-08-10 RX ADMIN — POTASSIUM CHLORIDE 20 MEQ: 1500 TABLET, EXTENDED RELEASE ORAL at 09:07

## 2024-08-10 RX ADMIN — OXYCODONE HYDROCHLORIDE AND ACETAMINOPHEN 500 MG: 500 TABLET ORAL at 09:08

## 2024-08-10 RX ADMIN — TIOTROPIUM BROMIDE INHALATION SPRAY 2 PUFF: 3.12 SPRAY, METERED RESPIRATORY (INHALATION) at 11:34

## 2024-08-10 RX ADMIN — ACETAMINOPHEN 650 MG: 325 TABLET ORAL at 09:10

## 2024-08-10 RX ADMIN — SODIUM CHLORIDE, PRESERVATIVE FREE 10 ML: 5 INJECTION INTRAVENOUS at 09:08

## 2024-08-10 RX ADMIN — Medication 2 PUFF: at 11:34

## 2024-08-10 RX ADMIN — PRAVASTATIN SODIUM 40 MG: 40 TABLET ORAL at 09:07

## 2024-08-10 RX ADMIN — TRAMADOL HYDROCHLORIDE 50 MG: 50 TABLET ORAL at 05:07

## 2024-08-10 RX ADMIN — Medication 250 MG: at 09:07

## 2024-08-10 ASSESSMENT — PAIN SCALES - GENERAL
PAINLEVEL_OUTOF10: 7
PAINLEVEL_OUTOF10: 5
PAINLEVEL_OUTOF10: 6

## 2024-08-10 ASSESSMENT — PAIN DESCRIPTION - LOCATION: LOCATION: BACK;NECK

## 2024-08-10 NOTE — PLAN OF CARE
Problem: Discharge Planning  Goal: Discharge to home or other facility with appropriate resources  8/10/2024 1223 by Yumiko Goldberg RN  Outcome: Progressing  8/10/2024 0016 by Lupe Cruz RN  Outcome: Progressing     Problem: Pain  Goal: Verbalizes/displays adequate comfort level or baseline comfort level  8/10/2024 1223 by Yumiko Goldberg RN  Outcome: Progressing  8/10/2024 0016 by Lupe Cruz RN  Outcome: Progressing     Problem: Safety - Adult  Goal: Free from fall injury  8/10/2024 1223 by Yumiko Goldberg RN  Outcome: Progressing  8/10/2024 0016 by Lupe Cruz RN  Outcome: Progressing     Problem: Skin/Tissue Integrity  Goal: Absence of new skin breakdown  Description: 1.  Monitor for areas of redness and/or skin breakdown  2.  Assess vascular access sites hourly  3.  Every 4-6 hours minimum:  Change oxygen saturation probe site  4.  Every 4-6 hours:  If on nasal continuous positive airway pressure, respiratory therapy assess nares and determine need for appliance change or resting period.  8/10/2024 1223 by Yumiko Goldberg RN  Outcome: Progressing  8/10/2024 0016 by Lupe Cruz RN  Outcome: Progressing

## 2024-08-10 NOTE — PLAN OF CARE
Patient discharge completed. Discharge information included information on diagnosis including signs and symptoms, complications and when to seek medical attention. Information on new medications also provided included use for the medication, side effects and when to call the doctor. Patient verbalized understanding of all discharge information. Patient escorted out by transportation company with all documented belongings. Transported to [facility].

## 2024-08-10 NOTE — PLAN OF CARE
Patient is increasingly agitated and combative. Patient attempted to get out of bed and is non compliant. Patient is answering all orientation questions appropriately but appears disoriented at times. Patient has been calling multiple family members that have reached out their wishes to not be contacted by her r/t difficult relations. Patient called 9-1-1, This RN spoke to the  and was able to confirm patient safety. Avasys in the room. Patient is pulling at oxygen tubes. Refuses Telebox. Requests to leave. Daughter Penny EDWARDS  contacted and requested for her phone in the room to be unplugged. RN fulfilled family request. Plan of care ongoing.

## 2024-08-10 NOTE — CARE COORDINATION
CASE MANAGEMENT DISCHARGE SUMMARY      Discharge to: SNF @ [facility]     Precertification completed: Yes  Hospital Exemption Notification (HENS) completed: Yes    Transportation:    Family/car: no   Medical Transport explained to pt/family. Pt/family voice no agency preference.    Agency used: [employer]   time: 1400   Ambulance form completed: Yes    Confirmed discharge plan with:     Patient: yes per RN     Family:  yes left vm for son Barney     [phone]        Facility/Agency, name: [facility]  ANDI/AVS faxed   Phone number for report to facility: [phone]     RN, name: Yumiko ROBLES    Note: Discharging nurse to complete ANDI, reconcile AVS, and place final copy with patient's discharge packet. RN to ensure that written prescriptions for  Level II medications are sent with patient to the facility as per protocol.

## 2024-08-10 NOTE — PLAN OF CARE
Problem: Discharge Planning  Goal: Discharge to home or other facility with appropriate resources  8/10/2024 0016 by Lupe Cruz RN  Outcome: Progressing  8/9/2024 1203 by Yumiko Goldberg RN  Outcome: Progressing     Problem: Pain  Goal: Verbalizes/displays adequate comfort level or baseline comfort level  8/10/2024 0016 by Lupe Cruz RN  Outcome: Progressing  8/9/2024 1203 by Yumiko Goldberg RN  Outcome: Progressing     Problem: Safety - Adult  Goal: Free from fall injury  8/10/2024 0016 by Lupe Cruz RN  Outcome: Progressing  8/9/2024 1203 by Yumiko Goldberg RN  Outcome: Progressing     Problem: Skin/Tissue Integrity  Goal: Absence of new skin breakdown  Description: 1.  Monitor for areas of redness and/or skin breakdown  2.  Assess vascular access sites hourly  3.  Every 4-6 hours minimum:  Change oxygen saturation probe site  4.  Every 4-6 hours:  If on nasal continuous positive airway pressure, respiratory therapy assess nares and determine need for appliance change or resting period.  8/10/2024 0016 by Lupe Cruz RN  Outcome: Progressing  8/9/2024 1203 by Yumiko Goldberg RN  Outcome: Progressing

## 2024-08-10 NOTE — PLAN OF CARE
[facility] to [facility], at [facility]. Pt will be in room 207. Gave Elizabeth my direct number to call with any questions. Updated on VS, mentation, and current family conversations regarding pt's care.

## 2024-08-10 NOTE — DISCHARGE SUMMARY
Hospital Medicine Discharge Summary    Patient: Isa Hernadnez   : 1945     Admit Date: 2024   Discharge Date:   8/10/2024  Disposition:  []Home   []HHC  []SNF  [x]ECF  []Acute Rehab  []LTAC  []Hospice  Code status:  []Full  []DNR/CCA  []Limited (DNR/CCA with Do Not Intubate)  [x]DNRCC  Condition at Discharge: Stable  Primary Care Provider: Jocelin Ellsworth MD    Admitting Provider: Low Rodriguez DO  Discharge Provider: Honey Mahajan, APRN - CNP     Discharge Diagnoses:      Active Hospital Problems    Diagnosis     Atrial fibrillation with RVR (HCC) [I48.91]        Presenting Admission History:    Isa Hernandez is a 79 y.o. female with pmh of  hypertension, COPD, lung mass, atrial fibrillation, and dementia, presenting from her assisted living facility with complaints of shortness of breath starting today. The patient has dementia and is a poor historian but reports concern that her oxygen was taken away. She denies chest pain and has no other significant complaints. She was recently hospitalized multiple times, most recently on  for hemoptysis and previously on  for pneumonia, syncope, and chest pain. She has a known history of atrial fibrillation and was previously on Xarelto, with her last dose taken on .The patient has a complicated recent medical history:     : Discharged after presenting with hemoptysis, was found to have adenocarcinoma of the lung. Completed a course of Levaquin for pneumonia. Oncology consultation noted likely post-obstructive pneumonia; patient declined further evaluation or pulmonology consultation.  : Discharged after presenting with chest pain, shortness of breath, and syncope. Diagnosed with pneumonia, managed with Levofloxacin. Known lung mass seen on imaging but patient declined further follow-up.  5/15: Hospitalized for a GI bleed, found to have Graf's esophagus on EGD. On Xarelto, which was resumed post-hospitalization.    acute abnormality of the visualized skull or soft tissues.     No acute intracranial findings.     CT CERVICAL SPINE WO CONTRAST    Result Date: 7/18/2024  EXAMINATION: CT OF THE CERVICAL SPINE WITHOUT CONTRAST 7/18/2024 11:54 am TECHNIQUE: CT of the cervical spine was performed without the administration of intravenous contrast. Multiplanar reformatted images are provided for review. Automated exposure control, iterative reconstruction, and/or weight based adjustment of the mA/kV was utilized to reduce the radiation dose to as low as reasonably achievable. COMPARISON: None. HISTORY: ORDERING SYSTEM PROVIDED HISTORY: neck pain TECHNOLOGIST PROVIDED HISTORY: Reason for exam:->neck pain Decision Support Exception - unselect if not a suspected or confirmed emergency medical condition->Emergency Medical Condition (MA) Reason for Exam: fell , hit head, neck pain , rt shoulder pain FINDINGS: BONES/ALIGNMENT: There is no acute fracture or traumatic malalignment.  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 DEGENERATIVE CHANGES: There is moderate disc space narrowing and endplate osteophyte formation at the mid and lower cervical levels.  Uncinate spurring contributes to severe bilateral neural foraminal encroachment at the C5/6 and C6/7 levels. SOFT TISSUES: There is no prevertebral soft tissue swelling.  Dense consolidation is present within the right upper lobe.     No acute abnormality of the cervical spine.     XR CHEST PORTABLE    Result Date: 7/18/2024  EXAMINATION: ONE XRAY VIEW OF THE CHEST 7/18/2024 10:06 am COMPARISON: 05/26/2024. HISTORY: ORDERING SYSTEM PROVIDED HISTORY: Chest pain TECHNOLOGIST PROVIDED HISTORY: Reason for exam:->Chest pain Reason for Exam: chest pain FINDINGS: There is new airspace consolidation throughout the right upper lobe with mild volume loss.  A linear left basal opacity is unchanged.  There is no pleural effusion.  Borderline cardiomegaly appears stable.     1. New

## 2024-08-10 NOTE — DISCHARGE INSTR - COC
Continuity of Care Form    Patient Name: Isa Hernandez   :  1945  MRN:  [id number]    Admit date:  2024  Discharge date:  08/10/24    Code Status Order: DNR-CC   Advance Directives:   Advance Care Flowsheet Documentation             Admitting Physician:  Low Rodriguez DO  PCP: Jocelin Ellsworth MD    Discharging Nurse: Yumiko Goldberg RN  Discharging Hospital Unit/Room#: 0435/0435-01  Discharging Unit Phone Number: [phone]    Emergency Contact:   Extended Emergency Contact Information  Primary Emergency Contact: Barney Hernandez   [facility]  Home Phone: [phone]  Mobile Phone: [phone]  Relation: Child  Secondary Emergency Contact: Patrick Hernandez  Home Phone: [phone]  Relation: Child    Past Surgical History:  Past Surgical History:   Procedure Laterality Date    APPENDECTOMY      BACK SURGERY      BRONCHOSCOPY      BRONCHOSCOPY N/A 2024    BRONCHOSCOPY ENDOBRONCHIAL ULTRASOUND performed by Salvador Colunga MD at [facility] ENDOSCOPY    BRONCHOSCOPY N/A 2024    BRONCHOSCOPY performed by Salvador Colunga MD at [facility] ENDOSCOPY    COLONOSCOPY  11/15/2012    1 snared polyp    CT NEEDLE BIOPSY LUNG PERCUTANEOUS  2023    CT NEEDLE BIOPSY LUNG PERCUTANEOUS 20[place] [facility] [place]    HYSTERECTOMY (CERVIX STATUS UNKNOWN)      PACEMAKER INSERTION      TONSILLECTOMY      UPPER GASTROINTESTINAL ENDOSCOPY N/A 5/15/2024    ESOPHAGOGASTRODUODENOSCOPY BIOPSY performed by Kalyan Grullon MD at [facility] ENDOSCOPY       Immunization History:   Immunization History   Administered Date(s) Administered    COVID-19, MODERNA Booster BLUE border, (age 18y+), IM, 50mcg/0.25mL 2021    COVID-19, PFIZER PURPLE top, DILUTE for use, (age 12 y+), 30mcg/0.3mL 2021, 2021    Influenza 2011    Influenza Vaccine, unspecified formulation 2011    Pneumococcal, PCV-13, PREVNAR 13, (age 6w+), IM, 0.5mL 2017    Pneumococcal, PPSV23, PNEUMOVAX 23, (age  on 8/10/24 at 11:09 AM EDT    PHYSICIAN SECTION    Prognosis: Fair    Condition at Discharge: Stable    Rehab Potential (if transferring to Rehab): Fair    Recommended Labs or Other Treatments After Discharge: PT/OT    Physician Certification: I certify the above information and transfer of Isa Hernandez  is necessary for the continuing treatment of the diagnosis listed and that she requires Skilled Nursing Facility for {GREATER/LESS:[id number]} 30 days.     Update Admission H&P: No change in H&P    PHYSICIAN SIGNATURE:  Electronically signed by HOWIE Roe - DUSTY/Emre Euceda MD on 8/10/24 at 2:06 PM EDT

## 2024-08-15 ENCOUNTER — APPOINTMENT (OUTPATIENT)
Dept: CT IMAGING | Age: 79
DRG: 180 | End: 2024-08-15
Payer: MEDICARE

## 2024-08-15 ENCOUNTER — APPOINTMENT (OUTPATIENT)
Dept: GENERAL RADIOLOGY | Age: 79
DRG: 180 | End: 2024-08-15
Payer: MEDICARE

## 2024-08-15 ENCOUNTER — HOSPITAL ENCOUNTER (INPATIENT)
Age: 79
LOS: 1 days | Discharge: HOSPICE | DRG: 180 | End: 2024-08-16
Attending: EMERGENCY MEDICINE | Admitting: INTERNAL MEDICINE
Payer: MEDICARE

## 2024-08-15 DIAGNOSIS — J18.9 PNEUMONIA DUE TO INFECTIOUS ORGANISM, UNSPECIFIED LATERALITY, UNSPECIFIED PART OF LUNG: ICD-10-CM

## 2024-08-15 DIAGNOSIS — R06.00 DYSPNEA, UNSPECIFIED TYPE: Primary | ICD-10-CM

## 2024-08-15 DIAGNOSIS — I26.99 PE (PULMONARY THROMBOEMBOLISM) (HCC): ICD-10-CM

## 2024-08-15 DIAGNOSIS — R91.8 LUNG MASS: ICD-10-CM

## 2024-08-15 LAB
ALBUMIN SERPL-MCNC: 3.8 G/DL (ref 3.4–5)
ALBUMIN/GLOB SERPL: 1.3 {RATIO} (ref 1.1–2.2)
ALP SERPL-CCNC: 109 U/L (ref 40–129)
ALT SERPL-CCNC: 10 U/L (ref 10–40)
ANION GAP SERPL CALCULATED.3IONS-SCNC: 13 MMOL/L (ref 3–16)
APTT BLD: 34.6 SEC (ref 22.1–36.4)
AST SERPL-CCNC: 14 U/L (ref 15–37)
BASOPHILS # BLD: 0.1 K/UL (ref 0–0.2)
BASOPHILS NFR BLD: 0.2 %
BILIRUB SERPL-MCNC: 0.7 MG/DL (ref 0–1)
BUN SERPL-MCNC: 7 MG/DL (ref 7–20)
CALCIUM SERPL-MCNC: 9.8 MG/DL (ref 8.3–10.6)
CHLORIDE SERPL-SCNC: 94 MMOL/L (ref 99–110)
CO2 SERPL-SCNC: 24 MMOL/L (ref 21–32)
CREAT SERPL-MCNC: <0.5 MG/DL (ref 0.6–1.2)
D-DIMER QUANTITATIVE: 14.59 UG/ML FEU (ref 0–0.6)
DEPRECATED RDW RBC AUTO: 16.4 % (ref 12.4–15.4)
EKG ATRIAL RATE: 60 BPM
EKG DIAGNOSIS: NORMAL
EKG P-R INTERVAL: 160 MS
EKG Q-T INTERVAL: 394 MS
EKG QRS DURATION: 74 MS
EKG QTC CALCULATION (BAZETT): 416 MS
EKG R AXIS: -31 DEGREES
EKG T AXIS: 21 DEGREES
EKG VENTRICULAR RATE: 67 BPM
EOSINOPHIL # BLD: 0.3 K/UL (ref 0–0.6)
EOSINOPHIL NFR BLD: 1.2 %
FLUAV RNA RESP QL NAA+PROBE: NOT DETECTED
FLUBV RNA RESP QL NAA+PROBE: NOT DETECTED
GFR SERPLBLD CREATININE-BSD FMLA CKD-EPI: >90 ML/MIN/{1.73_M2}
GLUCOSE SERPL-MCNC: 90 MG/DL (ref 70–99)
HCT VFR BLD AUTO: 39 % (ref 36–48)
HGB BLD-MCNC: 12.3 G/DL (ref 12–16)
INR PPP: 1.18 (ref 0.85–1.15)
LACTATE BLDV-SCNC: 1.7 MMOL/L (ref 0.4–1.9)
LYMPHOCYTES # BLD: 1.6 K/UL (ref 1–5.1)
LYMPHOCYTES NFR BLD: 6.5 %
MCH RBC QN AUTO: 27.3 PG (ref 26–34)
MCHC RBC AUTO-ENTMCNC: 31.7 G/DL (ref 31–36)
MCV RBC AUTO: 86.2 FL (ref 80–100)
MONOCYTES # BLD: 1.5 K/UL (ref 0–1.3)
MONOCYTES NFR BLD: 6.2 %
NEUTROPHILS # BLD: 20.8 K/UL (ref 1.7–7.7)
NEUTROPHILS NFR BLD: 85.9 %
NT-PROBNP SERPL-MCNC: 2619 PG/ML (ref 0–449)
PLATELET # BLD AUTO: 179 K/UL (ref 135–450)
PMV BLD AUTO: 10.2 FL (ref 5–10.5)
POTASSIUM SERPL-SCNC: 3.6 MMOL/L (ref 3.5–5.1)
PROT SERPL-MCNC: 6.8 G/DL (ref 6.4–8.2)
PROTHROMBIN TIME: 15.2 SEC (ref 11.9–14.9)
RBC # BLD AUTO: 4.52 M/UL (ref 4–5.2)
SARS-COV-2 RNA RESP QL NAA+PROBE: NOT DETECTED
SODIUM SERPL-SCNC: 131 MMOL/L (ref 136–145)
TROPONIN, HIGH SENSITIVITY: 15 NG/L (ref 0–14)
TROPONIN, HIGH SENSITIVITY: 15 NG/L (ref 0–14)
TROPONIN, HIGH SENSITIVITY: 16 NG/L (ref 0–14)
TROPONIN, HIGH SENSITIVITY: 17 NG/L (ref 0–14)
TROPONIN, HIGH SENSITIVITY: 29 NG/L (ref 0–14)
WBC # BLD AUTO: 24.3 K/UL (ref 4–11)

## 2024-08-15 PROCEDURE — 71260 CT THORAX DX C+: CPT

## 2024-08-15 PROCEDURE — 94761 N-INVAS EAR/PLS OXIMETRY MLT: CPT

## 2024-08-15 PROCEDURE — 96375 TX/PRO/DX INJ NEW DRUG ADDON: CPT

## 2024-08-15 PROCEDURE — 83880 ASSAY OF NATRIURETIC PEPTIDE: CPT

## 2024-08-15 PROCEDURE — 85379 FIBRIN DEGRADATION QUANT: CPT

## 2024-08-15 PROCEDURE — 2580000003 HC RX 258: Performed by: INTERNAL MEDICINE

## 2024-08-15 PROCEDURE — 87040 BLOOD CULTURE FOR BACTERIA: CPT

## 2024-08-15 PROCEDURE — 96374 THER/PROPH/DIAG INJ IV PUSH: CPT

## 2024-08-15 PROCEDURE — 99223 1ST HOSP IP/OBS HIGH 75: CPT | Performed by: STUDENT IN AN ORGANIZED HEALTH CARE EDUCATION/TRAINING PROGRAM

## 2024-08-15 PROCEDURE — 83605 ASSAY OF LACTIC ACID: CPT

## 2024-08-15 PROCEDURE — 6360000004 HC RX CONTRAST MEDICATION: Performed by: EMERGENCY MEDICINE

## 2024-08-15 PROCEDURE — 36415 COLL VENOUS BLD VENIPUNCTURE: CPT

## 2024-08-15 PROCEDURE — 6370000000 HC RX 637 (ALT 250 FOR IP): Performed by: INTERNAL MEDICINE

## 2024-08-15 PROCEDURE — 99285 EMERGENCY DEPT VISIT HI MDM: CPT

## 2024-08-15 PROCEDURE — 93005 ELECTROCARDIOGRAM TRACING: CPT | Performed by: EMERGENCY MEDICINE

## 2024-08-15 PROCEDURE — 84484 ASSAY OF TROPONIN QUANT: CPT

## 2024-08-15 PROCEDURE — 2580000003 HC RX 258: Performed by: EMERGENCY MEDICINE

## 2024-08-15 PROCEDURE — 85730 THROMBOPLASTIN TIME PARTIAL: CPT

## 2024-08-15 PROCEDURE — 87636 SARSCOV2 & INF A&B AMP PRB: CPT

## 2024-08-15 PROCEDURE — 6360000002 HC RX W HCPCS: Performed by: EMERGENCY MEDICINE

## 2024-08-15 PROCEDURE — 80053 COMPREHEN METABOLIC PANEL: CPT

## 2024-08-15 PROCEDURE — 2700000000 HC OXYGEN THERAPY PER DAY

## 2024-08-15 PROCEDURE — 2060000000 HC ICU INTERMEDIATE R&B

## 2024-08-15 PROCEDURE — 85610 PROTHROMBIN TIME: CPT

## 2024-08-15 PROCEDURE — 71046 X-RAY EXAM CHEST 2 VIEWS: CPT

## 2024-08-15 PROCEDURE — 85025 COMPLETE CBC W/AUTO DIFF WBC: CPT

## 2024-08-15 PROCEDURE — 93010 ELECTROCARDIOGRAM REPORT: CPT | Performed by: INTERNAL MEDICINE

## 2024-08-15 RX ORDER — OXYCODONE HYDROCHLORIDE 5 MG/1
5 TABLET ORAL EVERY 8 HOURS PRN
Status: ON HOLD | COMMUNITY
Start: 2024-05-08 | End: 2024-08-16 | Stop reason: HOSPADM

## 2024-08-15 RX ORDER — POTASSIUM CHLORIDE 7.45 MG/ML
10 INJECTION INTRAVENOUS PRN
Status: DISCONTINUED | OUTPATIENT
Start: 2024-08-15 | End: 2024-08-17 | Stop reason: HOSPADM

## 2024-08-15 RX ORDER — BACLOFEN 10 MG/1
5 TABLET ORAL NIGHTLY
Status: DISCONTINUED | OUTPATIENT
Start: 2024-08-15 | End: 2024-08-17 | Stop reason: HOSPADM

## 2024-08-15 RX ORDER — ONDANSETRON 2 MG/ML
4 INJECTION INTRAMUSCULAR; INTRAVENOUS EVERY 6 HOURS PRN
Status: DISCONTINUED | OUTPATIENT
Start: 2024-08-15 | End: 2024-08-17 | Stop reason: HOSPADM

## 2024-08-15 RX ORDER — POLYETHYLENE GLYCOL 3350 17 G/17G
17 POWDER, FOR SOLUTION ORAL DAILY PRN
Status: DISCONTINUED | OUTPATIENT
Start: 2024-08-15 | End: 2024-08-17 | Stop reason: HOSPADM

## 2024-08-15 RX ORDER — SODIUM CHLORIDE 0.9 % (FLUSH) 0.9 %
5-40 SYRINGE (ML) INJECTION EVERY 12 HOURS SCHEDULED
Status: DISCONTINUED | OUTPATIENT
Start: 2024-08-15 | End: 2024-08-17 | Stop reason: HOSPADM

## 2024-08-15 RX ORDER — LEVOFLOXACIN 5 MG/ML
750 INJECTION, SOLUTION INTRAVENOUS ONCE
Status: COMPLETED | OUTPATIENT
Start: 2024-08-15 | End: 2024-08-15

## 2024-08-15 RX ORDER — BUDESONIDE AND FORMOTEROL FUMARATE DIHYDRATE 160; 4.5 UG/1; UG/1
2 AEROSOL RESPIRATORY (INHALATION)
Status: CANCELLED | OUTPATIENT
Start: 2024-08-15

## 2024-08-15 RX ORDER — LEVOFLOXACIN 5 MG/ML
750 INJECTION, SOLUTION INTRAVENOUS EVERY 24 HOURS
Status: DISCONTINUED | OUTPATIENT
Start: 2024-08-16 | End: 2024-08-17 | Stop reason: HOSPADM

## 2024-08-15 RX ORDER — ACETAMINOPHEN 650 MG/1
650 SUPPOSITORY RECTAL EVERY 6 HOURS PRN
Status: DISCONTINUED | OUTPATIENT
Start: 2024-08-15 | End: 2024-08-17 | Stop reason: HOSPADM

## 2024-08-15 RX ORDER — TRAMADOL HYDROCHLORIDE 50 MG/1
50 TABLET ORAL EVERY 6 HOURS PRN
Status: ON HOLD | COMMUNITY
End: 2024-08-16 | Stop reason: HOSPADM

## 2024-08-15 RX ORDER — ONDANSETRON 4 MG/1
4 TABLET, ORALLY DISINTEGRATING ORAL EVERY 8 HOURS PRN
Status: DISCONTINUED | OUTPATIENT
Start: 2024-08-15 | End: 2024-08-17 | Stop reason: HOSPADM

## 2024-08-15 RX ORDER — MONTELUKAST SODIUM 10 MG/1
10 TABLET ORAL NIGHTLY
Status: DISCONTINUED | OUTPATIENT
Start: 2024-08-15 | End: 2024-08-17 | Stop reason: HOSPADM

## 2024-08-15 RX ORDER — FLUTICASONE PROPIONATE 50 MCG
1 SPRAY, SUSPENSION (ML) NASAL 2 TIMES DAILY PRN
Status: DISCONTINUED | OUTPATIENT
Start: 2024-08-15 | End: 2024-08-17 | Stop reason: HOSPADM

## 2024-08-15 RX ORDER — SODIUM CHLORIDE 9 MG/ML
INJECTION, SOLUTION INTRAVENOUS PRN
Status: DISCONTINUED | OUTPATIENT
Start: 2024-08-15 | End: 2024-08-17 | Stop reason: HOSPADM

## 2024-08-15 RX ORDER — HYDROXYZINE HYDROCHLORIDE 10 MG/1
10 TABLET, FILM COATED ORAL 2 TIMES DAILY PRN
Status: DISCONTINUED | OUTPATIENT
Start: 2024-08-15 | End: 2024-08-17 | Stop reason: HOSPADM

## 2024-08-15 RX ORDER — OXYCODONE HYDROCHLORIDE 5 MG/1
5 TABLET ORAL EVERY 8 HOURS PRN
Status: DISCONTINUED | OUTPATIENT
Start: 2024-08-15 | End: 2024-08-16

## 2024-08-15 RX ORDER — SODIUM CHLORIDE 0.9 % (FLUSH) 0.9 %
5-40 SYRINGE (ML) INJECTION PRN
Status: DISCONTINUED | OUTPATIENT
Start: 2024-08-15 | End: 2024-08-17 | Stop reason: HOSPADM

## 2024-08-15 RX ORDER — MAGNESIUM SULFATE IN WATER 40 MG/ML
2000 INJECTION, SOLUTION INTRAVENOUS PRN
Status: DISCONTINUED | OUTPATIENT
Start: 2024-08-15 | End: 2024-08-17 | Stop reason: HOSPADM

## 2024-08-15 RX ORDER — POTASSIUM CHLORIDE 20 MEQ/1
40 TABLET, EXTENDED RELEASE ORAL PRN
Status: DISCONTINUED | OUTPATIENT
Start: 2024-08-15 | End: 2024-08-17 | Stop reason: HOSPADM

## 2024-08-15 RX ORDER — QUETIAPINE FUMARATE 50 MG/1
50 TABLET, EXTENDED RELEASE ORAL NIGHTLY
Status: DISCONTINUED | OUTPATIENT
Start: 2024-08-15 | End: 2024-08-17 | Stop reason: HOSPADM

## 2024-08-15 RX ORDER — OXYBUTYNIN CHLORIDE 5 MG/1
5 TABLET, EXTENDED RELEASE ORAL NIGHTLY
Status: DISCONTINUED | OUTPATIENT
Start: 2024-08-15 | End: 2024-08-17 | Stop reason: HOSPADM

## 2024-08-15 RX ORDER — ASCORBIC ACID 500 MG
500 TABLET ORAL DAILY
Status: DISCONTINUED | OUTPATIENT
Start: 2024-08-15 | End: 2024-08-17 | Stop reason: HOSPADM

## 2024-08-15 RX ORDER — ACETAMINOPHEN 325 MG/1
650 TABLET ORAL EVERY 6 HOURS PRN
Status: DISCONTINUED | OUTPATIENT
Start: 2024-08-15 | End: 2024-08-17 | Stop reason: HOSPADM

## 2024-08-15 RX ORDER — DIPHENHYDRAMINE HYDROCHLORIDE 50 MG/ML
50 INJECTION INTRAMUSCULAR; INTRAVENOUS ONCE
Status: COMPLETED | OUTPATIENT
Start: 2024-08-15 | End: 2024-08-15

## 2024-08-15 RX ADMIN — BACLOFEN 5 MG: 10 TABLET ORAL at 20:17

## 2024-08-15 RX ADMIN — HYDROXYZINE HYDROCHLORIDE 10 MG: 10 TABLET ORAL at 20:16

## 2024-08-15 RX ADMIN — LEVOFLOXACIN 750 MG: 5 INJECTION, SOLUTION INTRAVENOUS at 15:03

## 2024-08-15 RX ADMIN — Medication 10 ML: at 20:18

## 2024-08-15 RX ADMIN — DIPHENHYDRAMINE HYDROCHLORIDE 50 MG: 50 INJECTION INTRAMUSCULAR; INTRAVENOUS at 10:14

## 2024-08-15 RX ADMIN — IOPAMIDOL 75 ML: 755 INJECTION, SOLUTION INTRAVENOUS at 11:57

## 2024-08-15 RX ADMIN — MONTELUKAST 10 MG: 10 TABLET, FILM COATED ORAL at 20:16

## 2024-08-15 RX ADMIN — METHYLPREDNISOLONE SODIUM SUCCINATE 40 MG: 40 INJECTION INTRAMUSCULAR; INTRAVENOUS at 10:14

## 2024-08-15 RX ADMIN — OXYBUTYNIN CHLORIDE 5 MG: 5 TABLET, EXTENDED RELEASE ORAL at 20:16

## 2024-08-15 RX ADMIN — OXYCODONE HYDROCHLORIDE AND ACETAMINOPHEN 500 MG: 500 TABLET ORAL at 18:26

## 2024-08-15 RX ADMIN — QUETIAPINE FUMARATE 50 MG: 50 TABLET, EXTENDED RELEASE ORAL at 20:16

## 2024-08-15 RX ADMIN — OXYCODONE HYDROCHLORIDE 5 MG: 5 TABLET ORAL at 20:17

## 2024-08-15 ASSESSMENT — ENCOUNTER SYMPTOMS
BACK PAIN: 0
STRIDOR: 0
COUGH: 1
ABDOMINAL PAIN: 0
SORE THROAT: 0
ABDOMINAL DISTENTION: 0
EYE DISCHARGE: 0
WHEEZING: 0
CONSTIPATION: 0
EYE PAIN: 0
VOMITING: 0
NAUSEA: 0
EYE ITCHING: 0
SHORTNESS OF BREATH: 1
EYE REDNESS: 0
DIARRHEA: 0
COLOR CHANGE: 0

## 2024-08-15 ASSESSMENT — PAIN DESCRIPTION - LOCATION
LOCATION: BACK
LOCATION: BACK

## 2024-08-15 ASSESSMENT — PAIN DESCRIPTION - DESCRIPTORS: DESCRIPTORS: ACHING

## 2024-08-15 ASSESSMENT — PAIN SCALES - GENERAL
PAINLEVEL_OUTOF10: 8
PAINLEVEL_OUTOF10: 10

## 2024-08-15 ASSESSMENT — PAIN - FUNCTIONAL ASSESSMENT: PAIN_FUNCTIONAL_ASSESSMENT: 0-10

## 2024-08-15 ASSESSMENT — PAIN DESCRIPTION - PAIN TYPE
TYPE: CHRONIC PAIN
TYPE: CHRONIC PAIN

## 2024-08-15 ASSESSMENT — PAIN DESCRIPTION - ORIENTATION: ORIENTATION: LEFT;UPPER

## 2024-08-15 NOTE — PLAN OF CARE
4 Eyes Skin Assessment     NAME:  Isa Hernandez  YOB: 1945  MEDICAL RECORD NUMBER:  [id number]    The patient is being assessed for  Admission    I agree that at least one RN has performed a thorough Head to Toe Skin Assessment on the patient. ALL assessment sites listed below have been assessed.      Areas assessed by both nurses:    Head, Face, Ears, Shoulders, Back, Chest, Arms, Elbows, Hands, Sacrum. Buttock, Coccyx, Ischium, Legs. Feet and Heels, and Under Medical Devices         Does the Patient have a Wound? No noted wound(s)       Jeison Prevention initiated by RN: Yes  Wound Care Orders initiated by RN: No    Pressure Injury (Stage 3,4, Unstageable, DTI, NWPT, and Complex wounds) if present, place Wound referral order by RN under : No    New Ostomies, if present place, Ostomy referral order under : No     Nurse 1 eSignature: Electronically signed by Yumiko Goldberg RN on 8/15/24 at 5:30 PM EDT    **SHARE this note so that the co-signing nurse can place an eSignature**    Nurse 2 eSignature: Electronically signed by Shivam Rocha RN on 8/16/24 at 6:59 AM EDT

## 2024-08-15 NOTE — CARE COORDINATION
Case Management Assessment  Initial Evaluation    Date/Time of Evaluation: 8/15/2024 10:37 AM  Assessment Completed by: Dia Burgos RN    If patient is discharged prior to next notation, then this note serves as note for discharge by case management.    Patient Name: Isa Hernandez                   YOB: 1945  Diagnosis: No admission diagnoses are documented for this encounter.                   Date / Time: 8/15/2024  8:13 AM    Patient Admission Status: Emergency   Readmission Risk (Low < 19, Mod (19-27), High > 27): Readmission Risk Score: 30.6    Current PCP: Jocelin Ellsworth MD  PCP verified by CM? Yes    Chart Reviewed: Yes      History Provided by: Patient, Child/Family  Patient Orientation: Alert and Oriented    Patient Cognition: Alert    Hospitalization in the last 30 days (Readmission):  Yes    If yes, Readmission Assessment in CM Navigator will be completed.    Advance Directives:      Code Status: Prior   Patient's Primary Decision Maker is: Legal Next of Kin    Primary Decision Maker: Barney Hernandez  Child - [phone]    Primary Decision Maker (Active): Penny Uriostegui - Child - [phone]    Discharge Planning:    Patient lives with: Alone Type of Home: Apartment  Primary Care Giver: Other (Comment) (came from [facility])  Patient Support Systems include: Children   Current Financial resources: Medicare  Current community resources:    Current services prior to admission: Home Care, Skilled Nursing Facility            Current DME: Walker            Type of Home Care services:  OT, PT, Nursing Services, Skilled Therapy    ADLS  Prior functional level: Assistance with the following:, Mobility  Current functional level: Assistance with the following:, Mobility    PT AM-PAC:   /24  OT AM-PAC:   /24    Family can provide assistance at DC: Yes  Would you like Case Management to discuss the discharge plan with any other family members/significant others, and if so, who? Yes (children)  Plans

## 2024-08-15 NOTE — ED NOTES
1242- Paged Pulmonology per Javed Blair MD   Re- Lung mass, consolidation, B/L Pes  1315- 2nd page per Javed Blair MD  1316- Pulmonology returned page and spoke with Javed Blair MD

## 2024-08-15 NOTE — ED NOTES
Patient Name: Isa Hernandez  :  1945  79 y.o.  MRN:  [id number]  Preferred Name  [facility]  ED Room #:    Family/Caregiver Present no  Restraints no  Sitter no  Sepsis Risk Score      Situation  Code Status: Prior No additional code details.    Allergies: Latex, Iodine, Penicillins, Sulfa antibiotics, Tetanus toxoids, Cephalexin, Clindamycin, Clindamycin/lincomycin, Influenza vaccines, Iodinated contrast media, Polymyxin b, Tetanus immune globulin, Tetanus toxoid, Albuterol, Codeine, Doxycycline, and Tiotropium bromide monohydrate  Weight: Patient Vitals for the past 96 hrs (Last 3 readings):   Weight   08/15/24 0818 57.6 kg (127 lb)     Arrived from: nursing home  Chief Complaint:   Chief Complaint   Patient presents with    Shortness of Breath     Patient brought in by [facility] from [place].   Patient reports SOB that began this morning along with head congestion and blood in sputum.  Wears 2L at baseline. Hx of lung cancer.      Hospital Problem/Diagnosis:  Principal Problem:    Pulmonary embolism, bilateral (HCC)  Resolved Problems:    * No resolved hospital problems. *    Imaging:   CT CHEST PULMONARY EMBOLISM W CONTRAST   Final Result   1. Multiple acute bilateral segmental/subsegmental pulmonary emboli.   2. Stable right hilar/mediastinal bronchogenic carcinoma with complete right   upper lobe collapse.   3. Increasing left basilar atelectasis versus pneumonia.   Findings were discussed with HKAEEM MUHAMMAD at 12:24 pm on 8/15/2024.         XR CHEST (2 VW)   Final Result   Stable appearance of the lungs with right upper lobe collapse.           Abnormal labs:   Abnormal Labs Reviewed   CBC WITH AUTO DIFFERENTIAL - Abnormal; Notable for the following components:       Result Value    WBC 24.3 (*)     RDW 16.4 (*)     Neutrophils Absolute 20.8 (*)     Monocytes Absolute 1.5 (*)     All other components within normal limits   COMPREHENSIVE METABOLIC PANEL W/ REFLEX TO MG FOR LOW K -

## 2024-08-15 NOTE — ED NOTES
Spoke with patient daughter, Penny regarding patient ED visit.  Patient daughter instructed writer that patient is a lung cancer patient who recently just signed a DNR-CC. Family is working on getting hospice involved and hope to move her to at home hospice on the 21st.   Patient family informed that they will be updated via phone once patient ED disposition is decided.

## 2024-08-15 NOTE — CONSULTS
Gastrointestinal:  Negative for abdominal distention, abdominal pain, constipation, diarrhea, nausea and vomiting.   Endocrine: Negative for polydipsia, polyphagia and polyuria.   Genitourinary:  Negative for difficulty urinating.   Musculoskeletal:  Negative for back pain, myalgias and neck pain.   Skin:  Negative for color change.   Neurological:  Negative for dizziness, weakness and light-headedness.   Psychiatric/Behavioral:  Negative for agitation and behavioral problems.        I personally reviewed the patient's medication list, medical and surgical history, and updated as needed.     Objective:   EXAM:  BP (!) 151/74   Pulse 63   Temp 98.5 °F (36.9 °C) (Oral)   Resp 19   Ht 1.651 m (5' 5\")   Wt 57.6 kg (127 lb)   SpO2 94%   BMI 21.13 kg/m²      Physical Exam  Constitutional:       Appearance: She is ill-appearing.   HENT:      Head: Normocephalic and atraumatic.      Nose: Nose normal.      Mouth/Throat:      Pharynx: No oropharyngeal exudate.   Eyes:      General: No scleral icterus.        Right eye: No discharge.         Left eye: No discharge.   Cardiovascular:      Rate and Rhythm: Normal rate.      Heart sounds: No murmur heard.     No gallop.   Pulmonary:      Effort: Pulmonary effort is normal.      Breath sounds: Rales present. No wheezing.   Abdominal:      General: Abdomen is flat. Bowel sounds are normal. There is no distension.      Tenderness: There is no abdominal tenderness.   Musculoskeletal:         General: No swelling.      Cervical back: Normal range of motion.   Skin:     General: Skin is warm and dry.   Neurological:      Mental Status: She is alert.      Comments: AOx1            Data Reviewed:   LABS:  :   Recent Labs     08/15/24  0831   WBC 24.3*   HGB 12.3   HCT 39.0   MCV 86.2        BMP:   Recent Labs     08/15/24  0831   *   K 3.6   CL 94*   CO2 24   BUN 7   CREATININE <0.5*     PROFILE:   Recent Labs     08/15/24  0831   AST 14*   ALT 10   BILITOT 0.7  goal saturation 88-92%, she is on 4L NC  - Agree with Levaquin for 5 day course  - Recommend Palliative Care consult with readmission and continued hemoptysis for lung mass  - Cont smoking cessation  - Restart home Trelegy equivalent      Salvador Colunga MD, FCCP    2:31 PM

## 2024-08-15 NOTE — ED PROVIDER NOTES
Emergency Department Provider Note  Location: [facility]  ED  8/15/2024     Patient Identification  Isa Hernandez is a 79 y.o. female    Chief Complaint  Shortness of Breath (Patient brought in by [facility] from [place]. /Patient reports SOB that began this morning along with head congestion and blood in sputum./Wears 2L at baseline. Hx of lung cancer. )          HPI  (History provided by patient)  Patient presents to the ER with worsening shortness of breath that started this morning.  She has a history of lung cancer, recent pneumonia, A-fib.  She has COPD and is on 2 L nasal cannula baseline.  She denies chest pain.  She has a chronic cough.  No body aches or chills.  She endorses ongoing thoracic back pain.  She states she recently finished antibiotics for pneumonia.    Nursing Notes reviewed.    Allergies:   Allergies   Allergen Reactions    Latex Itching and Rash    Iodine      IV dye    Penicillins      Pt states she stopped breathing    Sulfa Antibiotics      Pt stopped breathing.    Tetanus Toxoids Shortness Of Breath     Pt stopped breathing    Cephalexin      Other reaction(s): Not available    Clindamycin     Clindamycin/Lincomycin     Influenza Vaccines Other (See Comments)     History of GBS    Iodinated Contrast Media     Polymyxin B     Tetanus Immune Globulin      Other reaction(s): Not available    Tetanus Toxoid     Albuterol Nausea Only and Other (See Comments)     Cold chills, shakes. States it is only with the pill, but she is fine with breathing treatments.    Codeine Nausea And Vomiting    Doxycycline Nausea And Vomiting and Dizziness or Vertigo     Other reaction(s): Not available    Tiotropium Bromide Monohydrate Rash       Past medical history:  has a past medical history of Arthritis, Asthma, Bronchitis chronic, Colon polyp, COPD (chronic obstructive pulmonary disease) ([facility]), Coronary artery calcification seen on CT scan (7/29/2024), Emphysema of lung ([facility]),  (L) 0.6 - 1.2 mg/dL    Est, Glom Filt Rate >90 >60    Calcium 9.8 8.3 - 10.6 mg/dL    Total Protein 6.8 6.4 - 8.2 g/dL    Albumin 3.8 3.4 - 5.0 g/dL    Albumin/Globulin Ratio 1.3 1.1 - 2.2    Total Bilirubin 0.7 0.0 - 1.0 mg/dL    Alkaline Phosphatase 109 40 - 129 U/L    ALT 10 10 - 40 U/L    AST 14 (L) 15 - 37 U/L   Lactate, Sepsis   Result Value Ref Range    Lactic Acid, Sepsis 1.7 0.4 - 1.9 mmol/L   Troponin   Result Value Ref Range    Troponin, High Sensitivity 29 (H) 0 - 14 ng/L   BNP   Result Value Ref Range    Pro-BNP 2,619 (H) 0 - 449 pg/mL   D-Dimer, Quantitative   Result Value Ref Range    D-Dimer, Quant 14.59 (H) 0.00 - 0.60 ug/mL FEU   EKG 12 Lead   Result Value Ref Range    Ventricular Rate 67 BPM    Atrial Rate 60 BPM    P-R Interval 160 ms    QRS Duration 74 ms    Q-T Interval 394 ms    QTc Calculation (Bazett) 416 ms    R Axis -31 degrees    T Axis 21 degrees    Diagnosis       Atrial-paced rhythm with occasional supraventricular complexes and Premature supraventricular complexesLeft axis deviationNonspecific T wave abnormalityAbnormal ECGWhen compared with ECG of 07-AUG-2024 21:42,Electronic atrial pacemaker has replaced Atrial fibrillationVent. rate has decreased BY  85 BPMST elevation now present in Lateral leadsT wave inversion now evident in Anterior leads         Procedures  Procedures    ED Course       Upon reassessment, Isa Hernandez is breathing comfortably on 2 L nasal cannula.      Diagnostic studies reviewed.  Patient has elevated WBC, significantly elevated D-dimer    CT shows new bilateral PE, lung mass, and areas of atelectasis versus pneumonia    We had multiple long conversations with the patient and her family in terms of her goals and care.  The patient is DNR CC.  She has been open to hospice in the past.  Hospice team came to speak to the patient for possible intake, and the patient declined hospice services at this time.  Patient and family refused to return to [place]

## 2024-08-15 NOTE — H&P
Hospital Medicine History & Physical      Date of Admission: 8/15/2024    Date of Service:  Pt seen/examined on 8/15/2024    [x]Admitted to Inpatient with expected LOS greater than two midnights due to medical therapy.  []Placed in Observation status.    Chief Admission Complaint: Hemoptysis    Presenting Admission History:      79 y.o. female who presented to [facility] with history of adenocarcinoma of the lung.  Patient was here recently for hemoptysis.  Patient had been on Xarelto but this was discontinued due to hemoptysis.  Patient returns to the hospital with increasing shortness of breath and hemoptysis.  Patient noted to have bilateral subsegmental PEs.  Initially patient states she did not want aggressive care.  Now she is requesting aggressive care.  Patient declines hospice care.      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  Hemoptysis.  Secondary to malignancy.  Patient coughing up blood clots.  Discussed with Dr. Colunga.  Plan to monitor overnight.  Consider starting heparin drip tomorrow if hemoptysis improved.  2.  Lung malignancy.  Adenocarcinoma on last BAL.  Request hematology/oncology consult.  Patient agreeable to aggressive care.  3.  Elevated troponin.  Slight elevation which is stable.  Continue monitor closely.  4.  Deconditioning.  While PT OT evaluate and  Personally reviewed and interpreted for clinical significance.   Recent Labs     08/15/24  0831   WBC 24.3*   HGB 12.3   HCT 39.0        Recent Labs     08/15/24  0831   *   K 3.6   CL 94*   CO2 24   BUN 7   CREATININE <0.5*   CALCIUM 9.8     Recent Labs     08/15/24  0831   PROBNP 2,619*   TROPHS 29*     No results for input(s): \"LABA1C\" in the last 72 hours.  Recent Labs     08/15/24  0831   AST 14*   ALT 10   BILITOT 0.7   ALKPHOS 109     No results for input(s): \"INR\", \"LACTA\", \"TSH\" in the last 72 hours.     JANUSZ ARAIZA MD

## 2024-08-15 NOTE — ED NOTES
Patient family, Penny, updated on current plan of care.  Patient daughter requesting hospice resources. Social work notified.

## 2024-08-15 NOTE — ED NOTES
Patient returned from CT scan via stretcher.  CT tech reports patient coughing up blood clots into emesis bag.  Patient provided with tissues at this time.

## 2024-08-15 NOTE — PLAN OF CARE
Problem: Discharge Planning  Goal: Discharge to home or other facility with appropriate resources  Outcome: Progressing  Flowsheets (Taken 8/15/2024 1804)  Discharge to home or other facility with appropriate resources:   Identify barriers to discharge with patient and caregiver   Identify discharge learning needs (meds, wound care, etc)     Problem: Pain  Goal: Verbalizes/displays adequate comfort level or baseline comfort level  Outcome: Progressing     Problem: Skin/Tissue Integrity  Goal: Absence of new skin breakdown  Description: 1.  Monitor for areas of redness and/or skin breakdown  2.  Assess vascular access sites hourly  3.  Every 4-6 hours minimum:  Change oxygen saturation probe site  4.  Every 4-6 hours:  If on nasal continuous positive airway pressure, respiratory therapy assess nares and determine need for appliance change or resting period.  Outcome: Progressing     Problem: Safety - Adult  Goal: Free from fall injury  Outcome: Progressing     Problem: ABCDS Injury Assessment  Goal: Absence of physical injury  Outcome: Progressing

## 2024-08-15 NOTE — PROGRESS NOTES
HOSPICE [facility]    Met with patient in the ED who confirms she does want to move forward with hospice. Discussed that she was at [facility], it sounds like for a rehab stay. Discussed possibility of a stay at one of the hospice inpatient units for symptom management. Could consider [facility] as a future long term care facility with hospice following the inpatient unit. Call to daughter Penny at [phone], left voicemail for detailing options for inpatient hospice placement being considered with patient and requesting a call back. Call to number listed for Babs, it did not connect. Call to Barney at . No answer. Left voicemail detailing options that are being considered for inpatient hospice unit placement. Requested a call back. Call to fourth child Patrick at [phone]. Number is not active. Have attempted to contact all children, await their return responses. Call to [facility]. They state patient would have to return there directly to be guaranteed a bed at [facility]. Will present this option to the patient and informed Elena in admissions at [facility] that will inform her of what the patient tells me she is choosing. Elena in admissions at [facility] can be reached at .   Update 1155- Patient out of ED getting testing. When patient return to ED will offer her the option of returning to [facility] as a long term care patient with hospice vs. [facility] for symptom management if she declines to return to [facility] with hospice following.   Update 7316- Talked with daughter Babs who states she does not want patient to return to [facility] preferably. Babs prefers patient goes first to hospice IPCC for symptom management then to to home to her apartment with hospice or to a different long term care with hospice following.   Update- Talked with patient and MD at bedside. Patient expressed feeling overwhelmed by the decision to join hospice.  Reported wanting to think about it overnight. MD suggested to patient that we could pause the conversation for now. This RN updated daughter Babs whose correct number appears to be  in a voicemail. HOC will continue to follow up. Will update [facility] that daughter Babs reports she is not planning for the patient to return there as she reports she is not happy with the facility.     Elena Bull RN  [phone] (work cell)  [phone] (main & referrals)

## 2024-08-16 VITALS
DIASTOLIC BLOOD PRESSURE: 54 MMHG | SYSTOLIC BLOOD PRESSURE: 109 MMHG | BODY MASS INDEX: 19.83 KG/M2 | OXYGEN SATURATION: 90 % | WEIGHT: 119 LBS | RESPIRATION RATE: 20 BRPM | HEART RATE: 83 BPM | HEIGHT: 65 IN | TEMPERATURE: 97.5 F

## 2024-08-16 LAB
ALBUMIN SERPL-MCNC: 3.2 G/DL (ref 3.4–5)
ALBUMIN/GLOB SERPL: 1.1 {RATIO} (ref 1.1–2.2)
ALP SERPL-CCNC: 105 U/L (ref 40–129)
ALT SERPL-CCNC: 8 U/L (ref 10–40)
ANION GAP SERPL CALCULATED.3IONS-SCNC: 13 MMOL/L (ref 3–16)
ANTI-XA UNFRAC HEPARIN: 0.13 IU/ML (ref 0.3–0.7)
APTT BLD: 31.5 SEC (ref 22.1–36.4)
AST SERPL-CCNC: 10 U/L (ref 15–37)
BACTERIA BLD CULT ORG #2: NORMAL
BACTERIA BLD CULT: NORMAL
BASOPHILS # BLD: 0.1 K/UL (ref 0–0.2)
BASOPHILS NFR BLD: 0.3 %
BILIRUB SERPL-MCNC: 0.6 MG/DL (ref 0–1)
BUN SERPL-MCNC: 6 MG/DL (ref 7–20)
CALCIUM SERPL-MCNC: 8.9 MG/DL (ref 8.3–10.6)
CHLORIDE SERPL-SCNC: 94 MMOL/L (ref 99–110)
CO2 SERPL-SCNC: 25 MMOL/L (ref 21–32)
CREAT SERPL-MCNC: <0.5 MG/DL (ref 0.6–1.2)
DEPRECATED RDW RBC AUTO: 16 % (ref 12.4–15.4)
DEPRECATED RDW RBC AUTO: 16.8 % (ref 12.4–15.4)
EOSINOPHIL # BLD: 0 K/UL (ref 0–0.6)
EOSINOPHIL NFR BLD: 0.1 %
GFR SERPLBLD CREATININE-BSD FMLA CKD-EPI: >90 ML/MIN/{1.73_M2}
GLUCOSE BLD-MCNC: 98 MG/DL (ref 70–99)
GLUCOSE SERPL-MCNC: 124 MG/DL (ref 70–99)
HCT VFR BLD AUTO: 33 % (ref 36–48)
HCT VFR BLD AUTO: 36.6 % (ref 36–48)
HGB BLD-MCNC: 10.9 G/DL (ref 12–16)
HGB BLD-MCNC: 11.6 G/DL (ref 12–16)
INR PPP: 1.18 (ref 0.85–1.15)
LYMPHOCYTES # BLD: 0.8 K/UL (ref 1–5.1)
LYMPHOCYTES NFR BLD: 4 %
MCH RBC QN AUTO: 27.9 PG (ref 26–34)
MCH RBC QN AUTO: 28 PG (ref 26–34)
MCHC RBC AUTO-ENTMCNC: 31.7 G/DL (ref 31–36)
MCHC RBC AUTO-ENTMCNC: 32.8 G/DL (ref 31–36)
MCV RBC AUTO: 84.9 FL (ref 80–100)
MCV RBC AUTO: 88.5 FL (ref 80–100)
MONOCYTES # BLD: 1 K/UL (ref 0–1.3)
MONOCYTES NFR BLD: 5 %
NEUTROPHILS # BLD: 18.2 K/UL (ref 1.7–7.7)
NEUTROPHILS NFR BLD: 90.6 %
PERFORMED ON: NORMAL
PLATELET # BLD AUTO: 158 K/UL (ref 135–450)
PLATELET # BLD AUTO: 173 K/UL (ref 135–450)
PMV BLD AUTO: 10.5 FL (ref 5–10.5)
PMV BLD AUTO: 10.7 FL (ref 5–10.5)
POTASSIUM SERPL-SCNC: 4 MMOL/L (ref 3.5–5.1)
PROT SERPL-MCNC: 6 G/DL (ref 6.4–8.2)
PROTHROMBIN TIME: 15.2 SEC (ref 11.9–14.9)
RBC # BLD AUTO: 3.89 M/UL (ref 4–5.2)
RBC # BLD AUTO: 4.14 M/UL (ref 4–5.2)
SODIUM SERPL-SCNC: 132 MMOL/L (ref 136–145)
TROPONIN, HIGH SENSITIVITY: 17 NG/L (ref 0–14)
TROPONIN, HIGH SENSITIVITY: 19 NG/L (ref 0–14)
TROPONIN, HIGH SENSITIVITY: 20 NG/L (ref 0–14)
TROPONIN, HIGH SENSITIVITY: 20 NG/L (ref 0–14)
TROPONIN, HIGH SENSITIVITY: 23 NG/L (ref 0–14)
WBC # BLD AUTO: 19.2 K/UL (ref 4–11)
WBC # BLD AUTO: 20.1 K/UL (ref 4–11)

## 2024-08-16 PROCEDURE — 2700000000 HC OXYGEN THERAPY PER DAY

## 2024-08-16 PROCEDURE — 99233 SBSQ HOSP IP/OBS HIGH 50: CPT | Performed by: STUDENT IN AN ORGANIZED HEALTH CARE EDUCATION/TRAINING PROGRAM

## 2024-08-16 PROCEDURE — 2580000003 HC RX 258: Performed by: HOSPITALIST

## 2024-08-16 PROCEDURE — 85610 PROTHROMBIN TIME: CPT

## 2024-08-16 PROCEDURE — 94761 N-INVAS EAR/PLS OXIMETRY MLT: CPT

## 2024-08-16 PROCEDURE — 36569 INSJ PICC 5 YR+ W/O IMAGING: CPT

## 2024-08-16 PROCEDURE — 84484 ASSAY OF TROPONIN QUANT: CPT

## 2024-08-16 PROCEDURE — 36415 COLL VENOUS BLD VENIPUNCTURE: CPT

## 2024-08-16 PROCEDURE — 85520 HEPARIN ASSAY: CPT

## 2024-08-16 PROCEDURE — 92526 ORAL FUNCTION THERAPY: CPT

## 2024-08-16 PROCEDURE — 6370000000 HC RX 637 (ALT 250 FOR IP): Performed by: INTERNAL MEDICINE

## 2024-08-16 PROCEDURE — 85025 COMPLETE CBC W/AUTO DIFF WBC: CPT

## 2024-08-16 PROCEDURE — 2580000003 HC RX 258: Performed by: INTERNAL MEDICINE

## 2024-08-16 PROCEDURE — 85027 COMPLETE CBC AUTOMATED: CPT

## 2024-08-16 PROCEDURE — 92610 EVALUATE SWALLOWING FUNCTION: CPT

## 2024-08-16 PROCEDURE — 85730 THROMBOPLASTIN TIME PARTIAL: CPT

## 2024-08-16 PROCEDURE — 80053 COMPREHEN METABOLIC PANEL: CPT

## 2024-08-16 PROCEDURE — 02HV33Z INSERTION OF INFUSION DEVICE INTO SUPERIOR VENA CAVA, PERCUTANEOUS APPROACH: ICD-10-PCS | Performed by: STUDENT IN AN ORGANIZED HEALTH CARE EDUCATION/TRAINING PROGRAM

## 2024-08-16 PROCEDURE — C1751 CATH, INF, PER/CENT/MIDLINE: HCPCS

## 2024-08-16 PROCEDURE — 6360000002 HC RX W HCPCS: Performed by: HOSPITALIST

## 2024-08-16 PROCEDURE — 6360000002 HC RX W HCPCS: Performed by: INTERNAL MEDICINE

## 2024-08-16 PROCEDURE — 6370000000 HC RX 637 (ALT 250 FOR IP): Performed by: HOSPITALIST

## 2024-08-16 RX ORDER — MORPHINE SULFATE 2 MG/ML
2 INJECTION, SOLUTION INTRAMUSCULAR; INTRAVENOUS EVERY 4 HOURS PRN
Status: DISCONTINUED | OUTPATIENT
Start: 2024-08-16 | End: 2024-08-17 | Stop reason: HOSPADM

## 2024-08-16 RX ORDER — SODIUM CHLORIDE 0.9 % (FLUSH) 0.9 %
5-40 SYRINGE (ML) INJECTION EVERY 12 HOURS SCHEDULED
Status: DISCONTINUED | OUTPATIENT
Start: 2024-08-16 | End: 2024-08-17 | Stop reason: HOSPADM

## 2024-08-16 RX ORDER — SODIUM CHLORIDE 0.9 % (FLUSH) 0.9 %
5-40 SYRINGE (ML) INJECTION PRN
Status: DISCONTINUED | OUTPATIENT
Start: 2024-08-16 | End: 2024-08-17 | Stop reason: HOSPADM

## 2024-08-16 RX ORDER — OXYCODONE HYDROCHLORIDE 5 MG/1
5 TABLET ORAL EVERY 4 HOURS PRN
Status: DISCONTINUED | OUTPATIENT
Start: 2024-08-16 | End: 2024-08-17 | Stop reason: HOSPADM

## 2024-08-16 RX ORDER — HEPARIN SODIUM 1000 [USP'U]/ML
80 INJECTION, SOLUTION INTRAVENOUS; SUBCUTANEOUS PRN
Status: DISCONTINUED | OUTPATIENT
Start: 2024-08-16 | End: 2024-08-17 | Stop reason: HOSPADM

## 2024-08-16 RX ORDER — SODIUM CHLORIDE 9 MG/ML
INJECTION, SOLUTION INTRAVENOUS PRN
Status: DISCONTINUED | OUTPATIENT
Start: 2024-08-16 | End: 2024-08-17 | Stop reason: HOSPADM

## 2024-08-16 RX ORDER — HEPARIN SODIUM 1000 [USP'U]/ML
40 INJECTION, SOLUTION INTRAVENOUS; SUBCUTANEOUS PRN
Status: DISCONTINUED | OUTPATIENT
Start: 2024-08-16 | End: 2024-08-17 | Stop reason: HOSPADM

## 2024-08-16 RX ORDER — MIRTAZAPINE 15 MG/1
15 TABLET, FILM COATED ORAL NIGHTLY
Status: DISCONTINUED | OUTPATIENT
Start: 2024-08-16 | End: 2024-08-17 | Stop reason: HOSPADM

## 2024-08-16 RX ORDER — HEPARIN SODIUM 10000 [USP'U]/100ML
1040 INJECTION, SOLUTION INTRAVENOUS CONTINUOUS
Status: DISCONTINUED | OUTPATIENT
Start: 2024-08-16 | End: 2024-08-17 | Stop reason: HOSPADM

## 2024-08-16 RX ADMIN — OXYBUTYNIN CHLORIDE 5 MG: 5 TABLET, EXTENDED RELEASE ORAL at 20:49

## 2024-08-16 RX ADMIN — HYDROXYZINE HYDROCHLORIDE 10 MG: 10 TABLET ORAL at 12:33

## 2024-08-16 RX ADMIN — HEPARIN SODIUM 1040 UNITS/HR: 10000 INJECTION, SOLUTION INTRAVENOUS at 06:52

## 2024-08-16 RX ADMIN — QUETIAPINE FUMARATE 50 MG: 50 TABLET, EXTENDED RELEASE ORAL at 20:49

## 2024-08-16 RX ADMIN — ACETAMINOPHEN 650 MG: 325 TABLET ORAL at 00:13

## 2024-08-16 RX ADMIN — BACLOFEN 5 MG: 10 TABLET ORAL at 20:50

## 2024-08-16 RX ADMIN — LEVOFLOXACIN 750 MG: 5 INJECTION, SOLUTION INTRAVENOUS at 00:12

## 2024-08-16 RX ADMIN — Medication 10 ML: at 20:54

## 2024-08-16 RX ADMIN — ACETAMINOPHEN 650 MG: 325 TABLET ORAL at 06:58

## 2024-08-16 RX ADMIN — OXYCODONE HYDROCHLORIDE 5 MG: 5 TABLET ORAL at 04:28

## 2024-08-16 RX ADMIN — Medication 10 ML: at 20:55

## 2024-08-16 RX ADMIN — OXYCODONE HYDROCHLORIDE 5 MG: 5 TABLET ORAL at 12:33

## 2024-08-16 RX ADMIN — MIRTAZAPINE 15 MG: 15 TABLET, FILM COATED ORAL at 20:49

## 2024-08-16 RX ADMIN — MONTELUKAST 10 MG: 10 TABLET, FILM COATED ORAL at 20:49

## 2024-08-16 RX ADMIN — OXYCODONE HYDROCHLORIDE 5 MG: 5 TABLET ORAL at 20:49

## 2024-08-16 RX ADMIN — OXYCODONE HYDROCHLORIDE AND ACETAMINOPHEN 500 MG: 500 TABLET ORAL at 08:21

## 2024-08-16 RX ADMIN — MORPHINE SULFATE 2 MG: 2 INJECTION, SOLUTION INTRAMUSCULAR; INTRAVENOUS at 17:56

## 2024-08-16 ASSESSMENT — PAIN DESCRIPTION - DESCRIPTORS
DESCRIPTORS: ACHING
DESCRIPTORS: SPASM

## 2024-08-16 ASSESSMENT — PAIN SCALES - GENERAL
PAINLEVEL_OUTOF10: 8
PAINLEVEL_OUTOF10: 9
PAINLEVEL_OUTOF10: 4
PAINLEVEL_OUTOF10: 10
PAINLEVEL_OUTOF10: 7
PAINLEVEL_OUTOF10: 10
PAINLEVEL_OUTOF10: 9
PAINLEVEL_OUTOF10: 9
PAINLEVEL_OUTOF10: 8

## 2024-08-16 ASSESSMENT — PAIN DESCRIPTION - LOCATION
LOCATION: BACK
LOCATION: GENERALIZED
LOCATION: COCCYX
LOCATION: BACK
LOCATION: NECK
LOCATION: BACK

## 2024-08-16 ASSESSMENT — PAIN DESCRIPTION - ORIENTATION
ORIENTATION: RIGHT
ORIENTATION: LEFT
ORIENTATION: OTHER (COMMENT)

## 2024-08-16 NOTE — CARE COORDINATION
HOC to f/u with patient and family today about decision for hospice and decision to return to facility skilled verses hospice or go home with hospice. Will need PT/OT and pre-cert for skilled. Last admission CC and family were working on getting Medicaid for patient for LTC. Family at that time did not feel patient was safe at home. However hospice notes indicate daughter Babs states patient is not happy there and they want her to go home. Will await outcome of hospice meeting and continue to assist with d/c planning.

## 2024-08-16 NOTE — CONSULTS
Consult Call Back    Goals of Care   Overwhelming Symptoms    Who:Pallavi Vila RN  Date:8/16/2024,  Time:12:11 PM    Electronically signed by Krista Pereira on 8/16/24 at 12:11 PM EDT

## 2024-08-16 NOTE — PROGRESS NOTES
Physical Therapy/Occupational Therapy     Chart reviewed, orders received. Spoke with RN who reports pt may not be appropriate for therapy at this time. Pt has also not been properly anticoagulated for B PE's for 24 hours according to treatment protocol for therapy due to coughing up blood clots and required discontinuation of heparin. Will attempt at later date as pt condition allow.     Jh Odell, PT, DPT   Zohreh Castillo OTR/L

## 2024-08-16 NOTE — PLAN OF CARE
Bedside swallow evaluation completed this date.    Brooke Garsia M.S. CCC-SLP  Speech-language pathologist  [phone]

## 2024-08-16 NOTE — PLAN OF CARE
Problem: Discharge Planning  Goal: Discharge to home or other facility with appropriate resources  8/15/2024 2335 by Bonnie Rowley, RN  Outcome: Progressing  Flowsheets (Taken 8/15/2024 2005)  Discharge to home or other facility with appropriate resources:   Identify barriers to discharge with patient and caregiver   Arrange for needed discharge resources and transportation as appropriate   Identify discharge learning needs (meds, wound care, etc)   Refer to discharge planning if patient needs post-hospital services based on physician order or complex needs related to functional status, cognitive ability or social support system     Problem: Pain  Goal: Verbalizes/displays adequate comfort level or baseline comfort level  8/15/2024 2335 by Bonnie Rowley RN  Outcome: Progressing  Flowsheets (Taken 8/15/2024 1959)  Verbalizes/displays adequate comfort level or baseline comfort level:   Encourage patient to monitor pain and request assistance   Assess pain using appropriate pain scale   Administer analgesics based on type and severity of pain and evaluate response   Implement non-pharmacological measures as appropriate and evaluate response   Consider cultural and social influences on pain and pain management   Notify Licensed Independent Practitioner if interventions unsuccessful or patient reports new pain     Problem: Skin/Tissue Integrity  Goal: Absence of new skin breakdown  Description: 1.  Monitor for areas of redness and/or skin breakdown  2.  Assess vascular access sites hourly  3.  Every 4-6 hours minimum:  Change oxygen saturation probe site  4.  Every 4-6 hours:  If on nasal continuous positive airway pressure, respiratory therapy assess nares and determine need for appliance change or resting period.  8/15/2024 2335 by Bonnie Rowley, RN  Outcome: Progressing    Problem: Safety - Adult  Goal: Free from fall injury  8/15/2024 2335 by Bonnie Rowley, RN  Outcome: Progressing  Flowsheets (Taken 8/15/2024 2335)  Free From Fall

## 2024-08-16 NOTE — PROGRESS NOTES
Hospitalist Progress Note  Luis Wallace MD, FACP      Name:  Isa Hernandez /Age/Sex: 1945  (79 y.o. female)   MRN & CSN:  [id number] & [id number] Admission Date/Time: 8/15/2024  8:13 AM   Location:  [facility]0424[facility]01 PCP: Jocelin Ellsworth MD         Hospital Day: 2    Assessment and Plan:   Isa Hernandez is a 79 y.o.  female  who presents with hemoptysis    # Hemoptysis.  Secondary to malignancy.  Patient coughing up blood clots.  Discussed with Dr. Colunga.  Plan to monitor overnight.  Heparin currently on hold due to ongoing hemoptysis, await further recommendations from pulmonology, start heparin drip once hemoptysis is resolved,    # Segmental pulmonary embolism,: Heparin is currently on hold given hemoptysis, start heparin drip once hemoptysis is resolved, pulmonary on board if continues to have hemoptysis may need IVC filter placement    # Acute hypoxic respiratory failure as evidenced with SpO2 of less than 89% on room air currently on supplemental oxygen, continue supplemental oxygen and continue to wean, empirically on Levaquin for 5-day course    # Lung malignancy.  Adenocarcinoma on last BAL.  Request hematology/oncology consult.  Patient agreeable to aggressive care.    # Elevated troponin.  Slight elevation which is stable.  Continue monitor closely.    $ Deconditioning.  PT OT while in-house.  Patient with age-related debility.    # Advance care planning.  Discussed options with patient.  She states she would like to be full code.    Diet Diet NPO   DVT Prophylaxis On hold 2/2 hemoptysis   Code Status Full Code   Disposition To ECF in 2-3 days     Subjective:     Patient was seen and examined at bedside, feeling weak, denies any chest pain waiting to meet with hospice, complaining of chest pain otherwise afebrile    Ten point ROS reviewed negative, unless as noted above    Objective:       Intake/Output Summary (Last 24 hours) at 2024 1040  Last data filed at 2024

## 2024-08-16 NOTE — PROGRESS NOTES
Speech Language Pathology  Clinical Bedside Swallow Assessment  Facility/Department: [place] [address] [facility]        Recommendations:  Diet recommendation: IDDSI 6 Soft and Bite Sized Solids; IDDSI 0 Thin Liquids; Meds PO as tolerated, 1 at a time  Instrumentation: Instrumental swallow study recommended to correlate clinical findings (both MBSS and FEES reviewed with pt this date, discussed indications and contraindications for both studies), however pt declined at this time despite education.  RN aware.  Will continue to monitor.  Risk management: upright for all intake, stay upright for at least 30 mins after intake, small bites/sips, close supervision, oral care 2-3x/day to reduce adverse affects in the event of aspiration, increase physical mobility as able, alternate bites/sips, slow rate of intake, general GERD precautions, general aspiration precautions, and hold PO and contact SLP if s/s of aspiration or worsening respiratory status develop.    Pt is considered at an increased risk of aspiration with all PO intake at this time; recommend adherence to strict aspiration precautions and close supervision during meals  Note discussions with pt/pt's family this admission regarding GOC -- will continue to monitor.  Pt currently full code per EMR.      NAME:Isa Hernandez  : 1945 (79 y.o.)   MRN: [id number]  ROOM: 0424/0424-01  ADMISSION DATE: 8/15/2024  PATIENT DIAGNOSIS(ES): Lung mass [R91.8]  PE (pulmonary thromboembolism) (HCC) [I26.99]  Pulmonary embolism, bilateral (HCC) [I26.99]  Dyspnea, unspecified type [R06.00]  Pneumonia due to infectious organism, unspecified laterality, unspecified part of lung [J18.9]  Chief Complaint   Patient presents with    Shortness of Breath     Patient brought in by [facility] from [place].   Patient reports SOB that began this morning along with head congestion and blood in sputum.  Wears 2L at baseline. Hx of lung cancer.      Patient Active Problem List    Diagnosis  with HAKEEM MUHAMMAD at 12:24 pm on 8/15/2024.         XR CHEST (2 VW)   Final Result   Stable appearance of the lungs with right upper lobe collapse.               Reason for Referral  Isa Hernandez was referred for a bedside swallow evaluation to assess the efficiency of their swallow function, identify signs and symptoms of aspiration and make recommendations regarding safe dietary consistencies, effective compensatory strategies, and safe eating environment.    Assessment    Medical record review/interview: Per MD H&P on 8/15/24: \" 79 y.o. female who presented to [facility] with history of adenocarcinoma of the lung.  Patient was here recently for hemoptysis.  Patient had been on Xarelto but this was discontinued due to hemoptysis.  Patient returns to the hospital with increasing shortness of breath and hemoptysis.  Patient noted to have bilateral subsegmental PEs.  Initially patient states she did not want aggressive care.  Now she is requesting aggressive care.  Patient declines hospice care\".        Predisposing dysphagia risk factors: Hx of neurological disease , COPD, Other chronic respiratory illness, Cognitive Deficit, Age, Graf's Esophagus, and Chronic Smoking History  Clinical signs of possible chronic dysphagia: N/A  Precipitating dysphagia risk factors: reduced physical mobility and increased O2 demands    Patient Complaints:   Odynophagia: [] Yes [x] No  Globus Sensation: [] Yes [x] No  Reflux Sx's: [] Yes [x] No  SOB with PO intake: [x] Yes [] No (\"sometimes\" per pt)  Increased WOB with PO intake: [x] Yes [] No (\"sometimes\" per pt)  Coughing/Choking with PO intake: [x] Yes [] No (pt denied coughing with meals PTA, however had significant coughing episode with breakfast this date per pt and RN)  Weight loss: [] Yes [x] No  Reduced Appetite: [] Yes [x] No  Premature satiety: [] Yes [x] No  Recent and/or Recurrent PNA: [x] Yes [] No  Changes in voice/vocal quality: [] Yes [x] No  Trouble

## 2024-08-16 NOTE — CONSULTS
[facility]    Consult Note      Requesting Physician:  Dr. Cross    CHIEF COMPLAINT:  NSCLC    HISTORY OF PRESENT ILLNESS:    Ms. Hernandez  is a 79 y.o. female we are seeing in consultation for     ICD-10-CM    1. Dyspnea, unspecified type  R06.00       2. PE (pulmonary thromboembolism) (HCC)  I26.99       3. Lung mass  R91.8       4. Pneumonia due to infectious organism, unspecified laterality, unspecified part of lung  J18.9          This patient was admitted to [facility] on 8/15/2024 due to dyspnea.    Diagnosis:  A. Bronchoscopy with EBUS, 7/27/2024, with Dr. Salvador Colunga showed obliteration of the RUL airway.  Cytology from LN station 4R was positive for adenocarcinoma of the lung.    Imaging:  A. CTPA, 8/15/2024, showed a large infiltrating mass in the right hilum with collapse of the RUL.  Bilateral segmental and subsegmental PEs were noted.  B. CT abdomen/pelvis, 5/14/2024, showed no evidence of metastatic disease.    She is on O2 at 3 LPM by NC.  Afebrile.    HR 60's on tele.  She is sleeping soundly.  No evidence of hemoptysis.    Past Medical History:  Past Medical History:   Diagnosis Date    Arthritis     Asthma     Bronchitis chronic     Colon polyp     COPD (chronic obstructive pulmonary disease) (HCC)     Coronary artery calcification seen on CT scan 7/29/2024    Emphysema of lung (HCC)     Guillain-Minden (HCC)     Hyperlipidemia     Lock jaw     Pneumonia     Post-nasal drip     Primary hypertension 03/18/2024    Pulmonary nodule     bi lateral    Rash     itchy, bumps    Reflex sympathetic dystrophy     RSD (reflex sympathetic dystrophy)     TMJ (dislocation of temporomandibular joint)        Past Surgical History:  Past Surgical History:   Procedure Laterality Date    APPENDECTOMY      BACK SURGERY      BRONCHOSCOPY  2008    BRONCHOSCOPY N/A 7/27/2024    BRONCHOSCOPY  0435 -- -- -- -- -- 91 % -- --   08/16/24 0428 -- -- -- -- 18 -- -- --   08/16/24 0425 114/70 97.7 °F (36.5 °C) Oral 67 18 96 % -- --   08/16/24 0018 (!) 142/77 97.6 °F (36.4 °C) Axillary 68 18 95 % -- --   08/15/24 1959 118/72 97.5 °F (36.4 °C) Oral 86 18 92 % -- --   08/15/24 1715 129/82 97.4 °F (36.3 °C) Axillary 71 22 94 % -- --   08/15/24 1615 129/82 97.4 °F (36.3 °C) Axillary 71 22 94 % -- --   08/15/24 1516 136/69 -- -- 73 18 94 % -- --   08/15/24 1342 (!) 151/74 -- -- 63 19 94 % -- --   08/15/24 1207 (!) 152/86 -- -- 79 20 91 % -- --   08/15/24 1017 139/69 -- -- 69 26 92 % -- --   08/15/24 0818 131/65 98.5 °F (36.9 °C) Oral 59 18 100 % 1.651 m (5' 5\") 57.6 kg (127 lb)           CONSTITUTIONAL: awake, alert, cooperative, no apparent distress   EYES: pupils equal, round and reactive to light, sclera clear and conjunctiva normal  ENT: Normocephalic, without obvious abnormality, atraumatic  NECK: supple, symmetrical, no jugular venous distension and no carotid bruits   HEMATOLOGIC/LYMPHATIC: no cervical, supraclavicular or axillary lymphadenopathy   LUNGS: no increased work of breathing and clear to auscultation   CARDIOVASCULAR: regular rate and rhythm, normal S1 and S2, no murmur noted  ABDOMEN: normal bowel sounds x 4, soft, non-distended, non-tender, no masses palpated, no hepatosplenomegaly   MUSCULOSKELETAL: full range of motion noted, tone is normal  NEUROLOGIC: awake, alert, oriented to name, place and time. Motor skills grossly intact.   SKIN: Normal skin color, texture, turgor and no jaundice. appears intact   EXTREMITIES: no LE edema       DATA:    PT/INR:    Recent Labs     08/15/24  1920 08/07/24  2313   INR 1.18* 1.74*     PTT:    Recent Labs     08/15/24  1920   APTT 34.6       CMP:    Recent Labs     08/16/24  0320   *   K 4.0   CL 94*   CO2 25   BUN 6*     Mg:    Recent Labs     08/07/24  2143 08/04/24  0647 07/29/24  1313   MG 2.10 2.10 1.80       Lab Results   Component Value Date

## 2024-08-16 NOTE — CONSULTS
Pharmacy to Manage Heparin Infusion per Hospital Nomogram    Dx: PE  Pt wt = 57.6 kg  Baseline aPTT =     Oral factor Xa-inhibitors may alter and elevate anti-Xa levels used for unfractionated heparin monitoring. As a result, anti-Xa monitoring is not accurate while Xa-inhibitor activity is detectable. Utilize aPTT monitoring when patient received an oral factor Xa-inhibitor (apixaban, betrixaban, edoxaban or rivaroxaban) within 72 hours prior to admission (please document last administration time). The goal is to allow a washout of oral factor Xa-inhibitors by using aPTT for 72 hours, then change to ant-Xa levels for UFH.     Heparin (weight-based) Infusion: VTE/DVT/PE  No initial Bolus; Heparin infusion at 18 units/kg/hr (recommended max initial rate: 2100 units/hr).  Recheck anti-Xa (unless aPTT being used) in 6 hours.  Goal anti-Xa 0.3-0.7 IU/mL  Goal aPTT =  seconds.  Javed Verdugo, Pharm D.8/16/2024 6:43 AM

## 2024-08-16 NOTE — PROGRESS NOTES
Arrived to place PICC after chart review. Pre-procedure and timeout done with TRAVIS Madsen. Discussed limitations of placement and allergies. Consent confirmed. Procedure explained to pt, including risks and benefits. All questions answered. Pt verbalized understanding.      Vessels easily collapsible upon assessment. No difficulty accessing R brachial vein. Blood free flowing and non-pulsatile. Guidewire, introducer, and catheter all easily inserted. PICC placement verified using 3CG technology as evidenced by peaked P-waves with no initial deflection. Line has brisk blood return and flushes easily.     OK to use PICC. Please use new IV tubing when connecting to the newly placed central line.      Patient tolerated sterile procedure well. Bed left in low position with belongings and call light within reach. Educated on line care. Handoff to bedside RN.      Please call with any questions or concerns. The  will direct you to the PICC RN that is on call.      [phone]

## 2024-08-16 NOTE — PROGRESS NOTES
Patient: Isa Hernandez MRN: [id number]  Date of  Admission: 8/15/2024   YOB: 1945  Age: 79 y.o.  Sex: female    [address]  Room/Bed: 0424/0424-01 Admitting Physician: JANUSZ ARAIZA    Attending Physician:  Luis Wallace MD         Pulmonary Service Note      SUBJECTIVE:    Patient says that she does not want to die.  She has no other complaints.  Nursing reported that patient coughed up blood around 2-3 times        OBJECTIVE    Medications    Continuous Infusions:   [Held by provider] heparin (PORCINE) Infusion Stopped (08/16/24 0837)    sodium chloride         Scheduled Meds:   baclofen  5 mg Oral Nightly    montelukast  10 mg Oral Nightly    oxyBUTYnin  5 mg Oral Nightly    QUEtiapine  50 mg Oral Nightly    vitamin C  500 mg Oral Daily    sodium chloride flush  5-40 mL IntraVENous 2 times per day    levofloxacin  750 mg IntraVENous Q24H       PRN Meds:  heparin (porcine), heparin (porcine), oxyCODONE, morphine, fluticasone, hydrOXYzine HCl, sodium chloride flush, sodium chloride, potassium chloride **OR** potassium alternative oral replacement **OR** potassium chloride, magnesium sulfate, ondansetron **OR** ondansetron, polyethylene glycol, acetaminophen **OR** acetaminophen    Physical    Patient Vitals for the past 24 hrs:   BP Temp Temp src Pulse Resp SpO2 Weight   08/16/24 1105 126/71 -- -- -- -- 90 % --   08/16/24 1104 -- -- -- -- -- (!) 88 % --   08/16/24 1001 -- -- -- -- -- 98 % --   08/16/24 0914 131/80 -- -- -- -- -- --   08/16/24 0815 125/73 97.6 °F (36.4 °C) Oral 65 16 -- --   08/16/24 0645 -- -- -- -- -- -- 54 kg (119 lb)   08/16/24 0435 -- -- -- -- -- 91 % --   08/16/24 0428 -- -- -- -- 18 -- --   08/16/24 0425 114/70 97.7 °F (36.5 °C) Oral 67 18 96 % --   08/16/24 0018 (!) 142/77 97.6 °F (36.4 °C) Axillary 68 18 95 % --   08/15/24 1959 118/72 97.5 °F (36.4 °C) Oral 86 18 92 % --   08/15/24 1715 129/82 97.4 °F (36.3 °C) Axillary 71 22 94 % --   08/15/24 1615

## 2024-08-16 NOTE — PROGRESS NOTES
[facility]  Talked with family on the phone. Talked with three of four siblings Penny Corley, and Babs. They all give verbal consent for patient to go to a hospice inpatient unit. MD has patient charted as only alert and oriented to person and place, not to time or situation. Since MD thinks patient has confusion, will defer to majority of living children for decision making. Holding a bed at [facility] for patient to discharge to there this evening. Soleadd Plunkett is onsite and will be making contact with the family in the room. I am moving forward with the discharge plan for this evening per family request pending MD approval.     Elena Bull RN  [phone] (work cell)  [phone] (main & referrals)

## 2024-08-16 NOTE — PROGRESS NOTES
[facility]    Met with patient, and Babatunde's Babs and Penny at bedside to offer support, answer questions. Patient repeatedly asks the same questions, appears to forget the answers. Plan is discharge to [facility] with HOC. DNRCC signed per MD and discharge packet prepared. Thank you for the opportunity to serve this patient. Any questions, please call [phone].    Soledad Plunkett RN

## 2024-08-17 NOTE — DISCHARGE INSTR - DIET

## 2024-08-17 NOTE — PROGRESS NOTES
Patient discharged. Discharge education provided. PICC line in place. All belongings gathered. Patient left via stretcher at 2250.

## 2024-08-17 NOTE — DISCHARGE INSTR - COC
Continuity of Care Form    Patient Name: Isa Hernandez   :  1945  MRN:  [id number]    Admit date:  8/15/2024  Discharge date:  24    Code Status Order: Limited   Advance Directives:   Advance Care Flowsheet Documentation             Admitting Physician:  Moe Cross MD  PCP: Jocelin Ellsworth MD    Discharging Nurse: Juli Alonso  Discharging Hospital Unit/Room#: 0424/0424-01  Discharging Unit Phone Number: [phone]    Emergency Contact:   Extended Emergency Contact Information  Primary Emergency Contact: Barney Hernandez   [facility]  Home Phone: [phone]  Mobile Phone: [phone]  Relation: Child  Secondary Emergency Contact: Patrick Hernandez  Home Phone: [phone]  Relation: Child    Past Surgical History:  Past Surgical History:   Procedure Laterality Date    APPENDECTOMY      BACK SURGERY      BRONCHOSCOPY      BRONCHOSCOPY N/A 2024    BRONCHOSCOPY ENDOBRONCHIAL ULTRASOUND performed by Salvador Colunga MD at [facility] ENDOSCOPY    BRONCHOSCOPY N/A 2024    BRONCHOSCOPY performed by Salvador Colunga MD at [facility] ENDOSCOPY    COLONOSCOPY  11/15/2012    1 snared polyp    CT NEEDLE BIOPSY LUNG PERCUTANEOUS  2023    CT NEEDLE BIOPSY LUNG PERCUTANEOUS 2023 [facility] CT SCAN    HYSTERECTOMY (CERVIX STATUS UNKNOWN)      PACEMAKER INSERTION      TONSILLECTOMY      UPPER GASTROINTESTINAL ENDOSCOPY N/A 5/15/2024    ESOPHAGOGASTRODUODENOSCOPY BIOPSY performed by Kalyan Grullon MD at [facility] ENDOSCOPY       Immunization History:   Immunization History   Administered Date(s) Administered    COVID-19, MODERNA Booster BLUE border, (age 18y+), IM, 50mcg/0.25mL 2021    COVID-19, PFIZER PURPLE top, DILUTE for use, (age 12 y+), 30mcg/0.3mL 2021, 2021    Influenza 2011    Influenza Vaccine, unspecified formulation 2011    Pneumococcal, PCV-13, PREVNAR 13, (age 6w+), IM, 0.5mL 2017    Pneumococcal, PPSV23, PNEUMOVAX 23, (age  8/16/2024    Nursing Mobility/ADLs:  Walking   Assisted  Transfer  Assisted  Bathing  Assisted  Dressing  Assisted  Toileting  Assisted  Feeding  Independent  Med Admin  Assisted  Med Delivery   whole    Wound Care Documentation and Therapy:        Elimination:  Continence:   Bowel: Yes  Bladder: Yes  Urinary Catheter: None   Colostomy/Ileostomy/Ileal Conduit: No       Date of Last BM: LOCO     Intake/Output Summary (Last 24 hours) at 8/16/2024 2134  Last data filed at 8/16/2024 1952  Gross per 24 hour   Intake 840 ml   Output 450 ml   Net 390 ml     I/O last 3 completed shifts:  In: 1090 [P.O.:1080; I.V.:10]  Out: 800 [Urine:800]    Safety Concerns:     At Risk for Falls    Impairments/Disabilities:      None    Nutrition Therapy:  Current Nutrition Therapy:   - Oral Diet:  General    Routes of Feeding: Oral  Liquids: No Restrictions  Daily Fluid Restriction: no  Last Modified Barium Swallow with Video (Video Swallowing Test): not done    Treatments at the Time of Hospital Discharge:   Respiratory Treatments: n/a  Oxygen Therapy:  is on oxygen at 5 L/min per nasal cannula.  Ventilator:    - No ventilator support    Rehab Therapies: n/a  Weight Bearing Status/Restrictions: No weight bearing restrictions  Other Medical Equipment (for information only, NOT a DME order):  n/a  Other Treatments: n/a    Patient's personal belongings (please select all that are sent with patient):  TRAVIS Jones SIGNATURE:  Electronically signed by Juli Alonso RN on 8/16/24 at 9:42 PM EDT    CASE MANAGEMENT/SOCIAL WORK SECTION    Inpatient Status Date: 8/15/2024    Readmission Risk Assessment Score:  Readmission Risk              Risk of Unplanned Readmission:  53           Discharging to Facility/ Agency   Name:   Address:  Phone:  Fax:    Dialysis Facility (if applicable)   Name:  Address:  Dialysis Schedule:  Phone:  Fax:    / signature: {Esignature:[id number]}    PHYSICIAN SECTION    Prognosis:

## 2024-08-19 LAB
BACTERIA BLD CULT ORG #2: NORMAL
BACTERIA BLD CULT: NORMAL

## 2024-09-10 ENCOUNTER — ANTI-COAG VISIT (OUTPATIENT)
Dept: PHARMACY | Age: 79
End: 2024-09-10

## 2024-12-04 ENCOUNTER — TELEPHONE (OUTPATIENT)
Dept: ADMINISTRATIVE | Age: 79
End: 2024-12-04

## 2024-12-04 NOTE — TELEPHONE ENCOUNTER
Submitted PA for Xarelto 20MG tablets  Via CMM Key: [id number] STATUS: Eligibility could not be verified for this patient - patient not found. Please review patient information and re-submit.     Follow up done daily; if no decision with in three days we will refax.  If another three days goes by with no decision will call the insurance for status.

## 2024-12-30 NOTE — ED NOTES
Pt found drinking a bottle of water she brought from home, pt educated on npo status until all tests were back. Pt states \"I don't care, I do what I want.\"   <-- Click to add NO pertinent Family History

## (undated) PROCEDURE — 07D73ZX EXTRACTION OF THORAX LYMPHATIC, PERCUTANEOUS APPROACH, DIAGNOSTIC: ICD-10-PCS

## (undated) DEVICE — CANNULA,OXY,ADULT,SUPERSOFT,W/7'TUB,SC: Brand: MEDLINE INDUSTRIES, INC.

## (undated) DEVICE — TUBING, SUCTION, 3/16" X 12', STRAIGHT: Brand: MEDLINE

## (undated) DEVICE — NEEDLE ASPIR 22GA L700MM US GUID TREAT DST END FOR

## (undated) DEVICE — SYRINGE MED 10ML POLYPR LUERSLIP TIP FLAT TOP W/O SFTY DISP

## (undated) DEVICE — ELECTRODE,ECG,STRESS,FOAM,3PK: Brand: MEDLINE

## (undated) DEVICE — ENDOSCOPIC KIT 2 12 FT OP4 DE2 GWN SYR

## (undated) DEVICE — CONMED SCOPE SAVER BITE BLOCK, 20X27 MM: Brand: SCOPE SAVER

## (undated) DEVICE — FORCEPS BX L240CM JAW DIA2.8MM L CAP W/ NDL MIC MESH TOOTH

## (undated) DEVICE — KIT PRECLEANING BS ENDOSCP

## (undated) DEVICE — ADAPTER TBNG DIA15MM SWVL FBROPT BRONCHSCP TERM 2 AXIS PEEP

## (undated) DEVICE — CANNULA NSL AD TBNG L7FT PVC STR NONFLARED PRNG O2 DEL W STD

## (undated) DEVICE — SINGLE USE BIOPSY VALVE MAJ-210: Brand: SINGLE USE BIOPSY VALVE (STERILE)

## (undated) DEVICE — BOWL MED M 16OZ PLAS CAP GRAD

## (undated) DEVICE — SINGLE USE SUCTION VALVE MAJ-209: Brand: SINGLE USE SUCTION VALVE (STERILE)

## (undated) DEVICE — KIT COLON W/ 1.1OZ LUB AND 2 END

## (undated) DEVICE — NEEDLE ASPIR 21GA L700MM US GUID TREAT DST END FOR EFFICIENT

## (undated) DEVICE — NEEDLE ASPIR 19GA HISTOLOGY FLX VIZISHOT 2

## (undated) DEVICE — CONTAINER,SPEC,PNEUM TUBE,3OZ,STRL PATH: Brand: MEDLINE

## (undated) DEVICE — TUBING, SUCTION, 3/16" X 6', STRAIGHT: Brand: MEDLINE

## (undated) DEVICE — GOWN ISOLATN REGULAR L BLU POLYPR POLY OVR HD NK OPN BK

## (undated) DEVICE — YANKAUER,BULB TIP,W/O VENT,RIGID,STERILE: Brand: MEDLINE

## (undated) DEVICE — Device: Brand: BALLOON

## (undated) DEVICE — TRAP,MUCUS SPECIMEN, 80CC: Brand: MEDLINE

## (undated) DEVICE — COVER,TABLE,44X90,STERILE: Brand: MEDLINE

## (undated) DEVICE — SYRINGE MED 10ML SLIP TIP BLNT FILL AND LUERLOCK DISP

## (undated) DEVICE — SYRINGE MED 10ML LUERLOCK TIP W/O SFTY DISP